# Patient Record
Sex: MALE | Race: WHITE | NOT HISPANIC OR LATINO | URBAN - METROPOLITAN AREA
[De-identification: names, ages, dates, MRNs, and addresses within clinical notes are randomized per-mention and may not be internally consistent; named-entity substitution may affect disease eponyms.]

---

## 2016-09-17 RX ORDER — ASPIRIN/CALCIUM CARB/MAGNESIUM 324 MG
1 TABLET ORAL
Qty: 30 | Refills: 3
Start: 2016-09-17 | End: 2023-09-15

## 2017-01-10 ENCOUNTER — OUTPATIENT (OUTPATIENT)
Dept: OUTPATIENT SERVICES | Facility: HOSPITAL | Age: 82
LOS: 1 days | Discharge: HOME | End: 2017-01-10

## 2017-01-10 DIAGNOSIS — Z95.0 PRESENCE OF CARDIAC PACEMAKER: Chronic | ICD-10-CM

## 2017-01-10 DIAGNOSIS — Z95.1 PRESENCE OF AORTOCORONARY BYPASS GRAFT: Chronic | ICD-10-CM

## 2017-01-10 DIAGNOSIS — Z98.89 OTHER SPECIFIED POSTPROCEDURAL STATES: Chronic | ICD-10-CM

## 2017-06-14 ENCOUNTER — OUTPATIENT (OUTPATIENT)
Dept: OUTPATIENT SERVICES | Facility: HOSPITAL | Age: 82
LOS: 1 days | Discharge: HOME | End: 2017-06-14

## 2017-06-14 DIAGNOSIS — Z95.0 PRESENCE OF CARDIAC PACEMAKER: Chronic | ICD-10-CM

## 2017-06-14 DIAGNOSIS — I65.29 OCCLUSION AND STENOSIS OF UNSPECIFIED CAROTID ARTERY: ICD-10-CM

## 2017-06-14 DIAGNOSIS — Z98.89 OTHER SPECIFIED POSTPROCEDURAL STATES: Chronic | ICD-10-CM

## 2017-06-14 DIAGNOSIS — Z95.1 PRESENCE OF AORTOCORONARY BYPASS GRAFT: Chronic | ICD-10-CM

## 2017-06-22 ENCOUNTER — APPOINTMENT (OUTPATIENT)
Dept: CARDIOLOGY | Facility: CLINIC | Age: 82
End: 2017-06-22

## 2017-06-22 VITALS — SYSTOLIC BLOOD PRESSURE: 140 MMHG | BODY MASS INDEX: 27.26 KG/M2 | DIASTOLIC BLOOD PRESSURE: 60 MMHG | WEIGHT: 190 LBS

## 2017-06-28 DIAGNOSIS — I48.91 UNSPECIFIED ATRIAL FIBRILLATION: ICD-10-CM

## 2017-06-28 DIAGNOSIS — Z79.01 LONG TERM (CURRENT) USE OF ANTICOAGULANTS: ICD-10-CM

## 2017-07-07 ENCOUNTER — OUTPATIENT (OUTPATIENT)
Dept: OUTPATIENT SERVICES | Facility: HOSPITAL | Age: 82
LOS: 1 days | Discharge: HOME | End: 2017-07-07

## 2017-07-07 DIAGNOSIS — N40.0 BENIGN PROSTATIC HYPERPLASIA WITHOUT LOWER URINARY TRACT SYMPTOMS: ICD-10-CM

## 2017-07-07 DIAGNOSIS — Z98.89 OTHER SPECIFIED POSTPROCEDURAL STATES: Chronic | ICD-10-CM

## 2017-07-07 DIAGNOSIS — Z95.0 PRESENCE OF CARDIAC PACEMAKER: Chronic | ICD-10-CM

## 2017-07-07 DIAGNOSIS — E78.00 PURE HYPERCHOLESTEROLEMIA, UNSPECIFIED: ICD-10-CM

## 2017-07-07 DIAGNOSIS — N18.2 CHRONIC KIDNEY DISEASE, STAGE 2 (MILD): ICD-10-CM

## 2017-07-07 DIAGNOSIS — K76.89 OTHER SPECIFIED DISEASES OF LIVER: ICD-10-CM

## 2017-07-07 DIAGNOSIS — I65.29 OCCLUSION AND STENOSIS OF UNSPECIFIED CAROTID ARTERY: ICD-10-CM

## 2017-07-07 DIAGNOSIS — Z95.1 PRESENCE OF AORTOCORONARY BYPASS GRAFT: Chronic | ICD-10-CM

## 2017-07-07 DIAGNOSIS — E55.9 VITAMIN D DEFICIENCY, UNSPECIFIED: ICD-10-CM

## 2017-07-07 DIAGNOSIS — D64.9 ANEMIA, UNSPECIFIED: ICD-10-CM

## 2017-07-07 DIAGNOSIS — E11.9 TYPE 2 DIABETES MELLITUS WITHOUT COMPLICATIONS: ICD-10-CM

## 2017-07-12 ENCOUNTER — OUTPATIENT (OUTPATIENT)
Dept: OUTPATIENT SERVICES | Facility: HOSPITAL | Age: 82
LOS: 1 days | Discharge: HOME | End: 2017-07-12

## 2017-07-12 DIAGNOSIS — Z79.01 LONG TERM (CURRENT) USE OF ANTICOAGULANTS: ICD-10-CM

## 2017-07-12 DIAGNOSIS — I48.91 UNSPECIFIED ATRIAL FIBRILLATION: ICD-10-CM

## 2017-07-12 DIAGNOSIS — Z98.89 OTHER SPECIFIED POSTPROCEDURAL STATES: Chronic | ICD-10-CM

## 2017-07-12 DIAGNOSIS — Z95.0 PRESENCE OF CARDIAC PACEMAKER: Chronic | ICD-10-CM

## 2017-07-12 DIAGNOSIS — Z95.1 PRESENCE OF AORTOCORONARY BYPASS GRAFT: Chronic | ICD-10-CM

## 2017-07-12 DIAGNOSIS — I65.29 OCCLUSION AND STENOSIS OF UNSPECIFIED CAROTID ARTERY: ICD-10-CM

## 2017-08-16 ENCOUNTER — OUTPATIENT (OUTPATIENT)
Dept: OUTPATIENT SERVICES | Facility: HOSPITAL | Age: 82
LOS: 1 days | Discharge: HOME | End: 2017-08-16

## 2017-08-16 DIAGNOSIS — Z79.01 LONG TERM (CURRENT) USE OF ANTICOAGULANTS: ICD-10-CM

## 2017-08-16 DIAGNOSIS — Z95.1 PRESENCE OF AORTOCORONARY BYPASS GRAFT: Chronic | ICD-10-CM

## 2017-08-16 DIAGNOSIS — Z95.0 PRESENCE OF CARDIAC PACEMAKER: Chronic | ICD-10-CM

## 2017-08-16 DIAGNOSIS — Z98.89 OTHER SPECIFIED POSTPROCEDURAL STATES: Chronic | ICD-10-CM

## 2017-08-16 DIAGNOSIS — I48.91 UNSPECIFIED ATRIAL FIBRILLATION: ICD-10-CM

## 2017-08-16 DIAGNOSIS — I65.29 OCCLUSION AND STENOSIS OF UNSPECIFIED CAROTID ARTERY: ICD-10-CM

## 2017-09-07 DIAGNOSIS — Z79.01 LONG TERM (CURRENT) USE OF ANTICOAGULANTS: ICD-10-CM

## 2017-09-07 DIAGNOSIS — I48.91 UNSPECIFIED ATRIAL FIBRILLATION: ICD-10-CM

## 2017-09-20 ENCOUNTER — OUTPATIENT (OUTPATIENT)
Dept: OUTPATIENT SERVICES | Facility: HOSPITAL | Age: 82
LOS: 1 days | Discharge: HOME | End: 2017-09-20

## 2017-09-20 DIAGNOSIS — I48.91 UNSPECIFIED ATRIAL FIBRILLATION: ICD-10-CM

## 2017-09-20 DIAGNOSIS — Z95.0 PRESENCE OF CARDIAC PACEMAKER: Chronic | ICD-10-CM

## 2017-09-20 DIAGNOSIS — Z79.01 LONG TERM (CURRENT) USE OF ANTICOAGULANTS: ICD-10-CM

## 2017-09-20 DIAGNOSIS — I65.29 OCCLUSION AND STENOSIS OF UNSPECIFIED CAROTID ARTERY: ICD-10-CM

## 2017-09-20 DIAGNOSIS — Z95.1 PRESENCE OF AORTOCORONARY BYPASS GRAFT: Chronic | ICD-10-CM

## 2017-09-20 DIAGNOSIS — Z98.89 OTHER SPECIFIED POSTPROCEDURAL STATES: Chronic | ICD-10-CM

## 2017-10-18 ENCOUNTER — OUTPATIENT (OUTPATIENT)
Dept: OUTPATIENT SERVICES | Facility: HOSPITAL | Age: 82
LOS: 1 days | Discharge: HOME | End: 2017-10-18

## 2017-10-18 DIAGNOSIS — I65.29 OCCLUSION AND STENOSIS OF UNSPECIFIED CAROTID ARTERY: ICD-10-CM

## 2017-10-18 DIAGNOSIS — Z79.01 LONG TERM (CURRENT) USE OF ANTICOAGULANTS: ICD-10-CM

## 2017-10-18 DIAGNOSIS — I48.91 UNSPECIFIED ATRIAL FIBRILLATION: ICD-10-CM

## 2017-10-18 DIAGNOSIS — Z95.1 PRESENCE OF AORTOCORONARY BYPASS GRAFT: Chronic | ICD-10-CM

## 2017-10-18 DIAGNOSIS — Z95.0 PRESENCE OF CARDIAC PACEMAKER: Chronic | ICD-10-CM

## 2017-10-18 DIAGNOSIS — Z98.89 OTHER SPECIFIED POSTPROCEDURAL STATES: Chronic | ICD-10-CM

## 2017-10-25 ENCOUNTER — OUTPATIENT (OUTPATIENT)
Dept: OUTPATIENT SERVICES | Facility: HOSPITAL | Age: 82
LOS: 1 days | Discharge: HOME | End: 2017-10-25

## 2017-10-25 DIAGNOSIS — I35.1 NONRHEUMATIC AORTIC (VALVE) INSUFFICIENCY: ICD-10-CM

## 2017-10-25 DIAGNOSIS — Z98.89 OTHER SPECIFIED POSTPROCEDURAL STATES: Chronic | ICD-10-CM

## 2017-10-25 DIAGNOSIS — Z95.0 PRESENCE OF CARDIAC PACEMAKER: Chronic | ICD-10-CM

## 2017-10-25 DIAGNOSIS — I65.29 OCCLUSION AND STENOSIS OF UNSPECIFIED CAROTID ARTERY: ICD-10-CM

## 2017-10-25 DIAGNOSIS — Z95.1 PRESENCE OF AORTOCORONARY BYPASS GRAFT: Chronic | ICD-10-CM

## 2017-11-15 ENCOUNTER — OUTPATIENT (OUTPATIENT)
Dept: OUTPATIENT SERVICES | Facility: HOSPITAL | Age: 82
LOS: 1 days | Discharge: HOME | End: 2017-11-15

## 2017-11-15 DIAGNOSIS — Z95.1 PRESENCE OF AORTOCORONARY BYPASS GRAFT: Chronic | ICD-10-CM

## 2017-11-15 DIAGNOSIS — Z95.0 PRESENCE OF CARDIAC PACEMAKER: Chronic | ICD-10-CM

## 2017-11-15 DIAGNOSIS — Z98.89 OTHER SPECIFIED POSTPROCEDURAL STATES: Chronic | ICD-10-CM

## 2017-11-15 DIAGNOSIS — I48.91 UNSPECIFIED ATRIAL FIBRILLATION: ICD-10-CM

## 2017-11-15 DIAGNOSIS — I65.29 OCCLUSION AND STENOSIS OF UNSPECIFIED CAROTID ARTERY: ICD-10-CM

## 2017-11-15 DIAGNOSIS — Z79.01 LONG TERM (CURRENT) USE OF ANTICOAGULANTS: ICD-10-CM

## 2017-12-06 ENCOUNTER — OUTPATIENT (OUTPATIENT)
Dept: OUTPATIENT SERVICES | Facility: HOSPITAL | Age: 82
LOS: 1 days | Discharge: HOME | End: 2017-12-06

## 2017-12-06 DIAGNOSIS — Z98.89 OTHER SPECIFIED POSTPROCEDURAL STATES: Chronic | ICD-10-CM

## 2017-12-06 DIAGNOSIS — E55.9 VITAMIN D DEFICIENCY, UNSPECIFIED: ICD-10-CM

## 2017-12-06 DIAGNOSIS — I65.29 OCCLUSION AND STENOSIS OF UNSPECIFIED CAROTID ARTERY: ICD-10-CM

## 2017-12-06 DIAGNOSIS — D64.9 ANEMIA, UNSPECIFIED: ICD-10-CM

## 2017-12-06 DIAGNOSIS — N18.2 CHRONIC KIDNEY DISEASE, STAGE 2 (MILD): ICD-10-CM

## 2017-12-06 DIAGNOSIS — Z95.1 PRESENCE OF AORTOCORONARY BYPASS GRAFT: Chronic | ICD-10-CM

## 2017-12-06 DIAGNOSIS — D50.0 IRON DEFICIENCY ANEMIA SECONDARY TO BLOOD LOSS (CHRONIC): ICD-10-CM

## 2017-12-06 DIAGNOSIS — E61.1 IRON DEFICIENCY: ICD-10-CM

## 2017-12-06 DIAGNOSIS — Z95.0 PRESENCE OF CARDIAC PACEMAKER: Chronic | ICD-10-CM

## 2017-12-06 DIAGNOSIS — N40.0 BENIGN PROSTATIC HYPERPLASIA WITHOUT LOWER URINARY TRACT SYMPTOMS: ICD-10-CM

## 2017-12-06 DIAGNOSIS — K76.89 OTHER SPECIFIED DISEASES OF LIVER: ICD-10-CM

## 2017-12-06 DIAGNOSIS — E78.00 PURE HYPERCHOLESTEROLEMIA, UNSPECIFIED: ICD-10-CM

## 2017-12-13 ENCOUNTER — OUTPATIENT (OUTPATIENT)
Dept: OUTPATIENT SERVICES | Facility: HOSPITAL | Age: 82
LOS: 1 days | Discharge: HOME | End: 2017-12-13

## 2017-12-13 DIAGNOSIS — Z98.89 OTHER SPECIFIED POSTPROCEDURAL STATES: Chronic | ICD-10-CM

## 2017-12-13 DIAGNOSIS — I65.29 OCCLUSION AND STENOSIS OF UNSPECIFIED CAROTID ARTERY: ICD-10-CM

## 2017-12-13 DIAGNOSIS — Z79.01 LONG TERM (CURRENT) USE OF ANTICOAGULANTS: ICD-10-CM

## 2017-12-13 DIAGNOSIS — E11.9 TYPE 2 DIABETES MELLITUS WITHOUT COMPLICATIONS: ICD-10-CM

## 2017-12-13 DIAGNOSIS — I48.91 UNSPECIFIED ATRIAL FIBRILLATION: ICD-10-CM

## 2017-12-13 DIAGNOSIS — Z95.0 PRESENCE OF CARDIAC PACEMAKER: Chronic | ICD-10-CM

## 2017-12-13 DIAGNOSIS — Z95.1 PRESENCE OF AORTOCORONARY BYPASS GRAFT: Chronic | ICD-10-CM

## 2017-12-21 ENCOUNTER — APPOINTMENT (OUTPATIENT)
Dept: CARDIOLOGY | Facility: CLINIC | Age: 82
End: 2017-12-21

## 2017-12-21 VITALS — WEIGHT: 191 LBS | SYSTOLIC BLOOD PRESSURE: 136 MMHG | DIASTOLIC BLOOD PRESSURE: 70 MMHG | BODY MASS INDEX: 27.41 KG/M2

## 2018-01-17 ENCOUNTER — OUTPATIENT (OUTPATIENT)
Dept: OUTPATIENT SERVICES | Facility: HOSPITAL | Age: 83
LOS: 1 days | Discharge: HOME | End: 2018-01-17

## 2018-01-17 DIAGNOSIS — I65.29 OCCLUSION AND STENOSIS OF UNSPECIFIED CAROTID ARTERY: ICD-10-CM

## 2018-01-17 DIAGNOSIS — Z95.0 PRESENCE OF CARDIAC PACEMAKER: Chronic | ICD-10-CM

## 2018-01-17 DIAGNOSIS — Z98.89 OTHER SPECIFIED POSTPROCEDURAL STATES: Chronic | ICD-10-CM

## 2018-01-17 DIAGNOSIS — Z79.01 LONG TERM (CURRENT) USE OF ANTICOAGULANTS: ICD-10-CM

## 2018-01-17 DIAGNOSIS — I48.91 UNSPECIFIED ATRIAL FIBRILLATION: ICD-10-CM

## 2018-01-17 DIAGNOSIS — Z95.1 PRESENCE OF AORTOCORONARY BYPASS GRAFT: Chronic | ICD-10-CM

## 2018-02-21 ENCOUNTER — OUTPATIENT (OUTPATIENT)
Dept: OUTPATIENT SERVICES | Facility: HOSPITAL | Age: 83
LOS: 1 days | Discharge: HOME | End: 2018-02-21

## 2018-02-21 DIAGNOSIS — Z98.89 OTHER SPECIFIED POSTPROCEDURAL STATES: Chronic | ICD-10-CM

## 2018-02-21 DIAGNOSIS — Z79.01 LONG TERM (CURRENT) USE OF ANTICOAGULANTS: ICD-10-CM

## 2018-02-21 DIAGNOSIS — Z95.1 PRESENCE OF AORTOCORONARY BYPASS GRAFT: Chronic | ICD-10-CM

## 2018-02-21 DIAGNOSIS — I48.91 UNSPECIFIED ATRIAL FIBRILLATION: ICD-10-CM

## 2018-02-21 DIAGNOSIS — Z95.0 PRESENCE OF CARDIAC PACEMAKER: Chronic | ICD-10-CM

## 2018-03-19 ENCOUNTER — OUTPATIENT (OUTPATIENT)
Dept: OUTPATIENT SERVICES | Facility: HOSPITAL | Age: 83
LOS: 1 days | Discharge: HOME | End: 2018-03-19

## 2018-03-19 DIAGNOSIS — E55.9 VITAMIN D DEFICIENCY, UNSPECIFIED: ICD-10-CM

## 2018-03-19 DIAGNOSIS — Z95.1 PRESENCE OF AORTOCORONARY BYPASS GRAFT: Chronic | ICD-10-CM

## 2018-03-19 DIAGNOSIS — Z95.0 PRESENCE OF CARDIAC PACEMAKER: Chronic | ICD-10-CM

## 2018-03-19 DIAGNOSIS — E78.4 OTHER HYPERLIPIDEMIA: ICD-10-CM

## 2018-03-19 DIAGNOSIS — Z98.89 OTHER SPECIFIED POSTPROCEDURAL STATES: Chronic | ICD-10-CM

## 2018-03-19 DIAGNOSIS — E78.1 PURE HYPERGLYCERIDEMIA: ICD-10-CM

## 2018-03-19 DIAGNOSIS — R73.09 OTHER ABNORMAL GLUCOSE: ICD-10-CM

## 2018-03-19 DIAGNOSIS — R68.89 OTHER GENERAL SYMPTOMS AND SIGNS: ICD-10-CM

## 2018-03-19 DIAGNOSIS — R74.8 ABNORMAL LEVELS OF OTHER SERUM ENZYMES: ICD-10-CM

## 2018-03-19 DIAGNOSIS — Z02.89 ENCOUNTER FOR OTHER ADMINISTRATIVE EXAMINATIONS: ICD-10-CM

## 2018-03-19 DIAGNOSIS — R79.82 ELEVATED C-REACTIVE PROTEIN (CRP): ICD-10-CM

## 2018-03-28 ENCOUNTER — OUTPATIENT (OUTPATIENT)
Dept: OUTPATIENT SERVICES | Facility: HOSPITAL | Age: 83
LOS: 1 days | Discharge: HOME | End: 2018-03-28

## 2018-03-28 DIAGNOSIS — Z95.1 PRESENCE OF AORTOCORONARY BYPASS GRAFT: Chronic | ICD-10-CM

## 2018-03-28 DIAGNOSIS — Z98.89 OTHER SPECIFIED POSTPROCEDURAL STATES: Chronic | ICD-10-CM

## 2018-03-28 DIAGNOSIS — I48.91 UNSPECIFIED ATRIAL FIBRILLATION: ICD-10-CM

## 2018-03-28 DIAGNOSIS — Z95.0 PRESENCE OF CARDIAC PACEMAKER: Chronic | ICD-10-CM

## 2018-03-28 DIAGNOSIS — Z79.01 LONG TERM (CURRENT) USE OF ANTICOAGULANTS: ICD-10-CM

## 2018-05-04 ENCOUNTER — OUTPATIENT (OUTPATIENT)
Dept: OUTPATIENT SERVICES | Facility: HOSPITAL | Age: 83
LOS: 1 days | Discharge: HOME | End: 2018-05-04

## 2018-05-04 DIAGNOSIS — Z79.01 LONG TERM (CURRENT) USE OF ANTICOAGULANTS: ICD-10-CM

## 2018-05-04 DIAGNOSIS — Z98.89 OTHER SPECIFIED POSTPROCEDURAL STATES: Chronic | ICD-10-CM

## 2018-05-04 DIAGNOSIS — Z95.1 PRESENCE OF AORTOCORONARY BYPASS GRAFT: Chronic | ICD-10-CM

## 2018-05-04 DIAGNOSIS — I48.91 UNSPECIFIED ATRIAL FIBRILLATION: ICD-10-CM

## 2018-05-04 DIAGNOSIS — Z95.0 PRESENCE OF CARDIAC PACEMAKER: Chronic | ICD-10-CM

## 2018-06-08 ENCOUNTER — OUTPATIENT (OUTPATIENT)
Dept: OUTPATIENT SERVICES | Facility: HOSPITAL | Age: 83
LOS: 1 days | Discharge: HOME | End: 2018-06-08

## 2018-06-08 DIAGNOSIS — Z79.01 LONG TERM (CURRENT) USE OF ANTICOAGULANTS: ICD-10-CM

## 2018-06-08 DIAGNOSIS — Z98.89 OTHER SPECIFIED POSTPROCEDURAL STATES: Chronic | ICD-10-CM

## 2018-06-08 DIAGNOSIS — Z95.0 PRESENCE OF CARDIAC PACEMAKER: Chronic | ICD-10-CM

## 2018-06-08 DIAGNOSIS — Z95.1 PRESENCE OF AORTOCORONARY BYPASS GRAFT: Chronic | ICD-10-CM

## 2018-06-08 DIAGNOSIS — I48.91 UNSPECIFIED ATRIAL FIBRILLATION: ICD-10-CM

## 2018-06-21 ENCOUNTER — APPOINTMENT (OUTPATIENT)
Dept: CARDIOLOGY | Facility: CLINIC | Age: 83
End: 2018-06-21

## 2018-06-21 VITALS — BODY MASS INDEX: 27.55 KG/M2 | SYSTOLIC BLOOD PRESSURE: 126 MMHG | DIASTOLIC BLOOD PRESSURE: 56 MMHG | WEIGHT: 192 LBS

## 2018-07-13 ENCOUNTER — OUTPATIENT (OUTPATIENT)
Dept: OUTPATIENT SERVICES | Facility: HOSPITAL | Age: 83
LOS: 1 days | Discharge: HOME | End: 2018-07-13

## 2018-07-13 DIAGNOSIS — I48.91 UNSPECIFIED ATRIAL FIBRILLATION: ICD-10-CM

## 2018-07-13 DIAGNOSIS — Z98.89 OTHER SPECIFIED POSTPROCEDURAL STATES: Chronic | ICD-10-CM

## 2018-07-13 DIAGNOSIS — Z79.01 LONG TERM (CURRENT) USE OF ANTICOAGULANTS: ICD-10-CM

## 2018-07-13 DIAGNOSIS — Z95.0 PRESENCE OF CARDIAC PACEMAKER: Chronic | ICD-10-CM

## 2018-07-13 DIAGNOSIS — Z95.1 PRESENCE OF AORTOCORONARY BYPASS GRAFT: Chronic | ICD-10-CM

## 2018-08-17 ENCOUNTER — OUTPATIENT (OUTPATIENT)
Dept: OUTPATIENT SERVICES | Facility: HOSPITAL | Age: 83
LOS: 1 days | Discharge: HOME | End: 2018-08-17

## 2018-08-17 DIAGNOSIS — I48.91 UNSPECIFIED ATRIAL FIBRILLATION: ICD-10-CM

## 2018-08-17 DIAGNOSIS — Z95.0 PRESENCE OF CARDIAC PACEMAKER: Chronic | ICD-10-CM

## 2018-08-17 DIAGNOSIS — Z98.89 OTHER SPECIFIED POSTPROCEDURAL STATES: Chronic | ICD-10-CM

## 2018-08-17 DIAGNOSIS — Z95.1 PRESENCE OF AORTOCORONARY BYPASS GRAFT: Chronic | ICD-10-CM

## 2018-08-17 DIAGNOSIS — Z79.01 LONG TERM (CURRENT) USE OF ANTICOAGULANTS: ICD-10-CM

## 2018-08-24 ENCOUNTER — APPOINTMENT (OUTPATIENT)
Dept: CARDIOTHORACIC SURGERY | Facility: CLINIC | Age: 83
End: 2018-08-24
Payer: MEDICARE

## 2018-08-24 PROCEDURE — 99213 OFFICE O/P EST LOW 20 MIN: CPT

## 2018-09-14 ENCOUNTER — OUTPATIENT (OUTPATIENT)
Dept: OUTPATIENT SERVICES | Facility: HOSPITAL | Age: 83
LOS: 1 days | Discharge: HOME | End: 2018-09-14

## 2018-09-14 DIAGNOSIS — Z95.1 PRESENCE OF AORTOCORONARY BYPASS GRAFT: Chronic | ICD-10-CM

## 2018-09-14 DIAGNOSIS — I48.91 UNSPECIFIED ATRIAL FIBRILLATION: ICD-10-CM

## 2018-09-14 DIAGNOSIS — Z79.01 LONG TERM (CURRENT) USE OF ANTICOAGULANTS: ICD-10-CM

## 2018-09-14 DIAGNOSIS — Z95.0 PRESENCE OF CARDIAC PACEMAKER: Chronic | ICD-10-CM

## 2018-09-14 DIAGNOSIS — Z98.89 OTHER SPECIFIED POSTPROCEDURAL STATES: Chronic | ICD-10-CM

## 2018-09-14 LAB
POCT INR: 1.9 RATIO — HIGH (ref 0.9–1.2)
POCT PT: 23.3 SEC — HIGH (ref 10–13.4)

## 2018-09-18 ENCOUNTER — OUTPATIENT (OUTPATIENT)
Dept: OUTPATIENT SERVICES | Facility: HOSPITAL | Age: 83
LOS: 1 days | Discharge: HOME | End: 2018-09-18

## 2018-09-18 DIAGNOSIS — E11.9 TYPE 2 DIABETES MELLITUS WITHOUT COMPLICATIONS: ICD-10-CM

## 2018-09-18 DIAGNOSIS — Z95.1 PRESENCE OF AORTOCORONARY BYPASS GRAFT: Chronic | ICD-10-CM

## 2018-09-18 DIAGNOSIS — N18.2 CHRONIC KIDNEY DISEASE, STAGE 2 (MILD): ICD-10-CM

## 2018-09-18 DIAGNOSIS — Z95.0 PRESENCE OF CARDIAC PACEMAKER: Chronic | ICD-10-CM

## 2018-09-18 DIAGNOSIS — D64.9 ANEMIA, UNSPECIFIED: ICD-10-CM

## 2018-09-18 DIAGNOSIS — E78.00 PURE HYPERCHOLESTEROLEMIA, UNSPECIFIED: ICD-10-CM

## 2018-09-18 DIAGNOSIS — K76.89 OTHER SPECIFIED DISEASES OF LIVER: ICD-10-CM

## 2018-09-18 DIAGNOSIS — Z98.89 OTHER SPECIFIED POSTPROCEDURAL STATES: Chronic | ICD-10-CM

## 2018-09-18 DIAGNOSIS — N39.0 URINARY TRACT INFECTION, SITE NOT SPECIFIED: ICD-10-CM

## 2018-10-12 ENCOUNTER — OUTPATIENT (OUTPATIENT)
Dept: OUTPATIENT SERVICES | Facility: HOSPITAL | Age: 83
LOS: 1 days | Discharge: HOME | End: 2018-10-12

## 2018-10-12 DIAGNOSIS — Z95.1 PRESENCE OF AORTOCORONARY BYPASS GRAFT: Chronic | ICD-10-CM

## 2018-10-12 DIAGNOSIS — I48.91 UNSPECIFIED ATRIAL FIBRILLATION: ICD-10-CM

## 2018-10-12 DIAGNOSIS — Z79.01 LONG TERM (CURRENT) USE OF ANTICOAGULANTS: ICD-10-CM

## 2018-10-12 DIAGNOSIS — Z98.89 OTHER SPECIFIED POSTPROCEDURAL STATES: Chronic | ICD-10-CM

## 2018-10-12 DIAGNOSIS — Z95.0 PRESENCE OF CARDIAC PACEMAKER: Chronic | ICD-10-CM

## 2018-10-12 LAB
POCT INR: 1.6 RATIO — HIGH (ref 0.9–1.2)
POCT PT: 19.5 SEC — HIGH (ref 10–13.4)

## 2018-10-19 ENCOUNTER — OUTPATIENT (OUTPATIENT)
Dept: OUTPATIENT SERVICES | Facility: HOSPITAL | Age: 83
LOS: 1 days | Discharge: HOME | End: 2018-10-19

## 2018-10-19 DIAGNOSIS — I48.91 UNSPECIFIED ATRIAL FIBRILLATION: ICD-10-CM

## 2018-10-19 DIAGNOSIS — Z98.89 OTHER SPECIFIED POSTPROCEDURAL STATES: Chronic | ICD-10-CM

## 2018-10-19 DIAGNOSIS — Z79.01 LONG TERM (CURRENT) USE OF ANTICOAGULANTS: ICD-10-CM

## 2018-10-19 DIAGNOSIS — Z95.1 PRESENCE OF AORTOCORONARY BYPASS GRAFT: Chronic | ICD-10-CM

## 2018-10-19 DIAGNOSIS — Z95.0 PRESENCE OF CARDIAC PACEMAKER: Chronic | ICD-10-CM

## 2018-10-19 LAB
POCT INR: 1.9 RATIO — HIGH (ref 0.9–1.2)
POCT PT: 22.7 SEC — HIGH (ref 10–13.4)

## 2018-11-01 ENCOUNTER — OUTPATIENT (OUTPATIENT)
Dept: OUTPATIENT SERVICES | Facility: HOSPITAL | Age: 83
LOS: 1 days | Discharge: HOME | End: 2018-11-01

## 2018-11-01 DIAGNOSIS — I48.91 UNSPECIFIED ATRIAL FIBRILLATION: ICD-10-CM

## 2018-11-01 DIAGNOSIS — Z98.89 OTHER SPECIFIED POSTPROCEDURAL STATES: Chronic | ICD-10-CM

## 2018-11-01 DIAGNOSIS — Z95.0 PRESENCE OF CARDIAC PACEMAKER: Chronic | ICD-10-CM

## 2018-11-01 DIAGNOSIS — Z95.1 PRESENCE OF AORTOCORONARY BYPASS GRAFT: Chronic | ICD-10-CM

## 2018-11-01 DIAGNOSIS — Z79.01 LONG TERM (CURRENT) USE OF ANTICOAGULANTS: ICD-10-CM

## 2018-11-01 LAB
POCT INR: 2 RATIO — HIGH (ref 0.9–1.2)
POCT PT: 23.8 SEC — HIGH (ref 10–13.4)

## 2018-11-08 ENCOUNTER — OUTPATIENT (OUTPATIENT)
Dept: OUTPATIENT SERVICES | Facility: HOSPITAL | Age: 83
LOS: 1 days | Discharge: HOME | End: 2018-11-08

## 2018-11-08 DIAGNOSIS — R31.9 HEMATURIA, UNSPECIFIED: ICD-10-CM

## 2018-11-08 DIAGNOSIS — Z95.0 PRESENCE OF CARDIAC PACEMAKER: Chronic | ICD-10-CM

## 2018-11-08 DIAGNOSIS — Z98.89 OTHER SPECIFIED POSTPROCEDURAL STATES: Chronic | ICD-10-CM

## 2018-11-08 DIAGNOSIS — N40.1 BENIGN PROSTATIC HYPERPLASIA WITH LOWER URINARY TRACT SYMPTOMS: ICD-10-CM

## 2018-11-08 DIAGNOSIS — Z95.1 PRESENCE OF AORTOCORONARY BYPASS GRAFT: Chronic | ICD-10-CM

## 2018-11-14 ENCOUNTER — OUTPATIENT (OUTPATIENT)
Dept: OUTPATIENT SERVICES | Facility: HOSPITAL | Age: 83
LOS: 1 days | Discharge: HOME | End: 2018-11-14

## 2018-11-14 DIAGNOSIS — N20.1 CALCULUS OF URETER: ICD-10-CM

## 2018-11-14 DIAGNOSIS — N40.1 BENIGN PROSTATIC HYPERPLASIA WITH LOWER URINARY TRACT SYMPTOMS: ICD-10-CM

## 2018-11-14 DIAGNOSIS — Z95.0 PRESENCE OF CARDIAC PACEMAKER: Chronic | ICD-10-CM

## 2018-11-14 DIAGNOSIS — Z98.89 OTHER SPECIFIED POSTPROCEDURAL STATES: Chronic | ICD-10-CM

## 2018-11-14 DIAGNOSIS — Z95.1 PRESENCE OF AORTOCORONARY BYPASS GRAFT: Chronic | ICD-10-CM

## 2018-11-20 ENCOUNTER — OUTPATIENT (OUTPATIENT)
Dept: OUTPATIENT SERVICES | Facility: HOSPITAL | Age: 83
LOS: 1 days | Discharge: HOME | End: 2018-11-20

## 2018-11-20 VITALS
DIASTOLIC BLOOD PRESSURE: 86 MMHG | SYSTOLIC BLOOD PRESSURE: 150 MMHG | HEIGHT: 70 IN | HEART RATE: 72 BPM | WEIGHT: 191.8 LBS | TEMPERATURE: 96 F | RESPIRATION RATE: 18 BRPM | OXYGEN SATURATION: 97 %

## 2018-11-20 DIAGNOSIS — Z95.0 PRESENCE OF CARDIAC PACEMAKER: Chronic | ICD-10-CM

## 2018-11-20 DIAGNOSIS — Z98.89 OTHER SPECIFIED POSTPROCEDURAL STATES: Chronic | ICD-10-CM

## 2018-11-20 DIAGNOSIS — Z01.818 ENCOUNTER FOR OTHER PREPROCEDURAL EXAMINATION: ICD-10-CM

## 2018-11-20 DIAGNOSIS — N20.0 CALCULUS OF KIDNEY: ICD-10-CM

## 2018-11-20 DIAGNOSIS — Z95.1 PRESENCE OF AORTOCORONARY BYPASS GRAFT: Chronic | ICD-10-CM

## 2018-11-20 LAB
ALBUMIN SERPL ELPH-MCNC: 4.6 G/DL — SIGNIFICANT CHANGE UP (ref 3.5–5.2)
ALP SERPL-CCNC: 197 U/L — HIGH (ref 30–115)
ALT FLD-CCNC: 37 U/L — SIGNIFICANT CHANGE UP (ref 0–41)
ANION GAP SERPL CALC-SCNC: 16 MMOL/L — HIGH (ref 7–14)
APPEARANCE UR: ABNORMAL
APTT BLD: 41.9 SEC — HIGH (ref 27–39.2)
AST SERPL-CCNC: 34 U/L — SIGNIFICANT CHANGE UP (ref 0–41)
BASOPHILS # BLD AUTO: 0.04 K/UL — SIGNIFICANT CHANGE UP (ref 0–0.2)
BASOPHILS NFR BLD AUTO: 0.4 % — SIGNIFICANT CHANGE UP (ref 0–1)
BILIRUB SERPL-MCNC: 0.6 MG/DL — SIGNIFICANT CHANGE UP (ref 0.2–1.2)
BILIRUB UR-MCNC: NEGATIVE — SIGNIFICANT CHANGE UP
BUN SERPL-MCNC: 19 MG/DL — SIGNIFICANT CHANGE UP (ref 10–20)
CALCIUM SERPL-MCNC: 10 MG/DL — SIGNIFICANT CHANGE UP (ref 8.5–10.1)
CHLORIDE SERPL-SCNC: 100 MMOL/L — SIGNIFICANT CHANGE UP (ref 98–110)
CO2 SERPL-SCNC: 27 MMOL/L — SIGNIFICANT CHANGE UP (ref 17–32)
COLOR SPEC: YELLOW — SIGNIFICANT CHANGE UP
CREAT SERPL-MCNC: 1 MG/DL — SIGNIFICANT CHANGE UP (ref 0.7–1.5)
DIFF PNL FLD: ABNORMAL
EOSINOPHIL # BLD AUTO: 0.16 K/UL — SIGNIFICANT CHANGE UP (ref 0–0.7)
EOSINOPHIL NFR BLD AUTO: 1.8 % — SIGNIFICANT CHANGE UP (ref 0–8)
EPI CELLS # UR: ABNORMAL /HPF
ESTIMATED AVERAGE GLUCOSE: 123 MG/DL — HIGH (ref 68–114)
GLUCOSE SERPL-MCNC: 127 MG/DL — HIGH (ref 70–99)
GLUCOSE UR QL: NEGATIVE MG/DL — SIGNIFICANT CHANGE UP
GRAN CASTS # UR COMP ASSIST: ABNORMAL /LPF
HBA1C BLD-MCNC: 5.9 % — HIGH (ref 4–5.6)
HCT VFR BLD CALC: 41.2 % — LOW (ref 42–52)
HGB BLD-MCNC: 13.1 G/DL — LOW (ref 14–18)
IMM GRANULOCYTES NFR BLD AUTO: 0.7 % — HIGH (ref 0.1–0.3)
INR BLD: 2.13 RATIO — HIGH (ref 0.65–1.3)
KETONES UR-MCNC: NEGATIVE — SIGNIFICANT CHANGE UP
LEUKOCYTE ESTERASE UR-ACNC: NEGATIVE — SIGNIFICANT CHANGE UP
LYMPHOCYTES # BLD AUTO: 1.59 K/UL — SIGNIFICANT CHANGE UP (ref 1.2–3.4)
LYMPHOCYTES # BLD AUTO: 17.7 % — LOW (ref 20.5–51.1)
MCHC RBC-ENTMCNC: 30.6 PG — SIGNIFICANT CHANGE UP (ref 27–31)
MCHC RBC-ENTMCNC: 31.8 G/DL — LOW (ref 32–37)
MCV RBC AUTO: 96.3 FL — HIGH (ref 80–94)
MONOCYTES # BLD AUTO: 0.86 K/UL — HIGH (ref 0.1–0.6)
MONOCYTES NFR BLD AUTO: 9.6 % — HIGH (ref 1.7–9.3)
NEUTROPHILS # BLD AUTO: 6.27 K/UL — SIGNIFICANT CHANGE UP (ref 1.4–6.5)
NEUTROPHILS NFR BLD AUTO: 69.8 % — SIGNIFICANT CHANGE UP (ref 42.2–75.2)
NITRITE UR-MCNC: NEGATIVE — SIGNIFICANT CHANGE UP
NRBC # BLD: 0 /100 WBCS — SIGNIFICANT CHANGE UP (ref 0–0)
PH UR: 6 — SIGNIFICANT CHANGE UP (ref 5–8)
PLATELET # BLD AUTO: 325 K/UL — SIGNIFICANT CHANGE UP (ref 130–400)
POTASSIUM SERPL-MCNC: 4.6 MMOL/L — SIGNIFICANT CHANGE UP (ref 3.5–5)
POTASSIUM SERPL-SCNC: 4.6 MMOL/L — SIGNIFICANT CHANGE UP (ref 3.5–5)
PROT SERPL-MCNC: 7.1 G/DL — SIGNIFICANT CHANGE UP (ref 6–8)
PROT UR-MCNC: 30 MG/DL
PROTHROM AB SERPL-ACNC: 24.3 SEC — HIGH (ref 9.95–12.87)
RBC # BLD: 4.28 M/UL — LOW (ref 4.7–6.1)
RBC # FLD: 13.2 % — SIGNIFICANT CHANGE UP (ref 11.5–14.5)
RBC CASTS # UR COMP ASSIST: ABNORMAL /HPF
SODIUM SERPL-SCNC: 143 MMOL/L — SIGNIFICANT CHANGE UP (ref 135–146)
SP GR SPEC: 1.02 — SIGNIFICANT CHANGE UP (ref 1.01–1.03)
UROBILINOGEN FLD QL: 0.2 MG/DL — SIGNIFICANT CHANGE UP (ref 0.2–0.2)
WBC # BLD: 8.98 K/UL — SIGNIFICANT CHANGE UP (ref 4.8–10.8)
WBC # FLD AUTO: 8.98 K/UL — SIGNIFICANT CHANGE UP (ref 4.8–10.8)
WBC UR QL: SIGNIFICANT CHANGE UP /HPF

## 2018-11-20 NOTE — H&P PST ADULT - NS ABD PE RECTAL EXAM
- continue tylenol 650 mg q 6 hrs prn   - will order home PT throught [facility]  - bilateral mild osteoarthritis seen on recent hip xray not examined

## 2018-11-20 NOTE — H&P PST ADULT - PRIMARY CARE PROVIDER
usha- appt today 11/20/18      alison- 10/2018      [facility]- appt 11/21/18 usha- appt today 11/20/18      alison- 10/2018      coumadin clinic- appt 11/21/18   eps alessio- 8/2018

## 2018-11-20 NOTE — H&P PST ADULT - ATTENDING COMMENTS
9mm right renal stones and right proximal ureteral stone (on most recent KUB x-ray).  For Right ESWL today... all r/b/o d/w pt. All ? answered. 9mm right renal stones and right proximal ureteral stone (on most recent [facility] x-ray).  For Right ESWL today... all r/b/o d/w pt. All ? answered.  ICS r/w pt

## 2018-11-20 NOTE — H&P PST ADULT - HISTORY OF PRESENT ILLNESS
CURRENTLY  DENIES ANY CP, SOB,PALPITATIONS,COUGH OR DYSURIA  EXERCISE TOLERANCE 1/2 FOS WITHOUT SOB    AS PER PATIENT  this is his/her complete medical history including medications - PRESCRIPTIONS  OVER THE COUNTER MEDS

## 2018-11-20 NOTE — H&P PST ADULT - REASON FOR ADMISSION
85 Y/O M SCHEDULED FOR PAST FOR RIGHT EXTRACORPOREAL SHOCK WAVE LITHOTRIPSY. PT WITH hx OF KIDNEY STONES IN PAST

## 2018-11-20 NOTE — H&P PST ADULT - PSH
History of CEA (carotid endarterectomy)    History of permanent cardiac pacemaker placement    S/P CABG x 3

## 2018-11-20 NOTE — H&P PST ADULT - PMH
A-fib    Aortic stenosis, severe    Asthma with COPD    BPH (benign prostatic hyperplasia)    DM (diabetes mellitus)    Essential hypertension    HLD (hyperlipidemia)    Pacemaker

## 2018-11-21 ENCOUNTER — OUTPATIENT (OUTPATIENT)
Dept: OUTPATIENT SERVICES | Facility: HOSPITAL | Age: 83
LOS: 1 days | Discharge: HOME | End: 2018-11-21

## 2018-11-21 DIAGNOSIS — Z79.01 LONG TERM (CURRENT) USE OF ANTICOAGULANTS: ICD-10-CM

## 2018-11-21 DIAGNOSIS — Z98.89 OTHER SPECIFIED POSTPROCEDURAL STATES: Chronic | ICD-10-CM

## 2018-11-21 DIAGNOSIS — Z95.1 PRESENCE OF AORTOCORONARY BYPASS GRAFT: Chronic | ICD-10-CM

## 2018-11-21 DIAGNOSIS — Z95.0 PRESENCE OF CARDIAC PACEMAKER: Chronic | ICD-10-CM

## 2018-11-21 DIAGNOSIS — I48.91 UNSPECIFIED ATRIAL FIBRILLATION: ICD-10-CM

## 2018-11-21 LAB
CULTURE RESULTS: NO GROWTH — SIGNIFICANT CHANGE UP
POCT INR: 2 RATIO — HIGH (ref 0.9–1.2)
POCT PT: 24 SEC — HIGH (ref 10–13.4)
SPECIMEN SOURCE: SIGNIFICANT CHANGE UP

## 2018-11-26 NOTE — ASU PATIENT PROFILE, ADULT - PSH
H/O aortic valve replacement    History of CEA (carotid endarterectomy)    History of permanent cardiac pacemaker placement    S/P CABG x 3

## 2018-11-27 ENCOUNTER — OUTPATIENT (OUTPATIENT)
Dept: OUTPATIENT SERVICES | Facility: HOSPITAL | Age: 83
LOS: 1 days | Discharge: HOME | End: 2018-11-27

## 2018-11-27 VITALS
HEART RATE: 64 BPM | RESPIRATION RATE: 16 BRPM | HEIGHT: 70 IN | DIASTOLIC BLOOD PRESSURE: 76 MMHG | OXYGEN SATURATION: 98 % | TEMPERATURE: 97 F | SYSTOLIC BLOOD PRESSURE: 176 MMHG | WEIGHT: 188.94 LBS

## 2018-11-27 VITALS — HEART RATE: 59 BPM | DIASTOLIC BLOOD PRESSURE: 69 MMHG | SYSTOLIC BLOOD PRESSURE: 175 MMHG | RESPIRATION RATE: 17 BRPM

## 2018-11-27 DIAGNOSIS — Z95.2 PRESENCE OF PROSTHETIC HEART VALVE: Chronic | ICD-10-CM

## 2018-11-27 DIAGNOSIS — Z98.89 OTHER SPECIFIED POSTPROCEDURAL STATES: Chronic | ICD-10-CM

## 2018-11-27 DIAGNOSIS — Z95.0 PRESENCE OF CARDIAC PACEMAKER: Chronic | ICD-10-CM

## 2018-11-27 DIAGNOSIS — Z95.1 PRESENCE OF AORTOCORONARY BYPASS GRAFT: Chronic | ICD-10-CM

## 2018-11-27 LAB — GLUCOSE BLDC GLUCOMTR-MCNC: 119 MG/DL — HIGH (ref 70–99)

## 2018-11-27 RX ORDER — OXYCODONE AND ACETAMINOPHEN 5; 325 MG/1; MG/1
1 TABLET ORAL ONCE
Qty: 0 | Refills: 0 | Status: DISCONTINUED | OUTPATIENT
Start: 2018-11-27 | End: 2018-11-27

## 2018-11-27 RX ORDER — ONDANSETRON 8 MG/1
4 TABLET, FILM COATED ORAL ONCE
Qty: 0 | Refills: 0 | Status: DISCONTINUED | OUTPATIENT
Start: 2018-11-27 | End: 2018-12-12

## 2018-11-27 RX ORDER — SODIUM CHLORIDE 9 MG/ML
1000 INJECTION, SOLUTION INTRAVENOUS
Qty: 0 | Refills: 0 | Status: DISCONTINUED | OUTPATIENT
Start: 2018-11-27 | End: 2018-12-12

## 2018-11-27 RX ORDER — MORPHINE SULFATE 50 MG/1
2 CAPSULE, EXTENDED RELEASE ORAL ONCE
Qty: 0 | Refills: 0 | Status: DISCONTINUED | OUTPATIENT
Start: 2018-11-27 | End: 2018-11-27

## 2018-11-27 RX ADMIN — SODIUM CHLORIDE 100 MILLILITER(S): 9 INJECTION, SOLUTION INTRAVENOUS at 16:04

## 2018-11-27 NOTE — PRE-ANESTHESIA EVALUATION ADULT - NSANTHOSAYNRD_GEN_A_CORE
SEE TOOL/No. MICA screening performed.  STOP BANG Legend: 0-2 = LOW Risk; 3-4 = INTERMEDIATE Risk; 5-8 = HIGH Risk

## 2018-11-27 NOTE — BRIEF OPERATIVE NOTE - PROCEDURE
<<-----Click on this checkbox to enter Procedure ESWL  11/27/2018  [facility] stone  Active  [email or web address]

## 2018-11-30 DIAGNOSIS — N20.2 CALCULUS OF KIDNEY WITH CALCULUS OF URETER: ICD-10-CM

## 2018-11-30 DIAGNOSIS — Z79.82 LONG TERM (CURRENT) USE OF ASPIRIN: ICD-10-CM

## 2018-11-30 DIAGNOSIS — J44.9 CHRONIC OBSTRUCTIVE PULMONARY DISEASE, UNSPECIFIED: ICD-10-CM

## 2018-11-30 DIAGNOSIS — E11.9 TYPE 2 DIABETES MELLITUS WITHOUT COMPLICATIONS: ICD-10-CM

## 2018-11-30 DIAGNOSIS — Z87.891 PERSONAL HISTORY OF NICOTINE DEPENDENCE: ICD-10-CM

## 2018-11-30 DIAGNOSIS — I25.10 ATHEROSCLEROTIC HEART DISEASE OF NATIVE CORONARY ARTERY WITHOUT ANGINA PECTORIS: ICD-10-CM

## 2018-11-30 DIAGNOSIS — I77.9 DISORDER OF ARTERIES AND ARTERIOLES, UNSPECIFIED: ICD-10-CM

## 2018-11-30 DIAGNOSIS — E78.00 PURE HYPERCHOLESTEROLEMIA, UNSPECIFIED: ICD-10-CM

## 2018-11-30 DIAGNOSIS — Z79.01 LONG TERM (CURRENT) USE OF ANTICOAGULANTS: ICD-10-CM

## 2018-11-30 DIAGNOSIS — I34.0 NONRHEUMATIC MITRAL (VALVE) INSUFFICIENCY: ICD-10-CM

## 2018-11-30 DIAGNOSIS — Z95.0 PRESENCE OF CARDIAC PACEMAKER: ICD-10-CM

## 2018-11-30 DIAGNOSIS — E66.3 OVERWEIGHT: ICD-10-CM

## 2018-11-30 DIAGNOSIS — Z95.2 PRESENCE OF PROSTHETIC HEART VALVE: ICD-10-CM

## 2018-11-30 DIAGNOSIS — Z79.84 LONG TERM (CURRENT) USE OF ORAL HYPOGLYCEMIC DRUGS: ICD-10-CM

## 2018-11-30 DIAGNOSIS — Z91.041 RADIOGRAPHIC DYE ALLERGY STATUS: ICD-10-CM

## 2018-11-30 DIAGNOSIS — Z95.1 PRESENCE OF AORTOCORONARY BYPASS GRAFT: ICD-10-CM

## 2018-11-30 DIAGNOSIS — D64.9 ANEMIA, UNSPECIFIED: ICD-10-CM

## 2018-11-30 DIAGNOSIS — I10 ESSENTIAL (PRIMARY) HYPERTENSION: ICD-10-CM

## 2018-12-03 ENCOUNTER — OUTPATIENT (OUTPATIENT)
Dept: OUTPATIENT SERVICES | Facility: HOSPITAL | Age: 83
LOS: 1 days | Discharge: HOME | End: 2018-12-03

## 2018-12-03 DIAGNOSIS — I48.91 UNSPECIFIED ATRIAL FIBRILLATION: ICD-10-CM

## 2018-12-03 DIAGNOSIS — Z79.01 LONG TERM (CURRENT) USE OF ANTICOAGULANTS: ICD-10-CM

## 2018-12-03 DIAGNOSIS — Z95.0 PRESENCE OF CARDIAC PACEMAKER: Chronic | ICD-10-CM

## 2018-12-03 DIAGNOSIS — N20.0 CALCULUS OF KIDNEY: ICD-10-CM

## 2018-12-03 DIAGNOSIS — Z95.1 PRESENCE OF AORTOCORONARY BYPASS GRAFT: Chronic | ICD-10-CM

## 2018-12-03 DIAGNOSIS — Z95.2 PRESENCE OF PROSTHETIC HEART VALVE: Chronic | ICD-10-CM

## 2018-12-03 DIAGNOSIS — Z98.89 OTHER SPECIFIED POSTPROCEDURAL STATES: Chronic | ICD-10-CM

## 2018-12-03 PROBLEM — E11.9 TYPE 2 DIABETES MELLITUS WITHOUT COMPLICATIONS: Chronic | Status: ACTIVE | Noted: 2018-11-20

## 2018-12-03 PROBLEM — N40.0 BENIGN PROSTATIC HYPERPLASIA WITHOUT LOWER URINARY TRACT SYMPTOMS: Chronic | Status: ACTIVE | Noted: 2018-11-20

## 2018-12-03 LAB
POCT INR: 1.3 RATIO — HIGH (ref 0.9–1.2)
POCT PT: 15.7 SEC — HIGH (ref 10–13.4)

## 2018-12-05 DIAGNOSIS — N20.0 CALCULUS OF KIDNEY: ICD-10-CM

## 2018-12-05 DIAGNOSIS — Z02.9 ENCOUNTER FOR ADMINISTRATIVE EXAMINATIONS, UNSPECIFIED: ICD-10-CM

## 2018-12-12 ENCOUNTER — OUTPATIENT (OUTPATIENT)
Dept: OUTPATIENT SERVICES | Facility: HOSPITAL | Age: 83
LOS: 1 days | Discharge: HOME | End: 2018-12-12

## 2018-12-12 DIAGNOSIS — I48.91 UNSPECIFIED ATRIAL FIBRILLATION: ICD-10-CM

## 2018-12-12 DIAGNOSIS — Z95.1 PRESENCE OF AORTOCORONARY BYPASS GRAFT: Chronic | ICD-10-CM

## 2018-12-12 DIAGNOSIS — Z95.0 PRESENCE OF CARDIAC PACEMAKER: Chronic | ICD-10-CM

## 2018-12-12 DIAGNOSIS — Z79.01 LONG TERM (CURRENT) USE OF ANTICOAGULANTS: ICD-10-CM

## 2018-12-12 DIAGNOSIS — Z95.2 PRESENCE OF PROSTHETIC HEART VALVE: Chronic | ICD-10-CM

## 2018-12-12 DIAGNOSIS — Z98.89 OTHER SPECIFIED POSTPROCEDURAL STATES: Chronic | ICD-10-CM

## 2018-12-12 LAB
POCT INR: 2.2 RATIO — HIGH (ref 0.9–1.2)
POCT PT: 26.4 SEC — HIGH (ref 10–13.4)

## 2018-12-17 ENCOUNTER — OUTPATIENT (OUTPATIENT)
Dept: OUTPATIENT SERVICES | Facility: HOSPITAL | Age: 83
LOS: 1 days | Discharge: HOME | End: 2018-12-17

## 2018-12-17 DIAGNOSIS — R31.9 HEMATURIA, UNSPECIFIED: ICD-10-CM

## 2018-12-17 DIAGNOSIS — N40.1 BENIGN PROSTATIC HYPERPLASIA WITH LOWER URINARY TRACT SYMPTOMS: ICD-10-CM

## 2018-12-17 DIAGNOSIS — Z95.1 PRESENCE OF AORTOCORONARY BYPASS GRAFT: Chronic | ICD-10-CM

## 2018-12-17 DIAGNOSIS — N20.0 CALCULUS OF KIDNEY: ICD-10-CM

## 2018-12-17 DIAGNOSIS — Z95.2 PRESENCE OF PROSTHETIC HEART VALVE: Chronic | ICD-10-CM

## 2018-12-17 DIAGNOSIS — Z98.89 OTHER SPECIFIED POSTPROCEDURAL STATES: Chronic | ICD-10-CM

## 2018-12-17 DIAGNOSIS — Z95.0 PRESENCE OF CARDIAC PACEMAKER: Chronic | ICD-10-CM

## 2018-12-18 ENCOUNTER — OUTPATIENT (OUTPATIENT)
Dept: OUTPATIENT SERVICES | Facility: HOSPITAL | Age: 83
LOS: 1 days | Discharge: HOME | End: 2018-12-18

## 2018-12-18 VITALS
DIASTOLIC BLOOD PRESSURE: 73 MMHG | OXYGEN SATURATION: 98 % | WEIGHT: 188.94 LBS | TEMPERATURE: 97 F | SYSTOLIC BLOOD PRESSURE: 145 MMHG | HEART RATE: 74 BPM | HEIGHT: 70 IN | RESPIRATION RATE: 18 BRPM

## 2018-12-18 VITALS — RESPIRATION RATE: 17 BRPM | HEART RATE: 63 BPM | DIASTOLIC BLOOD PRESSURE: 73 MMHG | SYSTOLIC BLOOD PRESSURE: 181 MMHG

## 2018-12-18 DIAGNOSIS — Z95.0 PRESENCE OF CARDIAC PACEMAKER: Chronic | ICD-10-CM

## 2018-12-18 DIAGNOSIS — Z95.2 PRESENCE OF PROSTHETIC HEART VALVE: Chronic | ICD-10-CM

## 2018-12-18 DIAGNOSIS — Z98.89 OTHER SPECIFIED POSTPROCEDURAL STATES: Chronic | ICD-10-CM

## 2018-12-18 DIAGNOSIS — Z95.1 PRESENCE OF AORTOCORONARY BYPASS GRAFT: Chronic | ICD-10-CM

## 2018-12-18 LAB — GLUCOSE BLDC GLUCOMTR-MCNC: 135 MG/DL — HIGH (ref 70–99)

## 2018-12-18 RX ORDER — SODIUM CHLORIDE 9 MG/ML
1000 INJECTION, SOLUTION INTRAVENOUS
Qty: 0 | Refills: 0 | Status: DISCONTINUED | OUTPATIENT
Start: 2018-12-18 | End: 2018-12-18

## 2018-12-18 RX ORDER — ONDANSETRON 8 MG/1
4 TABLET, FILM COATED ORAL ONCE
Qty: 0 | Refills: 0 | Status: DISCONTINUED | OUTPATIENT
Start: 2018-12-18 | End: 2018-12-18

## 2018-12-18 RX ORDER — DEXAMETHASONE 0.5 MG/5ML
8 ELIXIR ORAL ONCE
Qty: 0 | Refills: 0 | Status: DISCONTINUED | OUTPATIENT
Start: 2018-12-18 | End: 2018-12-18

## 2018-12-18 RX ORDER — OXYCODONE AND ACETAMINOPHEN 5; 325 MG/1; MG/1
2 TABLET ORAL ONCE
Qty: 0 | Refills: 0 | Status: DISCONTINUED | OUTPATIENT
Start: 2018-12-18 | End: 2018-12-18

## 2018-12-18 RX ADMIN — SODIUM CHLORIDE 75 MILLILITER(S): 9 INJECTION, SOLUTION INTRAVENOUS at 14:58

## 2018-12-18 NOTE — BRIEF OPERATIVE NOTE - PROCEDURE
<<-----Click on this checkbox to enter Procedure ESWL  12/18/2018  Right Ureteral ESWL  Active  [email or web address]

## 2018-12-20 ENCOUNTER — OUTPATIENT (OUTPATIENT)
Dept: OUTPATIENT SERVICES | Facility: HOSPITAL | Age: 83
LOS: 1 days | Discharge: HOME | End: 2018-12-20

## 2018-12-20 DIAGNOSIS — Z95.0 PRESENCE OF CARDIAC PACEMAKER: Chronic | ICD-10-CM

## 2018-12-20 DIAGNOSIS — N20.0 CALCULUS OF KIDNEY: ICD-10-CM

## 2018-12-20 DIAGNOSIS — Z95.2 PRESENCE OF PROSTHETIC HEART VALVE: Chronic | ICD-10-CM

## 2018-12-20 DIAGNOSIS — Z95.1 PRESENCE OF AORTOCORONARY BYPASS GRAFT: Chronic | ICD-10-CM

## 2018-12-20 DIAGNOSIS — Z98.89 OTHER SPECIFIED POSTPROCEDURAL STATES: Chronic | ICD-10-CM

## 2018-12-24 DIAGNOSIS — I48.91 UNSPECIFIED ATRIAL FIBRILLATION: ICD-10-CM

## 2018-12-24 DIAGNOSIS — E11.40 TYPE 2 DIABETES MELLITUS WITH DIABETIC NEUROPATHY, UNSPECIFIED: ICD-10-CM

## 2018-12-24 DIAGNOSIS — I10 ESSENTIAL (PRIMARY) HYPERTENSION: ICD-10-CM

## 2018-12-24 DIAGNOSIS — Z91.041 RADIOGRAPHIC DYE ALLERGY STATUS: ICD-10-CM

## 2018-12-24 DIAGNOSIS — N20.1 CALCULUS OF URETER: ICD-10-CM

## 2018-12-24 DIAGNOSIS — E78.49 OTHER HYPERLIPIDEMIA: ICD-10-CM

## 2018-12-24 DIAGNOSIS — Z95.0 PRESENCE OF CARDIAC PACEMAKER: ICD-10-CM

## 2018-12-24 DIAGNOSIS — Z79.01 LONG TERM (CURRENT) USE OF ANTICOAGULANTS: ICD-10-CM

## 2018-12-28 ENCOUNTER — OUTPATIENT (OUTPATIENT)
Dept: OUTPATIENT SERVICES | Facility: HOSPITAL | Age: 83
LOS: 1 days | Discharge: HOME | End: 2018-12-28

## 2018-12-28 DIAGNOSIS — I48.91 UNSPECIFIED ATRIAL FIBRILLATION: ICD-10-CM

## 2018-12-28 DIAGNOSIS — Z95.1 PRESENCE OF AORTOCORONARY BYPASS GRAFT: Chronic | ICD-10-CM

## 2018-12-28 DIAGNOSIS — Z95.2 PRESENCE OF PROSTHETIC HEART VALVE: Chronic | ICD-10-CM

## 2018-12-28 DIAGNOSIS — Z95.0 PRESENCE OF CARDIAC PACEMAKER: Chronic | ICD-10-CM

## 2018-12-28 DIAGNOSIS — Z79.01 LONG TERM (CURRENT) USE OF ANTICOAGULANTS: ICD-10-CM

## 2018-12-28 DIAGNOSIS — Z98.89 OTHER SPECIFIED POSTPROCEDURAL STATES: Chronic | ICD-10-CM

## 2018-12-28 LAB
POCT INR: 1.3 RATIO — HIGH (ref 0.9–1.2)
POCT PT: 16 SEC — HIGH (ref 10–13.4)

## 2019-01-10 ENCOUNTER — OUTPATIENT (OUTPATIENT)
Dept: OUTPATIENT SERVICES | Facility: HOSPITAL | Age: 84
LOS: 1 days | Discharge: HOME | End: 2019-01-10

## 2019-01-10 ENCOUNTER — APPOINTMENT (OUTPATIENT)
Dept: CARDIOLOGY | Facility: CLINIC | Age: 84
End: 2019-01-10

## 2019-01-10 DIAGNOSIS — I48.91 UNSPECIFIED ATRIAL FIBRILLATION: ICD-10-CM

## 2019-01-10 DIAGNOSIS — Z95.0 PRESENCE OF CARDIAC PACEMAKER: Chronic | ICD-10-CM

## 2019-01-10 DIAGNOSIS — Z95.2 PRESENCE OF PROSTHETIC HEART VALVE: Chronic | ICD-10-CM

## 2019-01-10 DIAGNOSIS — Z95.1 PRESENCE OF AORTOCORONARY BYPASS GRAFT: Chronic | ICD-10-CM

## 2019-01-10 DIAGNOSIS — Z98.89 OTHER SPECIFIED POSTPROCEDURAL STATES: Chronic | ICD-10-CM

## 2019-01-10 DIAGNOSIS — Z79.01 LONG TERM (CURRENT) USE OF ANTICOAGULANTS: ICD-10-CM

## 2019-01-10 LAB
POCT INR: 2.2 RATIO — HIGH (ref 0.9–1.2)
POCT PT: 26.6 SEC — HIGH (ref 10–13.4)

## 2019-01-10 NOTE — PROCEDURE
[Complete Heart Block] : complete heart block [Pacemaker] : pacemaker [DDD] : DDD [Longevity: ___ months] : The estimated remaining battery life is [unfilled] months [Lead Imp:  ___ohms] : lead impedance was [unfilled] ohms [Sensing Amplitude ___mv] : sensing amplitude was [unfilled] mv [___V @] : [unfilled] V [___ ms] : [unfilled] ms [None] : none [Pace ___ %] : Pace [unfilled]% [de-identified] : Medtronic [de-identified] : Abdias [de-identified] : [id number] [de-identified] : 50

## 2019-02-05 ENCOUNTER — OUTPATIENT (OUTPATIENT)
Dept: OUTPATIENT SERVICES | Facility: HOSPITAL | Age: 84
LOS: 1 days | Discharge: HOME | End: 2019-02-05

## 2019-02-05 DIAGNOSIS — Z95.0 PRESENCE OF CARDIAC PACEMAKER: Chronic | ICD-10-CM

## 2019-02-05 DIAGNOSIS — Z95.2 PRESENCE OF PROSTHETIC HEART VALVE: Chronic | ICD-10-CM

## 2019-02-05 DIAGNOSIS — Z98.89 OTHER SPECIFIED POSTPROCEDURAL STATES: Chronic | ICD-10-CM

## 2019-02-05 DIAGNOSIS — I48.91 UNSPECIFIED ATRIAL FIBRILLATION: ICD-10-CM

## 2019-02-05 DIAGNOSIS — Z95.1 PRESENCE OF AORTOCORONARY BYPASS GRAFT: Chronic | ICD-10-CM

## 2019-02-05 DIAGNOSIS — Z79.01 LONG TERM (CURRENT) USE OF ANTICOAGULANTS: ICD-10-CM

## 2019-02-05 LAB
POCT INR: 2.5 RATIO — HIGH (ref 0.9–1.2)
POCT PT: 29.8 SEC — HIGH (ref 10–13.4)

## 2019-02-22 ENCOUNTER — OUTPATIENT (OUTPATIENT)
Dept: OUTPATIENT SERVICES | Facility: HOSPITAL | Age: 84
LOS: 1 days | Discharge: HOME | End: 2019-02-22

## 2019-02-22 DIAGNOSIS — N18.2 CHRONIC KIDNEY DISEASE, STAGE 2 (MILD): ICD-10-CM

## 2019-02-22 DIAGNOSIS — Z95.0 PRESENCE OF CARDIAC PACEMAKER: Chronic | ICD-10-CM

## 2019-02-22 DIAGNOSIS — D64.9 ANEMIA, UNSPECIFIED: ICD-10-CM

## 2019-02-22 DIAGNOSIS — E55.9 VITAMIN D DEFICIENCY, UNSPECIFIED: ICD-10-CM

## 2019-02-22 DIAGNOSIS — E78.00 PURE HYPERCHOLESTEROLEMIA, UNSPECIFIED: ICD-10-CM

## 2019-02-22 DIAGNOSIS — Z98.89 OTHER SPECIFIED POSTPROCEDURAL STATES: Chronic | ICD-10-CM

## 2019-02-22 DIAGNOSIS — Z95.1 PRESENCE OF AORTOCORONARY BYPASS GRAFT: Chronic | ICD-10-CM

## 2019-02-22 DIAGNOSIS — K76.89 OTHER SPECIFIED DISEASES OF LIVER: ICD-10-CM

## 2019-02-22 DIAGNOSIS — Z95.2 PRESENCE OF PROSTHETIC HEART VALVE: Chronic | ICD-10-CM

## 2019-02-22 DIAGNOSIS — N40.0 BENIGN PROSTATIC HYPERPLASIA WITHOUT LOWER URINARY TRACT SYMPTOMS: ICD-10-CM

## 2019-03-08 ENCOUNTER — OUTPATIENT (OUTPATIENT)
Dept: OUTPATIENT SERVICES | Facility: HOSPITAL | Age: 84
LOS: 1 days | Discharge: HOME | End: 2019-03-08

## 2019-03-08 DIAGNOSIS — Z98.89 OTHER SPECIFIED POSTPROCEDURAL STATES: Chronic | ICD-10-CM

## 2019-03-08 DIAGNOSIS — Z95.2 PRESENCE OF PROSTHETIC HEART VALVE: Chronic | ICD-10-CM

## 2019-03-08 DIAGNOSIS — Z95.1 PRESENCE OF AORTOCORONARY BYPASS GRAFT: Chronic | ICD-10-CM

## 2019-03-08 DIAGNOSIS — I48.91 UNSPECIFIED ATRIAL FIBRILLATION: ICD-10-CM

## 2019-03-08 DIAGNOSIS — Z95.0 PRESENCE OF CARDIAC PACEMAKER: Chronic | ICD-10-CM

## 2019-03-08 DIAGNOSIS — Z79.01 LONG TERM (CURRENT) USE OF ANTICOAGULANTS: ICD-10-CM

## 2019-03-08 LAB
POCT INR: 1.4 RATIO — HIGH (ref 0.9–1.2)
POCT PT: 17.2 SEC — HIGH (ref 10–13.4)

## 2019-03-22 ENCOUNTER — OUTPATIENT (OUTPATIENT)
Dept: OUTPATIENT SERVICES | Facility: HOSPITAL | Age: 84
LOS: 1 days | Discharge: HOME | End: 2019-03-22

## 2019-03-22 DIAGNOSIS — Z79.01 LONG TERM (CURRENT) USE OF ANTICOAGULANTS: ICD-10-CM

## 2019-03-22 DIAGNOSIS — Z98.89 OTHER SPECIFIED POSTPROCEDURAL STATES: Chronic | ICD-10-CM

## 2019-03-22 DIAGNOSIS — Z95.0 PRESENCE OF CARDIAC PACEMAKER: Chronic | ICD-10-CM

## 2019-03-22 DIAGNOSIS — Z95.1 PRESENCE OF AORTOCORONARY BYPASS GRAFT: Chronic | ICD-10-CM

## 2019-03-22 DIAGNOSIS — I48.91 UNSPECIFIED ATRIAL FIBRILLATION: ICD-10-CM

## 2019-03-22 DIAGNOSIS — Z95.2 PRESENCE OF PROSTHETIC HEART VALVE: Chronic | ICD-10-CM

## 2019-03-29 ENCOUNTER — OUTPATIENT (OUTPATIENT)
Dept: OUTPATIENT SERVICES | Facility: HOSPITAL | Age: 84
LOS: 1 days | Discharge: HOME | End: 2019-03-29

## 2019-03-29 DIAGNOSIS — Z79.01 LONG TERM (CURRENT) USE OF ANTICOAGULANTS: ICD-10-CM

## 2019-03-29 DIAGNOSIS — Z95.0 PRESENCE OF CARDIAC PACEMAKER: Chronic | ICD-10-CM

## 2019-03-29 DIAGNOSIS — I48.91 UNSPECIFIED ATRIAL FIBRILLATION: ICD-10-CM

## 2019-03-29 DIAGNOSIS — Z95.2 PRESENCE OF PROSTHETIC HEART VALVE: Chronic | ICD-10-CM

## 2019-03-29 DIAGNOSIS — Z98.89 OTHER SPECIFIED POSTPROCEDURAL STATES: Chronic | ICD-10-CM

## 2019-03-29 DIAGNOSIS — Z95.1 PRESENCE OF AORTOCORONARY BYPASS GRAFT: Chronic | ICD-10-CM

## 2019-04-05 ENCOUNTER — OUTPATIENT (OUTPATIENT)
Dept: OUTPATIENT SERVICES | Facility: HOSPITAL | Age: 84
LOS: 1 days | Discharge: HOME | End: 2019-04-05

## 2019-04-05 DIAGNOSIS — I48.91 UNSPECIFIED ATRIAL FIBRILLATION: ICD-10-CM

## 2019-04-05 DIAGNOSIS — Z95.2 PRESENCE OF PROSTHETIC HEART VALVE: Chronic | ICD-10-CM

## 2019-04-05 DIAGNOSIS — Z79.01 LONG TERM (CURRENT) USE OF ANTICOAGULANTS: ICD-10-CM

## 2019-04-05 DIAGNOSIS — Z95.1 PRESENCE OF AORTOCORONARY BYPASS GRAFT: Chronic | ICD-10-CM

## 2019-04-05 DIAGNOSIS — Z95.0 PRESENCE OF CARDIAC PACEMAKER: Chronic | ICD-10-CM

## 2019-04-05 DIAGNOSIS — Z98.89 OTHER SPECIFIED POSTPROCEDURAL STATES: Chronic | ICD-10-CM

## 2019-04-22 ENCOUNTER — OUTPATIENT (OUTPATIENT)
Dept: OUTPATIENT SERVICES | Facility: HOSPITAL | Age: 84
LOS: 1 days | Discharge: HOME | End: 2019-04-22

## 2019-04-22 DIAGNOSIS — Z79.01 LONG TERM (CURRENT) USE OF ANTICOAGULANTS: ICD-10-CM

## 2019-04-22 DIAGNOSIS — I48.91 UNSPECIFIED ATRIAL FIBRILLATION: ICD-10-CM

## 2019-04-22 DIAGNOSIS — Z95.2 PRESENCE OF PROSTHETIC HEART VALVE: Chronic | ICD-10-CM

## 2019-04-22 DIAGNOSIS — Z98.89 OTHER SPECIFIED POSTPROCEDURAL STATES: Chronic | ICD-10-CM

## 2019-04-22 DIAGNOSIS — Z95.1 PRESENCE OF AORTOCORONARY BYPASS GRAFT: Chronic | ICD-10-CM

## 2019-04-22 DIAGNOSIS — Z95.0 PRESENCE OF CARDIAC PACEMAKER: Chronic | ICD-10-CM

## 2019-04-29 ENCOUNTER — OUTPATIENT (OUTPATIENT)
Dept: OUTPATIENT SERVICES | Facility: HOSPITAL | Age: 84
LOS: 1 days | Discharge: HOME | End: 2019-04-29

## 2019-04-29 DIAGNOSIS — Z98.89 OTHER SPECIFIED POSTPROCEDURAL STATES: Chronic | ICD-10-CM

## 2019-04-29 DIAGNOSIS — Z95.0 PRESENCE OF CARDIAC PACEMAKER: Chronic | ICD-10-CM

## 2019-04-29 DIAGNOSIS — Z79.01 LONG TERM (CURRENT) USE OF ANTICOAGULANTS: ICD-10-CM

## 2019-04-29 DIAGNOSIS — I48.91 UNSPECIFIED ATRIAL FIBRILLATION: ICD-10-CM

## 2019-04-29 DIAGNOSIS — Z95.1 PRESENCE OF AORTOCORONARY BYPASS GRAFT: Chronic | ICD-10-CM

## 2019-04-29 DIAGNOSIS — Z95.2 PRESENCE OF PROSTHETIC HEART VALVE: Chronic | ICD-10-CM

## 2019-05-06 ENCOUNTER — OUTPATIENT (OUTPATIENT)
Dept: OUTPATIENT SERVICES | Facility: HOSPITAL | Age: 84
LOS: 1 days | Discharge: HOME | End: 2019-05-06

## 2019-05-06 DIAGNOSIS — Z79.01 LONG TERM (CURRENT) USE OF ANTICOAGULANTS: ICD-10-CM

## 2019-05-06 DIAGNOSIS — Z98.89 OTHER SPECIFIED POSTPROCEDURAL STATES: Chronic | ICD-10-CM

## 2019-05-06 DIAGNOSIS — Z95.1 PRESENCE OF AORTOCORONARY BYPASS GRAFT: Chronic | ICD-10-CM

## 2019-05-06 DIAGNOSIS — I48.91 UNSPECIFIED ATRIAL FIBRILLATION: ICD-10-CM

## 2019-05-06 DIAGNOSIS — Z95.0 PRESENCE OF CARDIAC PACEMAKER: Chronic | ICD-10-CM

## 2019-05-06 DIAGNOSIS — Z95.2 PRESENCE OF PROSTHETIC HEART VALVE: Chronic | ICD-10-CM

## 2019-05-21 ENCOUNTER — OUTPATIENT (OUTPATIENT)
Dept: OUTPATIENT SERVICES | Facility: HOSPITAL | Age: 84
LOS: 1 days | Discharge: HOME | End: 2019-05-21

## 2019-05-21 DIAGNOSIS — Z79.01 LONG TERM (CURRENT) USE OF ANTICOAGULANTS: ICD-10-CM

## 2019-05-21 DIAGNOSIS — Z95.2 PRESENCE OF PROSTHETIC HEART VALVE: Chronic | ICD-10-CM

## 2019-05-21 DIAGNOSIS — I48.91 UNSPECIFIED ATRIAL FIBRILLATION: ICD-10-CM

## 2019-05-21 DIAGNOSIS — Z95.0 PRESENCE OF CARDIAC PACEMAKER: Chronic | ICD-10-CM

## 2019-05-21 DIAGNOSIS — Z98.89 OTHER SPECIFIED POSTPROCEDURAL STATES: Chronic | ICD-10-CM

## 2019-05-21 DIAGNOSIS — Z95.1 PRESENCE OF AORTOCORONARY BYPASS GRAFT: Chronic | ICD-10-CM

## 2019-05-21 LAB
POCT INR: 1.9 RATIO — HIGH (ref 0.9–1.2)
POCT PT: 22.5 SEC — HIGH (ref 10–13.4)

## 2019-06-04 ENCOUNTER — OUTPATIENT (OUTPATIENT)
Dept: OUTPATIENT SERVICES | Facility: HOSPITAL | Age: 84
LOS: 1 days | Discharge: HOME | End: 2019-06-04

## 2019-06-04 DIAGNOSIS — Z95.2 PRESENCE OF PROSTHETIC HEART VALVE: Chronic | ICD-10-CM

## 2019-06-04 DIAGNOSIS — Z98.89 OTHER SPECIFIED POSTPROCEDURAL STATES: Chronic | ICD-10-CM

## 2019-06-04 DIAGNOSIS — Z95.0 PRESENCE OF CARDIAC PACEMAKER: Chronic | ICD-10-CM

## 2019-06-04 DIAGNOSIS — I48.91 UNSPECIFIED ATRIAL FIBRILLATION: ICD-10-CM

## 2019-06-04 DIAGNOSIS — Z79.01 LONG TERM (CURRENT) USE OF ANTICOAGULANTS: ICD-10-CM

## 2019-06-04 DIAGNOSIS — Z95.1 PRESENCE OF AORTOCORONARY BYPASS GRAFT: Chronic | ICD-10-CM

## 2019-06-25 ENCOUNTER — OUTPATIENT (OUTPATIENT)
Dept: OUTPATIENT SERVICES | Facility: HOSPITAL | Age: 84
LOS: 1 days | Discharge: HOME | End: 2019-06-25

## 2019-06-25 DIAGNOSIS — Z79.01 LONG TERM (CURRENT) USE OF ANTICOAGULANTS: ICD-10-CM

## 2019-06-25 DIAGNOSIS — Z98.89 OTHER SPECIFIED POSTPROCEDURAL STATES: Chronic | ICD-10-CM

## 2019-06-25 DIAGNOSIS — Z95.0 PRESENCE OF CARDIAC PACEMAKER: Chronic | ICD-10-CM

## 2019-06-25 DIAGNOSIS — Z95.2 PRESENCE OF PROSTHETIC HEART VALVE: Chronic | ICD-10-CM

## 2019-06-25 DIAGNOSIS — Z95.1 PRESENCE OF AORTOCORONARY BYPASS GRAFT: Chronic | ICD-10-CM

## 2019-06-25 DIAGNOSIS — I48.91 UNSPECIFIED ATRIAL FIBRILLATION: ICD-10-CM

## 2019-06-25 LAB
POCT INR: 2.3 RATIO — HIGH (ref 0.9–1.2)
POCT PT: 27.8 SEC — HIGH (ref 10–13.4)

## 2019-07-16 ENCOUNTER — OUTPATIENT (OUTPATIENT)
Dept: OUTPATIENT SERVICES | Facility: HOSPITAL | Age: 84
LOS: 1 days | Discharge: HOME | End: 2019-07-16

## 2019-07-16 DIAGNOSIS — I48.91 UNSPECIFIED ATRIAL FIBRILLATION: ICD-10-CM

## 2019-07-16 DIAGNOSIS — Z95.1 PRESENCE OF AORTOCORONARY BYPASS GRAFT: Chronic | ICD-10-CM

## 2019-07-16 DIAGNOSIS — Z95.0 PRESENCE OF CARDIAC PACEMAKER: Chronic | ICD-10-CM

## 2019-07-16 DIAGNOSIS — Z98.89 OTHER SPECIFIED POSTPROCEDURAL STATES: Chronic | ICD-10-CM

## 2019-07-16 DIAGNOSIS — Z95.2 PRESENCE OF PROSTHETIC HEART VALVE: Chronic | ICD-10-CM

## 2019-07-16 DIAGNOSIS — Z79.01 LONG TERM (CURRENT) USE OF ANTICOAGULANTS: ICD-10-CM

## 2019-07-16 LAB
POCT INR: 2.2 RATIO — HIGH (ref 0.9–1.2)
POCT PT: 26.8 SEC — HIGH (ref 10–13.4)

## 2019-08-05 NOTE — H&P PST ADULT - NSSUBSTANCEUSE_GEN_ALL_CORE_SD
never used PROBLEM DIAGNOSES  Problem: Occlusion of external iliac artery  Assessment and Plan: -LLE angio possible angioplasty with stent placement  -consent to be obtained  -labs, ECG, echo, CTA abd/pelvis, GATITO PVRs reviewed  -Procedure d/w pt; risks and benefits explained,questions answered  -hold insulin am of procedure

## 2019-08-13 ENCOUNTER — OUTPATIENT (OUTPATIENT)
Dept: OUTPATIENT SERVICES | Facility: HOSPITAL | Age: 84
LOS: 1 days | Discharge: HOME | End: 2019-08-13

## 2019-08-13 DIAGNOSIS — Z95.0 PRESENCE OF CARDIAC PACEMAKER: Chronic | ICD-10-CM

## 2019-08-13 DIAGNOSIS — I48.91 UNSPECIFIED ATRIAL FIBRILLATION: ICD-10-CM

## 2019-08-13 DIAGNOSIS — Z95.2 PRESENCE OF PROSTHETIC HEART VALVE: Chronic | ICD-10-CM

## 2019-08-13 DIAGNOSIS — Z98.89 OTHER SPECIFIED POSTPROCEDURAL STATES: Chronic | ICD-10-CM

## 2019-08-13 DIAGNOSIS — Z79.01 LONG TERM (CURRENT) USE OF ANTICOAGULANTS: ICD-10-CM

## 2019-08-13 DIAGNOSIS — Z95.1 PRESENCE OF AORTOCORONARY BYPASS GRAFT: Chronic | ICD-10-CM

## 2019-08-13 LAB
POCT INR: 1.1 RATIO — SIGNIFICANT CHANGE UP (ref 0.9–1.2)
POCT PT: 13.7 SEC — HIGH (ref 10–13.4)

## 2019-08-19 ENCOUNTER — OUTPATIENT (OUTPATIENT)
Dept: OUTPATIENT SERVICES | Facility: HOSPITAL | Age: 84
LOS: 1 days | Discharge: HOME | End: 2019-08-19

## 2019-08-19 DIAGNOSIS — Z95.2 PRESENCE OF PROSTHETIC HEART VALVE: Chronic | ICD-10-CM

## 2019-08-19 DIAGNOSIS — Z98.89 OTHER SPECIFIED POSTPROCEDURAL STATES: Chronic | ICD-10-CM

## 2019-08-19 DIAGNOSIS — Z95.1 PRESENCE OF AORTOCORONARY BYPASS GRAFT: Chronic | ICD-10-CM

## 2019-08-19 DIAGNOSIS — Z79.01 LONG TERM (CURRENT) USE OF ANTICOAGULANTS: ICD-10-CM

## 2019-08-19 DIAGNOSIS — Z95.0 PRESENCE OF CARDIAC PACEMAKER: Chronic | ICD-10-CM

## 2019-08-19 DIAGNOSIS — I48.91 UNSPECIFIED ATRIAL FIBRILLATION: ICD-10-CM

## 2019-08-19 LAB
POCT INR: 2.3 RATIO — HIGH (ref 0.9–1.2)
POCT PT: 27.6 SEC — HIGH (ref 10–13.4)

## 2019-08-28 ENCOUNTER — OUTPATIENT (OUTPATIENT)
Dept: OUTPATIENT SERVICES | Facility: HOSPITAL | Age: 84
LOS: 1 days | Discharge: HOME | End: 2019-08-28

## 2019-08-28 DIAGNOSIS — Z95.1 PRESENCE OF AORTOCORONARY BYPASS GRAFT: Chronic | ICD-10-CM

## 2019-08-28 DIAGNOSIS — Z95.2 PRESENCE OF PROSTHETIC HEART VALVE: Chronic | ICD-10-CM

## 2019-08-28 DIAGNOSIS — Z79.01 LONG TERM (CURRENT) USE OF ANTICOAGULANTS: ICD-10-CM

## 2019-08-28 DIAGNOSIS — Z95.0 PRESENCE OF CARDIAC PACEMAKER: Chronic | ICD-10-CM

## 2019-08-28 DIAGNOSIS — Z98.89 OTHER SPECIFIED POSTPROCEDURAL STATES: Chronic | ICD-10-CM

## 2019-08-28 DIAGNOSIS — I48.91 UNSPECIFIED ATRIAL FIBRILLATION: ICD-10-CM

## 2019-08-28 LAB
POCT INR: 2.3 RATIO — HIGH (ref 0.9–1.2)
POCT PT: 27.8 SEC — HIGH (ref 10–13.4)

## 2019-09-05 ENCOUNTER — APPOINTMENT (OUTPATIENT)
Dept: CARDIOLOGY | Facility: CLINIC | Age: 84
End: 2019-09-05
Payer: MEDICARE

## 2019-09-05 VITALS — DIASTOLIC BLOOD PRESSURE: 60 MMHG | SYSTOLIC BLOOD PRESSURE: 140 MMHG

## 2019-09-05 PROCEDURE — 93280 PM DEVICE PROGR EVAL DUAL: CPT

## 2019-09-05 PROCEDURE — 99213 OFFICE O/P EST LOW 20 MIN: CPT

## 2019-09-05 NOTE — PHYSICAL EXAM
[General Appearance - Well Developed] : well developed [Normal Appearance] : normal appearance [Well Groomed] : well groomed [General Appearance - Well Nourished] : well nourished [No Deformities] : no deformities [General Appearance - In No Acute Distress] : no acute distress [Heart Rate And Rhythm] : heart rate and rhythm were normal [Heart Sounds] : normal S1 and S2 [Murmurs] : no murmurs present [Arterial Pulses Normal] : the arterial pulses were normal [Respiration, Rhythm And Depth] : normal respiratory rhythm and effort [Exaggerated Use Of Accessory Muscles For Inspiration] : no accessory muscle use [Auscultation Breath Sounds / Voice Sounds] : lungs were clear to auscultation bilaterally [Left Infraclavicular] : left infraclavicular area [Clean] : clean [Dry] : dry [Well-Healed] : well-healed [Abdomen Soft] : soft [Abdomen Tenderness] : non-tender [Abdomen Mass (___ Cm)] : no abdominal mass palpated [Nail Clubbing] : no clubbing of the fingernails [Cyanosis, Localized] : no localized cyanosis [Petechial Hemorrhages (___cm)] : no petechial hemorrhages [] : no ischemic changes

## 2019-09-07 NOTE — PROCEDURE
[Complete Heart Block] : complete heart block [Pacemaker] : pacemaker [DDD] : DDD [Voltage: ___ volts] : Voltage was [unfilled] volts [Longevity: ___ months] : The estimated remaining battery life is [unfilled] months [Lead Imp:  ___ohms] : lead impedance was [unfilled] ohms [Sensing Amplitude ___mv] : sensing amplitude was [unfilled] mv [___V @] : [unfilled] V [___ ms] : [unfilled] ms [None] : none [Asense-Vsense ___ %] : Asense-Vsense [unfilled]% [Programmed for Longevity] : output reprogrammed for improved battery longevity [Asense-Vpace ___ %] : Asense-Vpace [unfilled]% [Apace-Vsense ___ %] : Apace-Vsense [unfilled]% [Apace-Vpace ___ %] : Apace-Vpace [unfilled]% [Pace ___ %] : Pace [unfilled]% [See Scanned Paceart Report] : See scanned paceart report [See Device Printout] : See device printout [de-identified] : Medtronic [de-identified] : Abdias [de-identified] : 4/7/14 [de-identified] : 50 [de-identified] : [id number] [de-identified] : 1 NSVT lasting 9 seconds.\par 2 AT/AF episodes longest lasting 1 min and 15 seconds.

## 2019-09-07 NOTE — HISTORY OF PRESENT ILLNESS
[Palpitations] : no palpitations [Syncope] : no syncope [SOB] : no dyspnea [Dizziness] : no dizziness [de-identified] : 86 y/o male with pmh of CAD, HTN , S/P PCI with stent to OM 1, followed by  CABG 3VD on 7/18/2011, AS S/P TAVR CHB, s/p PPM, AF on warfarin. \par Presents for routine follow up \par \par Repots progressive worsening in PELAYO . Denies CP/SOB or palpitations. No dizziness or syncope.

## 2019-09-12 ENCOUNTER — OUTPATIENT (OUTPATIENT)
Dept: OUTPATIENT SERVICES | Facility: HOSPITAL | Age: 84
LOS: 1 days | Discharge: HOME | End: 2019-09-12

## 2019-09-12 DIAGNOSIS — Z95.1 PRESENCE OF AORTOCORONARY BYPASS GRAFT: Chronic | ICD-10-CM

## 2019-09-12 DIAGNOSIS — E55.9 VITAMIN D DEFICIENCY, UNSPECIFIED: ICD-10-CM

## 2019-09-12 DIAGNOSIS — R74.8 ABNORMAL LEVELS OF OTHER SERUM ENZYMES: ICD-10-CM

## 2019-09-12 DIAGNOSIS — E78.00 PURE HYPERCHOLESTEROLEMIA, UNSPECIFIED: ICD-10-CM

## 2019-09-12 DIAGNOSIS — Z02.89 ENCOUNTER FOR OTHER ADMINISTRATIVE EXAMINATIONS: ICD-10-CM

## 2019-09-12 DIAGNOSIS — N18.2 CHRONIC KIDNEY DISEASE, STAGE 2 (MILD): ICD-10-CM

## 2019-09-12 DIAGNOSIS — R68.89 OTHER GENERAL SYMPTOMS AND SIGNS: ICD-10-CM

## 2019-09-12 DIAGNOSIS — Z95.0 PRESENCE OF CARDIAC PACEMAKER: Chronic | ICD-10-CM

## 2019-09-12 DIAGNOSIS — N40.0 BENIGN PROSTATIC HYPERPLASIA WITHOUT LOWER URINARY TRACT SYMPTOMS: ICD-10-CM

## 2019-09-12 DIAGNOSIS — D64.9 ANEMIA, UNSPECIFIED: ICD-10-CM

## 2019-09-12 DIAGNOSIS — R79.82 ELEVATED C-REACTIVE PROTEIN (CRP): ICD-10-CM

## 2019-09-12 DIAGNOSIS — Z95.2 PRESENCE OF PROSTHETIC HEART VALVE: Chronic | ICD-10-CM

## 2019-09-12 DIAGNOSIS — K76.89 OTHER SPECIFIED DISEASES OF LIVER: ICD-10-CM

## 2019-09-12 DIAGNOSIS — R73.09 OTHER ABNORMAL GLUCOSE: ICD-10-CM

## 2019-09-12 DIAGNOSIS — R94.6 ABNORMAL RESULTS OF THYROID FUNCTION STUDIES: ICD-10-CM

## 2019-09-12 DIAGNOSIS — Z98.89 OTHER SPECIFIED POSTPROCEDURAL STATES: Chronic | ICD-10-CM

## 2019-09-12 DIAGNOSIS — E11.9 TYPE 2 DIABETES MELLITUS WITHOUT COMPLICATIONS: ICD-10-CM

## 2019-09-18 ENCOUNTER — OUTPATIENT (OUTPATIENT)
Dept: OUTPATIENT SERVICES | Facility: HOSPITAL | Age: 84
LOS: 1 days | Discharge: HOME | End: 2019-09-18

## 2019-09-18 DIAGNOSIS — Z95.0 PRESENCE OF CARDIAC PACEMAKER: Chronic | ICD-10-CM

## 2019-09-18 DIAGNOSIS — I48.91 UNSPECIFIED ATRIAL FIBRILLATION: ICD-10-CM

## 2019-09-18 DIAGNOSIS — Z95.1 PRESENCE OF AORTOCORONARY BYPASS GRAFT: Chronic | ICD-10-CM

## 2019-09-18 DIAGNOSIS — Z95.2 PRESENCE OF PROSTHETIC HEART VALVE: Chronic | ICD-10-CM

## 2019-09-18 DIAGNOSIS — Z98.89 OTHER SPECIFIED POSTPROCEDURAL STATES: Chronic | ICD-10-CM

## 2019-09-18 DIAGNOSIS — Z79.01 LONG TERM (CURRENT) USE OF ANTICOAGULANTS: ICD-10-CM

## 2019-09-18 LAB
POCT INR: 3.4 RATIO — HIGH (ref 0.9–1.2)
POCT PT: 41.3 SEC — HIGH (ref 10–13.4)

## 2019-10-02 ENCOUNTER — APPOINTMENT (OUTPATIENT)
Dept: CARDIOTHORACIC SURGERY | Facility: CLINIC | Age: 84
End: 2019-10-02
Payer: MEDICARE

## 2019-10-02 VITALS
HEIGHT: 70 IN | RESPIRATION RATE: 14 BRPM | SYSTOLIC BLOOD PRESSURE: 150 MMHG | HEART RATE: 72 BPM | DIASTOLIC BLOOD PRESSURE: 74 MMHG | OXYGEN SATURATION: 97 % | TEMPERATURE: 98.2 F

## 2019-10-02 DIAGNOSIS — Z95.2 PRESENCE OF PROSTHETIC HEART VALVE: ICD-10-CM

## 2019-10-02 PROCEDURE — 99213 OFFICE O/P EST LOW 20 MIN: CPT

## 2019-10-02 NOTE — ASSESSMENT
[FreeTextEntry1] : Mr. Llanos is an 84 y/o male that arrives today for his three year TAVR follow up. PMH includes HTN, hyperlipidemia, atrial fibrillation (on Coumadin), asthma/COPD (no home O2), carotid stenosis s/p right CEA in 2013, PPM in 2006 with generator change in 2014, known CAD s/p PCI with stent to OM1 followed by 3V CABG (LIMA-LAD, SVG-OM1 and SVG-R-PDA) with MAZE via sternotomy with Dr. Ocampo at [facility] on 7/18/11 and chronic diastolic heart failure with severe aortic stenosis s/p transfemoral TAVR on 9/15/16 with Mehrdad S3 26mm (commercial) and CHB s/p PPM (last checked two weeks ago) who presents for his three year follow up. He is functionally doing very well. Offering slight SOB, which is almost the same as prior to surgery.  RTO in 1 year with echo, labs and EKG.

## 2019-10-02 NOTE — HISTORY OF PRESENT ILLNESS
[FreeTextEntry1] : Mr. Llanos is an 86 y/o male that arrives today for his three year TAVR follow up. PMH includes HTN, hyperlipidemia, atrial fibrillation (on Coumadin), asthma/COPD (no home O2), carotid stenosis s/p right CEA in 2013, PPM in 2006 with generator change in 2014, known CAD s/p PCI with stent to OM1 followed by 3V CABG (LIMA-LAD, SVG-OM1 and SVG-RPDA) with MAZE via sternotomy with Dr. Ocampo at [facility] on 7/18/11 and chronic diastolic heart failure with severe aortic stenosis s/p transfemoral TAVR on 9/15/16 with Mehrdad S3 26mm (commercial) and CHB s/p PPM (last checked two weeks ago) who presents for his three year follow up.

## 2019-10-02 NOTE — PHYSICAL EXAM
[Sclera] : the sclera and conjunctiva were normal [PERRL With Normal Accommodation] : pupils were equal in size, round, and reactive to light [Extraocular Movements] : extraocular movements were intact [Neck Appearance] : the appearance of the neck was normal [Neck Cervical Mass (___cm)] : no neck mass was observed [Jugular Venous Distention Increased] : there was no jugular-venous distention [Thyroid Diffuse Enlargement] : the thyroid was not enlarged [Thyroid Nodule] : there were no palpable thyroid nodules [Auscultation Breath Sounds / Voice Sounds] : lungs were clear to auscultation bilaterally [Heart Rate And Rhythm] : heart rate was normal and rhythm regular [Heart Sounds] : normal S1 and S2 [Heart Sounds Gallop] : no gallops [Murmurs] : no murmurs [Heart Sounds Pericardial Friction Rub] : no pericardial rub [Bowel Sounds] : normal bowel sounds [Abdomen Soft] : soft [Abdomen Tenderness] : non-tender [Abdomen Mass (___ Cm)] : no abdominal mass palpated [Skin Color & Pigmentation] : normal skin color and pigmentation [Skin Turgor] : normal skin turgor [] : no rash [Deep Tendon Reflexes (DTR)] : deep tendon reflexes were 2+ and symmetric [Sensation] : the sensory exam was normal to light touch and pinprick [No Focal Deficits] : no focal deficits [Oriented To Time, Place, And Person] : oriented to person, place, and time [Impaired Insight] : insight and judgment were intact [Affect] : the affect was normal

## 2019-10-16 ENCOUNTER — OUTPATIENT (OUTPATIENT)
Dept: OUTPATIENT SERVICES | Facility: HOSPITAL | Age: 84
LOS: 1 days | Discharge: HOME | End: 2019-10-16

## 2019-10-16 DIAGNOSIS — Z79.01 LONG TERM (CURRENT) USE OF ANTICOAGULANTS: ICD-10-CM

## 2019-10-16 DIAGNOSIS — Z95.2 PRESENCE OF PROSTHETIC HEART VALVE: Chronic | ICD-10-CM

## 2019-10-16 DIAGNOSIS — Z95.0 PRESENCE OF CARDIAC PACEMAKER: Chronic | ICD-10-CM

## 2019-10-16 DIAGNOSIS — Z98.89 OTHER SPECIFIED POSTPROCEDURAL STATES: Chronic | ICD-10-CM

## 2019-10-16 DIAGNOSIS — Z95.1 PRESENCE OF AORTOCORONARY BYPASS GRAFT: Chronic | ICD-10-CM

## 2019-10-16 DIAGNOSIS — I48.91 UNSPECIFIED ATRIAL FIBRILLATION: ICD-10-CM

## 2019-10-16 LAB
POCT INR: 3 RATIO — HIGH (ref 0.9–1.2)
POCT PT: 36.6 SEC — HIGH (ref 10–13.4)

## 2019-11-13 ENCOUNTER — OUTPATIENT (OUTPATIENT)
Dept: OUTPATIENT SERVICES | Facility: HOSPITAL | Age: 84
LOS: 1 days | Discharge: HOME | End: 2019-11-13

## 2019-11-13 DIAGNOSIS — Z95.1 PRESENCE OF AORTOCORONARY BYPASS GRAFT: Chronic | ICD-10-CM

## 2019-11-13 DIAGNOSIS — Z95.0 PRESENCE OF CARDIAC PACEMAKER: Chronic | ICD-10-CM

## 2019-11-13 DIAGNOSIS — I48.91 UNSPECIFIED ATRIAL FIBRILLATION: ICD-10-CM

## 2019-11-13 DIAGNOSIS — Z79.01 LONG TERM (CURRENT) USE OF ANTICOAGULANTS: ICD-10-CM

## 2019-11-13 DIAGNOSIS — Z95.2 PRESENCE OF PROSTHETIC HEART VALVE: Chronic | ICD-10-CM

## 2019-11-13 DIAGNOSIS — Z98.89 OTHER SPECIFIED POSTPROCEDURAL STATES: Chronic | ICD-10-CM

## 2019-11-13 LAB
POCT INR: 2 RATIO — HIGH (ref 0.9–1.2)
POCT PT: 23.8 SEC — HIGH (ref 10–13.4)

## 2019-12-11 ENCOUNTER — OUTPATIENT (OUTPATIENT)
Dept: OUTPATIENT SERVICES | Facility: HOSPITAL | Age: 84
LOS: 1 days | Discharge: HOME | End: 2019-12-11

## 2019-12-11 DIAGNOSIS — Z98.89 OTHER SPECIFIED POSTPROCEDURAL STATES: Chronic | ICD-10-CM

## 2019-12-11 DIAGNOSIS — Z95.2 PRESENCE OF PROSTHETIC HEART VALVE: Chronic | ICD-10-CM

## 2019-12-11 DIAGNOSIS — I48.91 UNSPECIFIED ATRIAL FIBRILLATION: ICD-10-CM

## 2019-12-11 DIAGNOSIS — Z95.1 PRESENCE OF AORTOCORONARY BYPASS GRAFT: Chronic | ICD-10-CM

## 2019-12-11 DIAGNOSIS — Z79.01 LONG TERM (CURRENT) USE OF ANTICOAGULANTS: ICD-10-CM

## 2019-12-11 DIAGNOSIS — Z95.0 PRESENCE OF CARDIAC PACEMAKER: Chronic | ICD-10-CM

## 2019-12-11 LAB
POCT INR: 1.7 RATIO — HIGH (ref 0.9–1.2)
POCT PT: 20.3 SEC — HIGH (ref 10–13.4)

## 2020-01-09 ENCOUNTER — OUTPATIENT (OUTPATIENT)
Dept: OUTPATIENT SERVICES | Facility: HOSPITAL | Age: 85
LOS: 1 days | Discharge: HOME | End: 2020-01-09

## 2020-01-09 DIAGNOSIS — Z95.0 PRESENCE OF CARDIAC PACEMAKER: Chronic | ICD-10-CM

## 2020-01-09 DIAGNOSIS — Z95.2 PRESENCE OF PROSTHETIC HEART VALVE: Chronic | ICD-10-CM

## 2020-01-09 DIAGNOSIS — Z79.01 LONG TERM (CURRENT) USE OF ANTICOAGULANTS: ICD-10-CM

## 2020-01-09 DIAGNOSIS — Z98.89 OTHER SPECIFIED POSTPROCEDURAL STATES: Chronic | ICD-10-CM

## 2020-01-09 DIAGNOSIS — I48.91 UNSPECIFIED ATRIAL FIBRILLATION: ICD-10-CM

## 2020-01-09 DIAGNOSIS — Z95.1 PRESENCE OF AORTOCORONARY BYPASS GRAFT: Chronic | ICD-10-CM

## 2020-01-09 LAB
POCT INR: 2.5 RATIO — HIGH (ref 0.9–1.2)
POCT PT: 30.5 SEC — HIGH (ref 10–13.4)

## 2020-02-10 ENCOUNTER — OUTPATIENT (OUTPATIENT)
Dept: OUTPATIENT SERVICES | Facility: HOSPITAL | Age: 85
LOS: 1 days | Discharge: HOME | End: 2020-02-10

## 2020-02-10 DIAGNOSIS — Z95.2 PRESENCE OF PROSTHETIC HEART VALVE: Chronic | ICD-10-CM

## 2020-02-10 DIAGNOSIS — Z98.89 OTHER SPECIFIED POSTPROCEDURAL STATES: Chronic | ICD-10-CM

## 2020-02-10 DIAGNOSIS — Z95.0 PRESENCE OF CARDIAC PACEMAKER: Chronic | ICD-10-CM

## 2020-02-10 DIAGNOSIS — Z95.1 PRESENCE OF AORTOCORONARY BYPASS GRAFT: Chronic | ICD-10-CM

## 2020-02-10 DIAGNOSIS — Z79.01 LONG TERM (CURRENT) USE OF ANTICOAGULANTS: ICD-10-CM

## 2020-02-10 DIAGNOSIS — I48.91 UNSPECIFIED ATRIAL FIBRILLATION: ICD-10-CM

## 2020-02-10 LAB
INR PPP: 2.7 RATIO
POCT INR: 2.7 RATIO — HIGH (ref 0.9–1.2)
POCT PT: 32.1 SEC — HIGH (ref 10–13.4)
POCT-PROTHROMBIN TIME: 32.1 SECS

## 2020-03-05 ENCOUNTER — APPOINTMENT (OUTPATIENT)
Dept: CARDIOLOGY | Facility: CLINIC | Age: 85
End: 2020-03-05
Payer: MEDICARE

## 2020-03-05 VITALS
HEIGHT: 70 IN | WEIGHT: 200 LBS | DIASTOLIC BLOOD PRESSURE: 80 MMHG | BODY MASS INDEX: 28.63 KG/M2 | SYSTOLIC BLOOD PRESSURE: 122 MMHG | HEART RATE: 73 BPM

## 2020-03-05 PROCEDURE — 93280 PM DEVICE PROGR EVAL DUAL: CPT

## 2020-03-05 PROCEDURE — 99213 OFFICE O/P EST LOW 20 MIN: CPT

## 2020-03-23 NOTE — PROCEDURE
[Complete Heart Block] : complete heart block [See Scanned Paceart Report] : See scanned paceart report [See Device Printout] : See device printout [Pacemaker] : pacemaker [DDD] : DDD [Voltage: ___ volts] : Voltage was [unfilled] volts [Longevity: ___ months] : The estimated remaining battery life is [unfilled] months [Lead Imp:  ___ohms] : lead impedance was [unfilled] ohms [Sensing Amplitude ___mv] : sensing amplitude was [unfilled] mv [___V @] : [unfilled] V [___ ms] : [unfilled] ms [None] : none [Programmed for Longevity] : output reprogrammed for improved battery longevity [Asense-Vsense ___ %] : Asense-Vsense [unfilled]% [Asense-Vpace ___ %] : Asense-Vpace [unfilled]% [Apace-Vsense ___ %] : Apace-Vsense [unfilled]% [Apace-Vpace ___ %] : Apace-Vpace [unfilled]% [Pace ___ %] : Pace [unfilled]% [de-identified] : Medtronic [de-identified] : Abdias [de-identified] : [id number] [de-identified] : 4/7/14 [de-identified] : 50 [de-identified] : 7 AT/AF episodes longest lasting 2 min and 33 seconds.

## 2020-03-23 NOTE — HISTORY OF PRESENT ILLNESS
[Palpitations] : no palpitations [SOB] : no dyspnea [Syncope] : no syncope [Dizziness] : no dizziness [de-identified] : 85 y/o male with pmh of CAD, HTN , S/P PCI with stent to OM 1, followed by  CABG 3VD on 7/18/2011, AS S/P TAVR CHB, s/p PPM, AF on warfarin. \par Presents for routine follow up \par \par Repots progressive worsening in PELAYO . Denies CP/SOB or palpitations. No dizziness or syncope.

## 2020-03-25 ENCOUNTER — OUTPATIENT (OUTPATIENT)
Dept: OUTPATIENT SERVICES | Facility: HOSPITAL | Age: 85
LOS: 1 days | Discharge: HOME | End: 2020-03-25

## 2020-03-25 DIAGNOSIS — Z98.89 OTHER SPECIFIED POSTPROCEDURAL STATES: Chronic | ICD-10-CM

## 2020-03-25 DIAGNOSIS — Z95.0 PRESENCE OF CARDIAC PACEMAKER: Chronic | ICD-10-CM

## 2020-03-25 DIAGNOSIS — Z95.2 PRESENCE OF PROSTHETIC HEART VALVE: Chronic | ICD-10-CM

## 2020-03-25 DIAGNOSIS — Z79.01 LONG TERM (CURRENT) USE OF ANTICOAGULANTS: ICD-10-CM

## 2020-03-25 DIAGNOSIS — Z95.1 PRESENCE OF AORTOCORONARY BYPASS GRAFT: Chronic | ICD-10-CM

## 2020-03-25 DIAGNOSIS — I48.91 UNSPECIFIED ATRIAL FIBRILLATION: ICD-10-CM

## 2020-03-25 LAB
INR PPP: 4.2 RATIO
POCT INR: 4.2 RATIO — HIGH (ref 0.9–1.2)
POCT PT: 51 SEC — HIGH (ref 10–13.4)
POCT-PROTHROMBIN TIME: 51 SECS

## 2020-04-23 ENCOUNTER — OUTPATIENT (OUTPATIENT)
Dept: OUTPATIENT SERVICES | Facility: HOSPITAL | Age: 85
LOS: 1 days | Discharge: HOME | End: 2020-04-23

## 2020-04-23 DIAGNOSIS — I48.91 UNSPECIFIED ATRIAL FIBRILLATION: ICD-10-CM

## 2020-04-23 DIAGNOSIS — Z95.2 PRESENCE OF PROSTHETIC HEART VALVE: Chronic | ICD-10-CM

## 2020-04-23 DIAGNOSIS — Z79.01 LONG TERM (CURRENT) USE OF ANTICOAGULANTS: ICD-10-CM

## 2020-04-23 DIAGNOSIS — Z98.89 OTHER SPECIFIED POSTPROCEDURAL STATES: Chronic | ICD-10-CM

## 2020-04-23 DIAGNOSIS — Z95.0 PRESENCE OF CARDIAC PACEMAKER: Chronic | ICD-10-CM

## 2020-04-23 DIAGNOSIS — Z95.1 PRESENCE OF AORTOCORONARY BYPASS GRAFT: Chronic | ICD-10-CM

## 2020-04-23 LAB
INR PPP: 4.8 RATIO
POCT INR: 4.8 RATIO — HIGH (ref 0.9–1.2)
POCT PT: 58.1 SEC — HIGH (ref 10–13.4)
POCT-PROTHROMBIN TIME: 58.1 SECS
QUALITY CONTROL: YES

## 2020-04-30 ENCOUNTER — OUTPATIENT (OUTPATIENT)
Dept: OUTPATIENT SERVICES | Facility: HOSPITAL | Age: 85
LOS: 1 days | Discharge: HOME | End: 2020-04-30

## 2020-04-30 DIAGNOSIS — Z95.1 PRESENCE OF AORTOCORONARY BYPASS GRAFT: Chronic | ICD-10-CM

## 2020-04-30 DIAGNOSIS — Z98.89 OTHER SPECIFIED POSTPROCEDURAL STATES: Chronic | ICD-10-CM

## 2020-04-30 DIAGNOSIS — I48.91 UNSPECIFIED ATRIAL FIBRILLATION: ICD-10-CM

## 2020-04-30 DIAGNOSIS — Z79.01 LONG TERM (CURRENT) USE OF ANTICOAGULANTS: ICD-10-CM

## 2020-04-30 DIAGNOSIS — Z95.2 PRESENCE OF PROSTHETIC HEART VALVE: Chronic | ICD-10-CM

## 2020-04-30 DIAGNOSIS — Z95.0 PRESENCE OF CARDIAC PACEMAKER: Chronic | ICD-10-CM

## 2020-04-30 LAB
INR PPP: 2 RATIO
POCT INR: 2 RATIO — HIGH (ref 0.9–1.2)
POCT PT: 24.4 SEC — HIGH (ref 10–13.4)
POCT-PROTHROMBIN TIME: 24.4 SECS
QUALITY CONTROL: YES

## 2020-05-14 ENCOUNTER — OUTPATIENT (OUTPATIENT)
Dept: OUTPATIENT SERVICES | Facility: HOSPITAL | Age: 85
LOS: 1 days | Discharge: HOME | End: 2020-05-14

## 2020-05-14 DIAGNOSIS — Z95.0 PRESENCE OF CARDIAC PACEMAKER: Chronic | ICD-10-CM

## 2020-05-14 DIAGNOSIS — Z98.89 OTHER SPECIFIED POSTPROCEDURAL STATES: Chronic | ICD-10-CM

## 2020-05-14 DIAGNOSIS — Z95.2 PRESENCE OF PROSTHETIC HEART VALVE: Chronic | ICD-10-CM

## 2020-05-14 DIAGNOSIS — Z79.01 LONG TERM (CURRENT) USE OF ANTICOAGULANTS: ICD-10-CM

## 2020-05-14 DIAGNOSIS — Z95.1 PRESENCE OF AORTOCORONARY BYPASS GRAFT: Chronic | ICD-10-CM

## 2020-05-14 DIAGNOSIS — I48.91 UNSPECIFIED ATRIAL FIBRILLATION: ICD-10-CM

## 2020-05-14 LAB
INR PPP: 1.9 RATIO
POCT INR: 1.9 RATIO — HIGH (ref 0.9–1.2)
POCT PT: 23 SEC — HIGH (ref 10–13.4)
POCT-PROTHROMBIN TIME: 23 SECS
QUALITY CONTROL: YES

## 2020-06-04 ENCOUNTER — OUTPATIENT (OUTPATIENT)
Dept: OUTPATIENT SERVICES | Facility: HOSPITAL | Age: 85
LOS: 1 days | Discharge: HOME | End: 2020-06-04

## 2020-06-04 DIAGNOSIS — Z95.2 PRESENCE OF PROSTHETIC HEART VALVE: Chronic | ICD-10-CM

## 2020-06-04 DIAGNOSIS — Z79.01 LONG TERM (CURRENT) USE OF ANTICOAGULANTS: ICD-10-CM

## 2020-06-04 DIAGNOSIS — I48.91 UNSPECIFIED ATRIAL FIBRILLATION: ICD-10-CM

## 2020-06-04 DIAGNOSIS — Z95.0 PRESENCE OF CARDIAC PACEMAKER: Chronic | ICD-10-CM

## 2020-06-04 DIAGNOSIS — Z98.89 OTHER SPECIFIED POSTPROCEDURAL STATES: Chronic | ICD-10-CM

## 2020-06-04 DIAGNOSIS — Z95.1 PRESENCE OF AORTOCORONARY BYPASS GRAFT: Chronic | ICD-10-CM

## 2020-06-04 LAB
INR PPP: 2.6 RATIO
POCT INR: 2.6 RATIO — HIGH (ref 0.9–1.2)
POCT PT: 30.7 SEC — HIGH (ref 10–13.4)
POCT-PROTHROMBIN TIME: 30.7 SECS
QUALITY CONTROL: YES

## 2020-07-02 ENCOUNTER — OUTPATIENT (OUTPATIENT)
Dept: OUTPATIENT SERVICES | Facility: HOSPITAL | Age: 85
LOS: 1 days | Discharge: HOME | End: 2020-07-02

## 2020-07-02 DIAGNOSIS — Z95.2 PRESENCE OF PROSTHETIC HEART VALVE: Chronic | ICD-10-CM

## 2020-07-02 DIAGNOSIS — Z95.0 PRESENCE OF CARDIAC PACEMAKER: Chronic | ICD-10-CM

## 2020-07-02 DIAGNOSIS — Z98.89 OTHER SPECIFIED POSTPROCEDURAL STATES: Chronic | ICD-10-CM

## 2020-07-02 DIAGNOSIS — Z95.1 PRESENCE OF AORTOCORONARY BYPASS GRAFT: Chronic | ICD-10-CM

## 2020-07-02 DIAGNOSIS — Z79.01 LONG TERM (CURRENT) USE OF ANTICOAGULANTS: ICD-10-CM

## 2020-07-02 DIAGNOSIS — I48.91 UNSPECIFIED ATRIAL FIBRILLATION: ICD-10-CM

## 2020-07-02 LAB
INR PPP: 2.3 RATIO
POCT INR: 2.3 RATIO — HIGH (ref 0.9–1.2)
POCT PT: 27.1 SEC — HIGH (ref 10–13.4)
POCT-PROTHROMBIN TIME: 27.1 SECS
QUALITY CONTROL: YES

## 2020-07-30 ENCOUNTER — RECORD ABSTRACTING (OUTPATIENT)
Age: 85
End: 2020-07-30

## 2020-07-30 DIAGNOSIS — E11.9 TYPE 2 DIABETES MELLITUS W/OUT COMPLICATIONS: ICD-10-CM

## 2020-08-13 ENCOUNTER — APPOINTMENT (OUTPATIENT)
Dept: MEDICATION MANAGEMENT | Facility: CLINIC | Age: 85
End: 2020-08-13

## 2020-08-13 ENCOUNTER — OUTPATIENT (OUTPATIENT)
Dept: OUTPATIENT SERVICES | Facility: HOSPITAL | Age: 85
LOS: 1 days | Discharge: HOME | End: 2020-08-13

## 2020-08-13 VITALS — HEART RATE: 69 BPM | RESPIRATION RATE: 16 BRPM | OXYGEN SATURATION: 99 %

## 2020-08-13 DIAGNOSIS — Z95.1 PRESENCE OF AORTOCORONARY BYPASS GRAFT: Chronic | ICD-10-CM

## 2020-08-13 DIAGNOSIS — I48.91 UNSPECIFIED ATRIAL FIBRILLATION: ICD-10-CM

## 2020-08-13 DIAGNOSIS — Z95.2 PRESENCE OF PROSTHETIC HEART VALVE: Chronic | ICD-10-CM

## 2020-08-13 DIAGNOSIS — Z95.0 PRESENCE OF CARDIAC PACEMAKER: Chronic | ICD-10-CM

## 2020-08-13 DIAGNOSIS — Z98.89 OTHER SPECIFIED POSTPROCEDURAL STATES: Chronic | ICD-10-CM

## 2020-08-13 DIAGNOSIS — Z79.01 LONG TERM (CURRENT) USE OF ANTICOAGULANTS: ICD-10-CM

## 2020-08-13 LAB
INR PPP: 2.4 RATIO
POCT-PROTHROMBIN TIME: 29.1 SECS
QUALITY CONTROL: YES

## 2020-09-25 ENCOUNTER — APPOINTMENT (OUTPATIENT)
Dept: MEDICATION MANAGEMENT | Facility: CLINIC | Age: 85
End: 2020-09-25

## 2020-09-25 ENCOUNTER — OUTPATIENT (OUTPATIENT)
Dept: OUTPATIENT SERVICES | Facility: HOSPITAL | Age: 85
LOS: 1 days | Discharge: HOME | End: 2020-09-25

## 2020-09-25 VITALS — HEART RATE: 76 BPM | RESPIRATION RATE: 16 BRPM | OXYGEN SATURATION: 99 %

## 2020-09-25 DIAGNOSIS — Z98.89 OTHER SPECIFIED POSTPROCEDURAL STATES: Chronic | ICD-10-CM

## 2020-09-25 DIAGNOSIS — Z95.2 PRESENCE OF PROSTHETIC HEART VALVE: Chronic | ICD-10-CM

## 2020-09-25 DIAGNOSIS — Z95.1 PRESENCE OF AORTOCORONARY BYPASS GRAFT: Chronic | ICD-10-CM

## 2020-09-25 DIAGNOSIS — Z79.01 LONG TERM (CURRENT) USE OF ANTICOAGULANTS: ICD-10-CM

## 2020-09-25 DIAGNOSIS — Z95.0 PRESENCE OF CARDIAC PACEMAKER: Chronic | ICD-10-CM

## 2020-09-25 DIAGNOSIS — I48.91 UNSPECIFIED ATRIAL FIBRILLATION: ICD-10-CM

## 2020-09-25 LAB
INR PPP: 1.9 RATIO
POCT-PROTHROMBIN TIME: 22.4 SECS
QUALITY CONTROL: YES

## 2020-10-02 ENCOUNTER — APPOINTMENT (OUTPATIENT)
Dept: MEDICATION MANAGEMENT | Facility: CLINIC | Age: 85
End: 2020-10-02

## 2020-10-02 ENCOUNTER — OUTPATIENT (OUTPATIENT)
Dept: OUTPATIENT SERVICES | Facility: HOSPITAL | Age: 85
LOS: 1 days | Discharge: HOME | End: 2020-10-02

## 2020-10-02 VITALS — HEART RATE: 78 BPM | RESPIRATION RATE: 16 BRPM | OXYGEN SATURATION: 98 %

## 2020-10-02 DIAGNOSIS — Z95.2 PRESENCE OF PROSTHETIC HEART VALVE: Chronic | ICD-10-CM

## 2020-10-02 DIAGNOSIS — Z79.01 LONG TERM (CURRENT) USE OF ANTICOAGULANTS: ICD-10-CM

## 2020-10-02 DIAGNOSIS — Z95.0 PRESENCE OF CARDIAC PACEMAKER: Chronic | ICD-10-CM

## 2020-10-02 DIAGNOSIS — I48.91 UNSPECIFIED ATRIAL FIBRILLATION: ICD-10-CM

## 2020-10-02 DIAGNOSIS — Z98.89 OTHER SPECIFIED POSTPROCEDURAL STATES: Chronic | ICD-10-CM

## 2020-10-02 DIAGNOSIS — Z95.1 PRESENCE OF AORTOCORONARY BYPASS GRAFT: Chronic | ICD-10-CM

## 2020-10-02 LAB
INR PPP: 2.6 RATIO
POCT-PROTHROMBIN TIME: 31.5 SECS
QUALITY CONTROL: YES

## 2020-11-12 ENCOUNTER — APPOINTMENT (OUTPATIENT)
Dept: CARDIOLOGY | Facility: CLINIC | Age: 85
End: 2020-11-12
Payer: MEDICARE

## 2020-11-12 VITALS
SYSTOLIC BLOOD PRESSURE: 159 MMHG | DIASTOLIC BLOOD PRESSURE: 80 MMHG | WEIGHT: 191 LBS | HEIGHT: 70 IN | BODY MASS INDEX: 27.35 KG/M2

## 2020-11-12 PROCEDURE — 99213 OFFICE O/P EST LOW 20 MIN: CPT

## 2020-11-12 PROCEDURE — 93280 PM DEVICE PROGR EVAL DUAL: CPT

## 2020-11-12 NOTE — PROCEDURE
[Complete Heart Block] : complete heart block [See Scanned Paceart Report] : See scanned paceart report [See Device Printout] : See device printout [Pacemaker] : pacemaker [DDD] : DDD [Voltage: ___ volts] : Voltage was [unfilled] volts [Longevity: ___ months] : The estimated remaining battery life is [unfilled] months [Programmed for Longevity] : output reprogrammed for improved battery longevity [Asense-Vsense ___ %] : Asense-Vsense [unfilled]% [Asense-Vpace ___ %] : Asense-Vpace [unfilled]% [Apace-Vsense ___ %] : Apace-Vsense [unfilled]% [Apace-Vpace ___ %] : Apace-Vpace [unfilled]% [Pace ___ %] : Pace [unfilled]% [de-identified] : Medtronic [de-identified] : Abdias [de-identified] : [id number] [de-identified] : 4/7/14 [de-identified] : 50 [de-identified] : 7 AT/AF episodes longest lasting 2 min and 33 seconds.

## 2020-11-12 NOTE — HISTORY OF PRESENT ILLNESS
[Palpitations] : no palpitations [SOB] : no dyspnea [Syncope] : no syncope [Dizziness] : no dizziness [de-identified] : 85 y/o male with pmh of CAD, HTN , S/P PCI with stent to OM 1, followed by  CABG 3VD on 7/18/2011, AS S/P TAVR CHB, s/p PPM, AF on warfarin. \par Presents for routine follow up \par \par Repots progressive worsening in PELAYO . Denies CP/SOB or palpitations. No dizziness or syncope.

## 2020-11-13 ENCOUNTER — APPOINTMENT (OUTPATIENT)
Dept: MEDICATION MANAGEMENT | Facility: CLINIC | Age: 85
End: 2020-11-13

## 2020-11-13 VITALS — OXYGEN SATURATION: 98 % | HEART RATE: 78 BPM | RESPIRATION RATE: 16 BRPM

## 2020-11-13 LAB
INR PPP: 2.7 RATIO
POCT-PROTHROMBIN TIME: 32.9 SECS
QUALITY CONTROL: YES

## 2020-12-22 ENCOUNTER — APPOINTMENT (OUTPATIENT)
Dept: MEDICATION MANAGEMENT | Facility: CLINIC | Age: 85
End: 2020-12-22

## 2020-12-31 ENCOUNTER — APPOINTMENT (OUTPATIENT)
Dept: MEDICATION MANAGEMENT | Facility: CLINIC | Age: 85
End: 2020-12-31

## 2020-12-31 ENCOUNTER — OUTPATIENT (OUTPATIENT)
Dept: OUTPATIENT SERVICES | Facility: HOSPITAL | Age: 85
LOS: 1 days | Discharge: HOME | End: 2020-12-31

## 2020-12-31 VITALS — HEART RATE: 73 BPM | OXYGEN SATURATION: 99 % | RESPIRATION RATE: 16 BRPM

## 2020-12-31 DIAGNOSIS — I48.91 UNSPECIFIED ATRIAL FIBRILLATION: ICD-10-CM

## 2020-12-31 DIAGNOSIS — Z95.1 PRESENCE OF AORTOCORONARY BYPASS GRAFT: Chronic | ICD-10-CM

## 2020-12-31 DIAGNOSIS — Z95.0 PRESENCE OF CARDIAC PACEMAKER: Chronic | ICD-10-CM

## 2020-12-31 DIAGNOSIS — Z79.01 LONG TERM (CURRENT) USE OF ANTICOAGULANTS: ICD-10-CM

## 2020-12-31 DIAGNOSIS — Z95.2 PRESENCE OF PROSTHETIC HEART VALVE: Chronic | ICD-10-CM

## 2020-12-31 DIAGNOSIS — Z98.89 OTHER SPECIFIED POSTPROCEDURAL STATES: Chronic | ICD-10-CM

## 2020-12-31 LAB
INR PPP: 3 RATIO
POCT-PROTHROMBIN TIME: 35.4 SECS
QUALITY CONTROL: YES

## 2021-02-10 ENCOUNTER — OUTPATIENT (OUTPATIENT)
Dept: OUTPATIENT SERVICES | Facility: HOSPITAL | Age: 86
LOS: 1 days | Discharge: HOME | End: 2021-02-10

## 2021-02-10 ENCOUNTER — APPOINTMENT (OUTPATIENT)
Dept: MEDICATION MANAGEMENT | Facility: CLINIC | Age: 86
End: 2021-02-10

## 2021-02-10 VITALS — RESPIRATION RATE: 16 BRPM | HEART RATE: 75 BPM | OXYGEN SATURATION: 100 %

## 2021-02-10 DIAGNOSIS — Z79.01 LONG TERM (CURRENT) USE OF ANTICOAGULANTS: ICD-10-CM

## 2021-02-10 DIAGNOSIS — I48.91 UNSPECIFIED ATRIAL FIBRILLATION: ICD-10-CM

## 2021-02-10 DIAGNOSIS — Z95.0 PRESENCE OF CARDIAC PACEMAKER: Chronic | ICD-10-CM

## 2021-02-10 DIAGNOSIS — Z98.89 OTHER SPECIFIED POSTPROCEDURAL STATES: Chronic | ICD-10-CM

## 2021-02-10 DIAGNOSIS — Z95.1 PRESENCE OF AORTOCORONARY BYPASS GRAFT: Chronic | ICD-10-CM

## 2021-02-10 DIAGNOSIS — Z95.2 PRESENCE OF PROSTHETIC HEART VALVE: Chronic | ICD-10-CM

## 2021-02-10 LAB
INR PPP: 2.1 RATIO
POCT-PROTHROMBIN TIME: 25 SECS
QUALITY CONTROL: YES

## 2021-03-24 ENCOUNTER — APPOINTMENT (OUTPATIENT)
Dept: MEDICATION MANAGEMENT | Facility: CLINIC | Age: 86
End: 2021-03-24

## 2021-03-31 ENCOUNTER — OUTPATIENT (OUTPATIENT)
Dept: OUTPATIENT SERVICES | Facility: HOSPITAL | Age: 86
LOS: 1 days | Discharge: HOME | End: 2021-03-31

## 2021-03-31 ENCOUNTER — APPOINTMENT (OUTPATIENT)
Dept: MEDICATION MANAGEMENT | Facility: CLINIC | Age: 86
End: 2021-03-31

## 2021-03-31 VITALS — HEART RATE: 65 BPM | OXYGEN SATURATION: 99 % | RESPIRATION RATE: 16 BRPM

## 2021-03-31 DIAGNOSIS — I48.91 UNSPECIFIED ATRIAL FIBRILLATION: ICD-10-CM

## 2021-03-31 DIAGNOSIS — Z79.01 LONG TERM (CURRENT) USE OF ANTICOAGULANTS: ICD-10-CM

## 2021-03-31 DIAGNOSIS — Z95.2 PRESENCE OF PROSTHETIC HEART VALVE: Chronic | ICD-10-CM

## 2021-03-31 DIAGNOSIS — Z95.0 PRESENCE OF CARDIAC PACEMAKER: Chronic | ICD-10-CM

## 2021-03-31 DIAGNOSIS — Z95.1 PRESENCE OF AORTOCORONARY BYPASS GRAFT: Chronic | ICD-10-CM

## 2021-03-31 DIAGNOSIS — Z98.89 OTHER SPECIFIED POSTPROCEDURAL STATES: Chronic | ICD-10-CM

## 2021-03-31 LAB
INR PPP: 2.7 RATIO
POCT-PROTHROMBIN TIME: 32.7 SECS
QUALITY CONTROL: YES

## 2021-05-12 ENCOUNTER — APPOINTMENT (OUTPATIENT)
Dept: MEDICATION MANAGEMENT | Facility: CLINIC | Age: 86
End: 2021-05-12

## 2021-05-12 ENCOUNTER — OUTPATIENT (OUTPATIENT)
Dept: OUTPATIENT SERVICES | Facility: HOSPITAL | Age: 86
LOS: 1 days | Discharge: HOME | End: 2021-05-12

## 2021-05-12 VITALS — HEART RATE: 63 BPM | OXYGEN SATURATION: 98 % | RESPIRATION RATE: 16 BRPM

## 2021-05-12 DIAGNOSIS — Z98.89 OTHER SPECIFIED POSTPROCEDURAL STATES: Chronic | ICD-10-CM

## 2021-05-12 DIAGNOSIS — Z79.01 LONG TERM (CURRENT) USE OF ANTICOAGULANTS: ICD-10-CM

## 2021-05-12 DIAGNOSIS — Z95.0 PRESENCE OF CARDIAC PACEMAKER: Chronic | ICD-10-CM

## 2021-05-12 DIAGNOSIS — Z95.2 PRESENCE OF PROSTHETIC HEART VALVE: Chronic | ICD-10-CM

## 2021-05-12 DIAGNOSIS — Z95.1 PRESENCE OF AORTOCORONARY BYPASS GRAFT: Chronic | ICD-10-CM

## 2021-05-12 DIAGNOSIS — I48.91 UNSPECIFIED ATRIAL FIBRILLATION: ICD-10-CM

## 2021-05-12 LAB
INR PPP: 2.5 RATIO
POCT-PROTHROMBIN TIME: 30.6 SECS
QUALITY CONTROL: YES

## 2021-05-27 ENCOUNTER — APPOINTMENT (OUTPATIENT)
Dept: CARDIOLOGY | Facility: CLINIC | Age: 86
End: 2021-05-27
Payer: MEDICARE

## 2021-05-27 VITALS
HEART RATE: 65 BPM | HEIGHT: 70 IN | DIASTOLIC BLOOD PRESSURE: 60 MMHG | BODY MASS INDEX: 27.92 KG/M2 | OXYGEN SATURATION: 96 % | SYSTOLIC BLOOD PRESSURE: 140 MMHG | WEIGHT: 195 LBS | TEMPERATURE: 98 F

## 2021-05-27 DIAGNOSIS — Z00.00 ENCOUNTER FOR GENERAL ADULT MEDICAL EXAMINATION W/OUT ABNORMAL FINDINGS: ICD-10-CM

## 2021-05-27 PROCEDURE — 93280 PM DEVICE PROGR EVAL DUAL: CPT

## 2021-05-27 NOTE — PROCEDURE
[Complete Heart Block] : complete heart block [See Scanned Paceart Report] : See scanned paceart report [See Device Printout] : See device printout [Pacemaker] : pacemaker [DDD] : DDD [Programmed for Longevity] : output reprogrammed for improved battery longevity [de-identified] : Medtronic [de-identified] : Abdias [de-identified] : [id number] [de-identified] : 4/7/14 [de-identified] : 50 [de-identified] : low AF brden <1%; battery 2 years\par P waves on low side 0.5\par increase RV out put to 2.5

## 2021-05-27 NOTE — HISTORY OF PRESENT ILLNESS
[Palpitations] : no palpitations [SOB] : no dyspnea [Syncope] : no syncope [Dizziness] : no dizziness [de-identified] : 85 y/o male with pmh of CAD, HTN , S/P PCI with stent to OM 1, followed by  CABG 3VD on 7/18/2011, AS S/P TAVR CHB, s/p PPM, AF on warfarin. \par Presents for routine follow up \par \par Repots progressive worsening in PELAYO . Denies CP/SOB or palpitations. No dizziness or syncope.

## 2021-07-02 ENCOUNTER — OUTPATIENT (OUTPATIENT)
Dept: OUTPATIENT SERVICES | Facility: HOSPITAL | Age: 86
LOS: 1 days | Discharge: HOME | End: 2021-07-02

## 2021-07-02 ENCOUNTER — APPOINTMENT (OUTPATIENT)
Dept: MEDICATION MANAGEMENT | Facility: CLINIC | Age: 86
End: 2021-07-02

## 2021-07-02 VITALS — OXYGEN SATURATION: 98 % | RESPIRATION RATE: 16 BRPM | HEART RATE: 65 BPM

## 2021-07-02 DIAGNOSIS — Z98.89 OTHER SPECIFIED POSTPROCEDURAL STATES: Chronic | ICD-10-CM

## 2021-07-02 DIAGNOSIS — Z95.2 PRESENCE OF PROSTHETIC HEART VALVE: Chronic | ICD-10-CM

## 2021-07-02 DIAGNOSIS — Z79.01 LONG TERM (CURRENT) USE OF ANTICOAGULANTS: ICD-10-CM

## 2021-07-02 DIAGNOSIS — Z95.0 PRESENCE OF CARDIAC PACEMAKER: Chronic | ICD-10-CM

## 2021-07-02 DIAGNOSIS — I48.91 UNSPECIFIED ATRIAL FIBRILLATION: ICD-10-CM

## 2021-07-02 DIAGNOSIS — Z95.1 PRESENCE OF AORTOCORONARY BYPASS GRAFT: Chronic | ICD-10-CM

## 2021-07-02 LAB
INR PPP: 3 RATIO
POCT-PROTHROMBIN TIME: 36.5 SECS
QUALITY CONTROL: YES

## 2021-07-03 ENCOUNTER — INPATIENT (INPATIENT)
Facility: HOSPITAL | Age: 86
LOS: 4 days | Discharge: NURSING, REHAB OR LONG-TERM CARE | End: 2021-07-08
Attending: SURGERY | Admitting: SURGERY
Payer: MEDICARE

## 2021-07-03 VITALS
TEMPERATURE: 98 F | HEIGHT: 70 IN | SYSTOLIC BLOOD PRESSURE: 202 MMHG | RESPIRATION RATE: 18 BRPM | HEART RATE: 79 BPM | OXYGEN SATURATION: 99 % | WEIGHT: 195.11 LBS | DIASTOLIC BLOOD PRESSURE: 90 MMHG

## 2021-07-03 DIAGNOSIS — Z95.1 PRESENCE OF AORTOCORONARY BYPASS GRAFT: ICD-10-CM

## 2021-07-03 DIAGNOSIS — N40.0 BENIGN PROSTATIC HYPERPLASIA WITHOUT LOWER URINARY TRACT SYMPTOMS: ICD-10-CM

## 2021-07-03 DIAGNOSIS — J44.9 CHRONIC OBSTRUCTIVE PULMONARY DISEASE, UNSPECIFIED: ICD-10-CM

## 2021-07-03 DIAGNOSIS — I50.23 ACUTE ON CHRONIC SYSTOLIC (CONGESTIVE) HEART FAILURE: ICD-10-CM

## 2021-07-03 DIAGNOSIS — I25.10 ATHEROSCLEROTIC HEART DISEASE OF NATIVE CORONARY ARTERY WITHOUT ANGINA PECTORIS: ICD-10-CM

## 2021-07-03 DIAGNOSIS — Z91.041 RADIOGRAPHIC DYE ALLERGY STATUS: ICD-10-CM

## 2021-07-03 DIAGNOSIS — Z95.3 PRESENCE OF XENOGENIC HEART VALVE: ICD-10-CM

## 2021-07-03 DIAGNOSIS — I11.0 HYPERTENSIVE HEART DISEASE WITH HEART FAILURE: ICD-10-CM

## 2021-07-03 DIAGNOSIS — Z95.0 PRESENCE OF CARDIAC PACEMAKER: Chronic | ICD-10-CM

## 2021-07-03 DIAGNOSIS — Z98.89 OTHER SPECIFIED POSTPROCEDURAL STATES: Chronic | ICD-10-CM

## 2021-07-03 DIAGNOSIS — Z95.2 PRESENCE OF PROSTHETIC HEART VALVE: Chronic | ICD-10-CM

## 2021-07-03 DIAGNOSIS — I48.91 UNSPECIFIED ATRIAL FIBRILLATION: ICD-10-CM
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
## 2021-07-03 DIAGNOSIS — E87.3 ALKALOSIS: ICD-10-CM

## 2021-07-03 DIAGNOSIS — G89.11 ACUTE PAIN DUE TO TRAUMA: ICD-10-CM

## 2021-07-03 DIAGNOSIS — S72.142A DISPLACED INTERTROCHANTERIC FRACTURE OF LEFT FEMUR, INITIAL ENCOUNTER FOR CLOSED FRACTURE: ICD-10-CM

## 2021-07-03 DIAGNOSIS — Z95.1 PRESENCE OF AORTOCORONARY BYPASS GRAFT: Chronic | ICD-10-CM

## 2021-07-03 DIAGNOSIS — Z95.0 PRESENCE OF CARDIAC PACEMAKER: ICD-10-CM

## 2021-07-03 DIAGNOSIS — I50.1 LEFT VENTRICULAR FAILURE, UNSPECIFIED: ICD-10-CM
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
## 2021-07-03 DIAGNOSIS — E11.9 TYPE 2 DIABETES MELLITUS WITHOUT COMPLICATIONS: ICD-10-CM
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
## 2021-07-03 DIAGNOSIS — Z79.01 LONG TERM (CURRENT) USE OF ANTICOAGULANTS: ICD-10-CM

## 2021-07-03 LAB
ALBUMIN SERPL ELPH-MCNC: 4.5 G/DL — SIGNIFICANT CHANGE UP (ref 3.5–5.2)
ALP SERPL-CCNC: 194 U/L — HIGH (ref 30–115)
ALT FLD-CCNC: 24 U/L — SIGNIFICANT CHANGE UP (ref 0–41)
ANION GAP SERPL CALC-SCNC: 15 MMOL/L — HIGH (ref 7–14)
ANION GAP SERPL CALC-SCNC: 15 MMOL/L — HIGH (ref 7–14)
APPEARANCE UR: CLEAR — SIGNIFICANT CHANGE UP
APTT BLD: 38.7 SEC — SIGNIFICANT CHANGE UP (ref 27–39.2)
APTT BLD: 45 SEC — HIGH (ref 27–39.2)
AST SERPL-CCNC: 28 U/L — SIGNIFICANT CHANGE UP (ref 0–41)
BACTERIA # UR AUTO: NEGATIVE — SIGNIFICANT CHANGE UP
BASOPHILS # BLD AUTO: 0.03 K/UL — SIGNIFICANT CHANGE UP (ref 0–0.2)
BASOPHILS # BLD AUTO: 0.04 K/UL — SIGNIFICANT CHANGE UP (ref 0–0.2)
BASOPHILS NFR BLD AUTO: 0.2 % — SIGNIFICANT CHANGE UP (ref 0–1)
BASOPHILS NFR BLD AUTO: 0.5 % — SIGNIFICANT CHANGE UP (ref 0–1)
BILIRUB SERPL-MCNC: 0.5 MG/DL — SIGNIFICANT CHANGE UP (ref 0.2–1.2)
BILIRUB UR-MCNC: NEGATIVE — SIGNIFICANT CHANGE UP
BUN SERPL-MCNC: 17 MG/DL — SIGNIFICANT CHANGE UP (ref 10–20)
BUN SERPL-MCNC: 18 MG/DL — SIGNIFICANT CHANGE UP (ref 10–20)
CALCIUM SERPL-MCNC: 10 MG/DL — SIGNIFICANT CHANGE UP (ref 8.5–10.1)
CALCIUM SERPL-MCNC: 9.9 MG/DL — SIGNIFICANT CHANGE UP (ref 8.5–10.1)
CHLORIDE SERPL-SCNC: 102 MMOL/L — SIGNIFICANT CHANGE UP (ref 98–110)
CHLORIDE SERPL-SCNC: 104 MMOL/L — SIGNIFICANT CHANGE UP (ref 98–110)
CO2 SERPL-SCNC: 20 MMOL/L — SIGNIFICANT CHANGE UP (ref 17–32)
CO2 SERPL-SCNC: 20 MMOL/L — SIGNIFICANT CHANGE UP (ref 17–32)
COLOR SPEC: COLORLESS — SIGNIFICANT CHANGE UP
CREAT SERPL-MCNC: 1 MG/DL — SIGNIFICANT CHANGE UP (ref 0.7–1.5)
CREAT SERPL-MCNC: 1.1 MG/DL — SIGNIFICANT CHANGE UP (ref 0.7–1.5)
DIFF PNL FLD: ABNORMAL
EOSINOPHIL # BLD AUTO: 0 K/UL — SIGNIFICANT CHANGE UP (ref 0–0.7)
EOSINOPHIL # BLD AUTO: 0.23 K/UL — SIGNIFICANT CHANGE UP (ref 0–0.7)
EOSINOPHIL NFR BLD AUTO: 0 % — SIGNIFICANT CHANGE UP (ref 0–8)
EOSINOPHIL NFR BLD AUTO: 2.6 % — SIGNIFICANT CHANGE UP (ref 0–8)
EPI CELLS # UR: 0 /HPF — SIGNIFICANT CHANGE UP (ref 0–5)
ETHANOL SERPL-MCNC: <10 MG/DL — SIGNIFICANT CHANGE UP
GLUCOSE SERPL-MCNC: 171 MG/DL — HIGH (ref 70–99)
GLUCOSE SERPL-MCNC: 199 MG/DL — HIGH (ref 70–99)
GLUCOSE UR QL: NEGATIVE — SIGNIFICANT CHANGE UP
HCT VFR BLD CALC: 38.6 % — LOW (ref 42–52)
HCT VFR BLD CALC: 39.4 % — LOW (ref 42–52)
HGB BLD-MCNC: 12.4 G/DL — LOW (ref 14–18)
HGB BLD-MCNC: 12.8 G/DL — LOW (ref 14–18)
HYALINE CASTS # UR AUTO: 0 /LPF — SIGNIFICANT CHANGE UP (ref 0–7)
IMM GRANULOCYTES NFR BLD AUTO: 0.6 % — HIGH (ref 0.1–0.3)
IMM GRANULOCYTES NFR BLD AUTO: 1.1 % — HIGH (ref 0.1–0.3)
INR BLD: 2.97 RATIO — HIGH (ref 0.65–1.3)
INR BLD: 3.09 RATIO — HIGH (ref 0.65–1.3)
KETONES UR-MCNC: NEGATIVE — SIGNIFICANT CHANGE UP
LACTATE SERPL-SCNC: 3.4 MMOL/L — HIGH (ref 0.7–2)
LACTATE SERPL-SCNC: 4.1 MMOL/L — CRITICAL HIGH (ref 0.7–2)
LEUKOCYTE ESTERASE UR-ACNC: NEGATIVE — SIGNIFICANT CHANGE UP
LIDOCAIN IGE QN: 32 U/L — SIGNIFICANT CHANGE UP (ref 7–60)
LYMPHOCYTES # BLD AUTO: 0.85 K/UL — LOW (ref 1.2–3.4)
LYMPHOCYTES # BLD AUTO: 2.55 K/UL — SIGNIFICANT CHANGE UP (ref 1.2–3.4)
LYMPHOCYTES # BLD AUTO: 28.9 % — SIGNIFICANT CHANGE UP (ref 20.5–51.1)
LYMPHOCYTES # BLD AUTO: 6 % — LOW (ref 20.5–51.1)
MAGNESIUM SERPL-MCNC: 1.8 MG/DL — SIGNIFICANT CHANGE UP (ref 1.8–2.4)
MCHC RBC-ENTMCNC: 29.8 PG — SIGNIFICANT CHANGE UP (ref 27–31)
MCHC RBC-ENTMCNC: 30.2 PG — SIGNIFICANT CHANGE UP (ref 27–31)
MCHC RBC-ENTMCNC: 32.1 G/DL — SIGNIFICANT CHANGE UP (ref 32–37)
MCHC RBC-ENTMCNC: 32.5 G/DL — SIGNIFICANT CHANGE UP (ref 32–37)
MCV RBC AUTO: 92.8 FL — SIGNIFICANT CHANGE UP (ref 80–94)
MCV RBC AUTO: 92.9 FL — SIGNIFICANT CHANGE UP (ref 80–94)
MONOCYTES # BLD AUTO: 0.16 K/UL — SIGNIFICANT CHANGE UP (ref 0.1–0.6)
MONOCYTES # BLD AUTO: 0.8 K/UL — HIGH (ref 0.1–0.6)
MONOCYTES NFR BLD AUTO: 1.1 % — LOW (ref 1.7–9.3)
MONOCYTES NFR BLD AUTO: 9.1 % — SIGNIFICANT CHANGE UP (ref 1.7–9.3)
NEUTROPHILS # BLD AUTO: 12.85 K/UL — HIGH (ref 1.4–6.5)
NEUTROPHILS # BLD AUTO: 5.14 K/UL — SIGNIFICANT CHANGE UP (ref 1.4–6.5)
NEUTROPHILS NFR BLD AUTO: 58.3 % — SIGNIFICANT CHANGE UP (ref 42.2–75.2)
NEUTROPHILS NFR BLD AUTO: 91.6 % — HIGH (ref 42.2–75.2)
NITRITE UR-MCNC: NEGATIVE — SIGNIFICANT CHANGE UP
NRBC # BLD: 0 /100 WBCS — SIGNIFICANT CHANGE UP (ref 0–0)
NRBC # BLD: 0 /100 WBCS — SIGNIFICANT CHANGE UP (ref 0–0)
PH UR: 6.5 — SIGNIFICANT CHANGE UP (ref 5–8)
PHOSPHATE SERPL-MCNC: 2.9 MG/DL — SIGNIFICANT CHANGE UP (ref 2.1–4.9)
PLATELET # BLD AUTO: 312 K/UL — SIGNIFICANT CHANGE UP (ref 130–400)
PLATELET # BLD AUTO: 312 K/UL — SIGNIFICANT CHANGE UP (ref 130–400)
POTASSIUM SERPL-MCNC: 4.1 MMOL/L — SIGNIFICANT CHANGE UP (ref 3.5–5)
POTASSIUM SERPL-MCNC: 4.3 MMOL/L — SIGNIFICANT CHANGE UP (ref 3.5–5)
POTASSIUM SERPL-SCNC: 4.1 MMOL/L — SIGNIFICANT CHANGE UP (ref 3.5–5)
POTASSIUM SERPL-SCNC: 4.3 MMOL/L — SIGNIFICANT CHANGE UP (ref 3.5–5)
PROT SERPL-MCNC: 7.3 G/DL — SIGNIFICANT CHANGE UP (ref 6–8)
PROT UR-MCNC: SIGNIFICANT CHANGE UP
PROTHROM AB SERPL-ACNC: 34.2 SEC — HIGH (ref 9.95–12.87)
PROTHROM AB SERPL-ACNC: 35.5 SEC — HIGH (ref 9.95–12.87)
RBC # BLD: 4.16 M/UL — LOW (ref 4.7–6.1)
RBC # BLD: 4.24 M/UL — LOW (ref 4.7–6.1)
RBC # FLD: 12.8 % — SIGNIFICANT CHANGE UP (ref 11.5–14.5)
RBC # FLD: 12.8 % — SIGNIFICANT CHANGE UP (ref 11.5–14.5)
RBC CASTS # UR COMP ASSIST: 13 /HPF — HIGH (ref 0–4)
SARS-COV-2 RNA SPEC QL NAA+PROBE: SIGNIFICANT CHANGE UP
SODIUM SERPL-SCNC: 137 MMOL/L — SIGNIFICANT CHANGE UP (ref 135–146)
SODIUM SERPL-SCNC: 139 MMOL/L — SIGNIFICANT CHANGE UP (ref 135–146)
SP GR SPEC: 1.02 — SIGNIFICANT CHANGE UP (ref 1.01–1.03)
UROBILINOGEN FLD QL: SIGNIFICANT CHANGE UP
WBC # BLD: 14.05 K/UL — HIGH (ref 4.8–10.8)
WBC # BLD: 8.81 K/UL — SIGNIFICANT CHANGE UP (ref 4.8–10.8)
WBC # FLD AUTO: 14.05 K/UL — HIGH (ref 4.8–10.8)
WBC # FLD AUTO: 8.81 K/UL — SIGNIFICANT CHANGE UP (ref 4.8–10.8)
WBC UR QL: 0 /HPF — SIGNIFICANT CHANGE UP (ref 0–5)

## 2021-07-03 PROCEDURE — 76705 ECHO EXAM OF ABDOMEN: CPT | Mod: 26,GC

## 2021-07-03 PROCEDURE — 93010 ELECTROCARDIOGRAM REPORT: CPT

## 2021-07-03 PROCEDURE — 71045 X-RAY EXAM CHEST 1 VIEW: CPT | Mod: 26

## 2021-07-03 PROCEDURE — 73552 X-RAY EXAM OF FEMUR 2/>: CPT | Mod: 26,LT

## 2021-07-03 PROCEDURE — 70450 CT HEAD/BRAIN W/O DYE: CPT | Mod: 26,MA

## 2021-07-03 PROCEDURE — 73502 X-RAY EXAM HIP UNI 2-3 VIEWS: CPT | Mod: 26,LT

## 2021-07-03 PROCEDURE — 72192 CT PELVIS W/O DYE: CPT | Mod: 26,59,MA

## 2021-07-03 PROCEDURE — 99285 EMERGENCY DEPT VISIT HI MDM: CPT | Mod: 25,GC

## 2021-07-03 PROCEDURE — 71260 CT THORAX DX C+: CPT | Mod: 26,MA

## 2021-07-03 PROCEDURE — 72125 CT NECK SPINE W/O DYE: CPT | Mod: 26,MA

## 2021-07-03 PROCEDURE — 93308 TTE F-UP OR LMTD: CPT | Mod: 26,GC

## 2021-07-03 PROCEDURE — 74177 CT ABD & PELVIS W/CONTRAST: CPT | Mod: 26,MA

## 2021-07-03 RX ORDER — SODIUM CHLORIDE 9 MG/ML
1000 INJECTION, SOLUTION INTRAVENOUS
Refills: 0 | Status: DISCONTINUED | OUTPATIENT
Start: 2021-07-03 | End: 2021-07-03

## 2021-07-03 RX ORDER — DEXTROSE 50 % IN WATER 50 %
25 SYRINGE (ML) INTRAVENOUS ONCE
Refills: 0 | Status: DISCONTINUED | OUTPATIENT
Start: 2021-07-03 | End: 2021-07-06

## 2021-07-03 RX ORDER — FINASTERIDE 5 MG/1
5 TABLET, FILM COATED ORAL DAILY
Refills: 0 | Status: DISCONTINUED | OUTPATIENT
Start: 2021-07-03 | End: 2021-07-06

## 2021-07-03 RX ORDER — DEXTROSE 50 % IN WATER 50 %
15 SYRINGE (ML) INTRAVENOUS ONCE
Refills: 0 | Status: DISCONTINUED | OUTPATIENT
Start: 2021-07-03 | End: 2021-07-06

## 2021-07-03 RX ORDER — INSULIN LISPRO 100/ML
VIAL (ML) SUBCUTANEOUS
Refills: 0 | Status: DISCONTINUED | OUTPATIENT
Start: 2021-07-03 | End: 2021-07-04

## 2021-07-03 RX ORDER — ZOLPIDEM TARTRATE 10 MG/1
5 TABLET ORAL AT BEDTIME
Refills: 0 | Status: DISCONTINUED | OUTPATIENT
Start: 2021-07-03 | End: 2021-07-06

## 2021-07-03 RX ORDER — SODIUM CHLORIDE 9 MG/ML
1000 INJECTION, SOLUTION INTRAVENOUS ONCE
Refills: 0 | Status: COMPLETED | OUTPATIENT
Start: 2021-07-03 | End: 2021-07-03

## 2021-07-03 RX ORDER — GLUCAGON INJECTION, SOLUTION 0.5 MG/.1ML
1 INJECTION, SOLUTION SUBCUTANEOUS ONCE
Refills: 0 | Status: DISCONTINUED | OUTPATIENT
Start: 2021-07-03 | End: 2021-07-06

## 2021-07-03 RX ORDER — LOSARTAN POTASSIUM 100 MG/1
100 TABLET, FILM COATED ORAL DAILY
Refills: 0 | Status: DISCONTINUED | OUTPATIENT
Start: 2021-07-03 | End: 2021-07-06

## 2021-07-03 RX ORDER — ACETAMINOPHEN 500 MG
650 TABLET ORAL EVERY 6 HOURS
Refills: 0 | Status: DISCONTINUED | OUTPATIENT
Start: 2021-07-03 | End: 2021-07-06

## 2021-07-03 RX ORDER — OXYCODONE HYDROCHLORIDE 5 MG/1
5 TABLET ORAL EVERY 6 HOURS
Refills: 0 | Status: DISCONTINUED | OUTPATIENT
Start: 2021-07-03 | End: 2021-07-06

## 2021-07-03 RX ORDER — PANTOPRAZOLE SODIUM 20 MG/1
40 TABLET, DELAYED RELEASE ORAL
Refills: 0 | Status: DISCONTINUED | OUTPATIENT
Start: 2021-07-03 | End: 2021-07-06

## 2021-07-03 RX ORDER — AMLODIPINE BESYLATE 2.5 MG/1
5 TABLET ORAL ONCE
Refills: 0 | Status: COMPLETED | OUTPATIENT
Start: 2021-07-03 | End: 2021-07-03

## 2021-07-03 RX ORDER — TETANUS TOXOID, REDUCED DIPHTHERIA TOXOID AND ACELLULAR PERTUSSIS VACCINE, ADSORBED 5; 2.5; 8; 8; 2.5 [IU]/.5ML; [IU]/.5ML; UG/.5ML; UG/.5ML; UG/.5ML
0.5 SUSPENSION INTRAMUSCULAR ONCE
Refills: 0 | Status: COMPLETED | OUTPATIENT
Start: 2021-07-03 | End: 2021-07-03

## 2021-07-03 RX ORDER — SODIUM CHLORIDE 9 MG/ML
1000 INJECTION, SOLUTION INTRAVENOUS
Refills: 0 | Status: DISCONTINUED | OUTPATIENT
Start: 2021-07-03 | End: 2021-07-06

## 2021-07-03 RX ORDER — SIMVASTATIN 20 MG/1
40 TABLET, FILM COATED ORAL AT BEDTIME
Refills: 0 | Status: DISCONTINUED | OUTPATIENT
Start: 2021-07-03 | End: 2021-07-06

## 2021-07-03 RX ORDER — DIPHENHYDRAMINE HCL 50 MG
50 CAPSULE ORAL ONCE
Refills: 0 | Status: COMPLETED | OUTPATIENT
Start: 2021-07-03 | End: 2021-07-03

## 2021-07-03 RX ORDER — METOPROLOL TARTRATE 50 MG
50 TABLET ORAL
Refills: 0 | Status: DISCONTINUED | OUTPATIENT
Start: 2021-07-03 | End: 2021-07-06

## 2021-07-03 RX ORDER — HEPARIN SODIUM 5000 [USP'U]/ML
5000 INJECTION INTRAVENOUS; SUBCUTANEOUS EVERY 8 HOURS
Refills: 0 | Status: DISCONTINUED | OUTPATIENT
Start: 2021-07-03 | End: 2021-07-03

## 2021-07-03 RX ORDER — DEXTROSE 50 % IN WATER 50 %
12.5 SYRINGE (ML) INTRAVENOUS ONCE
Refills: 0 | Status: DISCONTINUED | OUTPATIENT
Start: 2021-07-03 | End: 2021-07-06

## 2021-07-03 RX ORDER — TAMSULOSIN HYDROCHLORIDE 0.4 MG/1
0.4 CAPSULE ORAL AT BEDTIME
Refills: 0 | Status: DISCONTINUED | OUTPATIENT
Start: 2021-07-03 | End: 2021-07-06

## 2021-07-03 RX ORDER — ASPIRIN/CALCIUM CARB/MAGNESIUM 324 MG
81 TABLET ORAL DAILY
Refills: 0 | Status: DISCONTINUED | OUTPATIENT
Start: 2021-07-03 | End: 2021-07-06

## 2021-07-03 RX ORDER — SODIUM CHLORIDE 9 MG/ML
1000 INJECTION, SOLUTION INTRAVENOUS
Refills: 0 | Status: DISCONTINUED | OUTPATIENT
Start: 2021-07-03 | End: 2021-07-05

## 2021-07-03 RX ORDER — CHLORHEXIDINE GLUCONATE 213 G/1000ML
1 SOLUTION TOPICAL DAILY
Refills: 0 | Status: DISCONTINUED | OUTPATIENT
Start: 2021-07-03 | End: 2021-07-06

## 2021-07-03 RX ADMIN — Medication 50 MILLIGRAM(S): at 15:00

## 2021-07-03 RX ADMIN — Medication 650 MILLIGRAM(S): at 20:05

## 2021-07-03 RX ADMIN — ZOLPIDEM TARTRATE 5 MILLIGRAM(S): 10 TABLET ORAL at 23:22

## 2021-07-03 RX ADMIN — TETANUS TOXOID, REDUCED DIPHTHERIA TOXOID AND ACELLULAR PERTUSSIS VACCINE, ADSORBED 0.5 MILLILITER(S): 5; 2.5; 8; 8; 2.5 SUSPENSION INTRAMUSCULAR at 15:00

## 2021-07-03 RX ADMIN — SODIUM CHLORIDE 125 MILLILITER(S): 9 INJECTION, SOLUTION INTRAVENOUS at 21:11

## 2021-07-03 RX ADMIN — Medication 125 MILLIGRAM(S): at 15:00

## 2021-07-03 RX ADMIN — OXYCODONE HYDROCHLORIDE 5 MILLIGRAM(S): 5 TABLET ORAL at 21:52

## 2021-07-03 RX ADMIN — Medication 81 MILLIGRAM(S): at 21:10

## 2021-07-03 RX ADMIN — TAMSULOSIN HYDROCHLORIDE 0.4 MILLIGRAM(S): 0.4 CAPSULE ORAL at 21:10

## 2021-07-03 RX ADMIN — Medication 650 MILLIGRAM(S): at 18:15

## 2021-07-03 RX ADMIN — SODIUM CHLORIDE 1000 MILLILITER(S): 9 INJECTION, SOLUTION INTRAVENOUS at 19:45

## 2021-07-03 RX ADMIN — SODIUM CHLORIDE 100 MILLILITER(S): 9 INJECTION, SOLUTION INTRAVENOUS at 18:14

## 2021-07-03 RX ADMIN — Medication 650 MILLIGRAM(S): at 23:22

## 2021-07-03 RX ADMIN — FINASTERIDE 5 MILLIGRAM(S): 5 TABLET, FILM COATED ORAL at 21:10

## 2021-07-03 RX ADMIN — SIMVASTATIN 40 MILLIGRAM(S): 20 TABLET, FILM COATED ORAL at 21:11

## 2021-07-03 RX ADMIN — AMLODIPINE BESYLATE 5 MILLIGRAM(S): 2.5 TABLET ORAL at 21:10

## 2021-07-03 NOTE — H&P ADULT - NSHPLABSRESULTS_GEN_ALL_CORE
LABS:  CAPILLARY BLOOD GLUCOSE  POCT Blood Glucose.: 167 mg/dL (2021 14:43)                  12.8   8.81  )-----------( 312      ( 2021 14:54 )             39.4       139  |  104  |  18  ----------------------------<  171<H>  4.1   |  20  |  1.1    Ca    9.9      2021 14:54    TPro  7.3  /  Alb  4.5  /  TBili  0.5  /  DBili  x   /  AST  28  /  ALT  24  /  AlkPhos  194<H>      Lipase, Serum: 32 U/L (21 @ 14:54)    Lactate, Blood: 4.1 mmol/L (21 @ 14:54)    PT/INR - ( 2021 14:54 )   PT: 35.50 sec;   INR: 3.09 ratio    PTT - ( 2021 14:54 )  PTT:38.7 sec    Alcohol, Blood: <10 mg/dL (21 @ 14:54)    Urinalysis Basic - ( 2021 16:29 )  Color: Colorless / Appearance: Clear / S.022 / pH: x  Gluc: x / Ketone: Negative  / Bili: Negative / Urobili: <2 mg/dL   Blood: x / Protein: Trace / Nitrite: Negative   Leuk Esterase: Negative / RBC: 13 /HPF / WBC 0 /HPF   Sq Epi: x / Non Sq Epi: 0 /HPF / Bacteria: Negative      IMAGING:  < from: Xray Hip 2-3 Views, Left (21 @ 15:00) >  IMPRESSION:    1. Left intertrochanteric 1.6 cm displaced fracture.  < end of copied text >      < from: CT Head No Cont (21 @ 15:53) >  IMPRESSION:    CT HEAD:    1. No evidence of acute intracranial hemorrhage or large territorial infarct.  2. Stable chronic microvascular changes.    CT CERVICAL SPINE:    1. No evidence of a cervical spine fracture or subluxation.  2. Biapical lung interlobular septal thickening, consistent with interstitial edema.  < end of copied text >    < from: CT Abdomen and Pelvis w/ IV Cont (21 @ 15:59) >  IMPRESSION:    1. Acute comminuted left intertrochanteric femoral fracture.  2. No evidence of acute traumatic intrathoracic pathology or solid abdominal organ injury.  < end of copied text >

## 2021-07-03 NOTE — ED ADULT NURSE NOTE - OBJECTIVE STATEMENT
BIBEMS s/p trip and fall on dog's leash on concrete. C/o pain on left hip, abrasion noted on right knee. Denies head trauma and LOC, on coumadin. Trauma alert initiated.

## 2021-07-03 NOTE — ED PROVIDER NOTE - CARE PLAN
Principal Discharge DX:	Intertrochanteric fracture, closed, left, initial encounter  Secondary Diagnosis:	On continuous oral anticoagulation

## 2021-07-03 NOTE — H&P ADULT - ASSESSMENT
87yM w/ PMH/PSH of aortic stenosis s/p TAVR (2016), AFib (on Coumadin & s/p MAZE procedure), asthma/COPD, CHF, s/p pacemaker placement, carotid stenosis s/p right CEA (2013), and known CAD s/p PCI placement and s/p CABG who was seen as a Trauma Alert s/p mechanical trip and fall, -HT, -LOC, +Coumadin with complaint of left hip pain, no external signs of trauma. Trauma assessment in ED: ABCs intact , GCS 15 , AAOx3,  MARCH.     Injuries Identified:  - Left intertrochanteric femoral fx    PLAN:  -Admit to trauma  -F/u ortho recommendations- may need coumadin reversal  -Cardiology consult  -CT cystogram - microscopic hematuria on UA  -Pain control  -DVT/GI ppx 87yM w/ PMH/PSH of aortic stenosis s/p TAVR (2016), AFib (on Coumadin & s/p MAZE procedure), asthma/COPD, CHF, s/p pacemaker placement, carotid stenosis s/p right CEA (2013), and known CAD s/p PCI placement and s/p CABG who was seen as a Trauma Alert s/p mechanical trip and fall, -HT, -LOC, +Coumadin with complaint of left hip pain, no external signs of trauma. Trauma assessment in ED: ABCs intact , GCS 15 , AAOx3,  MARCH.     Injuries Identified:  - Left intertrochanteric femoral fx    PLAN:  -Admit to trauma  -F/u ortho recommendations- may need coumadin reversal  -Cardiology consult  -CT cystogram - microscopic hematuria on UA  -Pain control  -DVT/GI ppx      Senior Resident Addendum  Narrative as above, with PMH with significant cardiovascular disease, admitted for left intertrochanteric femoral fx, ortho with reported plan for OR tomorrow, medical and cardiology risk stratification requested, 2u pRBCs on hold, cleared for OR from trauma perspective.     Case d/w Dr. Saini, patient and family, ED, ortho.

## 2021-07-03 NOTE — ED PROVIDER NOTE - PROGRESS NOTE DETAILS
Trauma Alert called on EMS notification - trauma team at bedside prior to pt arrival. MANSI - L hip fx, Ortho consulted, will come see pt D/w Ortho - L femoral nerve block performed with good pain control. Ortho resident at bedside. Trauma DrLola Gave requesting a CT Cystogram due to presence of microscopic hematuria.  Care endorsed to Dr. Santaigo. AH - pending CT cystogram, H&P likely admitting to trauma under Gave

## 2021-07-03 NOTE — ED PROCEDURE NOTE - GENERAL PROCEDURE DETAILS
under US guidance, L femoral nerve identified and 20cc of Bupivacaine and 10cc of Lidocaine with Epi administered around nerve sheath

## 2021-07-03 NOTE — PROCEDURAL SAFETY CHECKLIST WITH OR WITHOUT SEDATION - NSPOSTCOMMENTFT_GEN_ALL_CORE
Pt tolerated procedure. Pt stated (+) pain relief s/p nerve block. (+) strong peripheral pulses. Will monitor pt.

## 2021-07-03 NOTE — H&P ADULT - HISTORY OF PRESENT ILLNESS
87yM w/ PMH/PSH of aortic stenosis s/p TAVR (2016), AFib (on Coumadin & s/p MAZE procedure), asthma/COPD, CHF, s/p pacemaker placement, carotid stenosis s/p right CEA (2013), and known CAD s/p PCI placement and s/p CABG who was seen as a Trauma Alert s/p mechanical trip and fall, -HT, -LOC, +Coumadin. Patient stated that he was walking his dog when he tripped over the leash, falling on his left side. Reports pain to his left hip, denies pain elsewhere.  Trauma assessment in ED: ABCs intact , GCS 15 , AAOx3.

## 2021-07-03 NOTE — H&P ADULT - NSICDXPASTSURGICALHX_GEN_ALL_CORE_FT
PAST SURGICAL HISTORY:  H/O aortic valve replacement     History of CEA (carotid endarterectomy)     History of permanent cardiac pacemaker placement     S/P CABG x 3

## 2021-07-03 NOTE — ED PROVIDER NOTE - OBJECTIVE STATEMENT
83 y/o M with PMH of a-fib on coumadin, aortic stenosis, asthma/COPD, CHF, pacemaker placement, carotid stenosis (s/p right CEA, 2013), and known CAD with s/p PCI placement and s/p CABG, TAVR who presents with fall this afternoon after tripping over dog's leash and falling on L side.  Pt only endorsing L hip pain.  No head injury, no LOC.  Pt denies chest pain, abdominal pain, SOB, headaches, neck pain.

## 2021-07-03 NOTE — ED PROVIDER NOTE - NS ED ROS FT
Review of Systems:  CONSTITUTIONAL: No fever, No diaphoresis  SKIN: No rash  HEMATOLOGIC: No abnormal bleeding or bruising  EYES: No eye pain, No blurred vision  ENT: No change in hearing, No neck pain  RESPIRATORY: No shortness of breath, No cough  CARDIAC: No chest pain, No palpitations  GI: No abdominal pain, No nausea, No vomiting  MUSCULOSKELETAL: + joint paint, No swelling, No back pain  NEUROLOGIC: No numbness, No focal weakness, No headache, No dizziness  All other systems negative, unless specified in HPI

## 2021-07-03 NOTE — H&P ADULT - ATTENDING COMMENTS
s/p fall   intertrochanteric fracture   Ortho evaluation   may need coumadin reversal   admit to trauma

## 2021-07-03 NOTE — ED PROVIDER NOTE - PHYSICAL EXAMINATION
CONSTITUTIONAL: in mild acute distress  TRAUMA: ABC intact, GCS 15  HEAD: normocephalic, atraumatic, no lacerations  EYES: EOMI, no raccoon eyes  ENT: no nasal discharge, no garcia sign, moist mucous membranes moist  NECK: no midline tenderness, no stepoffs, no deformity  CV: irregular  RESP: lungs clear to auscultation bilaterally, normal work of breathing, symmetric rise and fall of chest   CHEST: no chest wall tenderness, no crepitus, no clavicular deformity/tenting  ABD: soft, nondistended, nontender, no rebound, no guarding, no rigidity  BACK: no midline T/L/S-spine tenderness, no stepoffs, no deformity  EXT: TTP of L hip, externally rotated and shortened; cap refill < 2 seconds; Pulses 2+ DP  SKIN: small 1 cm abrasion to R knee  NEURO: A&Ox3,  sensation grossly intact throughout, no focal neurological deficits, GCS 15  PSYCH: normal mood, appropriate affect

## 2021-07-03 NOTE — H&P ADULT - NSICDXPASTMEDICALHX_GEN_ALL_CORE_FT
PAST MEDICAL HISTORY:  A-fib     Aortic stenosis, severe     Asthma with COPD     BPH (benign prostatic hyperplasia)     DM (diabetes mellitus)     Essential hypertension     HLD (hyperlipidemia)     Pacemaker

## 2021-07-03 NOTE — CONSULT NOTE ADULT - ATTENDING COMMENTS
s/p fall on coumadin   left inter trochanter Fracture   ortho evaluation may need coumadin reversal for surgery   admit to trauma   pain control   cardiology evaluation
will need ORIF   need INR to be 1.5     medical optimization

## 2021-07-03 NOTE — ED PROCEDURE NOTE - CPROC ED INFORMED CONSENT1
Benefits, risks, and possible complications of procedure explained to patient/caregiver who verbalized understanding and gave written consent.
This was an emergent procedure and consent was implied.

## 2021-07-03 NOTE — CONSULT NOTE ADULT - ASSESSMENT
ASSESSMENT:  87yM w/ PMH/PSH of aortic stenosis s/p TAVR (2016), AFib (on Coumadin & s/p MAZE procedure), asthma/COPD, CHF, s/p pacemaker placement, carotid stenosis s/p right CEA (2013), and known CAD s/p PCI placement and s/p CABG who was seen as a Trauma Alert s/p mechanical trip and fall, -HT, -LOC, +Coumadin with complaint of left hip pain, no external signs of trauma. Trauma assessment in ED: ABCs intact , GCS 15 , AAOx3,  MARCH.     Injuries identified:   - No external signs of trauma, pending imaging    PLAN:   - Trauma Labs: (CBC, BMP, Coags, T&S, UA, EtOH level)  - Additional studies :EKG    Trauma Imaging to include the following:  - CXR  - CT Head,  CT C-spine, CT Chest, CT Abd/Pelvis  - Extremity films: Left hip, femur    Additional consultations:  - Orthopedics    Disposition pending results of above labs and imaging  Above plan discussed with Trauma attending, Dr. Saini, patient, patient family, and ED team  --------------------------------------------------------------------------------------  07-03-21 @ 15:00   ASSESSMENT:  87yM w/ PMH/PSH of aortic stenosis s/p TAVR (2016), AFib (on Coumadin & s/p MAZE procedure), asthma/COPD, CHF, s/p pacemaker placement, carotid stenosis s/p right CEA (2013), and known CAD s/p PCI placement and s/p CABG who was seen as a Trauma Alert s/p mechanical trip and fall, -HT, -LOC, +Coumadin with complaint of left hip pain, no external signs of trauma. Trauma assessment in ED: ABCs intact , GCS 15 , AAOx3,  MARCH.     Injuries identified:   - No external signs of trauma, pending imaging    PLAN:   - Trauma Labs: (CBC, BMP, Coags, T&S, UA, EtOH level)  - Additional studies :EKG    Trauma Imaging to include the following:  - CXR  - CT Head,  CT C-spine, CT Chest, CT Abd/Pelvis  - Extremity films: Left hip, femur    Additional consultations:  - Orthopedics    Disposition pending results of above labs and imaging    Senior Resident Addendum  Please see H&P for final assessment and plan  Above plan discussed with Trauma attending, Dr. Saini, patient, patient family, and ED team  --------------------------------------------------------------------------------------  07-03-21 @ 15:00

## 2021-07-03 NOTE — H&P ADULT - NSHPPHYSICALEXAM_GEN_ALL_CORE
VITALS:  T(F): 98 (07-03-21 @ 14:41), Max: 98 (07-03-21 @ 14:41)  HR: 73 (07-03-21 @ 15:00) (73 - 83)  BP: 181/81 (07-03-21 @ 15:00) (181/81 - 202/90)  RR: 18 (07-03-21 @ 15:00) (18 - 18)  SpO2: 99% (07-03-21 @ 15:00) (99% - 99%)    PHYSICAL EXAM:  General: NAD  HEENT: Normocephalic, atraumatic, EOMI, PEERLA. No scalp lacerations.   Neck: Soft, midline trachea. No c-spine tenderness.  Chest: No chest wall tenderness, no subcutaneous emphysema.  Cardiac: S1, S2, RRR.  Respiratory: Bilateral breath sounds, clear and equal bilaterally.  Abdomen: Soft, non-distended, non-tender, no rebound, no guarding.  Groin: Normal appearing, pelvis stable   Ext:  Tender to palpation over left hip, nontender to palpation over remaining extremity. Unable to move LLE secondary to pain. No visible deformities. Moving B/L upper and RLE. Palpable Radial b/l UE, b/l DP palpable in LE.   Back: No T/L/S spine tenderness, No palpable runoff/stepoff/deformity.  Rectal: No annika blood, JOHNNY with good tone.

## 2021-07-03 NOTE — CONSULT NOTE ADULT - ASSESSMENT
CC / HPI  87yM w/ PMH/PSH of aortic stenosis s/p TAVR (2016), AFib (on Coumadin & s/p MAZE procedure), asthma/COPD, CHF, s/p pacemaker placement, carotid stenosis s/p right CEA (2013), and known CAD s/p PCI placement and s/p CABG who presented as a Trauma Alert s/p mechanical trip and fall, -HT, -LOC, +Coumadin. Patient stated that he was walking his dog when he tripped over the leash, falling on his left side.     No chest pain or shortness of breath. No syncope or presyncopal symptoms. Denies head trauma or loss of consciousness.  Denies other injuries.  Reports pain to his left hip, denies pain elsewhere.    ABCs intact , GCS 15 , AAOx3.    PMHx  A-fib   Aortic stenosis, severe   Asthma with COPD   BPH (benign prostatic hyperplasia)   DM (diabetes mellitus)   Essential hypertension   HLD (hyperlipidemia)   Pacemaker.     PSHx:  H/O aortic valve replacement   History of CEA (carotid endarterectomy)   History of permanent cardiac pacemaker placement   S/P CABG x 3.    SHx:   Family support nearby  Son (Eladio estevez) bedside, cell: [phone]    Medications  	Coumadin 4 mg oral tablet: 1 tab(s) orally once a day EVERY TUESDAY/THURSDAY/SATURDAY/SUNDAY  · 	Coumadin 6 mg oral tablet: 1 tab(s) orally once a day EVERY MONDAY/WEDNESDAY/FRIDAY  · 	amLODIPine 5 mg oral tablet: 1 tab(s) orally once a day  · 	Aspirin Enteric Coated 81 mg oral delayed release tablet: 1 tab(s) orally once a day  · 	simvastatin 40 mg oral tablet: 1 tab(s) orally once a day (at bedtime)  · 	Flomax 0.4 mg oral capsule: 1 cap(s) orally once a day  · 	Proscar 5 mg oral tablet: 1 tab(s) orally once a day  · 	losartan 100 mg oral tablet: 1 tab(s) orally once a day  · 	Ambien 10 mg oral tablet: 1 tab(s) orally once a day (at bedtime)  · 	Metoprolol Tartrate 50 mg oral tablet: 1 tab(s) orally 2 times a day  · 	metFORMIN 500 mg oral tablet: 1 tab(s) orally 2 times a day    PHYSICAL EXAM  General: In no acute distress, awake/alert and oriented    Left Hip  Unable to range hip secondary to pain  No swelling or ecchymosis  Skin intact about the hip  AROM limited due to pain  Motor intact: 5/5 gastroc-soleus, tibialis anterior, FHL, EHL  Sensation intact to light touch sural/saphenous/DP/SP/tibial   DP/PT palpable pulses, toes warm and well perfused    Complete orthopaedic secondary exam unremarkable for other injuries    IMAGING  Studies During Admission:  Multiple XR views today of pelvis, left hip and femur demonstrate   -displaced, intertrochanteric fracture of the L hip      ASSESSMENT AND PLAN  87M w/ a left displaced, intertrochanteric hip fracture.      Discussed with patient and his family the nature and severity of the injury, and the need and rationale for operative intervention for best chance return to function. We discussed the risks, benefits and expected rehabilitative course of all alternate, viable medical modes of treatment, including further diagnosis, both operative and non-operative treatments as well as no further treatment.  Discussed that surgical delay greater than 4 days is associated with increased mortality, UTI, decubitus ulcers, DVT and pneumonia. ((Marcy MOTT, JBJS 2005;87A:483), (Dedra P, LIAM, 2011).     All questions were answered.  Available links to further peer-reviewed written information on the diagnosis were provided.   The decision was made by the patient and his family to proceed with surgery to fix the left hip fracture.   The orthopedic team will add-on the patient for surgery tomorrow, 7/4/21, pending medical optimization.      PREOPERATIVE CHECKLIST  Diagnosis: Left Intertrochanteric Femur Fracture  Proposed Procedure: left femur intramedullary nailing  DOS: 7/4/21  Attending: ZACHARY Ang      x CBC  x PT/INR  x PTT  [] repeat PT/PTT/INR daily  x BMP  x EKG  x Chest XR    [pending] T&S (1st)  [pending] T&S (2nd)  [pending] 2 units of pRBC on hold  [pending] NPO at midnight, 7/4/21  [pending] Fluids  [pending] Anti-coag held  [pending] COVID Screen    CLEARANCES NEEDED  [pending] Medical/Trauma Clearance  [pending] cardiology Clearance  [pending] Anesthesia clearance     - in light of his coumadin use, and elevated INR, Please hold coumadin until the INR decreases to below 2.       Discussed with primary team and attending.   CC / HPI  87yM w/ PMH/PSH of aortic stenosis s/p TAVR (2016), AFib (on Coumadin & s/p MAZE procedure), asthma/COPD, CHF, s/p pacemaker placement, carotid stenosis s/p right CEA (2013), and known CAD s/p PCI placement and s/p CABG who presented as a Trauma Alert s/p mechanical trip and fall, -HT, -LOC, +Coumadin. Patient stated that he was walking his dog when he tripped over the leash, falling on his left side.     No chest pain or shortness of breath. No syncope or presyncopal symptoms. Denies head trauma or loss of consciousness.  Denies other injuries.  Reports pain to his left hip, denies pain elsewhere.    ABCs intact , GCS 15 , AAOx3.    PMHx  A-fib   Aortic stenosis, severe   Asthma with COPD   BPH (benign prostatic hyperplasia)   DM (diabetes mellitus)   Essential hypertension   HLD (hyperlipidemia)   Pacemaker.     PSHx:  H/O aortic valve replacement   History of CEA (carotid endarterectomy)   History of permanent cardiac pacemaker placement   S/P CABG x 3.    SHx:   Family support nearby  Son (Eladio estevez) bedside, cell: [phone]    Medications  	Coumadin 4 mg oral tablet: 1 tab(s) orally once a day EVERY TUESDAY/THURSDAY/SATURDAY/SUNDAY  · 	Coumadin 6 mg oral tablet: 1 tab(s) orally once a day EVERY MONDAY/WEDNESDAY/FRIDAY  · 	amLODIPine 5 mg oral tablet: 1 tab(s) orally once a day  · 	Aspirin Enteric Coated 81 mg oral delayed release tablet: 1 tab(s) orally once a day  · 	simvastatin 40 mg oral tablet: 1 tab(s) orally once a day (at bedtime)  · 	Flomax 0.4 mg oral capsule: 1 cap(s) orally once a day  · 	Proscar 5 mg oral tablet: 1 tab(s) orally once a day  · 	losartan 100 mg oral tablet: 1 tab(s) orally once a day  · 	Ambien 10 mg oral tablet: 1 tab(s) orally once a day (at bedtime)  · 	Metoprolol Tartrate 50 mg oral tablet: 1 tab(s) orally 2 times a day  · 	metFORMIN 500 mg oral tablet: 1 tab(s) orally 2 times a day    PHYSICAL EXAM  General: In no acute distress, awake/alert and oriented    Left Hip  Unable to range hip secondary to pain  No swelling or ecchymosis  Skin intact about the hip  AROM limited due to pain  Motor intact: 5/5 gastroc-soleus, tibialis anterior, FHL, EHL  Sensation intact to light touch sural/saphenous/DP/SP/tibial   DP/PT palpable pulses, toes warm and well perfused    Complete orthopaedic secondary exam unremarkable for other injuries    IMAGING  Studies During Admission:  Multiple XR views today of pelvis, left hip and femur demonstrate   -displaced, intertrochanteric fracture of the L hip      ASSESSMENT AND PLAN  87M w/ a left displaced, intertrochanteric hip fracture.      Discussed with patient and his family the nature and severity of the injury, and the need and rationale for operative intervention for best chance return to function. We discussed the risks, benefits and expected rehabilitative course of all alternate, viable medical modes of treatment, including further diagnosis, both operative and non-operative treatments as well as no further treatment.  Discussed that surgical delay greater than 4 days is associated with increased mortality, UTI, decubitus ulcers, DVT and pneumonia. ((Marcy MOTT, JBJS 2005;87A:483), (Dedra P, LIAM, 2011).     All questions were answered.  Available links to further peer-reviewed written information on the diagnosis were provided.   The decision was made by the patient and his family to proceed with surgery to fix the left hip fracture.   The orthopedic team will add-on the patient for surgery tomorrow, 7/4/21, pending medical optimization.      PREOPERATIVE CHECKLIST  Diagnosis: Left Intertrochanteric Femur Fracture  Proposed Procedure: left femur intramedullary nailing  DOS: 7/4/21  Attending: ZACHARY Ang      x CBC  x PT/INR  x PTT  [] repeat PT/PTT/INR daily  x BMP  x EKG  x Chest XR    [pending] T&S (1st)  [pending] T&S (2nd)  [pending] 2 units of pRBC on hold  [pending] NPO at midnight, 7/4/21  [pending] Fluids  [pending] Anti-coag held  [pending] COVID Screen    CLEARANCES NEEDED  [pending] Medical/Trauma Clearance  [pending] cardiology Clearance  [pending] Anesthesia clearance     - in light of his coumadin use, and elevated INR, Please hold coumadin until the INR decreases to at least 1.5.       Discussed with primary team and attending.

## 2021-07-03 NOTE — ED PROVIDER NOTE - ATTENDING CONTRIBUTION TO CARE
Pt is a 88yo male who tripped over his dogs' leash and fell onto his L hip.  No CHI, no LOC, no other complaints.    Exam: GCS 15, NCAT, RRR, CTAB, soft NT abdomen, stable pelvis, L hip tenderness, no knee tenderness, 2+ DP pulses, NAD  PLan: labs, xr, FAST

## 2021-07-03 NOTE — CONSULT NOTE ADULT - SUBJECTIVE AND OBJECTIVE BOX
TRAUMA ACTIVATION LEVEL:  Alert  ACTIVATED BY: EMS  INTUBATED: NO    MECHANISM OF INJURY:   [] Blunt     [] MVC	  [X] Fall	  [] Pedestrian Struck	  [] Motorcycle     [] Assault     [] Bicycle collision    [] Sports injury    [] Penetrating    [] Gun Shot Wound      [] Stab Wound    GCS: 15 	E: 4	V: 5	M: 6    HPI:  87yM w/ PMH/PSH of aortic stenosis s/p TAVR (2016), AFib (on Coumadin & s/p MAZE procedure), asthma/COPD, CHF, s/p pacemaker placement, carotid stenosis s/p right CEA (2013), and known CAD s/p PCI placement and s/p CABG who was seen as a Trauma Alert s/p mechanical trip and fall, -HT, -LOC, +Coumadin. Patient stated that he was walking his dog when he tripped over the leash, falling on his left side. Reports pain to his left hip, denies pain elsewhere.  Trauma assessment in ED: ABCs intact , GCS 15 , AAOx3.    PAST MEDICAL & SURGICAL HISTORY:  Asthma with COPD  HLD (hyperlipidemia)  Essential hypertension  Aortic stenosis, severe  A-fib  DM (diabetes mellitus)  Pacemaker  BPH (benign prostatic hyperplasia)  S/P CABG x 3  History of CEA (carotid endarterectomy)  History of permanent cardiac pacemaker placement  H/O aortic valve replacement    Allergies  IV Contrast (Hives)  Intolerances  contrast media (gadolinium-based) (Hives)    Home Medications:  Ambien 10 mg oral tablet: 1 tab(s) orally once a day (at bedtime) (18 Dec 2018 12:16)  metFORMIN 500 mg oral tablet: 1 tab(s) orally 2 times a day (18 Dec 2018 12:16)  Metoprolol Tartrate 50 mg oral tablet: 1 tab(s) orally 2 times a day (18 Dec 2018 12:16)    ROS: 10-system review is otherwise negative except HPI above.      Primary Survey:    A - airway intact  B - bilateral breath sounds and good chest rise  C - palpable pulses in all extremities  D - GCS 15 on arrival, MARCH  Exposure obtained    Vital Signs Last 24 Hrs  T(C): 36.5 (03 Jul 2021 14:33), Max: 36.5 (03 Jul 2021 14:33)  T(F): 97.7 (03 Jul 2021 14:33), Max: 97.7 (03 Jul 2021 14:33)  HR: 79 (03 Jul 2021 14:33) (79 - 79)  BP: 202/90 (03 Jul 2021 14:33) (202/90 - 202/90)  RR: 18 (03 Jul 2021 14:33) (18 - 18)  SpO2: 99% (03 Jul 2021 14:33) (99% - 99%)    Secondary Survey:   General: NAD  HEENT: Normocephalic, atraumatic, EOMI, PEERLA. No scalp lacerations.   Neck: Soft, midline trachea. No c-spine tenderness.  Chest: No chest wall tenderness, no subcutaneous emphysema.  Cardiac: S1, S2, RRR.  Respiratory: Bilateral breath sounds, clear and equal bilaterally.  Abdomen: Soft, non-distended, non-tender, no rebound, no guarding.  Groin: Normal appearing, pelvis stable   Ext:  Tender to palpation over left hip, nontender to palpation over remaining extremity. Unable to move LLE secondary to pain. No visible deformities. Moving B/L upper and RLE. Palpable Radial b/l UE, b/l DP palpable in LE.   Back: No T/L/S spine tenderness, No palpable runoff/stepoff/deformity.  Rectal: No annika blood, JOHNNY with good tone.    ACCESS / DEVICES:  [ X ] Peripheral IV  [ ] Central Venous Line	[ ] R	[ ] L	[ ] IJ	[ ] Fem	[ ] SC	Placed:   [ ] Arterial Line		[ ] R	[ ] L	[ ] Fem	[ ] Rad	[ ] Ax	Placed:   [ ] PICC:					[ ] Mediport  [ ] Urinary Catheter,  Date Placed:   [ ] Chest tube: [ ] Right, [ ] Left  [ ] EZEQUIEL/Ramón Drains    Labs:  CAPILLARY BLOOD GLUCOSE  POCT Blood Glucose.: 167 mg/dL (03 Jul 2021 14:43)    Remaining labs pending    RADIOLOGY & ADDITIONAL STUDIES:  Pending  ---------------------------------------------------------------------------------------

## 2021-07-04 LAB
ANION GAP SERPL CALC-SCNC: 12 MMOL/L — SIGNIFICANT CHANGE UP (ref 7–14)
APTT BLD: 38.6 SEC — SIGNIFICANT CHANGE UP (ref 27–39.2)
BASOPHILS # BLD AUTO: 0.04 K/UL — SIGNIFICANT CHANGE UP (ref 0–0.2)
BASOPHILS NFR BLD AUTO: 0.2 % — SIGNIFICANT CHANGE UP (ref 0–1)
BLD GP AB SCN SERPL QL: SIGNIFICANT CHANGE UP
BUN SERPL-MCNC: 22 MG/DL — HIGH (ref 10–20)
CALCIUM SERPL-MCNC: 9.1 MG/DL — SIGNIFICANT CHANGE UP (ref 8.5–10.1)
CHLORIDE SERPL-SCNC: 102 MMOL/L — SIGNIFICANT CHANGE UP (ref 98–110)
CO2 SERPL-SCNC: 22 MMOL/L — SIGNIFICANT CHANGE UP (ref 17–32)
CREAT SERPL-MCNC: 1.2 MG/DL — SIGNIFICANT CHANGE UP (ref 0.7–1.5)
EOSINOPHIL # BLD AUTO: 0.07 K/UL — SIGNIFICANT CHANGE UP (ref 0–0.7)
EOSINOPHIL NFR BLD AUTO: 0.3 % — SIGNIFICANT CHANGE UP (ref 0–8)
GLUCOSE BLDC GLUCOMTR-MCNC: 138 MG/DL — HIGH (ref 70–99)
GLUCOSE BLDC GLUCOMTR-MCNC: 160 MG/DL — HIGH (ref 70–99)
GLUCOSE BLDC GLUCOMTR-MCNC: 199 MG/DL — HIGH (ref 70–99)
GLUCOSE BLDC GLUCOMTR-MCNC: 200 MG/DL — HIGH (ref 70–99)
GLUCOSE BLDC GLUCOMTR-MCNC: 233 MG/DL — HIGH (ref 70–99)
GLUCOSE SERPL-MCNC: 132 MG/DL — HIGH (ref 70–99)
HCT VFR BLD CALC: 33.3 % — LOW (ref 42–52)
HGB BLD-MCNC: 10.7 G/DL — LOW (ref 14–18)
IMM GRANULOCYTES NFR BLD AUTO: 0.8 % — HIGH (ref 0.1–0.3)
INR BLD: 2.64 RATIO — HIGH (ref 0.65–1.3)
LACTATE SERPL-SCNC: 2.6 MMOL/L — HIGH (ref 0.7–2)
LYMPHOCYTES # BLD AUTO: 1.83 K/UL — SIGNIFICANT CHANGE UP (ref 1.2–3.4)
LYMPHOCYTES # BLD AUTO: 9 % — LOW (ref 20.5–51.1)
MAGNESIUM SERPL-MCNC: 2.1 MG/DL — SIGNIFICANT CHANGE UP (ref 1.8–2.4)
MCHC RBC-ENTMCNC: 29.8 PG — SIGNIFICANT CHANGE UP (ref 27–31)
MCHC RBC-ENTMCNC: 32.1 G/DL — SIGNIFICANT CHANGE UP (ref 32–37)
MCV RBC AUTO: 92.8 FL — SIGNIFICANT CHANGE UP (ref 80–94)
MONOCYTES # BLD AUTO: 1.87 K/UL — HIGH (ref 0.1–0.6)
MONOCYTES NFR BLD AUTO: 9.2 % — SIGNIFICANT CHANGE UP (ref 1.7–9.3)
NEUTROPHILS # BLD AUTO: 16.36 K/UL — HIGH (ref 1.4–6.5)
NEUTROPHILS NFR BLD AUTO: 80.5 % — HIGH (ref 42.2–75.2)
NRBC # BLD: 0 /100 WBCS — SIGNIFICANT CHANGE UP (ref 0–0)
PHOSPHATE SERPL-MCNC: 3.7 MG/DL — SIGNIFICANT CHANGE UP (ref 2.1–4.9)
PLATELET # BLD AUTO: 304 K/UL — SIGNIFICANT CHANGE UP (ref 130–400)
POTASSIUM SERPL-MCNC: 4.4 MMOL/L — SIGNIFICANT CHANGE UP (ref 3.5–5)
POTASSIUM SERPL-SCNC: 4.4 MMOL/L — SIGNIFICANT CHANGE UP (ref 3.5–5)
PROTHROM AB SERPL-ACNC: 30.4 SEC — HIGH (ref 9.95–12.87)
RBC # BLD: 3.59 M/UL — LOW (ref 4.7–6.1)
RBC # FLD: 13 % — SIGNIFICANT CHANGE UP (ref 11.5–14.5)
SODIUM SERPL-SCNC: 136 MMOL/L — SIGNIFICANT CHANGE UP (ref 135–146)
WBC # BLD: 20.33 K/UL — HIGH (ref 4.8–10.8)
WBC # FLD AUTO: 20.33 K/UL — HIGH (ref 4.8–10.8)

## 2021-07-04 PROCEDURE — 93306 TTE W/DOPPLER COMPLETE: CPT | Mod: 26

## 2021-07-04 PROCEDURE — 99233 SBSQ HOSP IP/OBS HIGH 50: CPT

## 2021-07-04 PROCEDURE — 99223 1ST HOSP IP/OBS HIGH 75: CPT

## 2021-07-04 RX ORDER — INSULIN LISPRO 100/ML
VIAL (ML) SUBCUTANEOUS EVERY 6 HOURS
Refills: 0 | Status: DISCONTINUED | OUTPATIENT
Start: 2021-07-04 | End: 2021-07-06

## 2021-07-04 RX ORDER — SODIUM CHLORIDE 9 MG/ML
500 INJECTION, SOLUTION INTRAVENOUS
Refills: 0 | Status: DISCONTINUED | OUTPATIENT
Start: 2021-07-04 | End: 2021-07-05

## 2021-07-04 RX ORDER — PHYTONADIONE (VIT K1) 5 MG
5 TABLET ORAL ONCE
Refills: 0 | Status: COMPLETED | OUTPATIENT
Start: 2021-07-04 | End: 2021-07-04

## 2021-07-04 RX ORDER — INSULIN LISPRO 100/ML
5 VIAL (ML) SUBCUTANEOUS ONCE
Refills: 0 | Status: COMPLETED | OUTPATIENT
Start: 2021-07-04 | End: 2021-07-04

## 2021-07-04 RX ORDER — MAGNESIUM SULFATE 500 MG/ML
2 VIAL (ML) INJECTION ONCE
Refills: 0 | Status: COMPLETED | OUTPATIENT
Start: 2021-07-04 | End: 2021-07-04

## 2021-07-04 RX ADMIN — TAMSULOSIN HYDROCHLORIDE 0.4 MILLIGRAM(S): 0.4 CAPSULE ORAL at 21:46

## 2021-07-04 RX ADMIN — Medication 650 MILLIGRAM(S): at 17:21

## 2021-07-04 RX ADMIN — Medication 650 MILLIGRAM(S): at 23:16

## 2021-07-04 RX ADMIN — SIMVASTATIN 40 MILLIGRAM(S): 20 TABLET, FILM COATED ORAL at 21:46

## 2021-07-04 RX ADMIN — LOSARTAN POTASSIUM 100 MILLIGRAM(S): 100 TABLET, FILM COATED ORAL at 11:45

## 2021-07-04 RX ADMIN — Medication 650 MILLIGRAM(S): at 11:46

## 2021-07-04 RX ADMIN — OXYCODONE HYDROCHLORIDE 5 MILLIGRAM(S): 5 TABLET ORAL at 17:20

## 2021-07-04 RX ADMIN — Medication 5 MILLIGRAM(S): at 16:47

## 2021-07-04 RX ADMIN — Medication 650 MILLIGRAM(S): at 05:08

## 2021-07-04 RX ADMIN — Medication 650 MILLIGRAM(S): at 11:49

## 2021-07-04 RX ADMIN — Medication 50 MILLIGRAM(S): at 17:47

## 2021-07-04 RX ADMIN — OXYCODONE HYDROCHLORIDE 5 MILLIGRAM(S): 5 TABLET ORAL at 04:10

## 2021-07-04 RX ADMIN — Medication 16.67 GRAM(S): at 04:06

## 2021-07-04 RX ADMIN — Medication 650 MILLIGRAM(S): at 23:07

## 2021-07-04 RX ADMIN — PANTOPRAZOLE SODIUM 40 MILLIGRAM(S): 20 TABLET, DELAYED RELEASE ORAL at 05:08

## 2021-07-04 RX ADMIN — Medication 50 MILLIGRAM(S): at 05:08

## 2021-07-04 RX ADMIN — CHLORHEXIDINE GLUCONATE 1 APPLICATION(S): 213 SOLUTION TOPICAL at 11:46

## 2021-07-04 RX ADMIN — ZOLPIDEM TARTRATE 5 MILLIGRAM(S): 10 TABLET ORAL at 01:53

## 2021-07-04 RX ADMIN — FINASTERIDE 5 MILLIGRAM(S): 5 TABLET, FILM COATED ORAL at 11:45

## 2021-07-04 RX ADMIN — Medication 2: at 11:45

## 2021-07-04 RX ADMIN — Medication 2: at 17:21

## 2021-07-04 RX ADMIN — Medication 81 MILLIGRAM(S): at 11:45

## 2021-07-04 RX ADMIN — OXYCODONE HYDROCHLORIDE 5 MILLIGRAM(S): 5 TABLET ORAL at 17:50

## 2021-07-04 RX ADMIN — ZOLPIDEM TARTRATE 5 MILLIGRAM(S): 10 TABLET ORAL at 23:17

## 2021-07-04 RX ADMIN — ZOLPIDEM TARTRATE 5 MILLIGRAM(S): 10 TABLET ORAL at 21:46

## 2021-07-04 RX ADMIN — Medication 5 UNIT(S): at 02:21

## 2021-07-04 RX ADMIN — Medication 2: at 06:33

## 2021-07-04 NOTE — CONSULT NOTE ADULT - ASSESSMENT
87yM w/ PMH/PSH of aortic stenosis s/p TAVR (2016), AFib (on Coumadin & s/p MAZE procedure), asthma/COPD, CHF, s/p pacemaker placement, carotid stenosis s/p right CEA (2013), and known CAD s/p PCI placement and s/p CABG who was seen as a Trauma Alert s/p mechanical trip and fall, -HT, -LOC, +Coumadin with complaint of left hip pain, no external signs of trauma. Trauma assessment in ED: ABCs intact , GCS 15 , AAOx3,  MARCH.     Injuries Identified:  - Left intertrochanteric femoral fx    PLAN:  #Left intertrochanteric femoral fx  Ortho planning repair.   Pending CArdiology eval for clearance  Patient uses coumadin.   coumadin held.  Current INR 2.64.  Ortho wants it <1.5  Administer PO Vitamin K 5mg x 1 now.   Repeat INR in AM    Recall PRN

## 2021-07-04 NOTE — PROGRESS NOTE ADULT - ASSESSMENT
· Assessment	  87yM w/ PMH/PSH of aortic stenosis s/p TAVR (2016), AFib (on Coumadin & s/p MAZE procedure), asthma/COPD, CHF, s/p pacemaker placement, carotid stenosis s/p right CEA (2013), and known CAD s/p PCI placement and s/p CABG who was seen as a Trauma Alert s/p mechanical trip and fall, -HT, -LOC, +Coumadin with complaint of left hip pain, no external signs of trauma. Trauma assessment in ED: ABCs intact , GCS 15 , AAOx3,  MARCH.     Injuries Identified:  - Left intertrochanteric femoral fx    PLAN:  -Admit to trauma  -F/u ortho recommendations- may need coumadin reversal  -Cardiology consult  -CT cystogram - microscopic hematuria on UA  -Pain control  -DVT/GI ppx  -AM lactate and AM INR  Lines/Tubes: [facility]    trauma [id number]

## 2021-07-04 NOTE — PROGRESS NOTE ADULT - ASSESSMENT
ORTHOPAEDIC SURGERY PREOP CHECKLIST    DENITA PINO  MRN-[id number]      [] Consent   [] in chart   [] in call room  DIAGNOSIS: LEFT INTERTROCHANTERIC FEMUR FRACTURE  PROCEDURE: LEFT FEMUR INTRAMEDULLARY NAILING    [X] Added on    [X] Trauma Clearance  [] Anesthesia Clearance  [] Testing required:  - REPEAT INR    Consults required:  [] Medical Clearance   [] Cards Clearance    [X] NPO @ MN - ORDER PLACED  CUURENT DIET: Diet, NPO:   Except Medications (07-03-21 @ 20:49) [Active]    [X] IVF   [X] HOLD ANTICOAGULATION   - pt is coumadin, please hold in anticipation of OR, and to bring INR down to goal of 1.5      [x] Type and Screen x1  [] Type and Screen x2  [] 2U pRBC on hold for OR - please order    [x] EKG in chart  [X] CXR in PACS     [X] CBC                        12.4   14.05 )-----------( 312      ( 03 Jul 2021 21:10 )             38.6       [X] BMP  07-03    137  |  102  |  17  ----------------------------<  199<H>  4.3   |  20  |  1.0    Ca    10.0      03 Jul 2021 21:10  Phos  2.9     07-03  Mg     1.8     07-03    TPro  7.3  /  Alb  4.5  /  TBili  0.5  /  DBili  x   /  AST  28  /  ALT  24  /  AlkPhos  194<H>  07-03    [] Coags  PT/INR - ( 03 Jul 2021 21:10 )   PT: 34.20 sec;     INR: 2.97 ratio    PTT - ( 03 Jul 2021 21:10 )  PTT:45.0 sec  - PLEASE ORDER REPEAT PT/INR - TREND TO GOAL INR OF 1.5    COVID STATUS:  [] pending  [X] negative  [] postive   COVID-19 PCR: NotDetec (03 Jul 2021 14:54)

## 2021-07-04 NOTE — CONSULT NOTE ADULT - SUBJECTIVE AND OBJECTIVE BOX
S: Left Hip pain  No CP/SOB/Palpitations/Light headedness/ N/v/f/c/      All other pertinent ROS negative.      07-03-21 @ 07:01  -  07-04-21 @ 07:00  --------------------------------------------------------  IN: 0 mL / OUT: 1300 mL / NET: -1300 mL    07-04-21 @ 07:01  -  07-04-21 @ 19:11  --------------------------------------------------------  IN: 1500 mL / OUT: 900 mL / NET: 600 mL      Vital Signs Last 24 Hrs  T(C): 36.2 (04 Jul 2021 17:30), Max: 36.4 (04 Jul 2021 05:21)  T(F): 97.1 (04 Jul 2021 14:36), Max: 97.6 (04 Jul 2021 05:21)  HR: 89 (04 Jul 2021 17:46) (72 - 92)  BP: 159/70 (04 Jul 2021 17:46) (107/62 - 177/87)  BP(mean): --  RR: 18 (04 Jul 2021 17:46) (18 - 18)  SpO2: 99% (03 Jul 2021 19:29) (99% - 99%)  PHYSICAL EXAM:    Constitutional: NAD, awake and alert, well-developed  HEENT: PERR, EOMI, Normal Hearing, MMM  Neck: Soft and supple, No LAD, No JVD  Respiratory: Breath sounds are clear bilaterally, No wheezing, rales or rhonchi  Cardiovascular: S1 and S2, regular rate and rhythm, no Murmurs, gallops or rubs  Gastrointestinal: Bowel Sounds present, soft, nontender, nondistended, no guarding, no rebound  Extremities: Left hip tender. limb externally rotated    MEDICATIONS:  MEDICATIONS  (STANDING):  acetaminophen   Tablet .. 650 milliGRAM(s) Oral every 6 hours  aspirin enteric coated 81 milliGRAM(s) Oral daily  chlorhexidine 4% Liquid 1 Application(s) Topical daily  dextrose 40% Gel 15 Gram(s) Oral once  dextrose 5%. 1000 milliLiter(s) (50 mL/Hr) IV Continuous <Continuous>  dextrose 5%. 1000 milliLiter(s) (100 mL/Hr) IV Continuous <Continuous>  dextrose 50% Injectable 25 Gram(s) IV Push once  dextrose 50% Injectable 12.5 Gram(s) IV Push once  dextrose 50% Injectable 25 Gram(s) IV Push once  finasteride 5 milliGRAM(s) Oral daily  glucagon  Injectable 1 milliGRAM(s) IntraMuscular once  insulin lispro (ADMELOG) corrective regimen sliding scale   SubCutaneous every 6 hours  lactated ringers. 1000 milliLiter(s) (125 mL/Hr) IV Continuous <Continuous>  lactated ringers. 500 milliLiter(s) (250 mL/Hr) IV Continuous <Continuous>  losartan 100 milliGRAM(s) Oral daily  metoprolol tartrate 50 milliGRAM(s) Oral two times a day  pantoprazole    Tablet 40 milliGRAM(s) Oral before breakfast  simvastatin 40 milliGRAM(s) Oral at bedtime  tamsulosin 0.4 milliGRAM(s) Oral at bedtime      LABS: All Labs Reviewed:                        12.4   14.05 )-----------( 312      ( 03 Jul 2021 21:10 )             38.6     07-03    137  |  102  |  17  ----------------------------<  199<H>  4.3   |  20  |  1.0    Ca    10.0      03 Jul 2021 21:10  Phos  2.9     07-03  Mg     1.8     07-03    TPro  7.3  /  Alb  4.5  /  TBili  0.5  /  DBili  x   /  AST  28  /  ALT  24  /  AlkPhos  194<H>  07-03    PT/INR - ( 04 Jul 2021 05:36 )   PT: 30.40 sec;   INR: 2.64 ratio         PTT - ( 04 Jul 2021 05:36 )  PTT:38.6 sec      Blood Culture:     Radiology: reviewed

## 2021-07-05 DIAGNOSIS — I48.91 UNSPECIFIED ATRIAL FIBRILLATION: ICD-10-CM

## 2021-07-05 DIAGNOSIS — R06.00 DYSPNEA, UNSPECIFIED: ICD-10-CM

## 2021-07-05 DIAGNOSIS — Z95.0 PRESENCE OF CARDIAC PACEMAKER: ICD-10-CM

## 2021-07-05 DIAGNOSIS — S72.142A DISPLACED INTERTROCHANTERIC FRACTURE OF LEFT FEMUR, INITIAL ENCOUNTER FOR CLOSED FRACTURE: ICD-10-CM

## 2021-07-05 LAB
A1C WITH ESTIMATED AVERAGE GLUCOSE RESULT: 6 % — HIGH (ref 4–5.6)
ANION GAP SERPL CALC-SCNC: 11 MMOL/L — SIGNIFICANT CHANGE UP (ref 7–14)
ANION GAP SERPL CALC-SCNC: 12 MMOL/L — SIGNIFICANT CHANGE UP (ref 7–14)
ANION GAP SERPL CALC-SCNC: 25 MMOL/L — HIGH (ref 7–14)
APTT BLD: 33.6 SEC — SIGNIFICANT CHANGE UP (ref 27–39.2)
BASE EXCESS BLDA CALC-SCNC: -0.9 MMOL/L — SIGNIFICANT CHANGE UP (ref -2–2)
BASOPHILS # BLD AUTO: 0.04 K/UL — SIGNIFICANT CHANGE UP (ref 0–0.2)
BASOPHILS # BLD AUTO: 0.04 K/UL — SIGNIFICANT CHANGE UP (ref 0–0.2)
BASOPHILS # BLD AUTO: 0.09 K/UL — SIGNIFICANT CHANGE UP (ref 0–0.2)
BASOPHILS NFR BLD AUTO: 0.2 % — SIGNIFICANT CHANGE UP (ref 0–1)
BASOPHILS NFR BLD AUTO: 0.2 % — SIGNIFICANT CHANGE UP (ref 0–1)
BASOPHILS NFR BLD AUTO: 0.4 % — SIGNIFICANT CHANGE UP (ref 0–1)
BUN SERPL-MCNC: 20 MG/DL — SIGNIFICANT CHANGE UP (ref 10–20)
CALCIUM SERPL-MCNC: 9 MG/DL — SIGNIFICANT CHANGE UP (ref 8.5–10.1)
CALCIUM SERPL-MCNC: 9.1 MG/DL — SIGNIFICANT CHANGE UP (ref 8.5–10.1)
CALCIUM SERPL-MCNC: 9.1 MG/DL — SIGNIFICANT CHANGE UP (ref 8.5–10.1)
CHLORIDE SERPL-SCNC: 102 MMOL/L — SIGNIFICANT CHANGE UP (ref 98–110)
CHLORIDE SERPL-SCNC: 103 MMOL/L — SIGNIFICANT CHANGE UP (ref 98–110)
CHLORIDE SERPL-SCNC: 99 MMOL/L — SIGNIFICANT CHANGE UP (ref 98–110)
CK MB CFR SERPL CALC: 4.1 NG/ML — SIGNIFICANT CHANGE UP (ref 0.6–6.3)
CK SERPL-CCNC: 416 U/L — HIGH (ref 0–225)
CO2 SERPL-SCNC: 12 MMOL/L — LOW (ref 17–32)
CO2 SERPL-SCNC: 23 MMOL/L — SIGNIFICANT CHANGE UP (ref 17–32)
CO2 SERPL-SCNC: 25 MMOL/L — SIGNIFICANT CHANGE UP (ref 17–32)
COVID-19 SPIKE DOMAIN AB INTERP: POSITIVE
COVID-19 SPIKE DOMAIN ANTIBODY RESULT: >250 U/ML — HIGH
CREAT SERPL-MCNC: 1 MG/DL — SIGNIFICANT CHANGE UP (ref 0.7–1.5)
CREAT SERPL-MCNC: 1 MG/DL — SIGNIFICANT CHANGE UP (ref 0.7–1.5)
CREAT SERPL-MCNC: 1.1 MG/DL — SIGNIFICANT CHANGE UP (ref 0.7–1.5)
EOSINOPHIL # BLD AUTO: 0.01 K/UL — SIGNIFICANT CHANGE UP (ref 0–0.7)
EOSINOPHIL # BLD AUTO: 0.09 K/UL — SIGNIFICANT CHANGE UP (ref 0–0.7)
EOSINOPHIL # BLD AUTO: 0.1 K/UL — SIGNIFICANT CHANGE UP (ref 0–0.7)
EOSINOPHIL NFR BLD AUTO: 0.1 % — SIGNIFICANT CHANGE UP (ref 0–8)
EOSINOPHIL NFR BLD AUTO: 0.4 % — SIGNIFICANT CHANGE UP (ref 0–8)
EOSINOPHIL NFR BLD AUTO: 0.6 % — SIGNIFICANT CHANGE UP (ref 0–8)
ESTIMATED AVERAGE GLUCOSE: 126 MG/DL — HIGH (ref 68–114)
GAS PNL BLDA: SIGNIFICANT CHANGE UP
GLUCOSE BLDC GLUCOMTR-MCNC: 143 MG/DL — HIGH (ref 70–99)
GLUCOSE BLDC GLUCOMTR-MCNC: 143 MG/DL — HIGH (ref 70–99)
GLUCOSE BLDC GLUCOMTR-MCNC: 196 MG/DL — HIGH (ref 70–99)
GLUCOSE BLDC GLUCOMTR-MCNC: 207 MG/DL — HIGH (ref 70–99)
GLUCOSE SERPL-MCNC: 121 MG/DL — HIGH (ref 70–99)
GLUCOSE SERPL-MCNC: 159 MG/DL — HIGH (ref 70–99)
GLUCOSE SERPL-MCNC: 219 MG/DL — HIGH (ref 70–99)
HCO3 BLDA-SCNC: 21 MMOL/L — LOW (ref 23–27)
HCT VFR BLD CALC: 31.6 % — LOW (ref 42–52)
HCT VFR BLD CALC: 32 % — LOW (ref 42–52)
HCT VFR BLD CALC: 37.1 % — LOW (ref 42–52)
HGB BLD-MCNC: 10.2 G/DL — LOW (ref 14–18)
HGB BLD-MCNC: 10.6 G/DL — LOW (ref 14–18)
HGB BLD-MCNC: 11.6 G/DL — LOW (ref 14–18)
IMM GRANULOCYTES NFR BLD AUTO: 0.9 % — HIGH (ref 0.1–0.3)
IMM GRANULOCYTES NFR BLD AUTO: 1.1 % — HIGH (ref 0.1–0.3)
IMM GRANULOCYTES NFR BLD AUTO: 2.5 % — HIGH (ref 0.1–0.3)
INR BLD: 2.66 RATIO — HIGH (ref 0.65–1.3)
LACTATE SERPL-SCNC: 7.7 MMOL/L — CRITICAL HIGH (ref 0.7–2)
LYMPHOCYTES # BLD AUTO: 1.53 K/UL — SIGNIFICANT CHANGE UP (ref 1.2–3.4)
LYMPHOCYTES # BLD AUTO: 10.2 % — LOW (ref 20.5–51.1)
LYMPHOCYTES # BLD AUTO: 11.9 % — LOW (ref 20.5–51.1)
LYMPHOCYTES # BLD AUTO: 2.04 K/UL — SIGNIFICANT CHANGE UP (ref 1.2–3.4)
LYMPHOCYTES # BLD AUTO: 2.43 K/UL — SIGNIFICANT CHANGE UP (ref 1.2–3.4)
LYMPHOCYTES # BLD AUTO: 8.6 % — LOW (ref 20.5–51.1)
MAGNESIUM SERPL-MCNC: 2 MG/DL — SIGNIFICANT CHANGE UP (ref 1.8–2.4)
MAGNESIUM SERPL-MCNC: 2 MG/DL — SIGNIFICANT CHANGE UP (ref 1.8–2.4)
MCHC RBC-ENTMCNC: 29.5 PG — SIGNIFICANT CHANGE UP (ref 27–31)
MCHC RBC-ENTMCNC: 30.1 PG — SIGNIFICANT CHANGE UP (ref 27–31)
MCHC RBC-ENTMCNC: 30.5 PG — SIGNIFICANT CHANGE UP (ref 27–31)
MCHC RBC-ENTMCNC: 31.3 G/DL — LOW (ref 32–37)
MCHC RBC-ENTMCNC: 32.3 G/DL — SIGNIFICANT CHANGE UP (ref 32–37)
MCHC RBC-ENTMCNC: 33.1 G/DL — SIGNIFICANT CHANGE UP (ref 32–37)
MCV RBC AUTO: 91.3 FL — SIGNIFICANT CHANGE UP (ref 80–94)
MCV RBC AUTO: 92 FL — SIGNIFICANT CHANGE UP (ref 80–94)
MCV RBC AUTO: 96.1 FL — HIGH (ref 80–94)
MONOCYTES # BLD AUTO: 0.37 K/UL — SIGNIFICANT CHANGE UP (ref 0.1–0.6)
MONOCYTES # BLD AUTO: 1.71 K/UL — HIGH (ref 0.1–0.6)
MONOCYTES # BLD AUTO: 1.91 K/UL — HIGH (ref 0.1–0.6)
MONOCYTES NFR BLD AUTO: 1.5 % — LOW (ref 1.7–9.3)
MONOCYTES NFR BLD AUTO: 11.2 % — HIGH (ref 1.7–9.3)
MONOCYTES NFR BLD AUTO: 9.6 % — HIGH (ref 1.7–9.3)
NEUTROPHILS # BLD AUTO: 12.84 K/UL — HIGH (ref 1.4–6.5)
NEUTROPHILS # BLD AUTO: 14.31 K/UL — HIGH (ref 1.4–6.5)
NEUTROPHILS # BLD AUTO: 20.31 K/UL — HIGH (ref 1.4–6.5)
NEUTROPHILS NFR BLD AUTO: 75.2 % — SIGNIFICANT CHANGE UP (ref 42.2–75.2)
NEUTROPHILS NFR BLD AUTO: 80.4 % — HIGH (ref 42.2–75.2)
NEUTROPHILS NFR BLD AUTO: 85 % — HIGH (ref 42.2–75.2)
NRBC # BLD: 0 /100 WBCS — SIGNIFICANT CHANGE UP (ref 0–0)
NT-PROBNP SERPL-SCNC: 2287 PG/ML — HIGH (ref 0–300)
PCO2 BLDA: 25 MMHG — LOW (ref 38–42)
PH BLDA: 7.53 — HIGH (ref 7.38–7.42)
PHOSPHATE SERPL-MCNC: 3.2 MG/DL — SIGNIFICANT CHANGE UP (ref 2.1–4.9)
PHOSPHATE SERPL-MCNC: 3.7 MG/DL — SIGNIFICANT CHANGE UP (ref 2.1–4.9)
PLATELET # BLD AUTO: 254 K/UL — SIGNIFICANT CHANGE UP (ref 130–400)
PLATELET # BLD AUTO: 257 K/UL — SIGNIFICANT CHANGE UP (ref 130–400)
PLATELET # BLD AUTO: 337 K/UL — SIGNIFICANT CHANGE UP (ref 130–400)
PO2 BLDA: 131 MMHG — HIGH (ref 78–95)
POTASSIUM SERPL-MCNC: 4 MMOL/L — SIGNIFICANT CHANGE UP (ref 3.5–5)
POTASSIUM SERPL-MCNC: 4.4 MMOL/L — SIGNIFICANT CHANGE UP (ref 3.5–5)
POTASSIUM SERPL-MCNC: 4.7 MMOL/L — SIGNIFICANT CHANGE UP (ref 3.5–5)
POTASSIUM SERPL-SCNC: 4 MMOL/L — SIGNIFICANT CHANGE UP (ref 3.5–5)
POTASSIUM SERPL-SCNC: 4.4 MMOL/L — SIGNIFICANT CHANGE UP (ref 3.5–5)
POTASSIUM SERPL-SCNC: 4.7 MMOL/L — SIGNIFICANT CHANGE UP (ref 3.5–5)
PROTHROM AB SERPL-ACNC: 30.6 SEC — HIGH (ref 9.95–12.87)
RBC # BLD: 3.46 M/UL — LOW (ref 4.7–6.1)
RBC # BLD: 3.48 M/UL — LOW (ref 4.7–6.1)
RBC # BLD: 3.86 M/UL — LOW (ref 4.7–6.1)
RBC # FLD: 13.1 % — SIGNIFICANT CHANGE UP (ref 11.5–14.5)
RBC # FLD: 13.2 % — SIGNIFICANT CHANGE UP (ref 11.5–14.5)
RBC # FLD: 13.2 % — SIGNIFICANT CHANGE UP (ref 11.5–14.5)
SAO2 % BLDA: 100 % — HIGH (ref 94–98)
SARS-COV-2 IGG+IGM SERPL QL IA: >250 U/ML — HIGH
SARS-COV-2 IGG+IGM SERPL QL IA: POSITIVE
SODIUM SERPL-SCNC: 136 MMOL/L — SIGNIFICANT CHANGE UP (ref 135–146)
SODIUM SERPL-SCNC: 138 MMOL/L — SIGNIFICANT CHANGE UP (ref 135–146)
SODIUM SERPL-SCNC: 138 MMOL/L — SIGNIFICANT CHANGE UP (ref 135–146)
TROPONIN T SERPL-MCNC: 0.02 NG/ML — HIGH
TROPONIN T SERPL-MCNC: 0.06 NG/ML — CRITICAL HIGH
WBC # BLD: 17.08 K/UL — HIGH (ref 4.8–10.8)
WBC # BLD: 17.79 K/UL — HIGH (ref 4.8–10.8)
WBC # BLD: 23.89 K/UL — HIGH (ref 4.8–10.8)
WBC # FLD AUTO: 17.08 K/UL — HIGH (ref 4.8–10.8)
WBC # FLD AUTO: 17.79 K/UL — HIGH (ref 4.8–10.8)
WBC # FLD AUTO: 23.89 K/UL — HIGH (ref 4.8–10.8)

## 2021-07-05 PROCEDURE — 71045 X-RAY EXAM CHEST 1 VIEW: CPT | Mod: 26,76

## 2021-07-05 PROCEDURE — 93010 ELECTROCARDIOGRAM REPORT: CPT

## 2021-07-05 PROCEDURE — 99233 SBSQ HOSP IP/OBS HIGH 50: CPT | Mod: 25

## 2021-07-05 PROCEDURE — 93010 ELECTROCARDIOGRAM REPORT: CPT | Mod: 77

## 2021-07-05 RX ORDER — ACETAMINOPHEN 500 MG
1000 TABLET ORAL ONCE
Refills: 0 | Status: COMPLETED | OUTPATIENT
Start: 2021-07-05 | End: 2021-07-05

## 2021-07-05 RX ORDER — FUROSEMIDE 40 MG
40 TABLET ORAL ONCE
Refills: 0 | Status: COMPLETED | OUTPATIENT
Start: 2021-07-05 | End: 2021-07-05

## 2021-07-05 RX ORDER — PHYTONADIONE (VIT K1) 5 MG
10 TABLET ORAL ONCE
Refills: 0 | Status: COMPLETED | OUTPATIENT
Start: 2021-07-05 | End: 2021-07-05

## 2021-07-05 RX ORDER — SODIUM CHLORIDE 9 MG/ML
1000 INJECTION, SOLUTION INTRAVENOUS
Refills: 0 | Status: DISCONTINUED | OUTPATIENT
Start: 2021-07-05 | End: 2021-07-06

## 2021-07-05 RX ADMIN — OXYCODONE HYDROCHLORIDE 5 MILLIGRAM(S): 5 TABLET ORAL at 21:30

## 2021-07-05 RX ADMIN — Medication 102 MILLIGRAM(S): at 06:03

## 2021-07-05 RX ADMIN — Medication 650 MILLIGRAM(S): at 16:54

## 2021-07-05 RX ADMIN — Medication 650 MILLIGRAM(S): at 05:46

## 2021-07-05 RX ADMIN — Medication 650 MILLIGRAM(S): at 20:01

## 2021-07-05 RX ADMIN — Medication 40 MILLIGRAM(S): at 08:07

## 2021-07-05 RX ADMIN — OXYCODONE HYDROCHLORIDE 5 MILLIGRAM(S): 5 TABLET ORAL at 01:25

## 2021-07-05 RX ADMIN — Medication 650 MILLIGRAM(S): at 06:09

## 2021-07-05 RX ADMIN — Medication 650 MILLIGRAM(S): at 20:31

## 2021-07-05 RX ADMIN — OXYCODONE HYDROCHLORIDE 5 MILLIGRAM(S): 5 TABLET ORAL at 17:02

## 2021-07-05 RX ADMIN — Medication 50 MILLIGRAM(S): at 05:46

## 2021-07-05 RX ADMIN — Medication 650 MILLIGRAM(S): at 15:24

## 2021-07-05 RX ADMIN — LOSARTAN POTASSIUM 100 MILLIGRAM(S): 100 TABLET, FILM COATED ORAL at 05:46

## 2021-07-05 RX ADMIN — SIMVASTATIN 40 MILLIGRAM(S): 20 TABLET, FILM COATED ORAL at 21:30

## 2021-07-05 RX ADMIN — Medication 81 MILLIGRAM(S): at 15:23

## 2021-07-05 RX ADMIN — OXYCODONE HYDROCHLORIDE 5 MILLIGRAM(S): 5 TABLET ORAL at 22:00

## 2021-07-05 RX ADMIN — Medication 400 MILLIGRAM(S): at 23:25

## 2021-07-05 RX ADMIN — OXYCODONE HYDROCHLORIDE 5 MILLIGRAM(S): 5 TABLET ORAL at 15:26

## 2021-07-05 RX ADMIN — OXYCODONE HYDROCHLORIDE 5 MILLIGRAM(S): 5 TABLET ORAL at 02:00

## 2021-07-05 RX ADMIN — TAMSULOSIN HYDROCHLORIDE 0.4 MILLIGRAM(S): 0.4 CAPSULE ORAL at 21:30

## 2021-07-05 RX ADMIN — FINASTERIDE 5 MILLIGRAM(S): 5 TABLET, FILM COATED ORAL at 15:24

## 2021-07-05 RX ADMIN — Medication 50 MILLIGRAM(S): at 17:04

## 2021-07-05 NOTE — CONSULT NOTE ADULT - SUBJECTIVE AND OBJECTIVE BOX
Patient is a 87y old  Male who presents with a chief complaint of left intertrochanteric fracture (2021 08:35)      HPI:  87yM w/ PMH/PSH of aortic stenosis s/p TAVR (), AFib (on Coumadin & s/p MAZE procedure), asthma/COPD, CHF, s/p pacemaker placement, carotid stenosis s/p right CEA (), and known CAD s/p PCI placement and s/p CABG 3v who was seen as a Trauma Alert s/p mechanical trip and fall, -HT, -LOC, +Coumadin. Patient stated that he was walking his dog when he tripped over the leash, falling on his left side. Reports pain to his left hip, denies pain elsewhere.  pt denies cp, sob nor fever prior to admission.  pt is preop for intertrochenteric fx repair and awaiting for inr to go to <1.5 for surgery.  pt this am at around 7 am had episode of vomiting with sob and pulse of decreased to 60.  a rapid response was called and pt give oxygen and iv lasix and improved.  pt has spb of 190.  pt was thoguth to be in chf with possible flash pulmonary edema.  pt had lactate of 7, troponin 0.02, wbc jumpd to 23 and bicarb decreased to 12.  pt's first abg was 7.32/32/152 and repeat was 7.53/25/131.  pt's lactate was 7 and decreased to 4.     Trauma assessment in ED: ABCs intact , GCS 15 , AAOx3. (2021 17:30)      PAST MEDICAL & SURGICAL HISTORY:  Asthma with COPD    HLD (hyperlipidemia)    Essential hypertension    Aortic stenosis, severe    A-fib    DM (diabetes mellitus)    Pacemaker    BPH (benign prostatic hyperplasia)    S/P CABG x 3    History of CEA (carotid endarterectomy)    History of permanent cardiac pacemaker placement    H/O aortic valve replacement        PREVIOUS DIAGNOSTIC TESTING:      ECHO  FINDINGS:    STRESS TEST  FINDINGS:    CATHETERIZATION  FINDINGS:    MEDICATIONS  (STANDING):  acetaminophen   Tablet .. 650 milliGRAM(s) Oral every 6 hours  aspirin enteric coated 81 milliGRAM(s) Oral daily  chlorhexidine 4% Liquid 1 Application(s) Topical daily  dextrose 40% Gel 15 Gram(s) Oral once  dextrose 5%. 1000 milliLiter(s) (50 mL/Hr) IV Continuous <Continuous>  dextrose 5%. 1000 milliLiter(s) (100 mL/Hr) IV Continuous <Continuous>  dextrose 50% Injectable 25 Gram(s) IV Push once  dextrose 50% Injectable 12.5 Gram(s) IV Push once  dextrose 50% Injectable 25 Gram(s) IV Push once  finasteride 5 milliGRAM(s) Oral daily  glucagon  Injectable 1 milliGRAM(s) IntraMuscular once  insulin lispro (ADMELOG) corrective regimen sliding scale   SubCutaneous every 6 hours  lactated ringers. 1000 milliLiter(s) (25 mL/Hr) IV Continuous <Continuous>  losartan 100 milliGRAM(s) Oral daily  metoprolol tartrate 50 milliGRAM(s) Oral two times a day  pantoprazole    Tablet 40 milliGRAM(s) Oral before breakfast  simvastatin 40 milliGRAM(s) Oral at bedtime  tamsulosin 0.4 milliGRAM(s) Oral at bedtime    MEDICATIONS  (PRN):  oxyCODONE    IR 5 milliGRAM(s) Oral every 6 hours PRN Severe Pain (7 - 10)  zolpidem 5 milliGRAM(s) Oral at bedtime PRN Insomnia  zolpidem 5 milliGRAM(s) Oral at bedtime PRN Insomnia      FAMILY HISTORY:      SOCIAL HISTORY:  CIGARETTES:  ALCOHOL:  DRUGS:                      REVIEW OF SYSTEMS:  CONSTITUTIONAL: No distress, Looks stable  NECK: No pain or stiffnes  RESPIRATORY: No cough, wheezing, shortness of breath  CARDIOVASCULAR: No chest pain, SOB, palpitations, leg swelling  GASTROINTESTINAL: No abdominal or epigastric pain. No nausea, vomiting, or hematemesis;  No melena.  NEUROLOGICAL: No dizziness, headaches, memory loss, loss of strength  SKIN: No itching, burning, rashes, or lesions   ENDOCRINE: No heat or cold intolerance  MUSCULOSKELETAL: No joint pain, No  swelling; No muscle pain  PSYCHIATRIC: No depression, anxiety, mood swings, or difficulty sleeping  ALLERGY: No hives, itching, rash          Vital Signs Last 24 Hrs  T(C): 36.7 (2021 14:10), Max: 36.7 (2021 14:10)  T(F): 98.1 (2021 14:10), Max: 98.1 (2021 14:10)  HR: 83 (2021 14:10) (56 - 89)  BP: 162/73 (2021 14:10) (123/61 - 213/130)  BP(mean): --  RR: 20 (2021 14:10) (18 - 24)  SpO2: 98% (2021 07:40) (84% - 98%)                      PHYSICAL EXAM:  GENERAL: No distress, well developed  HEAD:  Atraumatic, Normocephalic  NECK: Supple, No JVD, No Bruit of either carotid arteries  NERVOUS SYSTEM:  Alert, Awake, Oriented to time, place, person; Normal memory and speech; Normal motor Strength 5/5 B/L upper and lower extremities  CHEST/LUNG: Normal air entry to lung base bilaterally; No wheeze, crackle, rales, rhonchi  HEART: Regular heart beat, S1, A2, P2, No S3, No S4, No gallop, No murmur  ABDOMEN: Soft, Non tender, Non distended; Bowel sounds present  EXTREMITIES:  2+ Peripheral Pulses, No clubbing, No edema  SKIN: No rashes or lesions    TELEMETRY:    ECG:    I&O's Detail    2021 07:01  -  2021 07:00  --------------------------------------------------------  IN:    Lactated Ringers: 1500 mL  Total IN: 1500 mL    OUT:    Indwelling Catheter - Urethral (mL): 2100 mL  Total OUT: 2100 mL    Total NET: -600 mL      2021 07:01  -  2021 14:42  --------------------------------------------------------  IN:  Total IN: 0 mL    OUT:    Indwelling Catheter - Urethral (mL): 1400 mL  Total OUT: 1400 mL    Total NET: -1400 mL          LABS:                        11.6   23.89 )-----------( 337      ( 2021 07:16 )             37.1     07-05    136  |  99  |  20  ----------------------------<  219<H>  4.7   |  12<L>  |  1.1    Ca    9.1      2021 07:16  Phos  3.7     07-05  Mg     2.0     07-05    TPro  7.3  /  Alb  4.5  /  TBili  0.5  /  DBili  x   /  AST  28  /  ALT  24  /  AlkPhos  194<H>  07-03    CARDIAC MARKERS ( 2021 07:16 )  x     / 0.02 ng/mL / 416 U/L / x     / 4.1 ng/mL      PT/INR - ( 2021 23:13 )   PT: 30.60 sec;   INR: 2.66 ratio         PTT - ( 2021 23:13 )  PTT:33.6 sec  Urinalysis Basic - ( 2021 16:29 )    Color: Colorless / Appearance: Clear / S.022 / pH: x  Gluc: x / Ketone: Negative  / Bili: Negative / Urobili: <2 mg/dL   Blood: x / Protein: Trace / Nitrite: Negative   Leuk Esterase: Negative / RBC: 13 /HPF / WBC 0 /HPF   Sq Epi: x / Non Sq Epi: 0 /HPF / Bacteria: Negative      I&O's Summary    2021 07:01  -  2021 07:00  --------------------------------------------------------  IN: 1500 mL / OUT: 2100 mL / NET: -600 mL    2021 07:01  -  2021 14:42  --------------------------------------------------------  IN: 0 mL / OUT: 1400 mL / NET: -1400 mL        RADIOLOGY & ADDITIONAL STUDIES: Patient is a 87y old  Male who presents with a chief complaint of left intertrochanteric fracture (2021 08:35)      HPI:  87yM w/ PMH/PSH of aortic stenosis s/p TAVR (), AFib (on Coumadin & s/p MAZE procedure), asthma/COPD, CHF, s/p pacemaker placement, carotid stenosis s/p right CEA (), and known CAD s/p PCI placement and s/p CABG 3v who was seen as a Trauma Alert s/p mechanical trip and fall, -HT, -LOC, +Coumadin. Patient stated that he was walking his dog when he tripped over the leash, falling on his left side. Reports pain to his left hip, denies pain elsewhere.  pt denies cp, sob nor fever prior to admission.  pt is preop for intertrochenteric fx repair and awaiting for inr to go to <1.5 for surgery.  pt this am at around 7 am had episode of vomiting with sob and pulse of decreased to 60.  a rapid response was called and pt give oxygen and iv lasix and improved.  pt has spb of 190.  pt was thoguth to be in chf with possible flash pulmonary edema.  pt had lactate of 7, troponin 0.02, wbc jumpd to 23 and bicarb decreased to 12.  pt's first abg was 7.32/32/152 and repeat was 7.53/25/131.  pt's lactate was 7 and decreased to 4.     Trauma assessment in ED: ABCs intact , GCS 15 , AAOx3. (2021 17:30)      PAST MEDICAL & SURGICAL HISTORY:  Asthma with COPD    HLD (hyperlipidemia)    Essential hypertension    Aortic stenosis, severe    A-fib    DM (diabetes mellitus)    Pacemaker    BPH (benign prostatic hyperplasia)    S/P CABG x 3    History of CEA (carotid endarterectomy)    History of permanent cardiac pacemaker placement    H/O aortic valve replacement        PREVIOUS DIAGNOSTIC TESTING:      ECHO  FINDINGS:    STRESS TEST  FINDINGS:    CATHETERIZATION  FINDINGS:    MEDICATIONS  (STANDING):  acetaminophen   Tablet .. 650 milliGRAM(s) Oral every 6 hours  aspirin enteric coated 81 milliGRAM(s) Oral daily  chlorhexidine 4% Liquid 1 Application(s) Topical daily  dextrose 40% Gel 15 Gram(s) Oral once  dextrose 5%. 1000 milliLiter(s) (50 mL/Hr) IV Continuous <Continuous>  dextrose 5%. 1000 milliLiter(s) (100 mL/Hr) IV Continuous <Continuous>  dextrose 50% Injectable 25 Gram(s) IV Push once  dextrose 50% Injectable 12.5 Gram(s) IV Push once  dextrose 50% Injectable 25 Gram(s) IV Push once  finasteride 5 milliGRAM(s) Oral daily  glucagon  Injectable 1 milliGRAM(s) IntraMuscular once  insulin lispro (ADMELOG) corrective regimen sliding scale   SubCutaneous every 6 hours  lactated ringers. 1000 milliLiter(s) (25 mL/Hr) IV Continuous <Continuous>  losartan 100 milliGRAM(s) Oral daily  metoprolol tartrate 50 milliGRAM(s) Oral two times a day  pantoprazole    Tablet 40 milliGRAM(s) Oral before breakfast  simvastatin 40 milliGRAM(s) Oral at bedtime  tamsulosin 0.4 milliGRAM(s) Oral at bedtime    MEDICATIONS  (PRN):  oxyCODONE    IR 5 milliGRAM(s) Oral every 6 hours PRN Severe Pain (7 - 10)  zolpidem 5 milliGRAM(s) Oral at bedtime PRN Insomnia  zolpidem 5 milliGRAM(s) Oral at bedtime PRN Insomnia      FAMILY HISTORY:      SOCIAL HISTORY:  CIGARETTES:  ALCOHOL:  DRUGS:                      REVIEW OF SYSTEMS:  CONSTITUTIONAL: No distress, Looks stable  NECK: No pain or stiffnes  RESPIRATORY: No cough, wheezing, shortness of breath  CARDIOVASCULAR: No chest pain, SOB, palpitations, leg swelling  GASTROINTESTINAL: No abdominal or epigastric pain. No nausea, vomiting, or hematemesis;  No melena.  NEUROLOGICAL: No dizziness, headaches, memory loss, loss of strength  SKIN: No itching, burning, rashes, or lesions   ENDOCRINE: No heat or cold intolerance  MUSCULOSKELETAL: No joint pain, No  swelling; No muscle pain  PSYCHIATRIC: No depression, anxiety, mood swings, or difficulty sleeping  ALLERGY: No hives, itching, rash          Vital Signs Last 24 Hrs  T(C): 36.7 (2021 14:10), Max: 36.7 (2021 14:10)  T(F): 98.1 (2021 14:10), Max: 98.1 (2021 14:10)  HR: 83 (2021 14:10) (56 - 89)  BP: 162/73 (2021 14:10) (123/61 - 213/130)  BP(mean): --  RR: 20 (2021 14:10) (18 - 24)  SpO2: 98% (2021 07:40) (84% - 98%)                      PHYSICAL EXAM:  GENERAL: No distress, well developed  HEAD:  Atraumatic, Normocephalic  NECK: Supple, No JVD, No Bruit of either carotid arteries  NERVOUS SYSTEM:  Alert, Awake, Oriented to time, place, person; Normal memory and speech; Normal motor Strength 5/5 B/L upper and lower extremities  CHEST/LUNG: decreased bs bilateral bases, No wheeze, crackle, rales, rhonchi  HEART: Regular heart beat, S1, A2, P2, No S3, No S4, No gallop, 2/6 sm lsb  ABDOMEN: Soft, Non tender, Non distended; Bowel sounds present  EXTREMITIES:  2+ Peripheral Pulses, No clubbing, No edema  SKIN: No rashes or lesions    TELEMETRY:    ECG:Diagnosis Line Atrial fibrillation with frequent ventricular-paced complexes  Left axis deviation  Left ventricular hypertrophy with QRS widening  T wave abnormality, consider lateral ischemia  Abnormal ECG  cxray  New bibasilar pulmonary opacities.    I&O's Detail    2021 07:01  -  2021 07:00  --------------------------------------------------------  IN:    Lactated Ringers: 1500 mL  Total IN: 1500 mL    OUT:    Indwelling Catheter - Urethral (mL): 2100 mL  Total OUT: 2100 mL    Total NET: -600 mL      2021 07:01  -  2021 14:42  --------------------------------------------------------  IN:  Total IN: 0 mL    OUT:    Indwelling Catheter - Urethral (mL): 1400 mL  Total OUT: 1400 mL    Total NET: -1400 mL          LABS:                        11.6   23.89 )-----------( 337      ( 2021 07:16 )             37.1     07-05    136  |  99  |  20  ----------------------------<  219<H>  4.7   |  12<L>  |  1.1    Ca    9.1      2021 07:16  Phos  3.7     07-05  Mg     2.0     07-05    TPro  7.3  /  Alb  4.5  /  TBili  0.5  /  DBili  x   /  AST  28  /  ALT  24  /  AlkPhos  194<H>  07-03    CARDIAC MARKERS ( 2021 07:16 )  x     / 0.02 ng/mL / 416 U/L / x     / 4.1 ng/mL      PT/INR - ( 2021 23:13 )   PT: 30.60 sec;   INR: 2.66 ratio         PTT - ( 2021 23:13 )  PTT:33.6 sec  Urinalysis Basic - ( 2021 16:29 )    Color: Colorless / Appearance: Clear / S.022 / pH: x  Gluc: x / Ketone: Negative  / Bili: Negative / Urobili: <2 mg/dL   Blood: x / Protein: Trace / Nitrite: Negative   Leuk Esterase: Negative / RBC: 13 /HPF / WBC 0 /HPF   Sq Epi: x / Non Sq Epi: 0 /HPF / Bacteria: Negative      I&O's Summary    2021 07:  -  2021 07:00  --------------------------------------------------------  IN: 1500 mL / OUT: 2100 mL / NET: -600 mL    2021 07:01  -  2021 14:42  --------------------------------------------------------  IN: 0 mL / OUT: 1400 mL / NET: -1400 mL        RADIOLOGY & ADDITIONAL STUDIES:

## 2021-07-05 NOTE — CHART NOTE - NSCHARTNOTEFT_GEN_A_CORE
SICU seen and evaluated patient    Patient was a rapid response due to patient having increased work of breathing and desaturating. NC was started and patient only improved to saturations of 80%. RT came to bedside and pateint was placed on NRM with further improvement to 90% and evetaully placed on BiPAP with sats to 98%. CXR was done and decision was made to give patient lasix.    SICU was called to evaluate patient.    Currently patient is back on NC @ 4L saturating at 100%. B/L air entry of lungs. No increased work of breathing    ICU Vital Signs Last 24 Hrs  T(C): 36.4 (05 Jul 2021 11:26), Max: 36.4 (04 Jul 2021 21:22)  T(F): 97.6 (05 Jul 2021 11:26), Max: 97.6 (05 Jul 2021 11:26)  HR: 84 (05 Jul 2021 11:26) (56 - 89)  BP: 123/61 (05 Jul 2021 11:26) (123/61 - 213/130)  RR: 20 (05 Jul 2021 11:26) (18 - 24)  SpO2: 98% (05 Jul 2021 07:40) (84% - 98%)    PHYSICAL EXAM:  Constitutional: No acute distress. resting comfortably in bed    Eyes: PERRLA    Respiratory: on 4L NC sat 99%. b/l air entry    Cardiovascular: s1, s2    Gastrointestinal: abd soft, non-tender    Genitourinary: monitor UO s/p lasix    Extremities: ROM + in all extremities.     Plan:  Observe patient on floor  c/w NC  monitor oxygen saturations   vitals  f/u WBC  f/u co2  monitor urine output  AM CXR  f/u cards SICU seen and evaluated patient    Patient was a rapid response due to patient having increased work of breathing and desaturating. NC was started and patient only improved to saturations of 80%. RT came to bedside and pateint was placed on NRM with further improvement to 90% and evetaully placed on BiPAP with sats to 98%. CXR was done and decision was made to give patient lasix.    SICU was called to evaluate patient.    Currently patient is back on NC @ 4L saturating at 100%. B/L air entry of lungs. No increased work of breathing    ICU Vital Signs Last 24 Hrs  T(C): 36.4 (2021 11:26), Max: 36.4 (2021 21:22)  T(F): 97.6 (2021 11:26), Max: 97.6 (2021 11:26)  HR: 84 (2021 11:) (56 - 89)  BP: 123/61 (2021 11:26) (123/61 - 213/130)  RR: 20 (2021 11:26) (18 - 24)  SpO2: 98% (2021 07:40) (84% - 98%)    PHYSICAL EXAM:  Constitutional: No acute distress. resting comfortably in bed    Eyes: PERRLA    Respiratory: on 4L NC sat 99%. b/l air entry    Cardiovascular: s1, s2    Gastrointestinal: abd soft, non-tender    Genitourinary: monitor UO s/p lasix    Extremities: ROM + in all extremities.       LABS:  CAPILLARY BLOOD GLUCOSE      POCT Blood Glucose.: 207 mg/dL (2021 07:09)  POCT Blood Glucose.: 143 mg/dL (2021 00:23)  POCT Blood Glucose.: 138 mg/dL (2021 21:08)  POCT Blood Glucose.: 160 mg/dL (2021 16:38)                          11.6   23.89 )-----------( 337      ( 2021 07:16 )             37.1       Auto Neutrophil %: 85.0 % (21 @ 07:16)  Auto Immature Granulocyte %: 2.5 % (21 @ 07:16)  Auto Immature Granulocyte %: 0.8 % (21 @ 20:00)  Auto Neutrophil %: 80.5 % (21 @ 20:00)        136  |  99  |  20  ----------------------------<  219<H>  4.7   |  12<L>  |  1.1      Calcium, Total Serum: 9.1 mg/dL (21 @ 07:16)      LFTs:             7.3  | 0.5  | 28       ------------------[194     ( 2021 14:54 )  4.5  | x    | 24          Lipase:32     Amylase:x         Blood Gas Arterial, Lactate: 4.6 mmoL/L (21 @ 09:54)  Lactate, Blood: 7.7 mmol/L (21 @ 07:16)  Blood Gas Arterial, Lactate: 8.5 mmoL/L (21 @ 07:13)  Lactate, Blood: 2.6 mmol/L (21 @ 05:36)  Lactate, Blood: 3.4 mmol/L (21 @ 21:10)  Lactate, Blood: 4.1 mmol/L (21 @ 14:54)    ABG - ( 2021 09:54 )  pH: 7.53  /  pCO2: 25    /  pO2: 131   / HCO3: 21    / Base Excess: -0.9  /  SaO2: 100             ABG - ( 2021 07:13 )  pH: 7.32  /  pCO2: 32    /  pO2: 152   / HCO3: 16    / Base Excess: -9.0  /  SaO2: 99                Coags:     30.60  ----< 2.66    ( 2021 23:13 )     33.6        CARDIAC MARKERS ( 2021 07:16 )  x     / 0.02 ng/mL / 416 U/L / x     / 4.1 ng/mL      Serum Pro-Brain Natriuretic Peptide: 2287 pg/mL (21 @ 07:16)      Urinalysis Basic - ( 2021 16:29 )    Color: Colorless / Appearance: Clear / S.022 / pH: x  Gluc: x / Ketone: Negative  / Bili: Negative / Urobili: <2 mg/dL   Blood: x / Protein: Trace / Nitrite: Negative   Leuk Esterase: Negative / RBC: 13 /HPF / WBC 0 /HPF   Sq Epi: x / Non Sq Epi: 0 /HPF / Bacteria: Negative            Plan:  Observe patient on floor  c/w NC  monitor oxygen saturations   vitals  f/u WBC  f/u co2  monitor urine output  AM CXR  f/u cards

## 2021-07-05 NOTE — PROVIDER CONTACT NOTE (OTHER) - ASSESSMENT
Pt BP elevated to 213/85 around 0500. Upon assessment pt was a&o to name, date and , but not to location and situation.

## 2021-07-05 NOTE — CHART NOTE - NSCHARTNOTEFT_GEN_A_CORE
Team called to bedside ~7 AM due to hypoxia to 60% and increased work of breathing. 10L NC initiated, only improved saturation to ~80%. Rapid Response was called. Respiratory was called to bedside immediately, patient placed on a nonrebreather mask with improvement to 90%. During this time patient was hypertensive to 190's SBP and HR in the 60's. Full set of labs, CXR, and ABG obtained. On CXR patient appeared fluid overloaded, 40 of IV lasix given. Patient started on BiPAP with saturations >98%. Work of breathing improved. SICU consult placed for close hemodynamic and respiratory monitoring. Dr. Laguerre from Trauma Surgery at bedside during this event.     Labs:    POCT Blood Glucose.: 207 mg/dL (2021 07:09)  POCT Blood Glucose.: 143 mg/dL (2021 00:23)  POCT Blood Glucose.: 138 mg/dL (2021 21:08)  POCT Blood Glucose.: 160 mg/dL (2021 16:38)  POCT Blood Glucose.: 200 mg/dL (2021 11:39)                          11.6   23.89 )-----------( 337      ( 2021 07:16 )             37.1       Auto Neutrophil %: 85.0 % (21 @ 07:16)  Auto Immature Granulocyte %: 2.5 % (21 @ 07:16)  Auto Immature Granulocyte %: 0.8 % (21 @ 20:00)  Auto Neutrophil %: 80.5 % (21 @ 20:00)        136  |  102  |  22<H>  ----------------------------<  132<H>  4.4   |  22  |  1.2    Calcium, Total Serum: 9.1 mg/dL (21 @ 20:00)    LFTs:             7.3  | 0.5  | 28       ------------------[194     ( 2021 14:54 )  4.5  | x    | 24          Lipase:32       Blood Gas Arterial, Lactate: 8.5 mmoL/L (21 @ 07:13)  Lactate, Blood: 2.6 mmol/L (21 @ 05:36)  Lactate, Blood: 3.4 mmol/L (21 @ 21:10)  Lactate, Blood: 4.1 mmol/L (21 @ 14:54)    ABG - ( 2021 07:13 )  pH: 7.32  /  pCO2: 32    /  pO2: 152   / HCO3: 16    / Base Excess: -9.0  /  SaO2: 99        Coags:     30.60  ----< 2.66    ( 2021 23:13 )     33.6     CARDIAC MARKERS ( 2021 07:16 )  x     / 0.02 ng/mL / x     / x     / 4.1 ng/mL    Serum Pro-Brain Natriuretic Peptide: 2287 pg/mL (21 @ 07:16)    Urinalysis Basic - ( 2021 16:29 )    Color: Colorless / Appearance: Clear / S.022 / pH: x  Gluc: x / Ketone: Negative  / Bili: Negative / Urobili: <2 mg/dL   Blood: x / Protein: Trace / Nitrite: Negative   Leuk Esterase: Negative / RBC: 13 /HPF / WBC 0 /HPF   Sq Epi: x / Non Sq Epi: 0 /HPF / Bacteria: Negative

## 2021-07-06 DIAGNOSIS — Z02.9 ENCOUNTER FOR ADMINISTRATIVE EXAMINATIONS, UNSPECIFIED: ICD-10-CM

## 2021-07-06 LAB
ANION GAP SERPL CALC-SCNC: 10 MMOL/L — SIGNIFICANT CHANGE UP (ref 7–14)
ANION GAP SERPL CALC-SCNC: 10 MMOL/L — SIGNIFICANT CHANGE UP (ref 7–14)
APPEARANCE UR: CLEAR — SIGNIFICANT CHANGE UP
APTT BLD: 27.8 SEC — SIGNIFICANT CHANGE UP (ref 27–39.2)
APTT BLD: 27.9 SEC — SIGNIFICANT CHANGE UP (ref 27–39.2)
BACTERIA # UR AUTO: NEGATIVE — SIGNIFICANT CHANGE UP
BASOPHILS # BLD AUTO: 0.04 K/UL — SIGNIFICANT CHANGE UP (ref 0–0.2)
BASOPHILS NFR BLD AUTO: 0.3 % — SIGNIFICANT CHANGE UP (ref 0–1)
BILIRUB UR-MCNC: NEGATIVE — SIGNIFICANT CHANGE UP
BUN SERPL-MCNC: 22 MG/DL — HIGH (ref 10–20)
BUN SERPL-MCNC: 24 MG/DL — HIGH (ref 10–20)
CALCIUM SERPL-MCNC: 8.9 MG/DL — SIGNIFICANT CHANGE UP (ref 8.5–10.1)
CALCIUM SERPL-MCNC: 9 MG/DL — SIGNIFICANT CHANGE UP (ref 8.5–10.1)
CHLORIDE SERPL-SCNC: 102 MMOL/L — SIGNIFICANT CHANGE UP (ref 98–110)
CHLORIDE SERPL-SCNC: 103 MMOL/L — SIGNIFICANT CHANGE UP (ref 98–110)
CO2 SERPL-SCNC: 24 MMOL/L — SIGNIFICANT CHANGE UP (ref 17–32)
CO2 SERPL-SCNC: 26 MMOL/L — SIGNIFICANT CHANGE UP (ref 17–32)
COLOR SPEC: SIGNIFICANT CHANGE UP
CREAT SERPL-MCNC: 1.1 MG/DL — SIGNIFICANT CHANGE UP (ref 0.7–1.5)
CREAT SERPL-MCNC: 1.2 MG/DL — SIGNIFICANT CHANGE UP (ref 0.7–1.5)
DIFF PNL FLD: ABNORMAL
EOSINOPHIL # BLD AUTO: 0.06 K/UL — SIGNIFICANT CHANGE UP (ref 0–0.7)
EOSINOPHIL NFR BLD AUTO: 0.4 % — SIGNIFICANT CHANGE UP (ref 0–8)
EPI CELLS # UR: 1 /HPF — SIGNIFICANT CHANGE UP (ref 0–5)
GLUCOSE BLDC GLUCOMTR-MCNC: 118 MG/DL — HIGH (ref 70–99)
GLUCOSE BLDC GLUCOMTR-MCNC: 128 MG/DL — HIGH (ref 70–99)
GLUCOSE BLDC GLUCOMTR-MCNC: 138 MG/DL — HIGH (ref 70–99)
GLUCOSE SERPL-MCNC: 111 MG/DL — HIGH (ref 70–99)
GLUCOSE SERPL-MCNC: 182 MG/DL — HIGH (ref 70–99)
GLUCOSE UR QL: NEGATIVE — SIGNIFICANT CHANGE UP
HCT VFR BLD CALC: 33.8 % — LOW (ref 42–52)
HCT VFR BLD CALC: 34.5 % — LOW (ref 42–52)
HGB BLD-MCNC: 10.8 G/DL — LOW (ref 14–18)
HGB BLD-MCNC: 11 G/DL — LOW (ref 14–18)
HYALINE CASTS # UR AUTO: 1 /LPF — SIGNIFICANT CHANGE UP (ref 0–7)
IMM GRANULOCYTES NFR BLD AUTO: 0.8 % — HIGH (ref 0.1–0.3)
INR BLD: 1.12 RATIO — SIGNIFICANT CHANGE UP (ref 0.65–1.3)
INR BLD: 1.15 RATIO — SIGNIFICANT CHANGE UP (ref 0.65–1.3)
KETONES UR-MCNC: NEGATIVE — SIGNIFICANT CHANGE UP
LEUKOCYTE ESTERASE UR-ACNC: NEGATIVE — SIGNIFICANT CHANGE UP
LYMPHOCYTES # BLD AUTO: 0.67 K/UL — LOW (ref 1.2–3.4)
LYMPHOCYTES # BLD AUTO: 4.7 % — LOW (ref 20.5–51.1)
MAGNESIUM SERPL-MCNC: 2 MG/DL — SIGNIFICANT CHANGE UP (ref 1.8–2.4)
MCHC RBC-ENTMCNC: 29.7 PG — SIGNIFICANT CHANGE UP (ref 27–31)
MCHC RBC-ENTMCNC: 30.3 PG — SIGNIFICANT CHANGE UP (ref 27–31)
MCHC RBC-ENTMCNC: 31.9 G/DL — LOW (ref 32–37)
MCHC RBC-ENTMCNC: 32 G/DL — SIGNIFICANT CHANGE UP (ref 32–37)
MCV RBC AUTO: 92.9 FL — SIGNIFICANT CHANGE UP (ref 80–94)
MCV RBC AUTO: 95 FL — HIGH (ref 80–94)
MONOCYTES # BLD AUTO: 0.45 K/UL — SIGNIFICANT CHANGE UP (ref 0.1–0.6)
MONOCYTES NFR BLD AUTO: 3.1 % — SIGNIFICANT CHANGE UP (ref 1.7–9.3)
NEUTROPHILS # BLD AUTO: 13.04 K/UL — HIGH (ref 1.4–6.5)
NEUTROPHILS NFR BLD AUTO: 90.7 % — HIGH (ref 42.2–75.2)
NITRITE UR-MCNC: NEGATIVE — SIGNIFICANT CHANGE UP
NRBC # BLD: 0 /100 WBCS — SIGNIFICANT CHANGE UP (ref 0–0)
NRBC # BLD: 0 /100 WBCS — SIGNIFICANT CHANGE UP (ref 0–0)
PH UR: 6.5 — SIGNIFICANT CHANGE UP (ref 5–8)
PHOSPHATE SERPL-MCNC: 3.9 MG/DL — SIGNIFICANT CHANGE UP (ref 2.1–4.9)
PLATELET # BLD AUTO: 235 K/UL — SIGNIFICANT CHANGE UP (ref 130–400)
PLATELET # BLD AUTO: 255 K/UL — SIGNIFICANT CHANGE UP (ref 130–400)
POTASSIUM SERPL-MCNC: 3.9 MMOL/L — SIGNIFICANT CHANGE UP (ref 3.5–5)
POTASSIUM SERPL-MCNC: 4.9 MMOL/L — SIGNIFICANT CHANGE UP (ref 3.5–5)
POTASSIUM SERPL-SCNC: 3.9 MMOL/L — SIGNIFICANT CHANGE UP (ref 3.5–5)
POTASSIUM SERPL-SCNC: 4.9 MMOL/L — SIGNIFICANT CHANGE UP (ref 3.5–5)
PROCALCITONIN SERPL-MCNC: 6.58 NG/ML — HIGH (ref 0.02–0.1)
PROT UR-MCNC: ABNORMAL
PROTHROM AB SERPL-ACNC: 12.9 SEC — HIGH (ref 9.95–12.87)
PROTHROM AB SERPL-ACNC: 13.2 SEC — HIGH (ref 9.95–12.87)
RBC # BLD: 3.63 M/UL — LOW (ref 4.7–6.1)
RBC # BLD: 3.64 M/UL — LOW (ref 4.7–6.1)
RBC # FLD: 12.8 % — SIGNIFICANT CHANGE UP (ref 11.5–14.5)
RBC # FLD: 13.1 % — SIGNIFICANT CHANGE UP (ref 11.5–14.5)
RBC CASTS # UR COMP ASSIST: 22 /HPF — HIGH (ref 0–4)
SODIUM SERPL-SCNC: 137 MMOL/L — SIGNIFICANT CHANGE UP (ref 135–146)
SODIUM SERPL-SCNC: 138 MMOL/L — SIGNIFICANT CHANGE UP (ref 135–146)
SP GR SPEC: 1.01 — SIGNIFICANT CHANGE UP (ref 1.01–1.03)
TROPONIN T SERPL-MCNC: 0.04 NG/ML — CRITICAL HIGH
TROPONIN T SERPL-MCNC: 0.05 NG/ML — CRITICAL HIGH
UROBILINOGEN FLD QL: SIGNIFICANT CHANGE UP
WBC # BLD: 12.5 K/UL — HIGH (ref 4.8–10.8)
WBC # BLD: 14.38 K/UL — HIGH (ref 4.8–10.8)
WBC # FLD AUTO: 12.5 K/UL — HIGH (ref 4.8–10.8)
WBC # FLD AUTO: 14.38 K/UL — HIGH (ref 4.8–10.8)
WBC UR QL: 3 /HPF — SIGNIFICANT CHANGE UP (ref 0–5)

## 2021-07-06 PROCEDURE — 99231 SBSQ HOSP IP/OBS SF/LOW 25: CPT | Mod: GC

## 2021-07-06 PROCEDURE — 99232 SBSQ HOSP IP/OBS MODERATE 35: CPT

## 2021-07-06 PROCEDURE — 71045 X-RAY EXAM CHEST 1 VIEW: CPT | Mod: 26

## 2021-07-06 RX ORDER — FUROSEMIDE 40 MG
40 TABLET ORAL ONCE
Refills: 0 | Status: COMPLETED | OUTPATIENT
Start: 2021-07-06 | End: 2021-07-06

## 2021-07-06 RX ORDER — TAMSULOSIN HYDROCHLORIDE 0.4 MG/1
0.4 CAPSULE ORAL AT BEDTIME
Refills: 0 | Status: DISCONTINUED | OUTPATIENT
Start: 2021-07-06 | End: 2021-07-08

## 2021-07-06 RX ORDER — ASPIRIN/CALCIUM CARB/MAGNESIUM 324 MG
81 TABLET ORAL DAILY
Refills: 0 | Status: DISCONTINUED | OUTPATIENT
Start: 2021-07-06 | End: 2021-07-08

## 2021-07-06 RX ORDER — OXYCODONE HYDROCHLORIDE 5 MG/1
5 TABLET ORAL EVERY 6 HOURS
Refills: 0 | Status: DISCONTINUED | OUTPATIENT
Start: 2021-07-06 | End: 2021-07-08

## 2021-07-06 RX ORDER — ACETAMINOPHEN 500 MG
650 TABLET ORAL EVERY 6 HOURS
Refills: 0 | Status: DISCONTINUED | OUTPATIENT
Start: 2021-07-06 | End: 2021-07-08

## 2021-07-06 RX ORDER — SODIUM CHLORIDE 9 MG/ML
1000 INJECTION, SOLUTION INTRAVENOUS
Refills: 0 | Status: DISCONTINUED | OUTPATIENT
Start: 2021-07-06 | End: 2021-07-06

## 2021-07-06 RX ORDER — CEFAZOLIN SODIUM 1 G
2000 VIAL (EA) INJECTION EVERY 8 HOURS
Refills: 0 | Status: COMPLETED | OUTPATIENT
Start: 2021-07-06 | End: 2021-07-07

## 2021-07-06 RX ORDER — METOPROLOL TARTRATE 50 MG
50 TABLET ORAL
Refills: 0 | Status: DISCONTINUED | OUTPATIENT
Start: 2021-07-06 | End: 2021-07-08

## 2021-07-06 RX ORDER — DEXTROSE 50 % IN WATER 50 %
15 SYRINGE (ML) INTRAVENOUS ONCE
Refills: 0 | Status: DISCONTINUED | OUTPATIENT
Start: 2021-07-06 | End: 2021-07-08

## 2021-07-06 RX ORDER — FINASTERIDE 5 MG/1
5 TABLET, FILM COATED ORAL DAILY
Refills: 0 | Status: DISCONTINUED | OUTPATIENT
Start: 2021-07-06 | End: 2021-07-08

## 2021-07-06 RX ORDER — MORPHINE SULFATE 50 MG/1
2 CAPSULE, EXTENDED RELEASE ORAL
Refills: 0 | Status: DISCONTINUED | OUTPATIENT
Start: 2021-07-06 | End: 2021-07-06

## 2021-07-06 RX ORDER — DEXTROSE 50 % IN WATER 50 %
25 SYRINGE (ML) INTRAVENOUS ONCE
Refills: 0 | Status: DISCONTINUED | OUTPATIENT
Start: 2021-07-06 | End: 2021-07-08

## 2021-07-06 RX ORDER — OXYCODONE AND ACETAMINOPHEN 5; 325 MG/1; MG/1
1 TABLET ORAL ONCE
Refills: 0 | Status: DISCONTINUED | OUTPATIENT
Start: 2021-07-06 | End: 2021-07-06

## 2021-07-06 RX ORDER — GLUCAGON INJECTION, SOLUTION 0.5 MG/.1ML
1 INJECTION, SOLUTION SUBCUTANEOUS ONCE
Refills: 0 | Status: DISCONTINUED | OUTPATIENT
Start: 2021-07-06 | End: 2021-07-08

## 2021-07-06 RX ORDER — PANTOPRAZOLE SODIUM 20 MG/1
40 TABLET, DELAYED RELEASE ORAL
Refills: 0 | Status: DISCONTINUED | OUTPATIENT
Start: 2021-07-06 | End: 2021-07-08

## 2021-07-06 RX ORDER — ZOLPIDEM TARTRATE 10 MG/1
5 TABLET ORAL AT BEDTIME
Refills: 0 | Status: DISCONTINUED | OUTPATIENT
Start: 2021-07-06 | End: 2021-07-08

## 2021-07-06 RX ORDER — INSULIN LISPRO 100/ML
VIAL (ML) SUBCUTANEOUS EVERY 6 HOURS
Refills: 0 | Status: DISCONTINUED | OUTPATIENT
Start: 2021-07-06 | End: 2021-07-08

## 2021-07-06 RX ORDER — SIMVASTATIN 20 MG/1
40 TABLET, FILM COATED ORAL AT BEDTIME
Refills: 0 | Status: DISCONTINUED | OUTPATIENT
Start: 2021-07-06 | End: 2021-07-08

## 2021-07-06 RX ORDER — HEPARIN SODIUM 5000 [USP'U]/ML
5000 INJECTION INTRAVENOUS; SUBCUTANEOUS EVERY 8 HOURS
Refills: 0 | Status: DISCONTINUED | OUTPATIENT
Start: 2021-07-06 | End: 2021-07-08

## 2021-07-06 RX ORDER — DEXTROSE 50 % IN WATER 50 %
12.5 SYRINGE (ML) INTRAVENOUS ONCE
Refills: 0 | Status: DISCONTINUED | OUTPATIENT
Start: 2021-07-06 | End: 2021-07-08

## 2021-07-06 RX ORDER — LOSARTAN POTASSIUM 100 MG/1
100 TABLET, FILM COATED ORAL DAILY
Refills: 0 | Status: DISCONTINUED | OUTPATIENT
Start: 2021-07-06 | End: 2021-07-08

## 2021-07-06 RX ADMIN — Medication 650 MILLIGRAM(S): at 05:32

## 2021-07-06 RX ADMIN — MORPHINE SULFATE 2 MILLIGRAM(S): 50 CAPSULE, EXTENDED RELEASE ORAL at 18:06

## 2021-07-06 RX ADMIN — Medication 650 MILLIGRAM(S): at 13:20

## 2021-07-06 RX ADMIN — HEPARIN SODIUM 5000 UNIT(S): 5000 INJECTION INTRAVENOUS; SUBCUTANEOUS at 23:33

## 2021-07-06 RX ADMIN — LOSARTAN POTASSIUM 100 MILLIGRAM(S): 100 TABLET, FILM COATED ORAL at 05:02

## 2021-07-06 RX ADMIN — Medication 650 MILLIGRAM(S): at 23:32

## 2021-07-06 RX ADMIN — TAMSULOSIN HYDROCHLORIDE 0.4 MILLIGRAM(S): 0.4 CAPSULE ORAL at 23:32

## 2021-07-06 RX ADMIN — FINASTERIDE 5 MILLIGRAM(S): 5 TABLET, FILM COATED ORAL at 23:31

## 2021-07-06 RX ADMIN — MORPHINE SULFATE 2 MILLIGRAM(S): 50 CAPSULE, EXTENDED RELEASE ORAL at 17:50

## 2021-07-06 RX ADMIN — FINASTERIDE 5 MILLIGRAM(S): 5 TABLET, FILM COATED ORAL at 13:22

## 2021-07-06 RX ADMIN — Medication 81 MILLIGRAM(S): at 13:21

## 2021-07-06 RX ADMIN — OXYCODONE HYDROCHLORIDE 5 MILLIGRAM(S): 5 TABLET ORAL at 05:29

## 2021-07-06 RX ADMIN — PANTOPRAZOLE SODIUM 40 MILLIGRAM(S): 20 TABLET, DELAYED RELEASE ORAL at 05:03

## 2021-07-06 RX ADMIN — Medication 1000 MILLIGRAM(S): at 00:02

## 2021-07-06 RX ADMIN — SIMVASTATIN 40 MILLIGRAM(S): 20 TABLET, FILM COATED ORAL at 23:31

## 2021-07-06 RX ADMIN — Medication 650 MILLIGRAM(S): at 05:02

## 2021-07-06 RX ADMIN — Medication 100 MILLIGRAM(S): at 23:30

## 2021-07-06 RX ADMIN — OXYCODONE HYDROCHLORIDE 5 MILLIGRAM(S): 5 TABLET ORAL at 04:59

## 2021-07-06 RX ADMIN — SODIUM CHLORIDE 75 MILLILITER(S): 9 INJECTION, SOLUTION INTRAVENOUS at 17:35

## 2021-07-06 RX ADMIN — Medication 50 MILLIGRAM(S): at 05:02

## 2021-07-06 RX ADMIN — Medication 40 MILLIGRAM(S): at 11:37

## 2021-07-06 NOTE — CHART NOTE - NSCHARTNOTEFT_GEN_A_CORE
[employer] DC PPM interrogated 7/6/21  Two episodes high atrial rates, hx of AF on Coumadin  No VT events  Device functioning properly    Recall EP as needed [phone]

## 2021-07-06 NOTE — PROGRESS NOTE ADULT - ASSESSMENT
Impression     left intertrochanteric fracture   AS s/p TAVR  AFib on coumadin , s/p PPM  HFmrEF  CAD s/p CABG and PCI   HTN   HLD    * SUMMARY:    * Patient-based characteristics (Functional capacity)  Patient is able to achieve more than 4 MET (walk 4 blocks, climb 2 flights of stairs, etc...)          Y [X] / N []    High-risk patient features:  - Recent (<30 days) or active MI          Y [] / N [X]  - Unstable or severe angina          Y [] / N [X]  - Decompensated heart failure, or worsening or new-onset heart failure          Y [] / N [X]  - Severe valvular disease          Y [] / N [X]  - Significant arrhythmia (Tachy- or Bradyarrhythmia)          Y [] / N [X]    * Surgery/Procedure-based characteristics (Type of surgery)  - Low-risk procedure (outpatient procedure, elective, endoscopy, etc...)          Y [] / N []  - Elevated or Moderate-risk procedure (Inpatient)          Y [x] / N []  - High-risk procedure (urgent/emergent procedure, Intrathoracic, vascular, etc...)          Y [] / N []    * Revised Cardiac Risk Index (RCRI)  1- History of ischemic heart disease          Y [x] / N [X]  2- History of congestive heart failure          Y [x] / N [X]  3- History of stroke/TIA          Y [] / N [X]  4- History of insulin-dependent diabetes          Y [] / N [X]  5- Chronic kidney disease (Cr >2mg/dL)          Y [] / N [x]  6- Undergoing suprainguinal vascular, intraperitoneal, or intrathoracic surgery          Y [] / N [X]      Class III risk (Two factors) --> 10.1% risk (30-day risk of death, MI, or cardiac arrest)      * IMPRESSION & RECOMMENDATIONS:  Moderate risk patient for a  Moderate -risk surgery/procedure      control BP, restart home medications  avoid excess IV hydration and can given as need lasix for fluids overload   recheck INR, if it's below 1.7 can proceed to OR  restart AC after OR as soon as cleared by ortho       Impression     left intertrochanteric fracture   AS s/p TAVR  AFib on coumadin , s/p PPM  HFmrEF  CAD s/p CABG and PCI   HTN   HLD    * SUMMARY:    * Patient-based characteristics (Functional capacity)  Patient is able to achieve more than 4 MET (walk 4 blocks, climb 2 flights of stairs, etc...)          Y [X] / N []    High-risk patient features:  - Recent (<30 days) or active MI          Y [] / N [X]  - Unstable or severe angina          Y [] / N [X]  - Decompensated heart failure, or worsening or new-onset heart failure          Y [] / N [X]  - Severe valvular disease          Y [] / N [X]  - Significant arrhythmia (Tachy- or Bradyarrhythmia)          Y [] / N [X]    * Surgery/Procedure-based characteristics (Type of surgery)  - Low-risk procedure (outpatient procedure, elective, endoscopy, etc...)          Y [] / N []  - Elevated or Moderate-risk procedure (Inpatient)          Y [x] / N []  - High-risk procedure (urgent/emergent procedure, Intrathoracic, vascular, etc...)          Y [] / N []    * Revised Cardiac Risk Index (RCRI)  1- History of ischemic heart disease          Y [x] / N [X]  2- History of congestive heart failure          Y [x] / N [X]  3- History of stroke/TIA          Y [] / N [X]  4- History of insulin-dependent diabetes          Y [] / N [X]  5- Chronic kidney disease (Cr >2mg/dL)          Y [] / N [x]  6- Undergoing suprainguinal vascular, intraperitoneal, or intrathoracic surgery          Y [] / N [X]      Class III risk (Two factors) --> 10.1% risk (30-day risk of death, MI, or cardiac arrest)      * IMPRESSION & RECOMMENDATIONS:  Moderate risk patient for a  Moderate -risk surgery/procedure      control BP, restart home medications  avoid excess IV hydration and can given as need lasix for fluids overload   recheck INR, if it's below 1.7 can proceed to OR  restart AC after OR as soon as cleared by ortho    agree with above.

## 2021-07-06 NOTE — PROGRESS NOTE ADULT - ASSESSMENT
ASSESSMENT:  87M w/ left intertrochanteric femoral fracture.  Plan is OR w/ Ortho pending medical optimization.      PLAN:   - As per Cardiology, Moderate risk patient for a  Moderate -risk surgery/procedure.  Recommending EP consult to investigate pacemaker.  As per EP team, device functioning properly.  - FU Ortho plan and OR date  - FU UOP s/p lasix  - FU 11am INR  - PM labs    TRAUMA SPECTRA: [id number]

## 2021-07-06 NOTE — PRE-OP CHECKLIST - SELECT TESTS ORDERED
BMP/CBC/CMP/INR/EKG/CXR
BMP/CBC/CMP/PT/PTT/INR/Type and Cross/Type and Screen/EKG/CXR/COVID-19
BMP/CMP/INR/Type and Cross/Type and Screen/COVID-19

## 2021-07-06 NOTE — CHART NOTE - NSCHARTNOTEFT_GEN_A_CORE
PACU ANESTHESIA ADMISSION NOTE      Procedure: Intramedullary nailing of left femur      Post op diagnosis:      ____  Intubated  TV:______       Rate: ______      FiO2: ______    __x__  Patent Airway    ____  Full return of protective reflexes    ____  Full recovery from anesthesia / back to baseline     Vitals:   T:        98   R:     12            BP:        190/74           Sat:  98%                 P:  83      Mental Status:  __x__ Awake   _____ Alert   _____ Drowsy   _____ Sedated    Nausea/Vomiting:  __x__ NO  ______Yes,   See Post - Op Orders          Pain Scale (0-10):  _____    Treatment: ____ None    ___x_ See Post - Op/PCA Orders    Post - Operative Fluids:   ____ Oral   ___x_ See Post - Op Orders    Plan: Discharge:   ____Home       ___x__Floor     _____Critical Care    _____  Other:_________________    Comments: Uneventful intraoperative course. No anesthesia issues or complications noted.  Patient stable upon arrival to PACU. Report given to RN. Discharge when criteria met.

## 2021-07-07 LAB
ANION GAP SERPL CALC-SCNC: 11 MMOL/L — SIGNIFICANT CHANGE UP (ref 7–14)
BASOPHILS # BLD AUTO: 0.03 K/UL — SIGNIFICANT CHANGE UP (ref 0–0.2)
BASOPHILS NFR BLD AUTO: 0.2 % — SIGNIFICANT CHANGE UP (ref 0–1)
BUN SERPL-MCNC: 33 MG/DL — HIGH (ref 10–20)
CALCIUM SERPL-MCNC: 9 MG/DL — SIGNIFICANT CHANGE UP (ref 8.5–10.1)
CHLORIDE SERPL-SCNC: 103 MMOL/L — SIGNIFICANT CHANGE UP (ref 98–110)
CO2 SERPL-SCNC: 21 MMOL/L — SIGNIFICANT CHANGE UP (ref 17–32)
CREAT SERPL-MCNC: 1 MG/DL — SIGNIFICANT CHANGE UP (ref 0.7–1.5)
DEPRECATED COMMON MILLET IGE AB RAST CLASS [PRESENCE] IN SERUM: SIGNIFICANT CHANGE UP
DEPRECATED FOXTAIL MILLET IGE AB RAST CLASS [PRESENCE] IN SERUM: 0.12 KUA/L — SIGNIFICANT CHANGE UP
EOSINOPHIL # BLD AUTO: 0.1 K/UL — SIGNIFICANT CHANGE UP (ref 0–0.7)
EOSINOPHIL NFR BLD AUTO: 0.7 % — SIGNIFICANT CHANGE UP (ref 0–8)
GLUCOSE BLDC GLUCOMTR-MCNC: 175 MG/DL — HIGH (ref 70–99)
GLUCOSE BLDC GLUCOMTR-MCNC: 186 MG/DL — HIGH (ref 70–99)
GLUCOSE BLDC GLUCOMTR-MCNC: 188 MG/DL — HIGH (ref 70–99)
GLUCOSE BLDC GLUCOMTR-MCNC: 202 MG/DL — HIGH (ref 70–99)
GLUCOSE BLDC GLUCOMTR-MCNC: 206 MG/DL — HIGH (ref 70–99)
GLUCOSE BLDC GLUCOMTR-MCNC: 225 MG/DL — HIGH (ref 70–99)
GLUCOSE BLDC GLUCOMTR-MCNC: 262 MG/DL — HIGH (ref 70–99)
GLUCOSE SERPL-MCNC: 136 MG/DL — HIGH (ref 70–99)
HCT VFR BLD CALC: 31.8 % — LOW (ref 42–52)
HGB BLD-MCNC: 10.3 G/DL — LOW (ref 14–18)
IMM GRANULOCYTES NFR BLD AUTO: 0.7 % — HIGH (ref 0.1–0.3)
LYMPHOCYTES # BLD AUTO: 1.5 K/UL — SIGNIFICANT CHANGE UP (ref 1.2–3.4)
LYMPHOCYTES # BLD AUTO: 10.4 % — LOW (ref 20.5–51.1)
MAGNESIUM SERPL-MCNC: 2.1 MG/DL — SIGNIFICANT CHANGE UP (ref 1.8–2.4)
MCHC RBC-ENTMCNC: 29.8 PG — SIGNIFICANT CHANGE UP (ref 27–31)
MCHC RBC-ENTMCNC: 32.4 G/DL — SIGNIFICANT CHANGE UP (ref 32–37)
MCV RBC AUTO: 91.9 FL — SIGNIFICANT CHANGE UP (ref 80–94)
MONOCYTES # BLD AUTO: 1.62 K/UL — HIGH (ref 0.1–0.6)
MONOCYTES NFR BLD AUTO: 11.2 % — HIGH (ref 1.7–9.3)
NEUTROPHILS # BLD AUTO: 11.12 K/UL — HIGH (ref 1.4–6.5)
NEUTROPHILS NFR BLD AUTO: 76.8 % — HIGH (ref 42.2–75.2)
NRBC # BLD: 0 /100 WBCS — SIGNIFICANT CHANGE UP (ref 0–0)
PHOSPHATE SERPL-MCNC: 3.1 MG/DL — SIGNIFICANT CHANGE UP (ref 2.1–4.9)
PLATELET # BLD AUTO: 286 K/UL — SIGNIFICANT CHANGE UP (ref 130–400)
POTASSIUM SERPL-MCNC: 3.9 MMOL/L — SIGNIFICANT CHANGE UP (ref 3.5–5)
POTASSIUM SERPL-SCNC: 3.9 MMOL/L — SIGNIFICANT CHANGE UP (ref 3.5–5)
RBC # BLD: 3.46 M/UL — LOW (ref 4.7–6.1)
RBC # FLD: 12.7 % — SIGNIFICANT CHANGE UP (ref 11.5–14.5)
SODIUM SERPL-SCNC: 135 MMOL/L — SIGNIFICANT CHANGE UP (ref 135–146)
TOTAL IGE SMQN RAST: SIGNIFICANT CHANGE UP
WBC # BLD: 14.47 K/UL — HIGH (ref 4.8–10.8)
WBC # FLD AUTO: 14.47 K/UL — HIGH (ref 4.8–10.8)

## 2021-07-07 PROCEDURE — 99231 SBSQ HOSP IP/OBS SF/LOW 25: CPT | Mod: GC

## 2021-07-07 PROCEDURE — 71045 X-RAY EXAM CHEST 1 VIEW: CPT | Mod: 26

## 2021-07-07 RX ORDER — POLYETHYLENE GLYCOL 3350 17 G/17G
17 POWDER, FOR SOLUTION ORAL DAILY
Refills: 0 | Status: DISCONTINUED | OUTPATIENT
Start: 2021-07-07 | End: 2021-07-08

## 2021-07-07 RX ADMIN — OXYCODONE HYDROCHLORIDE 5 MILLIGRAM(S): 5 TABLET ORAL at 00:40

## 2021-07-07 RX ADMIN — Medication 50 MILLIGRAM(S): at 17:36

## 2021-07-07 RX ADMIN — OXYCODONE HYDROCHLORIDE 5 MILLIGRAM(S): 5 TABLET ORAL at 00:10

## 2021-07-07 RX ADMIN — LOSARTAN POTASSIUM 100 MILLIGRAM(S): 100 TABLET, FILM COATED ORAL at 06:06

## 2021-07-07 RX ADMIN — HEPARIN SODIUM 5000 UNIT(S): 5000 INJECTION INTRAVENOUS; SUBCUTANEOUS at 06:07

## 2021-07-07 RX ADMIN — Medication 4: at 06:06

## 2021-07-07 RX ADMIN — Medication 50 MILLIGRAM(S): at 06:06

## 2021-07-07 RX ADMIN — Medication 2: at 12:43

## 2021-07-07 RX ADMIN — Medication 650 MILLIGRAM(S): at 06:06

## 2021-07-07 RX ADMIN — Medication 650 MILLIGRAM(S): at 18:06

## 2021-07-07 RX ADMIN — Medication 100 MILLIGRAM(S): at 06:06

## 2021-07-07 RX ADMIN — PANTOPRAZOLE SODIUM 40 MILLIGRAM(S): 20 TABLET, DELAYED RELEASE ORAL at 06:06

## 2021-07-07 RX ADMIN — Medication 6: at 00:14

## 2021-07-07 RX ADMIN — HEPARIN SODIUM 5000 UNIT(S): 5000 INJECTION INTRAVENOUS; SUBCUTANEOUS at 13:38

## 2021-07-07 RX ADMIN — ZOLPIDEM TARTRATE 5 MILLIGRAM(S): 10 TABLET ORAL at 23:06

## 2021-07-07 RX ADMIN — TAMSULOSIN HYDROCHLORIDE 0.4 MILLIGRAM(S): 0.4 CAPSULE ORAL at 23:07

## 2021-07-07 RX ADMIN — Medication 2: at 17:41

## 2021-07-07 RX ADMIN — Medication 650 MILLIGRAM(S): at 06:43

## 2021-07-07 RX ADMIN — Medication 100 MILLIGRAM(S): at 13:38

## 2021-07-07 RX ADMIN — Medication 81 MILLIGRAM(S): at 12:40

## 2021-07-07 RX ADMIN — Medication 650 MILLIGRAM(S): at 23:10

## 2021-07-07 RX ADMIN — Medication 650 MILLIGRAM(S): at 17:36

## 2021-07-07 RX ADMIN — Medication 650 MILLIGRAM(S): at 00:02

## 2021-07-07 RX ADMIN — Medication 650 MILLIGRAM(S): at 13:10

## 2021-07-07 RX ADMIN — HEPARIN SODIUM 5000 UNIT(S): 5000 INJECTION INTRAVENOUS; SUBCUTANEOUS at 23:08

## 2021-07-07 RX ADMIN — SIMVASTATIN 40 MILLIGRAM(S): 20 TABLET, FILM COATED ORAL at 23:07

## 2021-07-07 RX ADMIN — Medication 650 MILLIGRAM(S): at 12:40

## 2021-07-07 RX ADMIN — FINASTERIDE 5 MILLIGRAM(S): 5 TABLET, FILM COATED ORAL at 12:40

## 2021-07-07 NOTE — CONSULT NOTE ADULT - ASSESSMENT
IMPRESSION: Rehab of left hip it fx, s/p orif    PRECAUTIONS: [   ] Cardiac  [   ] Respiratory  [   ] Seizures [   ] Contact Isolation  [   ] Droplet Isolation  [   ] Other    Weight Bearing Status: WBAT LLE    RECOMMENDATION:    Out of Bed to Chair     DVT/Decubiti Prophylaxis    REHAB PLAN:     [  x  ] Bedside P/T 3-5 times a week   [  x  ]   Bedside O/T  2-3 times a week             [    ] Speech Therapy               [    ]  No Rehab Therapy Indicated   Conditioning/ROM                                    ADL  Bed Mobility                                               Conditioning/ROM  Transfers                                                     Bed Mobility  Sitting /Standing Balance                         Transfers                                        Gait Training                                               Sitting/Standing Balance  Stair Training [   ]Applicable                    Home equipment Eval                                                                        Splinting  [   ] Only      GOALS:   ADL   [ x   ]   Independent                    Transfers  [ x   ] Independent                          Ambulation  [  x  ] Independent     [  x   ] With device                            [    ]  CG                                                         [    ]  CG                                                                  [    ] CG                            [    ] Min A                                                   [    ] Min A                                                              [    ] Min  A          DISCHARGE PLAN:   [    ]  Good candidate for Intensive Rehabilitation/Hospital based                                             Will tolerate 3hrs Intensive Rehab Daily                                       [     ]  Short Term Rehab in Skilled Nursing Facility                                       [     ]  Home with Outpatient or  services                                         [  x   ]  Possible Candidate for Intensive Hospital based Rehab

## 2021-07-07 NOTE — PHYSICAL THERAPY INITIAL EVALUATION ADULT - LEVEL OF INDEPENDENCE: BED TO CHAIR, REHAB EVAL
unable due to decr standing tolerance, poor wt shift skills, worsening pain with wt bearing/unable to perform

## 2021-07-07 NOTE — CONSULT NOTE ADULT - REASON FOR ADMISSION
left intertrochanteric fracture

## 2021-07-07 NOTE — CONSULT NOTE ADULT - SUBJECTIVE AND OBJECTIVE BOX
HPI:  87yM w/ PMH/PSH of aortic stenosis s/p TAVR (2016), AFib (on Coumadin & s/p MAZE procedure), asthma/COPD, CHF, s/p pacemaker placement, carotid stenosis s/p right CEA (2013), and known CAD s/p PCI placement and s/p CABG who was seen as a Trauma Alert s/p mechanical trip and fall, -HT, -LOC, +Coumadin. Patient stated that he was walking his dog when he tripped over the leash, falling on his left side. Reports pain to his left hip, denies pain elsewhere.  Trauma assessment in ED: ABCs intact , GCS 15. Trauma w/u showed left hip intertrochanteric fx, s/p orif on 7/6, wbat as per ortho      PAST MEDICAL & SURGICAL HISTORY:  Asthma with COPD    HLD (hyperlipidemia)    Essential hypertension    Aortic stenosis, severe    A-fib    DM (diabetes mellitus)    Pacemaker    BPH (benign prostatic hyperplasia)    S/P CABG x 3    History of CEA (carotid endarterectomy)    History of permanent cardiac pacemaker placement    H/O aortic valve replacement        Hospital Course:    TODAY'S SUBJECTIVE & REVIEW OF SYMPTOMS:     Constitutional WNL   Cardio WNL   Resp WNL   GI WNL  Heme WNL  Endo WNL  Skin WNL  MSK pain  Neuro WNL  Cognitive WNL  Psych WNL      MEDICATIONS  (STANDING):  acetaminophen   Tablet .. 650 milliGRAM(s) Oral every 6 hours  aspirin enteric coated 81 milliGRAM(s) Oral daily  ceFAZolin   IVPB 2000 milliGRAM(s) IV Intermittent every 8 hours  dextrose 40% Gel 15 Gram(s) Oral once  dextrose 50% Injectable 25 Gram(s) IV Push once  dextrose 50% Injectable 12.5 Gram(s) IV Push once  dextrose 50% Injectable 25 Gram(s) IV Push once  finasteride 5 milliGRAM(s) Oral daily  glucagon  Injectable 1 milliGRAM(s) IntraMuscular once  heparin   Injectable 5000 Unit(s) SubCutaneous every 8 hours  insulin lispro (ADMELOG) corrective regimen sliding scale   SubCutaneous every 6 hours  losartan 100 milliGRAM(s) Oral daily  metoprolol tartrate 50 milliGRAM(s) Oral two times a day  pantoprazole    Tablet 40 milliGRAM(s) Oral before breakfast  simvastatin 40 milliGRAM(s) Oral at bedtime  tamsulosin 0.4 milliGRAM(s) Oral at bedtime    MEDICATIONS  (PRN):  oxyCODONE    IR 5 milliGRAM(s) Oral every 6 hours PRN Severe Pain (7 - 10)  zolpidem 5 milliGRAM(s) Oral at bedtime PRN Insomnia      FAMILY HISTORY:      Allergies    IV Contrast (Hives)    Intolerances    contrast media (gadolinium-based) (Hives)      SOCIAL HISTORY:    [  ] Etoh  [  ] Smoking  [  ] Substance abuse     Home Environment:  [   ] Home Alone  [ x  ] Lives with Family  [   ] Home Health Aid    Dwelling:  [   ] Apartment  [x   ] Private House  [   ] Adult Home  [   ] Skilled Nursing Facility      [   ] Short Term  [   ] Long Term  [ x  ] Stairs       Elevator [   ]    FUNCTIONAL STATUS PTA: (Check all that apply)  Ambulation: [   x ]Independent    [   ] Dependent     [   ] Non-Ambulatory  Assistive Device: [   ] SA Cane  [   ]  Q Cane  [   ] Walker  [   ]  Wheelchair  ADL : [x   ] Independent  [    ]  Dependent       Vital Signs Last 24 Hrs  T(C): 36.1 (07 Jul 2021 12:47), Max: 37.2 (06 Jul 2021 15:38)  T(F): 97 (07 Jul 2021 12:47), Max: 99 (06 Jul 2021 15:38)  HR: 107 (07 Jul 2021 12:47) (76 - 107)  BP: 134/89 (07 Jul 2021 12:47) (134/89 - 213/80)  BP(mean): 98 (06 Jul 2021 18:30) (98 - 106)  RR: 18 (07 Jul 2021 12:47) (10 - 31)  SpO2: 97% (07 Jul 2021 05:08) (94% - 99%)      PHYSICAL EXAM: Awake & Alert  GENERAL: NAD  HEAD:  Normocephalic  CHEST/LUNG: Clear   HEART: S1S2+  ABDOMEN: Soft, Nontender  EXTREMITIES:  no calf tenderness    NERVOUS SYSTEM:  Cranial Nerves 2-12 intact [   ] Abnormal  [   ]  ROM: WFL all extremities [   ]  Abnormal [ x  ]limited LLE  Motor Strength: WFL all extremities  [   ]  Abnormal [ x  ]limited LLE  Sensation: intact to light touch [ x  ] Abnormal [   ]    FUNCTIONAL STATUS:  Bed Mobility: Independent [   ]  Supervision [   ]  Needs Assistance [x   ]  N/A [   ]  Transfers: Independent [   ]  Supervision [   ]  Needs Assistance [x   ]  N/A [   ]   Ambulation: Independent [   ]  Supervision [   ]  Needs Assistance [ x  ]  N/A [   ]  ADL: Independent [   ] Requires Assistance [   ] N/A [   ]      LABS:                        10.8   14.38 )-----------( 235      ( 06 Jul 2021 19:57 )             33.8     07-06    137  |  103  |  24<H>  ----------------------------<  182<H>  4.9   |  24  |  1.2    Ca    9.0      06 Jul 2021 19:57  Phos  3.9     07-06  Mg     2.0     07-06      PT/INR - ( 06 Jul 2021 19:57 )   PT: 12.90 sec;   INR: 1.12 ratio         PTT - ( 06 Jul 2021 19:57 )  PTT:27.8 sec      RADIOLOGY & ADDITIONAL STUDIES:

## 2021-07-07 NOTE — CONSULT NOTE ADULT - CONSULT REASON
preop evaluation
s/p mechanical trip and fall, -HT, -LOC, +Coumadin
Left hip fracture
Medical co-management
left hip fx

## 2021-07-07 NOTE — PROGRESS NOTE ADULT - ASSESSMENT
ASSESSMENT:  87M w/ left intertrochanteric femoral fracture.  Patient underwent intramedullary pinning by ortho on 7/6.     PLAN:   - D/C pizano and do trial void  - F/U Cardio   - PT/Rehab  - SW

## 2021-07-07 NOTE — PHYSICAL THERAPY INITIAL EVALUATION ADULT - RANGE OF MOTION EXAMINATION, REHAB EVAL
L Hip ~1/4 AROM limited due to pain/bilateral upper extremity ROM was WFL (within functional limits)/Right LE ROM was WFL (within functional limits)/deficits as listed below

## 2021-07-08 ENCOUNTER — TRANSCRIPTION ENCOUNTER (OUTPATIENT)
Age: 86
End: 2021-07-08

## 2021-07-08 VITALS
DIASTOLIC BLOOD PRESSURE: 74 MMHG | SYSTOLIC BLOOD PRESSURE: 159 MMHG | TEMPERATURE: 98 F | HEART RATE: 78 BPM | RESPIRATION RATE: 18 BRPM

## 2021-07-08 LAB
APTT BLD: 26.1 SEC — LOW (ref 27–39.2)
GLUCOSE BLDC GLUCOMTR-MCNC: 122 MG/DL — HIGH (ref 70–99)
GLUCOSE BLDC GLUCOMTR-MCNC: 128 MG/DL — HIGH (ref 70–99)
GLUCOSE BLDC GLUCOMTR-MCNC: 148 MG/DL — HIGH (ref 70–99)
GLUCOSE BLDC GLUCOMTR-MCNC: 156 MG/DL — HIGH (ref 70–99)
GLUCOSE BLDC GLUCOMTR-MCNC: 184 MG/DL — HIGH (ref 70–99)
INR BLD: 1.05 RATIO — SIGNIFICANT CHANGE UP (ref 0.65–1.3)
PROTHROM AB SERPL-ACNC: 12.1 SEC — SIGNIFICANT CHANGE UP (ref 9.95–12.87)

## 2021-07-08 PROCEDURE — 99232 SBSQ HOSP IP/OBS MODERATE 35: CPT

## 2021-07-08 PROCEDURE — 71045 X-RAY EXAM CHEST 1 VIEW: CPT | Mod: 26

## 2021-07-08 RX ORDER — WARFARIN SODIUM 2.5 MG/1
4 TABLET ORAL
Refills: 0 | Status: DISCONTINUED | OUTPATIENT
Start: 2021-07-08 | End: 2021-07-08

## 2021-07-08 RX ORDER — ACETAMINOPHEN 500 MG
2 TABLET ORAL
Qty: 0 | Refills: 0 | DISCHARGE
Start: 2021-07-08

## 2021-07-08 RX ORDER — WARFARIN SODIUM 2.5 MG/1
4 TABLET ORAL ONCE
Refills: 0 | Status: DISCONTINUED | OUTPATIENT
Start: 2021-07-08 | End: 2021-07-08

## 2021-07-08 RX ORDER — WARFARIN SODIUM 2.5 MG/1
6 TABLET ORAL
Refills: 0 | Status: DISCONTINUED | OUTPATIENT
Start: 2021-07-08 | End: 2021-07-08

## 2021-07-08 RX ADMIN — POLYETHYLENE GLYCOL 3350 17 GRAM(S): 17 POWDER, FOR SOLUTION ORAL at 12:22

## 2021-07-08 RX ADMIN — Medication 81 MILLIGRAM(S): at 12:17

## 2021-07-08 RX ADMIN — Medication 650 MILLIGRAM(S): at 12:17

## 2021-07-08 RX ADMIN — ZOLPIDEM TARTRATE 5 MILLIGRAM(S): 10 TABLET ORAL at 00:49

## 2021-07-08 RX ADMIN — Medication 50 MILLIGRAM(S): at 17:38

## 2021-07-08 RX ADMIN — HEPARIN SODIUM 5000 UNIT(S): 5000 INJECTION INTRAVENOUS; SUBCUTANEOUS at 05:18

## 2021-07-08 RX ADMIN — Medication 650 MILLIGRAM(S): at 17:38

## 2021-07-08 RX ADMIN — PANTOPRAZOLE SODIUM 40 MILLIGRAM(S): 20 TABLET, DELAYED RELEASE ORAL at 05:18

## 2021-07-08 RX ADMIN — HEPARIN SODIUM 5000 UNIT(S): 5000 INJECTION INTRAVENOUS; SUBCUTANEOUS at 13:06

## 2021-07-08 RX ADMIN — Medication 650 MILLIGRAM(S): at 17:43

## 2021-07-08 RX ADMIN — FINASTERIDE 5 MILLIGRAM(S): 5 TABLET, FILM COATED ORAL at 12:17

## 2021-07-08 RX ADMIN — Medication 650 MILLIGRAM(S): at 00:19

## 2021-07-08 RX ADMIN — Medication 50 MILLIGRAM(S): at 05:18

## 2021-07-08 RX ADMIN — LOSARTAN POTASSIUM 100 MILLIGRAM(S): 100 TABLET, FILM COATED ORAL at 05:18

## 2021-07-08 RX ADMIN — Medication 2: at 12:17

## 2021-07-08 RX ADMIN — Medication 650 MILLIGRAM(S): at 05:18

## 2021-07-08 RX ADMIN — Medication 2: at 00:49

## 2021-07-08 NOTE — PROGRESS NOTE ADULT - ASSESSMENT
ASSESSMENT:  87y Male w Left intertrochanteric fracture, S/P Fracture reduction.     PLAN:   - FU with cardio  - Continue PT   -DVT Profilaxis  -DC    Lines/Tubes: PIV, Midline, Central Line, A-Line, Chest tubes, Ramón/EZEQUIEL drains, Drew Catheter.    TRAUMA SPECTRA: [id number]

## 2021-07-08 NOTE — DISCHARGE NOTE NURSING/CASE MANAGEMENT/SOCIAL WORK - NSDCVIVACCINE_GEN_ALL_CORE_FT
Tdap; 03-Jul-2021 15:00; Montserrat Kimble (CELIA); [employer]; [id number] (Exp. Date: 18-Nov-2022); IntraMuscular; Deltoid Left.; 0.5 milliLiter(s); VIS (VIS Published: 09-May-2013, VIS Presented: 03-Jul-2021);

## 2021-07-08 NOTE — DISCHARGE NOTE PROVIDER - CARE PROVIDER_API CALL
Shaw Miranda (DO)  [facility]  [address] [place] [address]  Phone: [phone]  Fax: [phone]  Follow Up Time:

## 2021-07-08 NOTE — PROGRESS NOTE ADULT - PROVIDER SPECIALTY LIST ADULT
Orthopedics
Trauma Surgery
Cardiology
Trauma Surgery

## 2021-07-08 NOTE — DISCHARGE NOTE PROVIDER - NSDCCPCAREPLAN_GEN_ALL_CORE_FT
PRINCIPAL DISCHARGE DIAGNOSIS  Diagnosis: Intertrochanteric fracture, closed, left, initial encounter  Assessment and Plan of Treatment: Take Tylenol around the clock every 6 h for 5 days, afterwords as needed. Continue PT. Continue coumadin at home. Consult Dr. Moyer for LBB. FU in two weeks with trauma clinic.      SECONDARY DISCHARGE DIAGNOSES  Diagnosis: On continuous oral anticoagulation  Assessment and Plan of Treatment:

## 2021-07-08 NOTE — DISCHARGE NOTE PROVIDER - NSFOLLOWUPCLINICS_GEN_ALL_ED_FT
[facility]  Trauma Surgery  [address] [place] [address]  Phone: [phone]  Fax:   Follow Up Time: 2 weeks

## 2021-07-08 NOTE — DISCHARGE NOTE NURSING/CASE MANAGEMENT/SOCIAL WORK - PATIENT PORTAL LINK FT
You can access the FollowMyHealth Patient Portal offered by [facility] by registering at the following website: [email or web address]. By joining [facility]’s FollowMyHealth portal, you will also be able to view your health information using other applications (apps) compatible with our system.

## 2021-07-08 NOTE — OCCUPATIONAL THERAPY INITIAL EVALUATION ADULT - ADDITIONAL COMMENTS
Pt. Ind. with ADL's PTA. No RW or SC indoors or outdoors. Pt reports going outdoors at least one time a day and has a shower chair and grab bars in the shower.

## 2021-07-08 NOTE — PROGRESS NOTE ADULT - ATTENDING COMMENTS
ACS Attending Note Attestation    Patient is examined and evaluated at the bedside with the residents/PAs. Treatment plan discussed with the team, nurses, and consulting physicians and consulting teams. Medications, radiological studies and all other relevant studies reviewed. I reviewed the resident/PA note and agreed with above assessment and plan with following additions and corrections.    DENITA PINO Patient is a 87y old  Male who presents with a chief complaint of left intertrochanteric fracture (05 Jul 2021 08:35). This Am patient was found at the bedside with severe dysmaturation to low 80. ABG performed on 100% FiO2 demonstrate PO2 152 with significant AA gradient. Patient CXR demonstrated fluid overload -> Lasix 40 mg was given and patient was placed on BiPAP with improvement in saturation, HR and BP     Allergies  IV Contrast (Hives)  Intolerances  contrast media (gadolinium-based) (Hives)    PAST MEDICAL & SURGICAL HISTORY:  Asthma with COPD  HLD (hyperlipidemia)  Essential hypertension  Aortic stenosis, severe  A-fib  DM (diabetes mellitus)  Pacemaker  BPH (benign prostatic hyperplasia)  S/P CABG x 3  History of CEA (carotid endarterectomy)  History of permanent cardiac pacemaker placement  H/O aortic valve replacement    Vital Signs Last 24 Hrs  T(C): 36.4 (04 Jul 2021 21:22), Max: 36.4 (04 Jul 2021 21:22)  T(F): 97.5 (04 Jul 2021 21:22), Max: 97.5 (04 Jul 2021 21:22)  HR: 68 (05 Jul 2021 07:15) (68 - 89)  BP: 164/74 (05 Jul 2021 00:50) (153/69 - 164/74)  BP(mean): --  RR: 18 (04 Jul 2021 21:22) (18 - 18)  SpO2: 98% (05 Jul 2021 07:15) (98% - 98%)                        11.6   23.89 )-----------( 337      ( 05 Jul 2021 07:16 )             37.1     07-05    136  |  99  |  20  ----------------------------<  219<H>  4.7   |  12<L>  |  1.1    Ca    9.1      05 Jul 2021 07:16  Phos  3.7     07-05  Mg     2.0     07-05    TPro  7.3  /  Alb  4.5  /  TBili  0.5  /  DBili  x   /  AST  28  /  ALT  24  /  AlkPhos  194<H>  07-03    ABG - ( 05 Jul 2021 09:54 )  pH, Arterial: 7.53  pH, Blood: x     /  pCO2: 25    /  pO2: 131   / HCO3: 21    / Base Excess: -0.9  /  SaO2: 100     on 4L NC    Diagnosis: Left hip fracture                  Volume overload                  R/O PE vs fat embolus    Plan:	  - SICU/SDU consult for management  - repeat labs and follow up electrolytes   - monitor diuresis   - supportive care  - GI/DVT prophylaxis  - pain management  - follow up consults  - CTA of the chest  - repeat studies as needed    Suzanne Laguerre MD, FACS  Trauma/ACS/Surcical Critical care Attending
events noted  need INR at about 1.5 for surgery
rehab  OOB  WBAT
87M w/ left intertrochanteric femoral fracture.  Plan is OR w/ Ortho pending medical optimization.      PLAN:   - As per Cardiology, Moderate risk patient for a  Moderate -risk surgery/procedure.  Recommending EP consult to investigate pacemaker.  As per EP team, device functioning properly.  - FU Ortho plan and OR date  - FU UOP s/p lasix  - FU 11am INR  - PM labs
s/p imn l it  wbat  rehab  pain control  dc planning  may resume coumadin - keep inr closer to 2 than 3
will need ORIF  INR to 1.5   currently 2.64
87M w/ left intertrochanteric femoral fracture.  Patient underwent intramedullary pinning by ortho on 7/6.     PLAN:   - D/C pizano and do trial void  - F/U Cardio   - PT/Rehab  - SW   - no lasix today   - dc to rehab
hip fracture   for OR once assessed by cardiology   pain control   hold coumadin   DVT prophylaxis
Patient is stable clinically.  Afebrile.  Pain is controlled.    Assessment:  88 y/o male, S/P Fall.  Closed Left Proximal Femur Fracture.  S/P ORIF.  Acute pain due to trauma.    PLAN:  - PT  - Incentive spirometer  - DVT prophylaxis  -  for discharge planning

## 2021-07-08 NOTE — DISCHARGE NOTE PROVIDER - NSDCMRMEDTOKEN_GEN_ALL_CORE_FT
acetaminophen 325 mg oral tablet: 2 tab(s) orally every 6 hours  Ambien 10 mg oral tablet: 1 tab(s) orally once a day (at bedtime)  amLODIPine 5 mg oral tablet: 1 tab(s) orally once a day  Aspirin Enteric Coated 81 mg oral delayed release tablet: 1 tab(s) orally once a day  Coumadin 4 mg oral tablet: 1 tab(s) orally once a day EVERY TUESDAY/THURSDAY/SATURDAY/SUNDAY  Coumadin 6 mg oral tablet: 1 tab(s) orally once a day EVERY MONDAY/WEDNESDAY/FRIDAY  Flomax 0.4 mg oral capsule: 1 cap(s) orally once a day  losartan 100 mg oral tablet: 1 tab(s) orally once a day  metFORMIN 500 mg oral tablet: 1 tab(s) orally 2 times a day  Metoprolol Tartrate 50 mg oral tablet: 1 tab(s) orally 2 times a day  Proscar 5 mg oral tablet: 1 tab(s) orally once a day  simvastatin 40 mg oral tablet: 1 tab(s) orally once a day (at bedtime)

## 2021-07-08 NOTE — PROGRESS NOTE ADULT - REASON FOR ADMISSION
left intertrochanteric fracture

## 2021-07-08 NOTE — DISCHARGE NOTE PROVIDER - HOSPITAL COURSE
87y M  was admitted on July 3, 2021 S/P trip and fall (pt was on coumadin). Extensive workout was done.     07/04/21 24 h:  Pt was stable, no pain or fever. Cardiology consult ordered. PT was NPO until 3 pm, then Orthopedic Surgeons decided to move repair for next day, so we putted him on regular diet until midnight, afterwards NPO. Ordered PT, INR, PTT. Closely following this PT with Ortho team. Around 10 pm PT started to complain on 10/10 pain in his left thigh, thorough inspection of both limbs where done, no abnormalities observed. PT was administered 5 mg oxycodone tablet per os, which cased gradual relieve of  his pain.  WBC count on 07/03/21 was 14, on 07/04/21 it was 20, AM CBC ordered.    7/6: OR by ortho for intramedullary nailing of left femur fracture. Ortho recs: OT/PT. Restart DVT ppx. WBAT LLE. ABX 24 hrs. Pain control. Tele monitoring post. op.     7/7: D/C'ed jerica at 11:30am. Trial void pending. PT seen. Ambulating and passing gas. No BM. Tolerating DASH diet. Awating Cardio recs.     PT is going to rehab, restarted on Coumadin.

## 2021-07-08 NOTE — PROGRESS NOTE ADULT - SUBJECTIVE AND OBJECTIVE BOX
GENERAL SURGERY PROGRESS NOTE    Patient: DENITA PINO , 87y (34)Male   MRN: [id number]  Location: [address]  Visit: 21 Inpatient  Date: 21 @ 06:12        Admitted :21 (1d)  LOS: 1d    Procedure/Dx/Injuries:    Events of past 24 hours: Patient seen and examined at bedside. No acute events overnight. Afebrile, VSS.    PAST MEDICAL & SURGICAL HISTORY:  Asthma with COPD    HLD (hyperlipidemia)    Essential hypertension    Aortic stenosis, severe    A-fib    DM (diabetes mellitus)    Pacemaker    BPH (benign prostatic hyperplasia)    S/P CABG x 3    History of CEA (carotid endarterectomy)    History of permanent cardiac pacemaker placement    H/O aortic valve replacement        Vitals:   T(F): 97.6 (21 @ 05:21), Max: 98 (21 @ 14:41)  HR: 92 (21 @ 05:21)  BP: 107/62 (21 @ 05:21)  RR: 18 (21 @ 05:21)  SpO2: 99% (21 @ 19:29)      Diet, NPO:   Except Medications      Fluids: lactated ringers.: Solution, 500 milliLiter(s) infuse at 250 mL/Hr  lactated ringers.: Solution, 1000 milliLiter(s) infuse at 125 mL/Hr      I & O's:      Bowel Movement: :   Flatus: :     PHYSICAL EXAM:  General Appearance: NAD  HEENT: EOMI, sclera non-icteric.  Heart: RRR   Lungs: No increased work of breathing or accessory muscle use. Symmetric chest wall rise and fall.   Abdomen:  Soft, nontender, nondistended.   MSK/Extremities: Warm & well-perfused.   Skin: Warm, dry. No jaundice.   Incision/wound: healing well, dressings in place, clean, dry and intact    MEDICATIONS  (STANDING):  acetaminophen   Tablet .. 650 milliGRAM(s) Oral every 6 hours  aspirin enteric coated 81 milliGRAM(s) Oral daily  chlorhexidine 4% Liquid 1 Application(s) Topical daily  dextrose 40% Gel 15 Gram(s) Oral once  dextrose 5%. 1000 milliLiter(s) (50 mL/Hr) IV Continuous <Continuous>  dextrose 5%. 1000 milliLiter(s) (100 mL/Hr) IV Continuous <Continuous>  dextrose 50% Injectable 25 Gram(s) IV Push once  dextrose 50% Injectable 12.5 Gram(s) IV Push once  dextrose 50% Injectable 25 Gram(s) IV Push once  finasteride 5 milliGRAM(s) Oral daily  glucagon  Injectable 1 milliGRAM(s) IntraMuscular once  insulin lispro (ADMELOG) corrective regimen sliding scale   SubCutaneous every 6 hours  lactated ringers. 1000 milliLiter(s) (125 mL/Hr) IV Continuous <Continuous>  lactated ringers. 500 milliLiter(s) (250 mL/Hr) IV Continuous <Continuous>  losartan 100 milliGRAM(s) Oral daily  metoprolol tartrate 50 milliGRAM(s) Oral two times a day  pantoprazole    Tablet 40 milliGRAM(s) Oral before breakfast  simvastatin 40 milliGRAM(s) Oral at bedtime  tamsulosin 0.4 milliGRAM(s) Oral at bedtime    MEDICATIONS  (PRN):  oxyCODONE    IR 5 milliGRAM(s) Oral every 6 hours PRN Severe Pain (7 - 10)  zolpidem 5 milliGRAM(s) Oral at bedtime PRN Insomnia  zolpidem 5 milliGRAM(s) Oral at bedtime PRN Insomnia      DVT PROPHYLAXIS:   GI PROPHYLAXIS: pantoprazole    Tablet 40 milliGRAM(s) Oral before breakfast    ANTICOAGULATION:   ANTIBIOTICS:            LAB/STUDIES:  Labs:  CAPILLARY BLOOD GLUCOSE      POCT Blood Glucose.: 233 mg/dL (2021 02:03)  POCT Blood Glucose.: 203 mg/dL (2021 18:34)  POCT Blood Glucose.: 167 mg/dL (2021 14:43)                          12.4   14.05 )-----------( 312      ( 2021 21:10 )             38.6       Auto Neutrophil %: 91.6 % (21 @ 21:10)  Auto Immature Granulocyte %: 1.1 % (21 @ 21:10)  Auto Neutrophil %: 58.3 % (21 @ 14:54)  Auto Immature Granulocyte %: 0.6 % (21 @ 14:54)        137  |  102  |  17  ----------------------------<  199<H>  4.3   |  20  |  1.0      Calcium, Total Serum: 10.0 mg/dL (21 @ 21:10)      LFTs:             7.3  | 0.5  | 28       ------------------[194     ( 2021 14:54 )  4.5  | x    | 24          Lipase:32     Amylase:x         Lactate, Blood: 3.4 mmol/L (21 @ 21:10)  Lactate, Blood: 4.1 mmol/L (21 @ 14:54)      Coags:     34.20  ----< 2.97    ( 2021 21:10 )     45.0              Alcohol, Blood: <10 mg/dL (21 @ 14:54)    Urinalysis Basic - ( 2021 16:29 )    Color: Colorless / Appearance: Clear / S.022 / pH: x  Gluc: x / Ketone: Negative  / Bili: Negative / Urobili: <2 mg/dL   Blood: x / Protein: Trace / Nitrite: Negative   Leuk Esterase: Negative / RBC: 13 /HPF / WBC 0 /HPF   Sq Epi: x / Non Sq Epi: 0 /HPF / Bacteria: Negative            Alcohol, Blood: <10 mg/dL (21 @ 14:54)      IMAGING:      ACCESS/ DEVICES:  [ ] Peripheral IV  [ ] Central Venous Line	[ ] R	[ ] L	[ ] IJ	[ ] Fem	[ ] SC	Placed:   [ ] Arterial Line		[ ] R	[ ] L	[ ] Fem	[ ] Rad	[ ] Ax	Placed:   [ ] PICC:					[ ] Mediport  [ ] Urinary Catheter,  Date Placed:   [ ] Chest tube: [ ] Right, [ ] Left  [ ] EZEQUIEL/Ramón Drains    
TRAUMA SURGERY PROGRESS NOTE    Patient: DENITA PINO , 87y ([birth year])Male   MRN: [id number]  Location: [address]  Visit: 07-03-21 Inpatient  Date: 07-07-21 @ 09:25    Hospital Day #: 5  Post-Op Day #: 1    Procedure/Dx/Injuries: left closed intertrochanteric fracture    Events of past 24 hours: Patient was taken to OR for intramedullary nailing of left fx.     PAST MEDICAL & SURGICAL HISTORY:  Asthma with COPD    HLD (hyperlipidemia)    Essential hypertension    Aortic stenosis, severe    A-fib    DM (diabetes mellitus)    Pacemaker    BPH (benign prostatic hyperplasia)    S/P CABG x 3    History of CEA (carotid endarterectomy)    History of permanent cardiac pacemaker placement    H/O aortic valve replacement        Vitals:   T(F): 96.9 (07-07-21 @ 05:08), Max: 99 (07-06-21 @ 15:38)  HR: 80 (07-07-21 @ 05:08)  BP: 148/69 (07-07-21 @ 05:08)  RR: 16 (07-07-21 @ 05:08)  SpO2: 97% (07-07-21 @ 05:08)      Diet, DASH/TLC:   Sodium & Cholesterol Restricted      Fluids:     I & O's:    07-06-21 @ 07:01  -  07-07-21 @ 07:00  --------------------------------------------------------  IN:  Total IN: 0 mL    OUT:    Indwelling Catheter - Urethral (mL): 1850 mL  Total OUT: 1850 mL    Total NET: -1850 mL        PHYSICAL EXAM:  General: NAD  HEENT: Normocephalic, atraumatic, EOMI, PEERLA. no scalp lacerations   Neck: Soft, midline trachea. no c-spine tenderness  Chest: No chest wall tenderness, no subcutaneous emphysema   Cardiac: S1, S2, RRR  Respiratory: Bilateral breath sounds, clear and equal bilaterally  Abdomen: Soft, non-distended, non-tender, no rebound, no guarding.  Groin: Normal appearing, pelvis stable   Ext:  Moving b/l upper and lower extremities. Palpable Radial b/l UE, b/l DP palpable in LE. Appropriate post op LLE tenderness.   Back: No T/L/S spine tenderness, No palpable runoff/stepoff/deformity    MEDICATIONS  (STANDING):  acetaminophen   Tablet .. 650 milliGRAM(s) Oral every 6 hours  aspirin enteric coated 81 milliGRAM(s) Oral daily  ceFAZolin   IVPB 2000 milliGRAM(s) IV Intermittent every 8 hours  dextrose 40% Gel 15 Gram(s) Oral once  dextrose 50% Injectable 25 Gram(s) IV Push once  dextrose 50% Injectable 12.5 Gram(s) IV Push once  dextrose 50% Injectable 25 Gram(s) IV Push once  finasteride 5 milliGRAM(s) Oral daily  glucagon  Injectable 1 milliGRAM(s) IntraMuscular once  heparin   Injectable 5000 Unit(s) SubCutaneous every 8 hours  insulin lispro (ADMELOG) corrective regimen sliding scale   SubCutaneous every 6 hours  losartan 100 milliGRAM(s) Oral daily  metoprolol tartrate 50 milliGRAM(s) Oral two times a day  pantoprazole    Tablet 40 milliGRAM(s) Oral before breakfast  simvastatin 40 milliGRAM(s) Oral at bedtime  tamsulosin 0.4 milliGRAM(s) Oral at bedtime    MEDICATIONS  (PRN):  oxyCODONE    IR 5 milliGRAM(s) Oral every 6 hours PRN Severe Pain (7 - 10)  zolpidem 5 milliGRAM(s) Oral at bedtime PRN Insomnia      DVT PROPHYLAXIS: SCDs, heparin   Injectable 5000 Unit(s) SubCutaneous every 8 hours    GI PROPHYLAXIS: pantoprazole    Tablet 40 milliGRAM(s) Oral before breakfast    ANTICOAGULATION:   ANTIBIOTICS: ceFAZolin   IVPB 2000 milliGRAM(s)            LAB/STUDIES:  Labs:  CAPILLARY BLOOD GLUCOSE      POCT Blood Glucose.: 202 mg/dL (07 Jul 2021 07:41)  POCT Blood Glucose.: 206 mg/dL (07 Jul 2021 06:02)  POCT Blood Glucose.: 262 mg/dL (07 Jul 2021 00:13)  POCT Blood Glucose.: 118 mg/dL (06 Jul 2021 11:16)                          10.8   14.38 )-----------( 235      ( 06 Jul 2021 19:57 )             33.8       Auto Neutrophil %: 90.7 % (07-06-21 @ 19:57)  Auto Immature Granulocyte %: 0.8 % (07-06-21 @ 19:57)    07-06    137  |  103  |  24<H>  ----------------------------<  182<H>  4.9   |  24  |  1.2      Calcium, Total Serum: 9.0 mg/dL (07-06-21 @ 19:57)      LFTs:     Blood Gas Arterial, Lactate: 0.9 mmoL/L (07-06-21 @ 11:40)  Blood Gas Arterial, Lactate: 4.6 mmoL/L (07-05-21 @ 09:54)  Lactate, Blood: 7.7 mmol/L (07-05-21 @ 07:16)  Blood Gas Arterial, Lactate: 8.5 mmoL/L (07-05-21 @ 07:13)    ABG - ( 06 Jul 2021 11:40 )  pH: 7.49  /  pCO2: 35    /  pO2: 64    / HCO3: 26    / Base Excess: 3.2   /  SaO2: 95              ABG - ( 05 Jul 2021 09:54 )  pH: 7.53  /  pCO2: 25    /  pO2: 131   / HCO3: 21    / Base Excess: -0.9  /  SaO2: 100             ABG - ( 05 Jul 2021 07:13 )  pH: 7.32  /  pCO2: 32    /  pO2: 152   / HCO3: 16    / Base Excess: -9.0  /  SaO2: 99                Coags:     12.90  ----< 1.12    ( 06 Jul 2021 19:57 )     27.8        CARDIAC MARKERS ( 06 Jul 2021 11:11 )  x     / 0.04 ng/mL / x     / x     / x      CARDIAC MARKERS ( 06 Jul 2021 00:42 )  x     / 0.05 ng/mL / x     / x     / x      CARDIAC MARKERS ( 05 Jul 2021 16:56 )  x     / 0.06 ng/mL / x     / x     / x          Serum Pro-Brain Natriuretic Peptide: 2287 pg/mL (07-05-21 @ 07:16)    ACCESS DEVICES:  [x ] Peripheral IV  [x ] Urinary Catheter      ASSESSMENT:  87M w/ left intertrochanteric femoral fracture.  Patient underwent intramedullary pinning by ortho on 7/6.     PLAN:   - D/C pizano and do trial void  - F/U Cardio   - PT/Rehab  - SW       Lines/Tubes: PIV, Pizano Catheter.    TRAUMA SPECTRA: [id number]
TRAUMA SURGERY PROGRESS NOTE    Patient: DENITA PINO , 87y (34)Male   MRN: [id number]  Location: [address]  Visit: 21 Inpatient  Date: 21 @ 13:01    Hospital Day #: 4  Post-Op Day #: 3    Procedure/Dx/Injuries: Left Intertrochanteric Femoral Fracture    Events of past 24 hours:  `Patient had a RRT yesterday morning 2/2 desat to 60s, suspected fluid overload.  Given lasix and placed on Bipap.  INR increasing, repeat pending this afternoon.  Ortho saw and plan to take to OR when medically optimized.   PAST MEDICAL & SURGICAL HISTORY:  Asthma with COPD    HLD (hyperlipidemia)    Essential hypertension    Aortic stenosis, severe    A-fib    DM (diabetes mellitus)    Pacemaker    BPH (benign prostatic hyperplasia)    S/P CABG x 3    History of CEA (carotid endarterectomy)    History of permanent cardiac pacemaker placement    H/O aortic valve replacement        Vitals:   T(F): 97.3 (21 @ 09:00), Max: 98.1 (21 @ 14:10)  HR: 84 (21 @ 09:00)  BP: 145/64 (21 @ 09:00)  RR: 20 (21 @ 09:00)  SpO2: 95% (21 @ 11:25)      Diet, NPO:   Except Medications      Fluids:     I & O's:    21 @ 07:01  -  21 @ 07:00  --------------------------------------------------------  IN:    IV PiggyBack: 100 mL    Lactated Ringers: 300 mL  Total IN: 400 mL    OUT:    Indwelling Catheter - Urethral (mL): 2400 mL  Total OUT: 2400 mL    Total NET: -2000 mL      PHYSICAL EXAM:`  General: NAD  Chest: No chest wall tenderness, no subcutaneous emphysema   Cardiac: S1, S2, RRR  Respiratory: Unlabored breathing at rest  Abdomen: Soft, non-distended, non-tender, no rebound, no guarding.      MEDICATIONS  (STANDING):  acetaminophen   Tablet .. 650 milliGRAM(s) Oral every 6 hours  aspirin enteric coated 81 milliGRAM(s) Oral daily  chlorhexidine 4% Liquid 1 Application(s) Topical daily  dextrose 40% Gel 15 Gram(s) Oral once  dextrose 5%. 1000 milliLiter(s) (50 mL/Hr) IV Continuous <Continuous>  dextrose 5%. 1000 milliLiter(s) (100 mL/Hr) IV Continuous <Continuous>  dextrose 50% Injectable 25 Gram(s) IV Push once  dextrose 50% Injectable 12.5 Gram(s) IV Push once  dextrose 50% Injectable 25 Gram(s) IV Push once  finasteride 5 milliGRAM(s) Oral daily  glucagon  Injectable 1 milliGRAM(s) IntraMuscular once  insulin lispro (ADMELOG) corrective regimen sliding scale   SubCutaneous every 6 hours  lactated ringers. 1000 milliLiter(s) (25 mL/Hr) IV Continuous <Continuous>  losartan 100 milliGRAM(s) Oral daily  metoprolol tartrate 50 milliGRAM(s) Oral two times a day  pantoprazole    Tablet 40 milliGRAM(s) Oral before breakfast  simvastatin 40 milliGRAM(s) Oral at bedtime  tamsulosin 0.4 milliGRAM(s) Oral at bedtime    MEDICATIONS  (PRN):  oxyCODONE    IR 5 milliGRAM(s) Oral every 6 hours PRN Severe Pain (7 - 10)  zolpidem 5 milliGRAM(s) Oral at bedtime PRN Insomnia  zolpidem 5 milliGRAM(s) Oral at bedtime PRN Insomnia      DVT PROPHYLAXIS: SCDs,   GI PROPHYLAXIS: pantoprazole    Tablet 40 milliGRAM(s) Oral before breakfast    ANTICOAGULATION:   ANTIBIOTICS:           LAB/STUDIES:  Labs:  CAPILLARY BLOOD GLUCOSE      POCT Blood Glucose.: 118 mg/dL (2021 11:16)  POCT Blood Glucose.: 128 mg/dL (2021 05:02)  POCT Blood Glucose.: 138 mg/dL (2021 00:08)  POCT Blood Glucose.: 143 mg/dL (2021 18:23)  POCT Blood Glucose.: 196 mg/dL (2021 14:04)                          10.6   17.08 )-----------( 254      ( 2021 21:22 )             32.0       Auto Immature Granulocyte %: 0.9 % (21 @ 21:22)  Auto Neutrophil %: 75.2 % (21 @ 21:22)  Auto Immature Granulocyte %: 1.1 % (21 @ 16:56)  Auto Neutrophil %: 80.4 % (21 @ 16:56)        138  |  102  |  20  ----------------------------<  121<H>  4.0   |  25  |  1.0      Calcium, Total Serum: 9.1 mg/dL (21 @ 21:22)      LFTs:     Blood Gas Arterial, Lactate: 0.9 mmoL/L (21 @ 11:40)  Blood Gas Arterial, Lactate: 4.6 mmoL/L (21 @ 09:54)  Lactate, Blood: 7.7 mmol/L (21 @ 07:16)  Blood Gas Arterial, Lactate: 8.5 mmoL/L (21 @ 07:13)  Lactate, Blood: 2.6 mmol/L (21 @ 05:36)  Lactate, Blood: 3.4 mmol/L (21 @ 21:10)  Lactate, Blood: 4.1 mmol/L (21 @ 14:54)    ABG - ( 2021 11:40 )  pH: 7.49  /  pCO2: 35    /  pO2: 64    / HCO3: 26    / Base Excess: 3.2   /  SaO2: 95              ABG - ( 2021 09:54 )  pH: 7.53  /  pCO2: 25    /  pO2: 131   / HCO3: 21    / Base Excess: -0.9  /  SaO2: 100             ABG - ( 2021 07:13 )  pH: 7.32  /  pCO2: 32    /  pO2: 152   / HCO3: 16    / Base Excess: -9.0  /  SaO2: 99                Coags:     30.60  ----< 2.66    ( 2021 23:13 )     33.6        CARDIAC MARKERS ( 2021 00:42 )  x     / 0.05 ng/mL / x     / x     / x      CARDIAC MARKERS ( 2021 16:56 )  x     / 0.06 ng/mL / x     / x     / x      CARDIAC MARKERS ( 2021 07:16 )  x     / 0.02 ng/mL / 416 U/L / x     / 4.1 ng/mL      Serum Pro-Brain Natriuretic Peptide: 2287 pg/mL (21 @ 07:16)      Urinalysis Basic - ( 2021 02:58 )    Color: Light Yellow / Appearance: Clear / S.015 / pH: x  Gluc: x / Ketone: Negative  / Bili: Negative / Urobili: <2 mg/dL   Blood: x / Protein: 30 mg/dL / Nitrite: Negative   Leuk Esterase: Negative / RBC: 22 /HPF / WBC 3 /HPF   Sq Epi: x / Non Sq Epi: 1 /HPF / Bacteria: Negative      ASSESSMENT:  87M w/ left intertrochanteric femoral fracture.  Plan is OR w/ Ortho pending medical optimization.      PLAN:   - As per Cardiology, Moderate risk patient for a  Moderate -risk surgery/procedure.  Recommending EP consult to investigate pacemaker.  As per EP team, device functioning properly.  - FU Ortho plan and OR date  - FU UOP s/p lasix  - FU 11am INR  - PM labs    TRAUMA SPECTRA: [id number]
TRAUMA SURGERY PROGRESS NOTE    Patient: DENITA PINO , 87y (34)Male   MRN: [id number]  Location: [address]  Visit: 21 Inpatient  Date: 21 @ 02:08    Hospital Day #:3  Post-Op Day #:2    Procedure/Dx/Injuries: Left Intertrochanteric Femoral Fracture    Events of past 24 hours: Pt was stable, no pain or fever. Cardiology consult ordered. PT was NPO until 3 pm, then Orthopedic Surgeons decided to move repair for next day, so we putted him on regular diet until midnight, afterwards NPO. Ordered PT, INR, PTT. Closely following this PT with Ortho team. Around 10 pm PT started to complain on 10/10 pain in his left thigh, thorough inspection of both limbs where done, no abnormalities observed. PT was administered 5 mg oxycodone tablet per os, which cased gradual relieve of  his pain.  WBC count on 21 was 14, on 21 it was 20, AM CBC ordered.    PAST MEDICAL & SURGICAL HISTORY:  Asthma with COPD    HLD (hyperlipidemia)    Essential hypertension    Aortic stenosis, severe    A-fib    DM (diabetes mellitus)    Pacemaker    BPH (benign prostatic hyperplasia)    S/P CABG x 3    History of CEA (carotid endarterectomy)    History of permanent cardiac pacemaker placement    H/O aortic valve replacement        Vitals:   T(F): 97.5 (21 @ 21:22), Max: 97.6 (21 @ 05:21)  HR: 84 (21 @ 00:50)  BP: 164/74 (21 @ 00:50)  RR: 18 (21 @ 21:22)  SpO2: --      Diet, NPO after Midnight:      NPO Start Date: 2021,   NPO Start Time: 23:59  Diet, DASH/TLC:   Sodium & Cholesterol Restricted      Fluids:     I & O's:    21 @ 07:01  -  21 @ 07:00  --------------------------------------------------------  IN:  Total IN: 0 mL    OUT:    Indwelling Catheter - Urethral (mL): 800 mL    Voided (mL): 500 mL  Total OUT: 1300 mL    Total NET: -1300 mL        Bowel Movement: : [x] YES [] NO  Flatus: : [x] YES [] NO    PHYSICAL EXAM:  General: NAD  HEENT: Normocephalic, atraumatic, EOMI,  no scalp lacerations   Neck: Soft, midline trachea. no c-spine tenderness  Chest: Equal movements of chest wall.   Cardiac: S1, S2, RRR  Respiratory: Bilateral breath sounds, clear and equal bilaterally  Abdomen: Soft, non-distended, non-tender, no rebound, no guarding.  Groin: Normal appearing, pelvis stable   Ext:  Moving b/l upper and right lower extremities. ROM wasn't able to check, because of femoral fracture. Palpable Radial b/l UE, b/l DP palpable in LE.   Back: No T/L/S spine tenderness, No palpable runoff/stepoff/deformity      MEDICATIONS  (STANDING):  acetaminophen   Tablet .. 650 milliGRAM(s) Oral every 6 hours  aspirin enteric coated 81 milliGRAM(s) Oral daily  chlorhexidine 4% Liquid 1 Application(s) Topical daily  dextrose 40% Gel 15 Gram(s) Oral once  dextrose 5%. 1000 milliLiter(s) (50 mL/Hr) IV Continuous <Continuous>  dextrose 5%. 1000 milliLiter(s) (100 mL/Hr) IV Continuous <Continuous>  dextrose 50% Injectable 25 Gram(s) IV Push once  dextrose 50% Injectable 12.5 Gram(s) IV Push once  dextrose 50% Injectable 25 Gram(s) IV Push once  finasteride 5 milliGRAM(s) Oral daily  glucagon  Injectable 1 milliGRAM(s) IntraMuscular once  insulin lispro (ADMELOG) corrective regimen sliding scale   SubCutaneous every 6 hours  lactated ringers. 1000 milliLiter(s) (125 mL/Hr) IV Continuous <Continuous>  lactated ringers. 500 milliLiter(s) (250 mL/Hr) IV Continuous <Continuous>  losartan 100 milliGRAM(s) Oral daily  metoprolol tartrate 50 milliGRAM(s) Oral two times a day  pantoprazole    Tablet 40 milliGRAM(s) Oral before breakfast  simvastatin 40 milliGRAM(s) Oral at bedtime  tamsulosin 0.4 milliGRAM(s) Oral at bedtime    MEDICATIONS  (PRN):  oxyCODONE    IR 5 milliGRAM(s) Oral every 6 hours PRN Severe Pain (7 - 10)  zolpidem 5 milliGRAM(s) Oral at bedtime PRN Insomnia  zolpidem 5 milliGRAM(s) Oral at bedtime PRN Insomnia      DVT PROPHYLAXIS: SCDs,   GI PROPHYLAXIS: pantoprazole    Tablet 40 milliGRAM(s) Oral before breakfast    LAB/STUDIES:  Labs:  CAPILLARY BLOOD GLUCOSE      POCT Blood Glucose.: 143 mg/dL (2021 00:23)  POCT Blood Glucose.: 138 mg/dL (2021 21:08)  POCT Blood Glucose.: 160 mg/dL (2021 16:38)  POCT Blood Glucose.: 200 mg/dL (2021 11:39)  POCT Blood Glucose.: 199 mg/dL (2021 06:26)                          10.7   20.33 )-----------( 304      ( 2021 20:00 )             33.3       Auto Immature Granulocyte %: 0.8 % (21 @ 20:00)  Auto Neutrophil %: 80.5 % (21 @ 20:00)        136  |  102  |  22<H>  ----------------------------<  132<H>  4.4   |  22  |  1.2      Calcium, Total Serum: 9.1 mg/dL (21 @ 20:00)      LFTs:             7.3  | 0.5  | 28       ------------------[194     ( 2021 14:54 )  4.5  | x    | 24          Lipase:32     Amylase:x         Lactate, Blood: 2.6 mmol/L (21 @ 05:36)  Lactate, Blood: 3.4 mmol/L (21 @ 21:10)  Lactate, Blood: 4.1 mmol/L (21 @ 14:54)      Coags:     30.40  ----< 2.64    ( 2021 05:36 )     38.6        Urinalysis Basic - ( 2021 16:29 )    Color: Colorless / Appearance: Clear / S.022 / pH: x  Gluc: x / Ketone: Negative  / Bili: Negative / Urobili: <2 mg/dL   Blood: x / Protein: Trace / Nitrite: Negative   Leuk Esterase: Negative / RBC: 13 /HPF / WBC 0 /HPF   Sq Epi: x / Non Sq Epi: 0 /HPF / Bacteria: Negative          IMAGING:< from: Xray Femur 2 Views, Left (21 @ 15:00) >        ACCESS DEVICES:  [x] Peripheral IV  [ ] Central Venous Line	[ ] R	[ ] L	[ ] IJ	[ ] Fem	[ ] SC	Placed:   [ ] Arterial Line		[ ] R	[ ] L	[ ] Fem	[ ] Rad	[ ] Ax	Placed:   [ ] PICC:					[ ] Mediport  [ ] Urinary Catheter,  Date Placed:   [ ] Chest tube: [ ] Right, [ ] Left  [ ] EZEQUIEL/Ramón Drains    ASSESSMENT:  Pt was stable, no pain or fever. Cardiology consult ordered. PT was NPO until 3 pm, then Orthopedic Surgeons decided to move repair for next day, so we putted him on regular diet until midnight, afterwards NPO. Ordered PT, INR, PTT. Closely following this PT with Ortho team. Around 10 pm PT started to complain on 10/10 pain in his left thigh, thorough inspection of both limbs where done, no abnormalities observed. PT was administered 5 mg oxycodone tablet per os, which cased gradual relieve of  his pain.  WBC count on 21 was 14, on 21 it was 20, AM CBC ordered.    PLAN:   - Orthopedic Fracture Repair  -GET AM CBC  -GET AM PT, INR, PTT.  -NPO after midnight  -Correct INR, if needed      Lines/Tubes: PIV, Midline, Central Line, A-Line, Chest tubes, Ramón/EZEQUIEL drains, Drew Catheter.    TRAUMA SPECTRA: [id number]
`TRAUMA SURGERY PROGRESS NOTE    Patient: DENITA PINO , 87y ([birth year])Male   MRN: [id number]  Location: [address]   Visit: 07-03-21 Inpatient  Date: 07-08-21 @ 13:11    Hospital Day #:6  Post-Op Day #:5    Dx: Left intertrochanteric fracture     Events of past 24 hours: No acute events.     PAST MEDICAL & SURGICAL HISTORY:  Asthma with COPD    HLD (hyperlipidemia)    Essential hypertension    Aortic stenosis, severe    A-fib    DM (diabetes mellitus)    Pacemaker    BPH (benign prostatic hyperplasia)    S/P CABG x 3    History of CEA (carotid endarterectomy)    History of permanent cardiac pacemaker placement    H/O aortic valve replacement        Vitals:   T(F): 98.4 (07-08-21 @ 13:07), Max: 98.4 (07-08-21 @ 13:07)  HR: 78 (07-08-21 @ 13:07)  BP: 159/74 (07-08-21 @ 13:07)  RR: 18 (07-08-21 @ 13:07)  SpO2: 98% (07-08-21 @ 13:00)      Diet, DASH/TLC:   Sodium & Cholesterol Restricted      Fluids:     I & O's:    07-07-21 @ 07:01  -  07-08-21 @ 07:00  --------------------------------------------------------  IN:    Oral Fluid: 630 mL  Total IN: 630 mL    OUT:    Indwelling Catheter - Urethral (mL): 425 mL    Voided (mL): 850 mL  Total OUT: 1275 mL    Total NET: -645 mL        Bowel Movement: : [x] YES [] NO  Flatus: : [x] YES [] NO    PHYSICAL EXAM:  General: NAD  HEENT: Normocephalic, atraumatic, EOMI. no scalp lacerations   Neck: Soft, midline trachea. no c-spine tenderness  Chest: Equal chest movement  Cardiac: S1, S2, RRR  Respiratory: Bilateral breath sounds, clear and equal bilaterally  Abdomen: Soft, non-distended, non-tender, no rebound, no guarding.  Groin: Normal appearing, pelvis stable   Ext:  Moving b/l upper and lower extremities. Palpable Radial b/l UE, b/l DP palpable in LE.   Back: No T/L/S spine tenderness, No palpable runoff/stepoff/deformity    MEDICATIONS  (STANDING):  acetaminophen   Tablet .. 650 milliGRAM(s) Oral every 6 hours  aspirin enteric coated 81 milliGRAM(s) Oral daily  dextrose 40% Gel 15 Gram(s) Oral once  dextrose 50% Injectable 25 Gram(s) IV Push once  dextrose 50% Injectable 12.5 Gram(s) IV Push once  dextrose 50% Injectable 25 Gram(s) IV Push once  finasteride 5 milliGRAM(s) Oral daily  glucagon  Injectable 1 milliGRAM(s) IntraMuscular once  heparin   Injectable 5000 Unit(s) SubCutaneous every 8 hours  insulin lispro (ADMELOG) corrective regimen sliding scale   SubCutaneous every 6 hours  losartan 100 milliGRAM(s) Oral daily  metoprolol tartrate 50 milliGRAM(s) Oral two times a day  pantoprazole    Tablet 40 milliGRAM(s) Oral before breakfast  polyethylene glycol 3350 17 Gram(s) Oral daily  simvastatin 40 milliGRAM(s) Oral at bedtime  tamsulosin 0.4 milliGRAM(s) Oral at bedtime  warfarin 6 milliGRAM(s) Oral <User Schedule>  warfarin 4 milliGRAM(s) Oral <User Schedule>    MEDICATIONS  (PRN):  oxyCODONE    IR 5 milliGRAM(s) Oral every 6 hours PRN Severe Pain (7 - 10)  zolpidem 5 milliGRAM(s) Oral at bedtime PRN Insomnia      DVT PROPHYLAXIS: SCDs, heparin   Injectable 5000 Unit(s) SubCutaneous every 8 hours    GI PROPHYLAXIS: pantoprazole    Tablet 40 milliGRAM(s) Oral before breakfast    ANTICOAGULATION:   ANTIBIOTICS:           LAB/STUDIES:  Labs:  CAPILLARY BLOOD GLUCOSE      POCT Blood Glucose.: 184 mg/dL (08 Jul 2021 11:22)  POCT Blood Glucose.: 128 mg/dL (08 Jul 2021 07:43)  POCT Blood Glucose.: 148 mg/dL (08 Jul 2021 04:21)  POCT Blood Glucose.: 156 mg/dL (08 Jul 2021 00:18)  POCT Blood Glucose.: 175 mg/dL (07 Jul 2021 17:39)  POCT Blood Glucose.: 186 mg/dL (07 Jul 2021 16:35)                          10.3   14.47 )-----------( 286      ( 07 Jul 2021 21:22 )             31.8       Auto Neutrophil %: 76.8 % (07-07-21 @ 21:22)  Auto Immature Granulocyte %: 0.7 % (07-07-21 @ 21:22)    07-07    135  |  103  |  33<H>  ----------------------------<  136<H>  3.9   |  21  |  1.0      Calcium, Total Serum: 9.0 mg/dL (07-07-21 @ 21:22)      LFTs:     Blood Gas Arterial, Lactate: 0.9 mmoL/L (07-06-21 @ 11:40)    ABG - ( 06 Jul 2021 11:40 )  pH: 7.49  /  pCO2: 35    /  pO2: 64    / HCO3: 26    / Base Excess: 3.2   /  SaO2: 95              ABG - ( 05 Jul 2021 09:54 )  pH: 7.53  /  pCO2: 25    /  pO2: 131   / HCO3: 21    / Base Excess: -0.9  /  SaO2: 100             ABG - ( 05 Jul 2021 07:13 )  pH: 7.32  /  pCO2: 32    /  pO2: 152   / HCO3: 16    / Base Excess: -9.0  /  SaO2: 99                Coags:     12.10  ----< 1.05    ( 08 Jul 2021 01:35 )     26.1            Serum Pro-Brain Natriuretic Peptide: 2287 pg/mL (07-05-21 @ 07:16)            ACCESS DEVICES:  [x] Peripheral IV  [ ] Central Venous Line	[ ] R	[ ] L	[ ] IJ	[ ] Fem	[ ] SC	Placed:   [ ] Arterial Line		[ ] R	[ ] L	[ ] Fem	[ ] Rad	[ ] Ax	Placed:   [ ] PICC:					[ ] Mediport  [x] Urinary Catheter,  Date Placed:   [ ] Chest tube: [ ] Right, [ ] Left  [ ] EZEQUIEL/Ramón Drains    ASSESSMENT:  87y Male w Left intertrochanteric fracture, S/P Fracture reduction.     PLAN:   - FU with cardio  - Continue PT   -DVT Profilaxis  -DC    Lines/Tubes: PIV, Midline, Central Line, A-Line, Chest tubes, Raómn/EZEQUIEL drains, Drew Catheter.    TRAUMA SPECTRA: [id number]
Orthopaedic Surgery Progress Note    DENITA PINO  [id number]    POD 2 s/p left IT IMN. S&E at bedside this AM. Pain well controlled. Denies fever/chills/CP/SOB. No other complaints. Has not walked yet.    acetaminophen   Tablet .. 650 milliGRAM(s) Oral every 6 hours  aspirin enteric coated 81 milliGRAM(s) Oral daily  dextrose 40% Gel 15 Gram(s) Oral once  dextrose 50% Injectable 25 Gram(s) IV Push once  dextrose 50% Injectable 12.5 Gram(s) IV Push once  dextrose 50% Injectable 25 Gram(s) IV Push once  finasteride 5 milliGRAM(s) Oral daily  glucagon  Injectable 1 milliGRAM(s) IntraMuscular once  heparin   Injectable 5000 Unit(s) SubCutaneous every 8 hours  insulin lispro (ADMELOG) corrective regimen sliding scale   SubCutaneous every 6 hours  losartan 100 milliGRAM(s) Oral daily  metoprolol tartrate 50 milliGRAM(s) Oral two times a day  oxyCODONE    IR 5 milliGRAM(s) Oral every 6 hours PRN  pantoprazole    Tablet 40 milliGRAM(s) Oral before breakfast  polyethylene glycol 3350 17 Gram(s) Oral daily  simvastatin 40 milliGRAM(s) Oral at bedtime  tamsulosin 0.4 milliGRAM(s) Oral at bedtime  zolpidem 5 milliGRAM(s) Oral at bedtime PRN      T(C): 36.4 (07-08-21 @ 05:01), Max: 36.4 (07-08-21 @ 05:01)  HR: 66 (07-08-21 @ 05:01) (66 - 107)  BP: 162/76 (07-08-21 @ 05:01) (134/89 - 162/76)  RR: 18 (07-08-21 @ 05:01) (18 - 18)  SpO2: --    P/E:  NAD  Breathing comfortably on RA  Resting comfortably    LLE  Dressing in place, c/d/i  SILT sp/dp/t/sural/saph  Firing ta/ehl/fhl/gs  Foot WWP, 2+ DP pulse    Labs                        10.3   14.47 )-----------( 286      ( 07 Jul 2021 21:22 )             31.8     07-07    135  |  103  |  33<H>  ----------------------------<  136<H>  3.9   |  21  |  1.0    Ca    9.0      07 Jul 2021 21:22  Phos  3.1     07-07  Mg     2.1     07-07        PT/INR - ( 08 Jul 2021 01:35 )   PT: 12.10 sec;   INR: 1.05 ratio         PTT - ( 08 Jul 2021 01:35 )  PTT:26.1 sec    A/P:   86yo Male s/p IMN of left femur for IT fracture on 7/6/2021    - WBAT LLE  - Antibiotics: course complete  - PT/OT  - DVT ppx  - Pain control   - Ortho to follow  - If he is to be discharged please have him FU in the orthopedic outpatient office 1 week after DC          
POST OP CHECK:    PROCEDURE:  Intramedullary nailing of left femur      Pt seen and examined at bedside after procedure indicated above. Not in any acute distress.  Pain well controlled.        - WBAT LLE  - Antibiotics for 24hrs   - PT/OT  - Restart DVT PPX today  - Upgraded to tele unit  - Pain control   - Ortho to follow
ORTHOPEDIC SURGERY PRE OP NOTE      Diagnosis: Left intertrochanteric fracture    Planned Procedure: Left hip intramedullary nailling     Consent: TO BE OBTAINED BY ATTENDING - Dr. Callahan                   Risks/benefits/alternatives were discussed with the patient/family and they wish to proceed with surgery.       ANTICIPATED DATE OF PROCEDURE : 7/6/2021  SCHEDULED CASE OR ADD-ON CASE: 7/6/2021    Clearances:   [X] Cardiology: 7/6/2021     T(C): 36.8 (07-06-21 @ 14:45), Max: 36.8 (07-06-21 @ 13:00)  HR: 88 (07-06-21 @ 14:45) (67 - 88)  BP: 142/62 (07-06-21 @ 14:45) (142/62 - 166/80)  RR: 20 (07-06-21 @ 14:45) (18 - 20)  SpO2: 95% (07-06-21 @ 14:45) (95% - 99%)    Labs:                        11.0   12.50 )-----------( 255      ( 06 Jul 2021 11:11 )             34.5     07-06    138  |  102  |  22<H>  ----------------------------<  111<H>  3.9   |  26  |  1.1    Ca    8.9      06 Jul 2021 11:11  Phos  3.2     07-05  Mg     2.0     07-05      PT/INR - ( 06 Jul 2021 11:11 )   PT: 13.20 sec;   INR: 1.15 ratio         PTT - ( 06 Jul 2021 11:11 )  PTT:27.9 sec  Type and Screen X 2:    COVID-19 PCR: NotDetec (03 Jul 2021 14:54)    [-]Pregnancy test ( if female of childbearing age) : N/A  [X]EKG:   [X]CXR:       DIET: NPO after midnight  IVF: per primary team    ANTICOAGULATION STATUS Coumadin  [X] DISCONTINUE Coumadin                              A/P: Patient is a 87y y/o Male Pending L hip IMN    [x] -NPO and IVF @ midnight  [x]pain control/analgesia prn per primary team   [x]Incentive Spirometry   [x]F/U Clearance  [x]F/U Pending Labs - new INR drawn, within acceptable range  [ ]Notify Ortho with any questions- spectra [id number]    [x]DISCUSSED WITH PRIMARY TEAM
Orthopaedic Surgery Progress Note    DENITA PINO  [id number]    S&E at bedside this AM. Surgery for left hip fracture fixation has been delayed due to increased INR.    Rapid response called this AM due to desaturations into the 60s. Patient examined with trauma team at bedside. Placed on bipap. Primary team suspecting fluid overload. INR also increased, now 2.6. Plan is for fixation of the hip fracture when medically optimized.    acetaminophen   Tablet .. 650 milliGRAM(s) Oral every 6 hours  aspirin enteric coated 81 milliGRAM(s) Oral daily  chlorhexidine 4% Liquid 1 Application(s) Topical daily  dextrose 40% Gel 15 Gram(s) Oral once  dextrose 5%. 1000 milliLiter(s) IV Continuous <Continuous>  dextrose 5%. 1000 milliLiter(s) IV Continuous <Continuous>  dextrose 50% Injectable 25 Gram(s) IV Push once  dextrose 50% Injectable 12.5 Gram(s) IV Push once  dextrose 50% Injectable 25 Gram(s) IV Push once  finasteride 5 milliGRAM(s) Oral daily  glucagon  Injectable 1 milliGRAM(s) IntraMuscular once  insulin lispro (ADMELOG) corrective regimen sliding scale   SubCutaneous every 6 hours  lactated ringers. 1000 milliLiter(s) IV Continuous <Continuous>  lactated ringers. 500 milliLiter(s) IV Continuous <Continuous>  losartan 100 milliGRAM(s) Oral daily  metoprolol tartrate 50 milliGRAM(s) Oral two times a day  oxyCODONE    IR 5 milliGRAM(s) Oral every 6 hours PRN  pantoprazole    Tablet 40 milliGRAM(s) Oral before breakfast  simvastatin 40 milliGRAM(s) Oral at bedtime  tamsulosin 0.4 milliGRAM(s) Oral at bedtime  zolpidem 5 milliGRAM(s) Oral at bedtime PRN  zolpidem 5 milliGRAM(s) Oral at bedtime PRN      T(C): 36.4 (07-04-21 @ 21:22), Max: 36.4 (07-04-21 @ 21:22)  HR: 68 (07-05-21 @ 07:15) (68 - 89)  BP: 164/74 (07-05-21 @ 00:50) (153/69 - 164/74)  RR: 18 (07-04-21 @ 21:22) (18 - 18)  SpO2: 98% (07-05-21 @ 07:15) (98% - 98%)    P/E:  General: In no acute distress, awake/alert and oriented    Left Hip  Unable to range hip secondary to pain  No swelling or ecchymosis  Skin intact about the hip  AROM limited due to pain  Motor intact: 5/5 gastroc-soleus, tibialis anterior, FHL, EHL  Sensation intact to light touch sural/saphenous/DP/SP/tibial   DP/PT palpable pulses, toes warm and well perfused    Labs                        10.7   20.33 )-----------( 304      ( 04 Jul 2021 20:00 )             33.3     07-04    136  |  102  |  22<H>  ----------------------------<  132<H>  4.4   |  22  |  1.2    Ca    9.1      04 Jul 2021 20:00  Phos  3.7     07-04  Mg     2.1     07-04    TPro  7.3  /  Alb  4.5  /  TBili  0.5  /  DBili  x   /  AST  28  /  ALT  24  /  AlkPhos  194<H>  07-03    LIVER FUNCTIONS - ( 03 Jul 2021 14:54 )  Alb: 4.5 g/dL / Pro: 7.3 g/dL / ALK PHOS: 194 U/L / ALT: 24 U/L / AST: 28 U/L / GGT: x           PT/INR - ( 04 Jul 2021 23:13 )   PT: 30.60 sec;   INR: 2.66 ratio         PTT - ( 04 Jul 2021 23:13 )  PTT:33.6 sec    A/P:   86yo Male with left IT fracture pending fixation now unstable on bipap 2/2 presumed fluid overload. Plan to transfer to ICU and trend INR in anticipation for surgery tomorrow. Still pending medical optimization prior to surgery    Weightbearing: NWB LLE  DVT ppx: SCD, hold chem in preparation for OR  Pain control  Home medications  Trend labs  PT/OT/Rehab  Dispo: Pending PT recommendations          
INTERVAL HISTORY:   patient seen and examined this morning. patient feels fine with no more SOB or chest pain. sating fine on Rair.   s/p IV lasix once  	  MEDICATIONS:  acetaminophen   Tablet .. 650 milliGRAM(s) Oral every 6 hours  aspirin enteric coated 81 milliGRAM(s) Oral daily  chlorhexidine 4% Liquid 1 Application(s) Topical daily  dextrose 40% Gel 15 Gram(s) Oral once  dextrose 5%. 1000 milliLiter(s) IV Continuous <Continuous>  dextrose 5%. 1000 milliLiter(s) IV Continuous <Continuous>  dextrose 50% Injectable 25 Gram(s) IV Push once  dextrose 50% Injectable 12.5 Gram(s) IV Push once  dextrose 50% Injectable 25 Gram(s) IV Push once  finasteride 5 milliGRAM(s) Oral daily  glucagon  Injectable 1 milliGRAM(s) IntraMuscular once  insulin lispro (ADMELOG) corrective regimen sliding scale   SubCutaneous every 6 hours  lactated ringers. 1000 milliLiter(s) IV Continuous <Continuous>  losartan 100 milliGRAM(s) Oral daily  metoprolol tartrate 50 milliGRAM(s) Oral two times a day  oxyCODONE    IR 5 milliGRAM(s) Oral every 6 hours PRN  pantoprazole    Tablet 40 milliGRAM(s) Oral before breakfast  simvastatin 40 milliGRAM(s) Oral at bedtime  tamsulosin 0.4 milliGRAM(s) Oral at bedtime  zolpidem 5 milliGRAM(s) Oral at bedtime PRN  zolpidem 5 milliGRAM(s) Oral at bedtime PRN      REVIEW OF SYSTEMS:  CONSTITUTIONAL: No fever, weight loss, or fatigue  NECK: No pain or stiffness  RESPIRATORY: No cough, wheezing, chills or hemoptysis; No shortness of breath  CARDIOVASCULAR: No chest pain, palpitations, dizziness, or leg swelling  GASTROINTESTINAL: No abdominal or epigastric pain. No nausea, vomiting, or hematemesis; No diarrhea or constipation. No melena or hematochezia.  GENITOURINARY: No dysuria, frequency, hematuria, or incontinence  NEUROLOGICAL: No headaches, memory loss, loss of strength, numbness, or tremors  SKIN: No itching, burning, rashes, or lesions   ENDOCRINE: No heat or cold intolerance; No hair loss  MUSCULOSKELETAL: No joint pain or swelling; No muscle, back, or extremity pain  HEME/LYMPH: No easy bruising, or bleeding gums    PHYSICAL EXAM:  T(C): 36.3 (07-06-21 @ 09:00), Max: 36.7 (07-05-21 @ 14:10)  HR: 84 (07-06-21 @ 09:00) (67 - 84)  BP: 145/64 (07-06-21 @ 09:00) (145/64 - 166/80)  RR: 20 (07-06-21 @ 09:00) (18 - 20)  SpO2: 95% (07-06-21 @ 11:25) (95% - 99%)  Wt(kg): --  I&O's Summary    05 Jul 2021 07:01  -  06 Jul 2021 07:00  --------------------------------------------------------  IN: 400 mL / OUT: 2400 mL / NET: -2000 mL          GENERAL: NAD, well-groomed, well-developed  HEAD:  Atraumatic, Normocephalic  NECK: Supple, No JVD, Normal thyroid  NERVOUS SYSTEM:  Alert & Oriented X3, Good concentration  CHEST/LUNG: Clear to percussion bilaterally; No rales, rhonchi, wheezing, or rubs  HEART: Regular rate and rhythm; No murmurs, rubs, or gallops  ABDOMEN: Soft, Nontender, Nondistended; Bowel sounds present  EXTREMITIES:  2+ Peripheral Pulses, No clubbing, cyanosis, or edema  SKIN: No rashes or lesions    LABS:	 	    CARDIAC MARKERS ( 06 Jul 2021 00:42 )  x     / 0.05 ng/mL / x     / x     / x      CARDIAC MARKERS ( 05 Jul 2021 16:56 )  x     / 0.06 ng/mL / x     / x     / x      CARDIAC MARKERS ( 05 Jul 2021 07:16 )  x     / 0.02 ng/mL / 416 U/L / x     / 4.1 ng/mL                            10.6   17.08 )-----------( 254      ( 05 Jul 2021 21:22 )             32.0     07-05    138  |  102  |  20  ----------------------------<  121<H>  4.0   |  25  |  1.0    Ca    9.1      05 Jul 2021 21:22  Phos  3.2     07-05  Mg     2.0     07-05      proBNP:   Lipid Profile:       cxr  Impression:    Improving bibasilar opacities.    2d ECHO  Summary:   1. Mildly decreased global left ventricular systolic function.   2. LV Ejection Fraction by Song's Method with a biplane EF of 47 %.   3. Elevated left atrial and left ventricular end-diastolic pressures.   4. Mild left ventricular hypertrophy.   5. Mildly increased LV wall thickness.   6. Normal left ventricular internal cavity size.  7. Spectral Doppler shows restrictive pattern of left ventricular myocardial filling (Grade III diastolic dysfunction).   8. Mildly enlarged right atrium.   9. Moderately enlarged left atrium.  10. Moderate mitral annular calcification.  11. Moderatemitral valve regurgitation.  12. Moderate thickening and calcification of the anterior and posterior mitral valve leaflets.  13. Moderate tricuspid regurgitation.  14. TAVR present with normal function.  15. Estimated pulmonary artery systolic pressure is 52.2 mmHg assuming a right atrial pressure of 10 mmHg, which is consistent with moderate pulmonary hypertension.

## 2021-07-08 NOTE — OCCUPATIONAL THERAPY INITIAL EVALUATION ADULT - PERTINENT HX OF CURRENT PROBLEM, REHAB EVAL
87yM w/ PMH/PSH of aortic stenosis s/p TAVR (2016), AFib (on Coumadin & s/p MAZE procedure), asthma/COPD, CHF, s/p pacemaker placement, carotid stenosis s/p right CEA (2013), and known CAD s/p PCI placement and s/p CABG who was seen as a Trauma Alert s/p mechanical trip and fall, -HT, -LOC, +Coumadin. Patient stated that he was walking his dog when he tripped over the leash, falling on his left side.

## 2021-07-19 LAB — GLUCOSE BLDC GLUCOMTR-MCNC: 125 MG/DL — HIGH (ref 70–99)

## 2021-08-13 ENCOUNTER — APPOINTMENT (OUTPATIENT)
Dept: MEDICATION MANAGEMENT | Facility: CLINIC | Age: 86
End: 2021-08-13

## 2021-09-17 ENCOUNTER — APPOINTMENT (OUTPATIENT)
Dept: MEDICATION MANAGEMENT | Facility: CLINIC | Age: 86
End: 2021-09-17

## 2021-09-17 ENCOUNTER — OUTPATIENT (OUTPATIENT)
Dept: OUTPATIENT SERVICES | Facility: HOSPITAL | Age: 86
LOS: 1 days | Discharge: HOME | End: 2021-09-17

## 2021-09-17 VITALS — HEART RATE: 71 BPM | RESPIRATION RATE: 16 BRPM | OXYGEN SATURATION: 99 %

## 2021-09-17 DIAGNOSIS — Z98.89 OTHER SPECIFIED POSTPROCEDURAL STATES: Chronic | ICD-10-CM

## 2021-09-17 DIAGNOSIS — I48.91 UNSPECIFIED ATRIAL FIBRILLATION: ICD-10-CM

## 2021-09-17 DIAGNOSIS — Z95.0 PRESENCE OF CARDIAC PACEMAKER: Chronic | ICD-10-CM

## 2021-09-17 DIAGNOSIS — Z79.01 LONG TERM (CURRENT) USE OF ANTICOAGULANTS: ICD-10-CM

## 2021-09-17 DIAGNOSIS — Z95.1 PRESENCE OF AORTOCORONARY BYPASS GRAFT: Chronic | ICD-10-CM

## 2021-09-17 DIAGNOSIS — Z95.2 PRESENCE OF PROSTHETIC HEART VALVE: Chronic | ICD-10-CM

## 2021-09-17 LAB
INR PPP: 1.3 RATIO
POCT-PROTHROMBIN TIME: 15.5 SECS
QUALITY CONTROL: YES

## 2021-09-23 ENCOUNTER — OUTPATIENT (OUTPATIENT)
Dept: OUTPATIENT SERVICES | Facility: HOSPITAL | Age: 86
LOS: 1 days | Discharge: HOME | End: 2021-09-23

## 2021-09-23 ENCOUNTER — APPOINTMENT (OUTPATIENT)
Dept: MEDICATION MANAGEMENT | Facility: CLINIC | Age: 86
End: 2021-09-23

## 2021-09-23 VITALS — HEART RATE: 67 BPM | OXYGEN SATURATION: 100 % | RESPIRATION RATE: 16 BRPM

## 2021-09-23 DIAGNOSIS — Z95.2 PRESENCE OF PROSTHETIC HEART VALVE: Chronic | ICD-10-CM

## 2021-09-23 DIAGNOSIS — Z95.1 PRESENCE OF AORTOCORONARY BYPASS GRAFT: Chronic | ICD-10-CM

## 2021-09-23 DIAGNOSIS — Z79.01 LONG TERM (CURRENT) USE OF ANTICOAGULANTS: ICD-10-CM

## 2021-09-23 DIAGNOSIS — I48.91 UNSPECIFIED ATRIAL FIBRILLATION: ICD-10-CM

## 2021-09-23 DIAGNOSIS — Z95.0 PRESENCE OF CARDIAC PACEMAKER: Chronic | ICD-10-CM

## 2021-09-23 DIAGNOSIS — Z98.89 OTHER SPECIFIED POSTPROCEDURAL STATES: Chronic | ICD-10-CM

## 2021-09-23 LAB
INR PPP: 1.9 RATIO
POCT-PROTHROMBIN TIME: 22.9 SECS
QUALITY CONTROL: YES

## 2021-09-30 ENCOUNTER — APPOINTMENT (OUTPATIENT)
Dept: MEDICATION MANAGEMENT | Facility: CLINIC | Age: 86
End: 2021-09-30

## 2021-09-30 ENCOUNTER — OUTPATIENT (OUTPATIENT)
Dept: OUTPATIENT SERVICES | Facility: HOSPITAL | Age: 86
LOS: 1 days | Discharge: HOME | End: 2021-09-30

## 2021-09-30 VITALS — RESPIRATION RATE: 16 BRPM | OXYGEN SATURATION: 100 % | HEART RATE: 68 BPM

## 2021-09-30 DIAGNOSIS — Z98.89 OTHER SPECIFIED POSTPROCEDURAL STATES: Chronic | ICD-10-CM

## 2021-09-30 DIAGNOSIS — Z95.1 PRESENCE OF AORTOCORONARY BYPASS GRAFT: Chronic | ICD-10-CM

## 2021-09-30 DIAGNOSIS — Z79.01 LONG TERM (CURRENT) USE OF ANTICOAGULANTS: ICD-10-CM

## 2021-09-30 DIAGNOSIS — Z95.0 PRESENCE OF CARDIAC PACEMAKER: Chronic | ICD-10-CM

## 2021-09-30 DIAGNOSIS — Z95.2 PRESENCE OF PROSTHETIC HEART VALVE: Chronic | ICD-10-CM

## 2021-09-30 DIAGNOSIS — I48.91 UNSPECIFIED ATRIAL FIBRILLATION: ICD-10-CM

## 2021-09-30 LAB
INR PPP: 2.4 RATIO
POCT-PROTHROMBIN TIME: 28.2 SECS
QUALITY CONTROL: YES

## 2021-10-12 ENCOUNTER — APPOINTMENT (OUTPATIENT)
Dept: MEDICATION MANAGEMENT | Facility: CLINIC | Age: 86
End: 2021-10-12

## 2021-10-12 ENCOUNTER — OUTPATIENT (OUTPATIENT)
Dept: OUTPATIENT SERVICES | Facility: HOSPITAL | Age: 86
LOS: 1 days | Discharge: HOME | End: 2021-10-12

## 2021-10-12 VITALS — RESPIRATION RATE: 16 BRPM | HEART RATE: 78 BPM | OXYGEN SATURATION: 100 %

## 2021-10-12 DIAGNOSIS — I48.91 UNSPECIFIED ATRIAL FIBRILLATION: ICD-10-CM

## 2021-10-12 DIAGNOSIS — Z95.0 PRESENCE OF CARDIAC PACEMAKER: Chronic | ICD-10-CM

## 2021-10-12 DIAGNOSIS — Z95.2 PRESENCE OF PROSTHETIC HEART VALVE: Chronic | ICD-10-CM

## 2021-10-12 DIAGNOSIS — Z79.01 LONG TERM (CURRENT) USE OF ANTICOAGULANTS: ICD-10-CM

## 2021-10-12 DIAGNOSIS — Z95.1 PRESENCE OF AORTOCORONARY BYPASS GRAFT: Chronic | ICD-10-CM

## 2021-10-12 DIAGNOSIS — Z98.89 OTHER SPECIFIED POSTPROCEDURAL STATES: Chronic | ICD-10-CM

## 2021-10-12 LAB
INR PPP: 2.7 RATIO
POCT-PROTHROMBIN TIME: 32.9 SECS
QUALITY CONTROL: YES

## 2021-11-02 ENCOUNTER — OUTPATIENT (OUTPATIENT)
Dept: OUTPATIENT SERVICES | Facility: HOSPITAL | Age: 86
LOS: 1 days | Discharge: HOME | End: 2021-11-02

## 2021-11-02 ENCOUNTER — APPOINTMENT (OUTPATIENT)
Dept: MEDICATION MANAGEMENT | Facility: CLINIC | Age: 86
End: 2021-11-02

## 2021-11-02 VITALS — RESPIRATION RATE: 16 BRPM | OXYGEN SATURATION: 99 % | HEART RATE: 93 BPM

## 2021-11-02 DIAGNOSIS — Z95.1 PRESENCE OF AORTOCORONARY BYPASS GRAFT: Chronic | ICD-10-CM

## 2021-11-02 DIAGNOSIS — Z79.01 LONG TERM (CURRENT) USE OF ANTICOAGULANTS: ICD-10-CM

## 2021-11-02 DIAGNOSIS — I48.91 UNSPECIFIED ATRIAL FIBRILLATION: ICD-10-CM

## 2021-11-02 DIAGNOSIS — Z95.0 PRESENCE OF CARDIAC PACEMAKER: Chronic | ICD-10-CM

## 2021-11-02 DIAGNOSIS — Z95.2 PRESENCE OF PROSTHETIC HEART VALVE: Chronic | ICD-10-CM

## 2021-11-02 DIAGNOSIS — Z98.89 OTHER SPECIFIED POSTPROCEDURAL STATES: Chronic | ICD-10-CM

## 2021-11-02 LAB
INR PPP: 3.5 RATIO
POCT-PROTHROMBIN TIME: 41.6 SECS
QUALITY CONTROL: YES

## 2021-11-16 ENCOUNTER — APPOINTMENT (OUTPATIENT)
Dept: MEDICATION MANAGEMENT | Facility: CLINIC | Age: 86
End: 2021-11-16

## 2021-11-16 ENCOUNTER — OUTPATIENT (OUTPATIENT)
Dept: OUTPATIENT SERVICES | Facility: HOSPITAL | Age: 86
LOS: 1 days | Discharge: HOME | End: 2021-11-16

## 2021-11-16 VITALS — RESPIRATION RATE: 16 BRPM | OXYGEN SATURATION: 100 % | HEART RATE: 64 BPM

## 2021-11-16 DIAGNOSIS — Z95.2 PRESENCE OF PROSTHETIC HEART VALVE: Chronic | ICD-10-CM

## 2021-11-16 DIAGNOSIS — Z79.01 LONG TERM (CURRENT) USE OF ANTICOAGULANTS: ICD-10-CM

## 2021-11-16 DIAGNOSIS — Z98.89 OTHER SPECIFIED POSTPROCEDURAL STATES: Chronic | ICD-10-CM

## 2021-11-16 DIAGNOSIS — Z95.1 PRESENCE OF AORTOCORONARY BYPASS GRAFT: Chronic | ICD-10-CM

## 2021-11-16 DIAGNOSIS — I48.91 UNSPECIFIED ATRIAL FIBRILLATION: ICD-10-CM

## 2021-11-16 DIAGNOSIS — Z95.0 PRESENCE OF CARDIAC PACEMAKER: Chronic | ICD-10-CM

## 2021-11-16 LAB
INR PPP: 2.5 RATIO
POCT-PROTHROMBIN TIME: 30.5 SECS
QUALITY CONTROL: YES

## 2021-12-02 ENCOUNTER — APPOINTMENT (OUTPATIENT)
Dept: CARDIOLOGY | Facility: CLINIC | Age: 86
End: 2021-12-02
Payer: MEDICARE

## 2021-12-02 VITALS
TEMPERATURE: 96.6 F | BODY MASS INDEX: 27.2 KG/M2 | HEIGHT: 70 IN | HEART RATE: 68 BPM | WEIGHT: 190 LBS | DIASTOLIC BLOOD PRESSURE: 60 MMHG | SYSTOLIC BLOOD PRESSURE: 136 MMHG

## 2021-12-02 PROCEDURE — 99213 OFFICE O/P EST LOW 20 MIN: CPT

## 2021-12-02 PROCEDURE — 93280 PM DEVICE PROGR EVAL DUAL: CPT

## 2021-12-21 NOTE — HISTORY OF PRESENT ILLNESS
[Palpitations] : no palpitations [SOB] : no dyspnea [Syncope] : no syncope [Dizziness] : no dizziness [de-identified] : 88 y/o male with pmh of CAD, HTN , S/P PCI with stent to OM 1, followed by  CABG 3VD on 7/18/2011, AS S/P TAVR CHB, s/p PPM, AF on warfarin. \par Presents for routine follow up \par \par Repots progressive worsening in PELAYO . Denies CP/SOB or palpitations. No dizziness or syncope.

## 2021-12-21 NOTE — PROCEDURE
[Complete Heart Block] : complete heart block [See Scanned Paceart Report] : See scanned paceart report [See Device Printout] : See device printout [Pacemaker] : pacemaker [DDD] : DDD [Programmed for Longevity] : output reprogrammed for improved battery longevity [de-identified] : Medtronic [de-identified] : Abdias [de-identified] : [id number] [de-identified] : 4/7/14 [de-identified] : 50 [de-identified] : low AF brden <1%; battery 2 years\par P waves on low side 0.5\par increase RV out put to 2.5

## 2021-12-22 ENCOUNTER — APPOINTMENT (OUTPATIENT)
Dept: MEDICATION MANAGEMENT | Facility: CLINIC | Age: 86
End: 2021-12-22

## 2021-12-22 ENCOUNTER — OUTPATIENT (OUTPATIENT)
Dept: OUTPATIENT SERVICES | Facility: HOSPITAL | Age: 86
LOS: 1 days | Discharge: HOME | End: 2021-12-22

## 2021-12-22 VITALS — RESPIRATION RATE: 16 BRPM | HEART RATE: 76 BPM | OXYGEN SATURATION: 100 %

## 2021-12-22 DIAGNOSIS — Z95.1 PRESENCE OF AORTOCORONARY BYPASS GRAFT: Chronic | ICD-10-CM

## 2021-12-22 DIAGNOSIS — Z95.2 PRESENCE OF PROSTHETIC HEART VALVE: Chronic | ICD-10-CM

## 2021-12-22 DIAGNOSIS — Z98.89 OTHER SPECIFIED POSTPROCEDURAL STATES: Chronic | ICD-10-CM

## 2021-12-22 DIAGNOSIS — Z95.0 PRESENCE OF CARDIAC PACEMAKER: Chronic | ICD-10-CM

## 2021-12-22 DIAGNOSIS — Z79.01 LONG TERM (CURRENT) USE OF ANTICOAGULANTS: ICD-10-CM

## 2021-12-22 DIAGNOSIS — I48.91 UNSPECIFIED ATRIAL FIBRILLATION: ICD-10-CM

## 2021-12-22 LAB
INR PPP: 2.4 RATIO
POCT-PROTHROMBIN TIME: 29 SECS
QUALITY CONTROL: YES

## 2022-02-02 ENCOUNTER — OUTPATIENT (OUTPATIENT)
Dept: OUTPATIENT SERVICES | Facility: HOSPITAL | Age: 87
LOS: 1 days | Discharge: HOME | End: 2022-02-02

## 2022-02-02 ENCOUNTER — APPOINTMENT (OUTPATIENT)
Dept: MEDICATION MANAGEMENT | Facility: CLINIC | Age: 87
End: 2022-02-02

## 2022-02-02 VITALS — OXYGEN SATURATION: 99 % | RESPIRATION RATE: 16 BRPM | HEART RATE: 76 BPM

## 2022-02-02 DIAGNOSIS — Z95.0 PRESENCE OF CARDIAC PACEMAKER: Chronic | ICD-10-CM

## 2022-02-02 DIAGNOSIS — Z98.89 OTHER SPECIFIED POSTPROCEDURAL STATES: Chronic | ICD-10-CM

## 2022-02-02 DIAGNOSIS — Z95.2 PRESENCE OF PROSTHETIC HEART VALVE: Chronic | ICD-10-CM

## 2022-02-02 DIAGNOSIS — Z95.1 PRESENCE OF AORTOCORONARY BYPASS GRAFT: Chronic | ICD-10-CM

## 2022-02-02 DIAGNOSIS — Z79.01 LONG TERM (CURRENT) USE OF ANTICOAGULANTS: ICD-10-CM

## 2022-02-02 DIAGNOSIS — I48.91 UNSPECIFIED ATRIAL FIBRILLATION: ICD-10-CM

## 2022-02-02 LAB
INR PPP: 3.4 RATIO
POCT-PROTHROMBIN TIME: 40.9 SECS
QUALITY CONTROL: YES

## 2022-02-23 ENCOUNTER — OUTPATIENT (OUTPATIENT)
Dept: OUTPATIENT SERVICES | Facility: HOSPITAL | Age: 87
LOS: 1 days | Discharge: HOME | End: 2022-02-23

## 2022-02-23 ENCOUNTER — APPOINTMENT (OUTPATIENT)
Dept: MEDICATION MANAGEMENT | Facility: CLINIC | Age: 87
End: 2022-02-23

## 2022-02-23 VITALS — HEART RATE: 78 BPM | RESPIRATION RATE: 16 BRPM | OXYGEN SATURATION: 98 %

## 2022-02-23 DIAGNOSIS — Z79.01 LONG TERM (CURRENT) USE OF ANTICOAGULANTS: ICD-10-CM

## 2022-02-23 DIAGNOSIS — Z95.0 PRESENCE OF CARDIAC PACEMAKER: Chronic | ICD-10-CM

## 2022-02-23 DIAGNOSIS — I48.91 UNSPECIFIED ATRIAL FIBRILLATION: ICD-10-CM

## 2022-02-23 DIAGNOSIS — Z98.89 OTHER SPECIFIED POSTPROCEDURAL STATES: Chronic | ICD-10-CM

## 2022-02-23 DIAGNOSIS — Z95.2 PRESENCE OF PROSTHETIC HEART VALVE: Chronic | ICD-10-CM

## 2022-02-23 DIAGNOSIS — Z95.1 PRESENCE OF AORTOCORONARY BYPASS GRAFT: Chronic | ICD-10-CM

## 2022-02-23 LAB
INR PPP: 2.9 RATIO
POCT-PROTHROMBIN TIME: 34.9 SECS
QUALITY CONTROL: YES

## 2022-02-28 NOTE — ED PROCEDURE NOTE - ATTENDING CONTRIBUTION TO CARE
I was present for and supervised the key and critical aspects of the procedures performed during the care of the patient. - CMV -/+ intermediate risk, continue Valcyte 450 mg BID  - Toxo -/- low risk  - PJP prophylaxis - on bactrim SS M/W/F  - Thrush prophylaxis - on Fluconazole daily to boost tacro level. I personally supervised the study.  I reviewed the images and interpretation by the resident/ACP and have edited where appropriate.

## 2022-03-23 ENCOUNTER — OUTPATIENT (OUTPATIENT)
Dept: OUTPATIENT SERVICES | Facility: HOSPITAL | Age: 87
LOS: 1 days | Discharge: HOME | End: 2022-03-23

## 2022-03-23 ENCOUNTER — APPOINTMENT (OUTPATIENT)
Dept: MEDICATION MANAGEMENT | Facility: CLINIC | Age: 87
End: 2022-03-23

## 2022-03-23 VITALS
OXYGEN SATURATION: 99 % | DIASTOLIC BLOOD PRESSURE: 70 MMHG | SYSTOLIC BLOOD PRESSURE: 170 MMHG | HEART RATE: 76 BPM | RESPIRATION RATE: 16 BRPM

## 2022-03-23 DIAGNOSIS — Z95.0 PRESENCE OF CARDIAC PACEMAKER: Chronic | ICD-10-CM

## 2022-03-23 DIAGNOSIS — Z98.89 OTHER SPECIFIED POSTPROCEDURAL STATES: Chronic | ICD-10-CM

## 2022-03-23 DIAGNOSIS — Z95.2 PRESENCE OF PROSTHETIC HEART VALVE: Chronic | ICD-10-CM

## 2022-03-23 DIAGNOSIS — Z79.01 LONG TERM (CURRENT) USE OF ANTICOAGULANTS: ICD-10-CM

## 2022-03-23 DIAGNOSIS — Z95.1 PRESENCE OF AORTOCORONARY BYPASS GRAFT: Chronic | ICD-10-CM

## 2022-03-23 DIAGNOSIS — I48.91 UNSPECIFIED ATRIAL FIBRILLATION: ICD-10-CM

## 2022-03-23 LAB
INR PPP: 4.1 RATIO
POCT-PROTHROMBIN TIME: 49.4 SECS
QUALITY CONTROL: YES

## 2022-03-30 ENCOUNTER — APPOINTMENT (OUTPATIENT)
Dept: MEDICATION MANAGEMENT | Facility: CLINIC | Age: 87
End: 2022-03-30

## 2022-03-30 ENCOUNTER — OUTPATIENT (OUTPATIENT)
Dept: OUTPATIENT SERVICES | Facility: HOSPITAL | Age: 87
LOS: 1 days | Discharge: HOME | End: 2022-03-30

## 2022-03-30 VITALS — SYSTOLIC BLOOD PRESSURE: 160 MMHG | DIASTOLIC BLOOD PRESSURE: 60 MMHG

## 2022-03-30 VITALS — RESPIRATION RATE: 16 BRPM | HEART RATE: 60 BPM | OXYGEN SATURATION: 97 %

## 2022-03-30 DIAGNOSIS — Z95.1 PRESENCE OF AORTOCORONARY BYPASS GRAFT: Chronic | ICD-10-CM

## 2022-03-30 DIAGNOSIS — I48.91 UNSPECIFIED ATRIAL FIBRILLATION: ICD-10-CM

## 2022-03-30 DIAGNOSIS — Z95.0 PRESENCE OF CARDIAC PACEMAKER: Chronic | ICD-10-CM

## 2022-03-30 DIAGNOSIS — Z79.01 LONG TERM (CURRENT) USE OF ANTICOAGULANTS: ICD-10-CM

## 2022-03-30 DIAGNOSIS — Z95.2 PRESENCE OF PROSTHETIC HEART VALVE: Chronic | ICD-10-CM

## 2022-03-30 DIAGNOSIS — Z98.89 OTHER SPECIFIED POSTPROCEDURAL STATES: Chronic | ICD-10-CM

## 2022-03-30 LAB
INR PPP: 2.7 RATIO
POCT-PROTHROMBIN TIME: 32.2 SECS
QUALITY CONTROL: YES

## 2022-04-04 ENCOUNTER — APPOINTMENT (OUTPATIENT)
Dept: MEDICATION MANAGEMENT | Facility: CLINIC | Age: 87
End: 2022-04-04

## 2022-04-04 ENCOUNTER — OUTPATIENT (OUTPATIENT)
Dept: OUTPATIENT SERVICES | Facility: HOSPITAL | Age: 87
LOS: 1 days | Discharge: HOME | End: 2022-04-04

## 2022-04-04 VITALS — OXYGEN SATURATION: 98 % | RESPIRATION RATE: 16 BRPM | HEART RATE: 104 BPM

## 2022-04-04 VITALS — SYSTOLIC BLOOD PRESSURE: 150 MMHG | DIASTOLIC BLOOD PRESSURE: 60 MMHG

## 2022-04-04 DIAGNOSIS — Z95.1 PRESENCE OF AORTOCORONARY BYPASS GRAFT: Chronic | ICD-10-CM

## 2022-04-04 DIAGNOSIS — Z51.81 ENCOUNTER FOR THERAPEUTIC DRUG LVL MONITORING: ICD-10-CM

## 2022-04-04 DIAGNOSIS — Z95.0 PRESENCE OF CARDIAC PACEMAKER: Chronic | ICD-10-CM

## 2022-04-04 DIAGNOSIS — I48.91 UNSPECIFIED ATRIAL FIBRILLATION: ICD-10-CM

## 2022-04-04 DIAGNOSIS — Z79.01 LONG TERM (CURRENT) USE OF ANTICOAGULANTS: ICD-10-CM

## 2022-04-04 DIAGNOSIS — Z98.89 OTHER SPECIFIED POSTPROCEDURAL STATES: Chronic | ICD-10-CM

## 2022-04-04 DIAGNOSIS — Z95.2 PRESENCE OF PROSTHETIC HEART VALVE: Chronic | ICD-10-CM

## 2022-04-04 LAB
INR PPP: 2.7 RATIO
POCT-PROTHROMBIN TIME: 32.7 SECS
QUALITY CONTROL: YES

## 2022-04-19 ENCOUNTER — APPOINTMENT (OUTPATIENT)
Dept: MEDICATION MANAGEMENT | Facility: CLINIC | Age: 87
End: 2022-04-19

## 2022-04-19 ENCOUNTER — OUTPATIENT (OUTPATIENT)
Dept: OUTPATIENT SERVICES | Facility: HOSPITAL | Age: 87
LOS: 1 days | Discharge: HOME | End: 2022-04-19

## 2022-04-19 DIAGNOSIS — Z95.1 PRESENCE OF AORTOCORONARY BYPASS GRAFT: Chronic | ICD-10-CM

## 2022-04-19 DIAGNOSIS — Z79.01 LONG TERM (CURRENT) USE OF ANTICOAGULANTS: ICD-10-CM

## 2022-04-19 DIAGNOSIS — Z95.0 PRESENCE OF CARDIAC PACEMAKER: Chronic | ICD-10-CM

## 2022-04-19 DIAGNOSIS — Z98.89 OTHER SPECIFIED POSTPROCEDURAL STATES: Chronic | ICD-10-CM

## 2022-04-19 DIAGNOSIS — I48.91 UNSPECIFIED ATRIAL FIBRILLATION: ICD-10-CM

## 2022-04-19 DIAGNOSIS — Z95.2 PRESENCE OF PROSTHETIC HEART VALVE: Chronic | ICD-10-CM

## 2022-04-19 LAB
INR PPP: 4.4 RATIO
POCT-PROTHROMBIN TIME: 52.8 SECS
QUALITY CONTROL: YES

## 2022-04-25 ENCOUNTER — INPATIENT (INPATIENT)
Facility: HOSPITAL | Age: 87
LOS: 1 days | Discharge: HOME | End: 2022-04-27
Attending: INTERNAL MEDICINE | Admitting: INTERNAL MEDICINE
Payer: MEDICARE

## 2022-04-25 VITALS
OXYGEN SATURATION: 96 % | HEART RATE: 80 BPM | DIASTOLIC BLOOD PRESSURE: 70 MMHG | TEMPERATURE: 97 F | HEIGHT: 70 IN | SYSTOLIC BLOOD PRESSURE: 153 MMHG | RESPIRATION RATE: 20 BRPM

## 2022-04-25 DIAGNOSIS — Z95.0 PRESENCE OF CARDIAC PACEMAKER: Chronic | ICD-10-CM

## 2022-04-25 DIAGNOSIS — Z95.2 PRESENCE OF PROSTHETIC HEART VALVE: Chronic | ICD-10-CM

## 2022-04-25 DIAGNOSIS — Z95.1 PRESENCE OF AORTOCORONARY BYPASS GRAFT: Chronic | ICD-10-CM

## 2022-04-25 DIAGNOSIS — Z98.89 OTHER SPECIFIED POSTPROCEDURAL STATES: Chronic | ICD-10-CM

## 2022-04-25 LAB
ALBUMIN SERPL ELPH-MCNC: 4.6 G/DL — SIGNIFICANT CHANGE UP (ref 3.5–5.2)
ALP SERPL-CCNC: 209 U/L — HIGH (ref 30–115)
ALT FLD-CCNC: 18 U/L — SIGNIFICANT CHANGE UP (ref 0–41)
ANION GAP SERPL CALC-SCNC: 14 MMOL/L — SIGNIFICANT CHANGE UP (ref 7–14)
APTT BLD: 41.8 SEC — HIGH (ref 27–39.2)
AST SERPL-CCNC: 21 U/L — SIGNIFICANT CHANGE UP (ref 0–41)
BASOPHILS # BLD AUTO: 0.06 K/UL — SIGNIFICANT CHANGE UP (ref 0–0.2)
BASOPHILS NFR BLD AUTO: 0.6 % — SIGNIFICANT CHANGE UP (ref 0–1)
BILIRUB SERPL-MCNC: 0.7 MG/DL — SIGNIFICANT CHANGE UP (ref 0.2–1.2)
BUN SERPL-MCNC: 22 MG/DL — HIGH (ref 10–20)
CALCIUM SERPL-MCNC: 10.1 MG/DL — SIGNIFICANT CHANGE UP (ref 8.5–10.1)
CHLORIDE SERPL-SCNC: 106 MMOL/L — SIGNIFICANT CHANGE UP (ref 98–110)
CO2 SERPL-SCNC: 18 MMOL/L — SIGNIFICANT CHANGE UP (ref 17–32)
CREAT SERPL-MCNC: 1 MG/DL — SIGNIFICANT CHANGE UP (ref 0.7–1.5)
EGFR: 72 ML/MIN/1.73M2 — SIGNIFICANT CHANGE UP
EOSINOPHIL # BLD AUTO: 0.2 K/UL — SIGNIFICANT CHANGE UP (ref 0–0.7)
EOSINOPHIL NFR BLD AUTO: 2 % — SIGNIFICANT CHANGE UP (ref 0–8)
GLUCOSE BLDC GLUCOMTR-MCNC: 162 MG/DL — HIGH (ref 70–99)
GLUCOSE SERPL-MCNC: 159 MG/DL — HIGH (ref 70–99)
HCT VFR BLD CALC: 34.7 % — LOW (ref 42–52)
HGB BLD-MCNC: 11.2 G/DL — LOW (ref 14–18)
IMM GRANULOCYTES NFR BLD AUTO: 0.6 % — HIGH (ref 0.1–0.3)
INR BLD: 2.13 RATIO — HIGH (ref 0.65–1.3)
LYMPHOCYTES # BLD AUTO: 1.17 K/UL — LOW (ref 1.2–3.4)
LYMPHOCYTES # BLD AUTO: 11.9 % — LOW (ref 20.5–51.1)
MCHC RBC-ENTMCNC: 29.2 PG — SIGNIFICANT CHANGE UP (ref 27–31)
MCHC RBC-ENTMCNC: 32.3 G/DL — SIGNIFICANT CHANGE UP (ref 32–37)
MCV RBC AUTO: 90.6 FL — SIGNIFICANT CHANGE UP (ref 80–94)
MONOCYTES # BLD AUTO: 1.03 K/UL — HIGH (ref 0.1–0.6)
MONOCYTES NFR BLD AUTO: 10.5 % — HIGH (ref 1.7–9.3)
NEUTROPHILS # BLD AUTO: 7.32 K/UL — HIGH (ref 1.4–6.5)
NEUTROPHILS NFR BLD AUTO: 74.4 % — SIGNIFICANT CHANGE UP (ref 42.2–75.2)
NRBC # BLD: 0 /100 WBCS — SIGNIFICANT CHANGE UP (ref 0–0)
NT-PROBNP SERPL-SCNC: 2226 PG/ML — HIGH (ref 0–300)
PLATELET # BLD AUTO: 314 K/UL — SIGNIFICANT CHANGE UP (ref 130–400)
POTASSIUM SERPL-MCNC: 4.2 MMOL/L — SIGNIFICANT CHANGE UP (ref 3.5–5)
POTASSIUM SERPL-SCNC: 4.2 MMOL/L — SIGNIFICANT CHANGE UP (ref 3.5–5)
PROT SERPL-MCNC: 7.2 G/DL — SIGNIFICANT CHANGE UP (ref 6–8)
PROTHROM AB SERPL-ACNC: 24.3 SEC — HIGH (ref 9.95–12.87)
RBC # BLD: 3.83 M/UL — LOW (ref 4.7–6.1)
RBC # FLD: 13.8 % — SIGNIFICANT CHANGE UP (ref 11.5–14.5)
SARS-COV-2 RNA SPEC QL NAA+PROBE: SIGNIFICANT CHANGE UP
SODIUM SERPL-SCNC: 138 MMOL/L — SIGNIFICANT CHANGE UP (ref 135–146)
TROPONIN T SERPL-MCNC: 0.03 NG/ML — CRITICAL HIGH
TROPONIN T SERPL-MCNC: 0.04 NG/ML — CRITICAL HIGH
WBC # BLD: 9.84 K/UL — SIGNIFICANT CHANGE UP (ref 4.8–10.8)
WBC # FLD AUTO: 9.84 K/UL — SIGNIFICANT CHANGE UP (ref 4.8–10.8)

## 2022-04-25 PROCEDURE — 99285 EMERGENCY DEPT VISIT HI MDM: CPT | Mod: FS

## 2022-04-25 PROCEDURE — 93010 ELECTROCARDIOGRAM REPORT: CPT | Mod: 77

## 2022-04-25 PROCEDURE — 93010 ELECTROCARDIOGRAM REPORT: CPT

## 2022-04-25 PROCEDURE — 71045 X-RAY EXAM CHEST 1 VIEW: CPT | Mod: 26

## 2022-04-25 RX ORDER — LOSARTAN POTASSIUM 100 MG/1
100 TABLET, FILM COATED ORAL DAILY
Refills: 0 | Status: DISCONTINUED | OUTPATIENT
Start: 2022-04-25 | End: 2022-04-27

## 2022-04-25 RX ORDER — SIMVASTATIN 20 MG/1
40 TABLET, FILM COATED ORAL AT BEDTIME
Refills: 0 | Status: DISCONTINUED | OUTPATIENT
Start: 2022-04-25 | End: 2022-04-27

## 2022-04-25 RX ORDER — AMLODIPINE BESYLATE 2.5 MG/1
5 TABLET ORAL DAILY
Refills: 0 | Status: DISCONTINUED | OUTPATIENT
Start: 2022-04-25 | End: 2022-04-27

## 2022-04-25 RX ORDER — TAMSULOSIN HYDROCHLORIDE 0.4 MG/1
0.4 CAPSULE ORAL AT BEDTIME
Refills: 0 | Status: DISCONTINUED | OUTPATIENT
Start: 2022-04-25 | End: 2022-04-27

## 2022-04-25 RX ORDER — DEXTROSE 50 % IN WATER 50 %
25 SYRINGE (ML) INTRAVENOUS ONCE
Refills: 0 | Status: DISCONTINUED | OUTPATIENT
Start: 2022-04-25 | End: 2022-04-27

## 2022-04-25 RX ORDER — WARFARIN SODIUM 2.5 MG/1
6 TABLET ORAL ONCE
Refills: 0 | Status: COMPLETED | OUTPATIENT
Start: 2022-04-25 | End: 2022-04-25

## 2022-04-25 RX ORDER — GLUCAGON INJECTION, SOLUTION 0.5 MG/.1ML
1 INJECTION, SOLUTION SUBCUTANEOUS ONCE
Refills: 0 | Status: DISCONTINUED | OUTPATIENT
Start: 2022-04-25 | End: 2022-04-27

## 2022-04-25 RX ORDER — DEXTROSE 50 % IN WATER 50 %
12.5 SYRINGE (ML) INTRAVENOUS ONCE
Refills: 0 | Status: DISCONTINUED | OUTPATIENT
Start: 2022-04-25 | End: 2022-04-27

## 2022-04-25 RX ORDER — FUROSEMIDE 40 MG
60 TABLET ORAL DAILY
Refills: 0 | Status: DISCONTINUED | OUTPATIENT
Start: 2022-04-25 | End: 2022-04-27

## 2022-04-25 RX ORDER — ZOLPIDEM TARTRATE 10 MG/1
5 TABLET ORAL AT BEDTIME
Refills: 0 | Status: DISCONTINUED | OUTPATIENT
Start: 2022-04-25 | End: 2022-04-27

## 2022-04-25 RX ORDER — DEXTROSE 50 % IN WATER 50 %
15 SYRINGE (ML) INTRAVENOUS ONCE
Refills: 0 | Status: DISCONTINUED | OUTPATIENT
Start: 2022-04-25 | End: 2022-04-27

## 2022-04-25 RX ORDER — FINASTERIDE 5 MG/1
5 TABLET, FILM COATED ORAL DAILY
Refills: 0 | Status: DISCONTINUED | OUTPATIENT
Start: 2022-04-25 | End: 2022-04-27

## 2022-04-25 RX ORDER — SODIUM CHLORIDE 9 MG/ML
1000 INJECTION, SOLUTION INTRAVENOUS
Refills: 0 | Status: DISCONTINUED | OUTPATIENT
Start: 2022-04-25 | End: 2022-04-27

## 2022-04-25 RX ORDER — METOPROLOL TARTRATE 50 MG
50 TABLET ORAL
Refills: 0 | Status: DISCONTINUED | OUTPATIENT
Start: 2022-04-25 | End: 2022-04-27

## 2022-04-25 RX ORDER — INSULIN LISPRO 100/ML
VIAL (ML) SUBCUTANEOUS
Refills: 0 | Status: DISCONTINUED | OUTPATIENT
Start: 2022-04-25 | End: 2022-04-27

## 2022-04-25 RX ORDER — ASPIRIN/CALCIUM CARB/MAGNESIUM 324 MG
81 TABLET ORAL DAILY
Refills: 0 | Status: DISCONTINUED | OUTPATIENT
Start: 2022-04-25 | End: 2022-04-27

## 2022-04-25 RX ADMIN — TAMSULOSIN HYDROCHLORIDE 0.4 MILLIGRAM(S): 0.4 CAPSULE ORAL at 22:01

## 2022-04-25 RX ADMIN — Medication 60 MILLIGRAM(S): at 11:53

## 2022-04-25 RX ADMIN — ZOLPIDEM TARTRATE 5 MILLIGRAM(S): 10 TABLET ORAL at 22:01

## 2022-04-25 RX ADMIN — WARFARIN SODIUM 6 MILLIGRAM(S): 2.5 TABLET ORAL at 22:01

## 2022-04-25 RX ADMIN — Medication 50 MILLIGRAM(S): at 15:39

## 2022-04-25 RX ADMIN — SIMVASTATIN 40 MILLIGRAM(S): 20 TABLET, FILM COATED ORAL at 22:01

## 2022-04-25 RX ADMIN — AMLODIPINE BESYLATE 5 MILLIGRAM(S): 2.5 TABLET ORAL at 15:39

## 2022-04-25 RX ADMIN — LOSARTAN POTASSIUM 100 MILLIGRAM(S): 100 TABLET, FILM COATED ORAL at 15:39

## 2022-04-25 NOTE — H&P ADULT - NSHPLABSRESULTS_GEN_ALL_CORE
LABS:  cret                        11.2   9.84  )-----------( 314      ( 25 Apr 2022 10:41 )             34.7     04-25    138  |  106  |  22<H>  ----------------------------<  159<H>  4.2   |  18  |  1.0    Ca    10.1      25 Apr 2022 10:41    TPro  7.2  /  Alb  4.6  /  TBili  0.7  /  DBili  x   /  AST  21  /  ALT  18  /  AlkPhos  209<H>  04-25    PT/INR - ( 25 Apr 2022 10:41 )   PT: 24.30 sec;   INR: 2.13 ratio         PTT - ( 25 Apr 2022 10:41 )  PTT:41.8 sec    Serum Pro-Brain Natriuretic Peptide: 2226 pg/mL (04.25.22 @ 10:41)     Troponin T, Serum: 0.04: TYPE:(C=Critical, N=Notification, A=Abnormal) c

## 2022-04-25 NOTE — PATIENT PROFILE ADULT - FALL HARM RISK - HARM RISK INTERVENTIONS
Assistance with ambulation/Assistance OOB with selected safe patient handling equipment/Communicate Risk of Fall with Harm to all staff/Discuss with provider need for PT consult/Monitor gait and stability/Provide patient with walking aids - walker, cane, crutches/Reinforce activity limits and safety measures with patient and family/Review medications for side effects contributing to fall risk/Sit up slowly, dangle for a short time, stand at bedside before walking/Tailored Fall Risk Interventions/Use of alarms - bed, chair and/or voice tab/Visual Cue: Yellow wristband and red socks/Bed in lowest position, wheels locked, appropriate side rails in place/Call bell, personal items and telephone in reach/Instruct patient to call for assistance before getting out of bed or chair/Non-slip footwear when patient is out of bed/[place] to call system/Physically safe environment - no spills, clutter or unnecessary equipment/Purposeful Proactive Rounding/Room/bathroom lighting operational, light cord in reach

## 2022-04-25 NOTE — ED ADULT NURSE NOTE - NSIMPLEMENTINTERV_GEN_ALL_ED
Implemented All Fall with Harm Risk Interventions:  [place] to call system. Call bell, personal items and telephone within reach. Instruct patient to call for assistance. Room bathroom lighting operational. Non-slip footwear when patient is off stretcher. Physically safe environment: no spills, clutter or unnecessary equipment. Stretcher in lowest position, wheels locked, appropriate side rails in place. Provide visual cue, wrist band, yellow gown, etc. Monitor gait and stability. Monitor for mental status changes and reorient to person, place, and time. Review medications for side effects contributing to fall risk. Reinforce activity limits and safety measures with patient and family. Provide visual clues: red socks.

## 2022-04-25 NOTE — ED PROVIDER NOTE - NS ED ROS FT
Constitutional: (-) fever (-) malaise (-) diaphoresis (-) chills   Eyes: (-) visual changes (-) eye pain (-) eye discharge (-) photophobia (-) FB sensation  ENMT: (-) nasal or chest congestion (-) runny nose (-) sore throat (-) hoarseness  (-) hearing changes (-) ear pain (-) ear discharge or infections (-) neck pain (-) neck stiffness  Cardiac: (+) chest pain  (-) palpitations (-) syncope (-) edema  Respiratory: (-) cough (+) SOB (-) PELAYO  GI: (-) nausea (-) vomiting (-) diarrhea (-) abdominal pain  : (-) dysuria (-) increased frequency  (-) hematuria (-) incontinence  MS: (-) back pain (-) myalgia (-) muscle weakness (-)  joint pain  Neuro: (-) headache (-) dizziness (-) numbness/tingling to extremities B/L (-) weakness  Skin: (-) rash (-) laceration    Except as documented in the HPI, all other systems are negative.

## 2022-04-25 NOTE — H&P ADULT - NSHPPHYSICALEXAM_GEN_ALL_CORE
PHYSICAL EXAM:  GENERAL: NAD, lying in bed comfortably  HEAD:  Atraumatic, Normocephalic  EYES: EOMI, PERRLA, conjunctiva and sclera clear  ENT: Moist mucous membranes  NECK: Supple, No JVD  CHEST/LUNG: Decreased BS on the right base; No rales, rhonchi, wheezing, or rubs. Unlabored respirations  HEART: Regular rate and  irregular rhythm; 3/5 systolic murmurs on aortic, rubs, or gallops  ABDOMEN: Bowel sounds present; Soft, Nontender, Nondistended. No hepatomegaly  EXTREMITIES:  3+ peripheral pitting edema up to knees b/l, 2+ Peripheral Pulses, brisk capillary refill. No clubbing, cyanosis  NERVOUS SYSTEM:  Alert & Oriented X3, speech clear. No deficits   MSK: FROM all 4 extremities, full and equal strength  SKIN: No rashes or lesions

## 2022-04-25 NOTE — ED PROVIDER NOTE - NS ED ATTENDING STATEMENT MOD
This was a shared visit with the MILLICENT. I reviewed and verified the documentation and independently performed the documented:

## 2022-04-25 NOTE — ED PROVIDER NOTE - ATTENDING APP SHARED VISIT CONTRIBUTION OF CARE
88-year-old man, history of hypertension, COPD without home O2, diabetes, AS status post TAVR, CAD status post CABG, PPM, A. fib on Coumadin, presents with progressive shortness of breath on exertion over the last 1 week associated with some tightness in his chest.  Initially symptoms were just with exertion, now feels some at rest.  No annika chest pain, radiation to back or arms, nausea, vomiting.  Reports orthopnea and lower extremity edema.  He has been prescribed Lasix, but only "as needed," but patient did not realize current symptoms might be an indication to take it.  On exam he is mildly tachypneic, but alert, otherwise well-appearing, looks younger than stated age.  Lungs with decreased breath sounds at the bases, CV S1-S2, abdomen soft, nontender, 2+ bilateral pitting edema with chronic overlying skin changes.  Concern for fluid overload, less likely pneumonia or PE with this presentation.  Labs, chest x-ray, diuresis, admit.

## 2022-04-25 NOTE — ED PROVIDER NOTE - CARE PLAN
1 Principal Discharge DX:	CHF exacerbation  Secondary Diagnosis:	Elevated troponin  Secondary Diagnosis:	Pleural effusion

## 2022-04-25 NOTE — ED ADULT NURSE NOTE - OBJECTIVE STATEMENT
pt. is an 88yr male presenting to the ED for SOB x1 week pt. is an 88yr male presenting to the ED for SOB x1 week, pt. states that this morning SOB was worse than usual.

## 2022-04-25 NOTE — PATIENT PROFILE ADULT - ARE SIGNIFICANT INDICATORS COMPLETE.
5/24/2021       RE: Omid Major  [address]  [place] [address]     Dear Colleague,    Thank you for referring your patient, Omid Major, to the [facility] [place] at [facility]. Please see a copy of my visit note below.    Omid Major is a 58 year old male who is 9-10 weeks s/p [place] pouch creation. We saw him in the ER a feww weeks ago with an open wound to the left of the stoma. There was no communication with the stoma itself. We left a 12F catheter in the stoma during healing.       Exam:  Incision clean, dry, intact  No tenderness or evidence of cellulitis  No hematoma  Wound to the left of umbilical stoma is mostly closed -- now about pea-sized and good granulation tissue. No catheter palpable or visible in the wound.  12F catheter was removed and I catheterized him with a 14F coude catheter.      A/P   Excellent outcome after [place] pouch creation (no cystectomy)  He should cath and irrigate daily -- working toward volumes of 250cc.  F/U: 2 months for wound check.   He should arrange f/u with [facility] transplant. I asked Liane Segura RN, to help coordinate.       Again, thank you for allowing me to participate in the care of your patient.      Sincerely,    Severiano Justice MD   Yes

## 2022-04-25 NOTE — ED PROVIDER NOTE - CLINICAL SUMMARY MEDICAL DECISION MAKING FREE TEXT BOX
Labs with elevated BNP, chest x-ray with pulmonary congestion and bilateral pleural effusions.  Patient had an echo with an EF of 47% last summer, symptoms likely are due to heart failure.  Spoke with Dr. Armstrong, will admit telemetry for diuresis.  Patient feeling better on O2, and amenable to plan.

## 2022-04-25 NOTE — ED PROVIDER NOTE - PHYSICAL EXAMINATION
GENERAL: Well-nourished, Well-developed. NAD.  HEAD: No visible or palpable bumps or hematomas. No ecchymosis behind ears B/L.  Eyes: PERRLA, EOMI. No asymmetry. No nystagmus. No conjunctival injection. Non-icteric sclera.  ENMT: MMM.   Neck: Supple. FROM  CVS: RRR. Normal S1,S2. No murmurs appreciated on auscultation   RESP: No use of accessory muscles. Chest rise symmetrical with good expansion. Lungs clear to auscultation B/L. No wheezing, rales, or rhonchi auscultated.  GI: Normal auscultation of bowel sounds in all 4 quadrants. Soft, Nontender, Nondistended. No guarding or rebound tenderness. No CVAT B/L.  Skin: Warm, Dry. No rashes or lesions. Good cap refill < 2 sec B/L.  EXT: Radial and pedal pulses present B/L. No calf tenderness or swelling B/L. No palpable cords. No pedal edema B/L. GENERAL: Well-nourished, Well-developed. NAD.  HEAD: No visible or palpable bumps or hematomas. No ecchymosis behind ears B/L.  Eyes: PERRLA, EOMI. No asymmetry. No nystagmus. No conjunctival injection. Non-icteric sclera.  ENMT: MMM.   Neck: Supple. FROM  CVS: RRR. Normal S1,S2. No murmurs appreciated on auscultation   RESP: No use of accessory muscles. Chest rise symmetrical with good expansion. (+)b/l basilar crackles  GI: Normal auscultation of bowel sounds in all 4 quadrants. Soft, Nontender, Nondistended. No guarding or rebound tenderness. No CVAT B/L.  Skin: Warm, Dry. No rashes or lesions. Good cap refill < 2 sec B/L.  EXT: Radial and pedal pulses present B/L. No calf tenderness or swelling B/L. No palpable cords. No pedal edema B/L.

## 2022-04-25 NOTE — ED PROVIDER NOTE - OBJECTIVE STATEMENT
89 yo M pmhx DM, CAD s/p CABG, AS s/p TAVR, pacemaker, afib on coumadin, hld, htn, copd (not on home O2) presenting to the ED for evaluation of persistent sob x 1 week associated with chest tightness. Pt reports symptoms present at rest, denies any modifying factors. Denies fever, chills, cough, abd pain, nausea, vomiting, LE swelling, calf pain. 89 yo M pmhx DM, chf, CAD s/p CABG, AS s/p TAVR, pacemaker, afib on coumadin, hld, htn, copd (not on home O2) presenting to the ED for evaluation of persistent sob x 1 week associated with chest tightness. Pt reports symptoms present at rest, denies any modifying factors. Denies fever, chills, cough, abd pain, nausea, vomiting, LE swelling, calf pain.

## 2022-04-25 NOTE — H&P ADULT - ATTENDING COMMENTS
Patient seen and examined independently of resident    89yo M hx of aortic stenosis s/p TAVR (2016), CAD s/p PCI and CABG 3v, chronic atrial fibrillation (on Coumadin s/p MAZE procedure). 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    #Acute on chronic HFmrEF - Much better after furosemide   #Tropinemia - Stable   #CAD s/p PCI, CABG  #HLD  #HTN - Slightly elevated   Now on oxygen, sitting and comfortable  Speaking in full sentences  - Continue IV diuresis   - Trend troponins   - Check TTE   - Continue home medications: aspirin, simvastatin 40, metroprolol 50 BID (can change to succinate on discharge), losartan 100  - Daily I/Os and weight measurement   - Cardiology consult placed     #Chronic atrial fibrillation. CHADSVASC = 6  # AS s/p TAVR   - Continue warfarin for AC  - Check INR daily     # Normocytic Anemia - Will check iron studies     # Diabetes - FS controlled   - Insulin while in the hospital     #COPD  - not on home O2  - maintain O2 89-92%  - Will likely need triple therapy on discharge     #BPH  - cont flomax and finasteride    #DVT ppx: coumadin  #Activity: as tolerated   #Diet: Diabetic   #Code: full  #Dispo: acute. needs active diuresis  #Handoff: admit to tele. Patient seen and examined independently of resident    89yo M hx of aortic stenosis s/p TAVR (2016), CAD s/p PCI and CABG 3v, chronic atrial fibrillation (on Coumadin s/p MAZE procedure). 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    #Acute on chronic HFmrEF - Much better after furosemide   #Tropinemia - Stable   #CAD s/p PCI, CABG  #HLD  #HTN - Slightly elevated   Now on oxygen, sitting and comfortable  Speaking in full sentences  - Continue IV diuresis   - Trend troponins   - Check TTE   - Continue home medications: aspirin, simvastatin 40, metroprolol 50 BID (can change to succinate on discharge), losartan 100  - Daily I/Os and weight measurement   - Cardiology consult placed   - Continue current "HTN regimen for now. Reasonable given volume overload. Can maximize amlodipine if needed.     #Chronic atrial fibrillation. CHADSVASC = 6  # AS s/p TAVR   - Continue warfarin for AC  - Check INR daily     # Normocytic Anemia - Will check iron studies     # Diabetes - FS controlled   - Insulin while in the hospital     #COPD  - not on home O2  - maintain O2 89-92%  - Will likely need triple therapy on discharge     #BPH  - cont flomax and finasteride    #DVT ppx: coumadin  #Activity: as tolerated   #Diet: Diabetic   #Code: full  #Dispo: acute. needs active diuresis  #Handoff: admit to tele. Patient seen and examined independently of resident    87yo M hx of aortic stenosis s/p TAVR (2016), CAD s/p PCI and CABG 3v, chronic atrial fibrillation (on Coumadin s/p MAZE procedure). 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    Subjective   The patient is not much more comfortable. Sitting. Able to speak in full sentences   No distress, no chest pain, no palpitations. Mild shortness of breath still.     Objective   GENERAL: NAD, comforatble  NECK: Supple, No JVD  CHEST/LUNG: Decreased BS on the right base. No rales appreciated.   HEART: Regular rate and  irregular rhythm; 3/5 systolic murmurs on aortic site  ABDOMEN: Bowel sounds present; Soft, Nontender, Nondistended.   EXTREMITIES:  3+ peripheral pitting edema up to knees b/l  NERVOUS SYSTEM:  Alert & Oriented X3, speech clear. No deficits   MSK: FROM all 4 extremities, full and equal strength    #Acute on chronic HFmrEF - Much better after furosemide   #Tropinemia - Stable   #CAD s/p PCI, CABG  #HLD  #HTN - Slightly elevated   Now on oxygen, sitting and comfortable  Speaking in full sentences  - Continue IV diuresis   - Trend troponins   - Check TTE   - Continue home medications: aspirin, simvastatin 40, metroprolol 50 BID (can change to succinate on discharge), losartan 100  - Daily I/Os and weight measurement   - Cardiology consult placed   - Continue current "HTN regimen for now. Reasonable given volume overload. Can maximize amlodipine if needed.     #Chronic atrial fibrillation. CHADSVASC = 6  # AS s/p TAVR   - Continue warfarin for AC  - Check INR daily     # Normocytic Anemia - Will check iron studies     # Diabetes - FS controlled   - Insulin while in the hospital     #COPD  - not on home O2  - maintain O2 89-92%  - Will likely need triple therapy on discharge     #BPH  - cont flomax and finasteride    #DVT ppx: coumadin  #Activity: as tolerated   #Diet: Diabetic   #Code: full  #Dispo: acute. needs active diuresis  #Handoff: admit to tele.

## 2022-04-25 NOTE — H&P ADULT - ASSESSMENT
89yo M hx of aortic stenosis s/p TAVR (2016), CAD s/p PCI and CABG 3v, chronic atrial fibrillation (on Coumadin s/p MAZE procedure). 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    IMPRESSION  #Respiratory distress and substernal chest pain 2/2 acute CHF exacerbation  #HFmrEF s/p PPM  #Tropinemia  #Chronic atrial fibrillation s/p MAZE on coumadin  #AS s/p TAVR  #CAD s/p PCI and CABG 3v  #HTN  #HLD  #COPD not on home O2  #Normocytic anemia  #Borderline DM2    PLAN    #Acute on chronic HFmrEF  #Tropinemia  #CAD s/p PCI, CABG  #HLD  #HTN  - On admission, BNP 2.2K (stable from last year), currently on RA  - Cont lasix 60mg IV QD  - cont ASA 81mg, simvastatin 40mg, metoprolol tart 50mg BID, losartan 100mg QD  - keep HR < 120, -150  - strict I/Os, daily weights, fluid restriction  - trend trops.  - f/u Chest Xray  - check TSH  - TTE  - admit to tele  - f/u cardio. Sees Dr. Armstrong     #Chronic atrial fibrillation. CHADSVASC = 6  #AS s/p TAVR  - s/p MAZE, has PPM  - cont warfarin  - cont metoprolol tart BID    #Anemia  - check iron studies    #DM2  - takes metformin 500mg BID at home. last A1c was 6  - will check a1c, SSI    #COPD  - not on home O2  - maintain O2 89-92%    #BPH  - cont flomax and finasteride    #DVT ppx: coumadin  #GI ppx: none  #Activity: AAT. Assess functional status with PT  #Diet: DASH/TLC. Assess A1c and blood sugars. If needed, can add carb restrictions  #Code: full  #Dispo: acute. needs active diuresis  #Handoff: admit to tele.  87yo M hx of aortic stenosis s/p TAVR (2016), CAD s/p PCI and CABG 3v, chronic atrial fibrillation (on Coumadin s/p MAZE procedure). 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    IMPRESSION  #Respiratory distress and substernal chest pain 2/2 acute CHF exacerbation  #HFmrEF s/p PPM  #Tropinemia  #Chronic atrial fibrillation s/p MAZE on coumadin  #AS s/p TAVR  #CAD s/p PCI and CABG 3v  #HTN  #HLD  #COPD not on home O2  #Normocytic anemia  #DM2    PLAN    #Acute on chronic HFmrEF  #Tropinemia  #CAD s/p PCI, CABG  #HLD  #HTN  - On admission, BNP 2.2K (stable from last year), currently on RA  - Cont lasix 60mg IV QD  - cont ASA 81mg, simvastatin 40mg, metoprolol tart 50mg BID, losartan 100mg QD  - keep HR < 120, -150  - strict I/Os, daily weights, fluid restriction  - trend trops.  - f/u Chest Xray  - check TSH  - TTE  - admit to tele  - f/u cardio. Sees Dr. Armstrong     #Chronic atrial fibrillation. CHADSVASC = 6  #AS s/p TAVR  - s/p MAZE, has PPM  - cont warfarin  - cont metoprolol tart BID    #Anemia  - check iron studies    #DM2  - takes metformin 500mg BID at home. last A1c was 6  - will check a1c, SSI    #COPD  - not on home O2  - maintain O2 89-92%    #BPH  - cont flomax and finasteride    #DVT ppx: coumadin  #GI ppx: none  #Activity: AAT. Assess functional status with PT  #Diet: DASH/TLC. Assess A1c and blood sugars. If needed, can add carb restrictions  #Code: full  #Dispo: acute. needs active diuresis  #Handoff: admit to tele.

## 2022-04-25 NOTE — H&P ADULT - HISTORY OF PRESENT ILLNESS
87yo M hx of aortic stenosis s/p TAVR (2016), CAD s/p PCI and CABG 3v, paroxysmal atrial fibrillation (on Coumadin s/p MAZE procedure). 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 The chest pain is present at rest. Denies fevers, chills, cough, abdominal pain, n/v, LE swelling, calf pain    At the ED, patient was slightly hypertensive (153/70), SpO2 96% on RA. Labs were significant for trops- 0.04 (appears stable compared with 7/2021), BNP 2.2K (appears stable from 7/2021). CXR was performed, slight blunting of the right costophrenic angle. Patient was started on lasix 60mg IV QD.  87yo M hx of aortic stenosis s/p TAVR (2016), CAD s/p PCI and CABG 3v, chronic atrial fibrillation (on Coumadin s/p MAZE procedure). 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 He also endorses some wheezing occuring at night that week, and swelling of his LE that is worse than his baseline. The chest pain was progressively getting worse (on and off) and present at rest. He denies fevers, sick contacts, weight change, headaches, lightheadedness, syncope, palpitations, n/v, abdominal pain, diarrhea, dysuria. Of note, patient endorses having high BPs at the clinic (as high as SBP of 190) and despite restricting his caloric intake in his diet, he has not noticed any weight loss.     At the ED, patient was slightly hypertensive (153/70), SpO2 96% on RA. Labs were significant for trops- 0.04 (appears stable compared with 7/2021), BNP 2.2K (appears stable from 7/2021). CXR was performed, slight blunting of the right costophrenic angle. Patient was started on lasix 60mg IV QD. Patient was given supplemental O2 for comfort, now currently on RA. Patient states that his chest pain and shortness of breath have improved significantly with lasix treatment

## 2022-04-26 LAB
A1C WITH ESTIMATED AVERAGE GLUCOSE RESULT: 6.3 % — HIGH (ref 4–5.6)
ALBUMIN SERPL ELPH-MCNC: 4.2 G/DL — SIGNIFICANT CHANGE UP (ref 3.5–5.2)
ALP SERPL-CCNC: 198 U/L — HIGH (ref 30–115)
ALT FLD-CCNC: 17 U/L — SIGNIFICANT CHANGE UP (ref 0–41)
ANION GAP SERPL CALC-SCNC: 16 MMOL/L — HIGH (ref 7–14)
APTT BLD: 43.2 SEC — HIGH (ref 27–39.2)
AST SERPL-CCNC: 18 U/L — SIGNIFICANT CHANGE UP (ref 0–41)
BILIRUB SERPL-MCNC: 0.7 MG/DL — SIGNIFICANT CHANGE UP (ref 0.2–1.2)
BUN SERPL-MCNC: 25 MG/DL — HIGH (ref 10–20)
CALCIUM SERPL-MCNC: 9.8 MG/DL — SIGNIFICANT CHANGE UP (ref 8.5–10.1)
CHLORIDE SERPL-SCNC: 109 MMOL/L — SIGNIFICANT CHANGE UP (ref 98–110)
CHOLEST SERPL-MCNC: 97 MG/DL — SIGNIFICANT CHANGE UP
CO2 SERPL-SCNC: 19 MMOL/L — SIGNIFICANT CHANGE UP (ref 17–32)
CREAT SERPL-MCNC: 1.1 MG/DL — SIGNIFICANT CHANGE UP (ref 0.7–1.5)
EGFR: 65 ML/MIN/1.73M2 — SIGNIFICANT CHANGE UP
ESTIMATED AVERAGE GLUCOSE: 134 MG/DL — HIGH (ref 68–114)
FERRITIN SERPL-MCNC: 131 NG/ML — SIGNIFICANT CHANGE UP (ref 30–400)
GLUCOSE BLDC GLUCOMTR-MCNC: 112 MG/DL — HIGH (ref 70–99)
GLUCOSE BLDC GLUCOMTR-MCNC: 119 MG/DL — HIGH (ref 70–99)
GLUCOSE BLDC GLUCOMTR-MCNC: 126 MG/DL — HIGH (ref 70–99)
GLUCOSE BLDC GLUCOMTR-MCNC: 154 MG/DL — HIGH (ref 70–99)
GLUCOSE BLDC GLUCOMTR-MCNC: 197 MG/DL — HIGH (ref 70–99)
GLUCOSE SERPL-MCNC: 121 MG/DL — HIGH (ref 70–99)
HCT VFR BLD CALC: 33.7 % — LOW (ref 42–52)
HDLC SERPL-MCNC: 49 MG/DL — SIGNIFICANT CHANGE UP
HGB BLD-MCNC: 10.8 G/DL — LOW (ref 14–18)
INR BLD: 2.73 RATIO — HIGH (ref 0.65–1.3)
LIPID PNL WITH DIRECT LDL SERPL: 25 MG/DL — SIGNIFICANT CHANGE UP
MAGNESIUM SERPL-MCNC: 1.9 MG/DL — SIGNIFICANT CHANGE UP (ref 1.8–2.4)
MCHC RBC-ENTMCNC: 28.6 PG — SIGNIFICANT CHANGE UP (ref 27–31)
MCHC RBC-ENTMCNC: 32 G/DL — SIGNIFICANT CHANGE UP (ref 32–37)
MCV RBC AUTO: 89.2 FL — SIGNIFICANT CHANGE UP (ref 80–94)
NON HDL CHOLESTEROL: 48 MG/DL — SIGNIFICANT CHANGE UP
NRBC # BLD: 0 /100 WBCS — SIGNIFICANT CHANGE UP (ref 0–0)
PLATELET # BLD AUTO: 292 K/UL — SIGNIFICANT CHANGE UP (ref 130–400)
POTASSIUM SERPL-MCNC: 3.9 MMOL/L — SIGNIFICANT CHANGE UP (ref 3.5–5)
POTASSIUM SERPL-SCNC: 3.9 MMOL/L — SIGNIFICANT CHANGE UP (ref 3.5–5)
PROT SERPL-MCNC: 7 G/DL — SIGNIFICANT CHANGE UP (ref 6–8)
PROTHROM AB SERPL-ACNC: 31.1 SEC — HIGH (ref 9.95–12.87)
RBC # BLD: 3.78 M/UL — LOW (ref 4.7–6.1)
RBC # FLD: 13.8 % — SIGNIFICANT CHANGE UP (ref 11.5–14.5)
SODIUM SERPL-SCNC: 144 MMOL/L — SIGNIFICANT CHANGE UP (ref 135–146)
TRIGL SERPL-MCNC: 117 MG/DL — SIGNIFICANT CHANGE UP
TROPONIN T SERPL-MCNC: 0.05 NG/ML — CRITICAL HIGH
TSH SERPL-MCNC: 2.91 UIU/ML — SIGNIFICANT CHANGE UP (ref 0.27–4.2)
WBC # BLD: 8.89 K/UL — SIGNIFICANT CHANGE UP (ref 4.8–10.8)
WBC # FLD AUTO: 8.89 K/UL — SIGNIFICANT CHANGE UP (ref 4.8–10.8)

## 2022-04-26 PROCEDURE — 71045 X-RAY EXAM CHEST 1 VIEW: CPT | Mod: 26

## 2022-04-26 PROCEDURE — 99233 SBSQ HOSP IP/OBS HIGH 50: CPT

## 2022-04-26 RX ORDER — HYDRALAZINE HCL 50 MG
10 TABLET ORAL ONCE
Refills: 0 | Status: COMPLETED | OUTPATIENT
Start: 2022-04-26 | End: 2022-04-26

## 2022-04-26 RX ADMIN — Medication 81 MILLIGRAM(S): at 11:27

## 2022-04-26 RX ADMIN — LOSARTAN POTASSIUM 100 MILLIGRAM(S): 100 TABLET, FILM COATED ORAL at 05:21

## 2022-04-26 RX ADMIN — SIMVASTATIN 40 MILLIGRAM(S): 20 TABLET, FILM COATED ORAL at 21:58

## 2022-04-26 RX ADMIN — TAMSULOSIN HYDROCHLORIDE 0.4 MILLIGRAM(S): 0.4 CAPSULE ORAL at 21:58

## 2022-04-26 RX ADMIN — FINASTERIDE 5 MILLIGRAM(S): 5 TABLET, FILM COATED ORAL at 11:27

## 2022-04-26 RX ADMIN — Medication 1: at 11:42

## 2022-04-26 RX ADMIN — AMLODIPINE BESYLATE 5 MILLIGRAM(S): 2.5 TABLET ORAL at 05:21

## 2022-04-26 RX ADMIN — ZOLPIDEM TARTRATE 5 MILLIGRAM(S): 10 TABLET ORAL at 21:58

## 2022-04-26 RX ADMIN — Medication 10 MILLIGRAM(S): at 17:43

## 2022-04-26 RX ADMIN — Medication 60 MILLIGRAM(S): at 05:21

## 2022-04-26 NOTE — PHYSICAL THERAPY INITIAL EVALUATION ADULT - GENERAL OBSERVATIONS, REHAB EVAL
[id number] am Chart reviewed. Pt. seen out of bed in recliner , in No apparent distress , + IV lock, telemetry , denies pain, Pt. agreed to activity/therapy.

## 2022-04-26 NOTE — PROGRESS NOTE ADULT - SUBJECTIVE AND OBJECTIVE BOX
SUBJECTIVE:    Patient is a 88y old Male who presents with a chief complaint of CHF exacerbation (25 Apr 2022 13:19)    Currently admitted to medicine with the primary diagnosis of CHF exacerbation.  Today is hospital day 1d. This morning he is resting comfortably in bed and reports no new issues or overnight events.   Pt reports that his shortness of breath and LE edema improved. Pt denies chest pain.     PAST MEDICAL & SURGICAL HISTORY  Asthma with COPD    HLD (hyperlipidemia)    Essential hypertension    Aortic stenosis, severe    A-fib    DM (diabetes mellitus)    Pacemaker    BPH (benign prostatic hyperplasia)    S/P CABG x 3    History of CEA (carotid endarterectomy)    History of permanent cardiac pacemaker placement    H/O aortic valve replacement      ALLERGIES:  IV Contrast (Hives)    MEDICATIONS:  STANDING MEDICATIONS  amLODIPine   Tablet 5 milliGRAM(s) Oral daily  aspirin enteric coated 81 milliGRAM(s) Oral daily  dextrose 5%. 1000 milliLiter(s) IV Continuous <Continuous>  dextrose 5%. 1000 milliLiter(s) IV Continuous <Continuous>  dextrose 50% Injectable 25 Gram(s) IV Push once  dextrose 50% Injectable 12.5 Gram(s) IV Push once  dextrose 50% Injectable 25 Gram(s) IV Push once  finasteride 5 milliGRAM(s) Oral daily  furosemide   Injectable 60 milliGRAM(s) IV Push daily  glucagon  Injectable 1 milliGRAM(s) IntraMuscular once  insulin lispro (ADMELOG) corrective regimen sliding scale   SubCutaneous three times a day before meals  losartan 100 milliGRAM(s) Oral daily  metoprolol tartrate 50 milliGRAM(s) Oral two times a day  simvastatin 40 milliGRAM(s) Oral at bedtime  tamsulosin 0.4 milliGRAM(s) Oral at bedtime    PRN MEDICATIONS  dextrose Oral Gel 15 Gram(s) Oral once PRN  zolpidem 5 milliGRAM(s) Oral at bedtime PRN  zolpidem 5 milliGRAM(s) Oral at bedtime PRN    VITALS:   T(F): 97  HR: 50  BP: 170/70  RR: 18  SpO2: 96%    LABS:                        10.8   8.89  )-----------( 292      ( 26 Apr 2022 07:30 )             33.7     04-26    144  |  109  |  25<H>  ----------------------------<  121<H>  3.9   |  19  |  1.1    Ca    9.8      26 Apr 2022 07:30  Mg     1.9     04-26    TPro  7.0  /  Alb  4.2  /  TBili  0.7  /  DBili  x   /  AST  18  /  ALT  17  /  AlkPhos  198<H>  04-26    PT/INR - ( 26 Apr 2022 07:30 )   PT: 31.10 sec;   INR: 2.73 ratio         PTT - ( 26 Apr 2022 07:30 )  PTT:43.2 sec      Troponin T, Serum: 0.05 ng/mL *HH* (04-26-22 @ 07:30)  Troponin T, Serum: 0.03 ng/mL *HH* (04-25-22 @ 16:00)      CARDIAC MARKERS ( 26 Apr 2022 07:30 )  x     / 0.05 ng/mL / x     / x     / x      CARDIAC MARKERS ( 25 Apr 2022 16:00 )  x     / 0.03 ng/mL / x     / x     / x      CARDIAC MARKERS ( 25 Apr 2022 10:41 )  x     / 0.04 ng/mL / x     / x     / x        RADIOLOGY:  < from: Xray Chest 1 View- PORTABLE-Urgent (04.25.22 @ 13:40) >    IMPRESSION:    Bibasilaropacities, favor atelectasis. No pneumothorax.    < end of copied text >      PHYSICAL EXAM:  GEN: No acute distress  LUNGS: decreased breath sounds bilaterally, no wheezes,    HEART: Regular rate and rhythm, +s1 s2  ABD: Soft, non-tender, non-distended. +BS  EXT: pitting edema 2 +/2+PP/MARCH/Skin Intact.   NEURO: AAOX3     SUBJECTIVE:    Patient is a 88y old Male who presents with a chief complaint of CHF exacerbation (25 Apr 2022 13:19)    Currently admitted to medicine with the primary diagnosis of CHF exacerbation.  Today is hospital day 1d. This morning he is resting comfortably in bed and reports no new issues or overnight events.   Pt reports that his shortness of breath and LE edema improved. Pt denies chest pain.     ROS : no cp, sob improved     PAST MEDICAL & SURGICAL HISTORY  Asthma with COPD    HLD (hyperlipidemia)    Essential hypertension    Aortic stenosis, severe    A-fib    DM (diabetes mellitus)    Pacemaker    BPH (benign prostatic hyperplasia)    S/P CABG x 3    History of CEA (carotid endarterectomy)    History of permanent cardiac pacemaker placement    H/O aortic valve replacement      ALLERGIES:  IV Contrast (Hives)    MEDICATIONS:  STANDING MEDICATIONS  amLODIPine   Tablet 5 milliGRAM(s) Oral daily  aspirin enteric coated 81 milliGRAM(s) Oral daily  dextrose 5%. 1000 milliLiter(s) IV Continuous <Continuous>  dextrose 5%. 1000 milliLiter(s) IV Continuous <Continuous>  dextrose 50% Injectable 25 Gram(s) IV Push once  dextrose 50% Injectable 12.5 Gram(s) IV Push once  dextrose 50% Injectable 25 Gram(s) IV Push once  finasteride 5 milliGRAM(s) Oral daily  furosemide   Injectable 60 milliGRAM(s) IV Push daily  glucagon  Injectable 1 milliGRAM(s) IntraMuscular once  insulin lispro (ADMELOG) corrective regimen sliding scale   SubCutaneous three times a day before meals  losartan 100 milliGRAM(s) Oral daily  metoprolol tartrate 50 milliGRAM(s) Oral two times a day  simvastatin 40 milliGRAM(s) Oral at bedtime  tamsulosin 0.4 milliGRAM(s) Oral at bedtime    PRN MEDICATIONS  dextrose Oral Gel 15 Gram(s) Oral once PRN  zolpidem 5 milliGRAM(s) Oral at bedtime PRN  zolpidem 5 milliGRAM(s) Oral at bedtime PRN    VITALS:   T(F): 97  HR: 50  BP: 170/70  RR: 18  SpO2: 96%    LABS:                        10.8   8.89  )-----------( 292      ( 26 Apr 2022 07:30 )             33.7     04-26    144  |  109  |  25<H>  ----------------------------<  121<H>  3.9   |  19  |  1.1    Ca    9.8      26 Apr 2022 07:30  Mg     1.9     04-26    TPro  7.0  /  Alb  4.2  /  TBili  0.7  /  DBili  x   /  AST  18  /  ALT  17  /  AlkPhos  198<H>  04-26    PT/INR - ( 26 Apr 2022 07:30 )   PT: 31.10 sec;   INR: 2.73 ratio         PTT - ( 26 Apr 2022 07:30 )  PTT:43.2 sec      Troponin T, Serum: 0.05 ng/mL *HH* (04-26-22 @ 07:30)  Troponin T, Serum: 0.03 ng/mL *HH* (04-25-22 @ 16:00)      CARDIAC MARKERS ( 26 Apr 2022 07:30 )  x     / 0.05 ng/mL / x     / x     / x      CARDIAC MARKERS ( 25 Apr 2022 16:00 )  x     / 0.03 ng/mL / x     / x     / x      CARDIAC MARKERS ( 25 Apr 2022 10:41 )  x     / 0.04 ng/mL / x     / x     / x        RADIOLOGY:  < from: Xray Chest 1 View- PORTABLE-Urgent (04.25.22 @ 13:40) >    IMPRESSION:    Bibasilaropacities, favor atelectasis. No pneumothorax.    < end of copied text >      PHYSICAL EXAM:  GEN: No acute distress  LUNGS: decreased breath sounds bilaterally, no wheezes,    HEART: Regular rate and rhythm, +s1 s2  ABD: Soft, non-tender, non-distended. +BS  EXT: pitting edema 2 +/2+PP/MARCH/Skin Intact.   NEURO: AAOX3

## 2022-04-26 NOTE — PHYSICAL THERAPY INITIAL EVALUATION ADULT - PERTINENT HX OF CURRENT PROBLEM, REHAB EVAL
89yo M hx of aortic stenosis s/p TAVR (2016), CAD s/p PCI and CABG 3v, chronic atrial fibrillation (on Coumadin s/p MAZE procedure). 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

## 2022-04-26 NOTE — PHYSICAL THERAPY INITIAL EVALUATION ADULT - SPECIFY REASON(S)
Upon assessment pt is independent with transfers and ambulation using a rolling walker ; will not require skilled PT at this time , may recall our service as needed.

## 2022-04-27 ENCOUNTER — APPOINTMENT (OUTPATIENT)
Dept: MEDICATION MANAGEMENT | Facility: CLINIC | Age: 87
End: 2022-04-27

## 2022-04-27 ENCOUNTER — TRANSCRIPTION ENCOUNTER (OUTPATIENT)
Age: 87
End: 2022-04-27

## 2022-04-27 VITALS
SYSTOLIC BLOOD PRESSURE: 143 MMHG | HEART RATE: 59 BPM | DIASTOLIC BLOOD PRESSURE: 64 MMHG | TEMPERATURE: 97 F | RESPIRATION RATE: 18 BRPM

## 2022-04-27 LAB
ALBUMIN SERPL ELPH-MCNC: 4.5 G/DL — SIGNIFICANT CHANGE UP (ref 3.5–5.2)
ALP SERPL-CCNC: 213 U/L — HIGH (ref 30–115)
ALT FLD-CCNC: 18 U/L — SIGNIFICANT CHANGE UP (ref 0–41)
ANION GAP SERPL CALC-SCNC: 16 MMOL/L — HIGH (ref 7–14)
APTT BLD: 44.5 SEC — HIGH (ref 27–39.2)
AST SERPL-CCNC: 20 U/L — SIGNIFICANT CHANGE UP (ref 0–41)
BASOPHILS # BLD AUTO: 0.05 K/UL — SIGNIFICANT CHANGE UP (ref 0–0.2)
BASOPHILS NFR BLD AUTO: 0.5 % — SIGNIFICANT CHANGE UP (ref 0–1)
BILIRUB SERPL-MCNC: 0.7 MG/DL — SIGNIFICANT CHANGE UP (ref 0.2–1.2)
BUN SERPL-MCNC: 28 MG/DL — HIGH (ref 10–20)
CALCIUM SERPL-MCNC: 10 MG/DL — SIGNIFICANT CHANGE UP (ref 8.5–10.1)
CHLORIDE SERPL-SCNC: 106 MMOL/L — SIGNIFICANT CHANGE UP (ref 98–110)
CO2 SERPL-SCNC: 22 MMOL/L — SIGNIFICANT CHANGE UP (ref 17–32)
CREAT SERPL-MCNC: 1.1 MG/DL — SIGNIFICANT CHANGE UP (ref 0.7–1.5)
EGFR: 65 ML/MIN/1.73M2 — SIGNIFICANT CHANGE UP
EOSINOPHIL # BLD AUTO: 0.36 K/UL — SIGNIFICANT CHANGE UP (ref 0–0.7)
EOSINOPHIL NFR BLD AUTO: 3.9 % — SIGNIFICANT CHANGE UP (ref 0–8)
GLUCOSE BLDC GLUCOMTR-MCNC: 148 MG/DL — HIGH (ref 70–99)
GLUCOSE BLDC GLUCOMTR-MCNC: 211 MG/DL — HIGH (ref 70–99)
GLUCOSE SERPL-MCNC: 139 MG/DL — HIGH (ref 70–99)
HCT VFR BLD CALC: 35.3 % — LOW (ref 42–52)
HGB BLD-MCNC: 11.3 G/DL — LOW (ref 14–18)
IMM GRANULOCYTES NFR BLD AUTO: 0.4 % — HIGH (ref 0.1–0.3)
INR BLD: 3.18 RATIO — HIGH (ref 0.65–1.3)
LYMPHOCYTES # BLD AUTO: 2.14 K/UL — SIGNIFICANT CHANGE UP (ref 1.2–3.4)
LYMPHOCYTES # BLD AUTO: 23 % — SIGNIFICANT CHANGE UP (ref 20.5–51.1)
MAGNESIUM SERPL-MCNC: 2 MG/DL — SIGNIFICANT CHANGE UP (ref 1.8–2.4)
MCHC RBC-ENTMCNC: 28.8 PG — SIGNIFICANT CHANGE UP (ref 27–31)
MCHC RBC-ENTMCNC: 32 G/DL — SIGNIFICANT CHANGE UP (ref 32–37)
MCV RBC AUTO: 90.1 FL — SIGNIFICANT CHANGE UP (ref 80–94)
MONOCYTES # BLD AUTO: 1.26 K/UL — HIGH (ref 0.1–0.6)
MONOCYTES NFR BLD AUTO: 13.5 % — HIGH (ref 1.7–9.3)
NEUTROPHILS # BLD AUTO: 5.47 K/UL — SIGNIFICANT CHANGE UP (ref 1.4–6.5)
NEUTROPHILS NFR BLD AUTO: 58.7 % — SIGNIFICANT CHANGE UP (ref 42.2–75.2)
NRBC # BLD: 0 /100 WBCS — SIGNIFICANT CHANGE UP (ref 0–0)
PLATELET # BLD AUTO: 322 K/UL — SIGNIFICANT CHANGE UP (ref 130–400)
POTASSIUM SERPL-MCNC: 4.1 MMOL/L — SIGNIFICANT CHANGE UP (ref 3.5–5)
POTASSIUM SERPL-SCNC: 4.1 MMOL/L — SIGNIFICANT CHANGE UP (ref 3.5–5)
PROT SERPL-MCNC: 7.2 G/DL — SIGNIFICANT CHANGE UP (ref 6–8)
PROTHROM AB SERPL-ACNC: 36.1 SEC — HIGH (ref 9.95–12.87)
RBC # BLD: 3.92 M/UL — LOW (ref 4.7–6.1)
RBC # FLD: 13.8 % — SIGNIFICANT CHANGE UP (ref 11.5–14.5)
SODIUM SERPL-SCNC: 144 MMOL/L — SIGNIFICANT CHANGE UP (ref 135–146)
WBC # BLD: 9.32 K/UL — SIGNIFICANT CHANGE UP (ref 4.8–10.8)
WBC # FLD AUTO: 9.32 K/UL — SIGNIFICANT CHANGE UP (ref 4.8–10.8)

## 2022-04-27 PROCEDURE — 71045 X-RAY EXAM CHEST 1 VIEW: CPT | Mod: 26

## 2022-04-27 PROCEDURE — 99239 HOSP IP/OBS DSCHRG MGMT >30: CPT

## 2022-04-27 PROCEDURE — 93306 TTE W/DOPPLER COMPLETE: CPT | Mod: 26

## 2022-04-27 RX ADMIN — Medication 60 MILLIGRAM(S): at 06:03

## 2022-04-27 RX ADMIN — AMLODIPINE BESYLATE 5 MILLIGRAM(S): 2.5 TABLET ORAL at 06:04

## 2022-04-27 RX ADMIN — LOSARTAN POTASSIUM 100 MILLIGRAM(S): 100 TABLET, FILM COATED ORAL at 06:04

## 2022-04-27 RX ADMIN — Medication 2: at 11:53

## 2022-04-27 RX ADMIN — FINASTERIDE 5 MILLIGRAM(S): 5 TABLET, FILM COATED ORAL at 11:24

## 2022-04-27 RX ADMIN — Medication 81 MILLIGRAM(S): at 11:24

## 2022-04-27 NOTE — PROGRESS NOTE ADULT - ASSESSMENT
89yo M hx of aortic stenosis s/p TAVR (2016), CAD s/p PCI and CABG 3v, chronic atrial fibrillation (on Coumadin s/p MAZE procedure). 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    #Acute on chronic HFmrEF  #Tropinemia  #CAD s/p PCI, CABG  #HLD  #HTN  - On admission, BNP 2.2K (stable from last year), currently on RA  - Cont lasix 60mg IV QD  - cont ASA 81mg, simvastatin 40mg, metoprolol tart 50mg BID, losartan 100mg QD  - keep HR < 120, -150  - strict I/Os, daily weights, fluid restriction  - trend 0.03>0.04>0.05  - Chest Xray: Bibasilar opacities, favor atelectasis. No pneumothorax.  - TSH 2.9 WNL  - TTE  - f/u cardio Dr. Armstrong     #Chronic atrial fibrillation. CHADSVASC = 6  #AS s/p TAVR  - s/p MAZE, has PPM  - cont warfarin 6mg Mon Wed Friday  - f/u INR daily  - cont metoprolol tart BID    #Anemia  - check iron studies    #DM2  - takes metformin 500mg BID at home. l  -  a1c 6.3 4/26/22  - c/w SSI    #COPD  - not on home O2  - maintain O2 89-92%    #BPH  - cont flomax and finasteride    #DVT ppx: coumadin 6mg Mon Wed Friday  #GI ppx: none  #Activity: AAT. f/u PT  #Diet: DASH/TLC, CC  #Code: full  #Dispo: acute.  #Handoff: c/w tele, f/u ECHO; f/u cardio cs 
87yo M hx of aortic stenosis s/p TAVR (2016), CAD s/p PCI and CABG 3v, chronic atrial fibrillation (on Coumadin s/p MAZE procedure). 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    #Acute on chronic HFmrEF  #Tropinemia  #CAD s/p PCI, CABG  #HLD  #HTN  - On admission, BNP 2.2K (stable from last year), currently on RA  - Cont lasix 60mg IV QD  - cont ASA 81mg, simvastatin 40mg, metoprolol tart 50mg BID, losartan 100mg QD  - keep HR < 120, -150  - strict I/Os, daily weights, fluid restriction  - trend 0.03>0.04>0.05  - Chest Xray: Bibasilar opacities, favor atelectasis. No pneumothorax.  - TSH 2.9 WNL  - TTE  - f/u cardio Dr. Armstrong     #Chronic atrial fibrillation. CHADSVASC = 6  #AS s/p TAVR  - s/p MAZE, has PPM  - cont warfarin 6mg Mon Wed Friday  - f/u INR daily  - cont metoprolol tart BID    #Anemia  - check iron studies    #DM2  - takes metformin 500mg BID at home. l  -  a1c 6.3 4/26/22  - c/w SSI    #COPD  - not on home O2  - maintain O2 89-92%    #BPH  - cont flomax and finasteride    #DVT ppx: coumadin 6mg Mon Wed Friday  #GI ppx: none  #Activity: AAT. f/u PT  #Diet: DASH/TLC, CC  #Code: full  #Dispo: acute.  #Handoff: c/w tele, f/u cardio cs

## 2022-04-27 NOTE — DISCHARGE NOTE NURSING/CASE MANAGEMENT/SOCIAL WORK - NSDCVIVACCINE_GEN_ALL_CORE_FT
Tdap; 03-Jul-2021 15:00; Montserrat Kimble (CELIA); [employer]; [id number] (Exp. Date: 18-Nov-2022); IntraMuscular; Deltoid Left.; 0.5 milliLiter(s); VIS (VIS Published: 09-May-2013, VIS Presented: 03-Jul-2021);

## 2022-04-27 NOTE — DISCHARGE NOTE PROVIDER - NSDCCPCAREPLAN_GEN_ALL_CORE_FT
PRINCIPAL DISCHARGE DIAGNOSIS  Diagnosis: CHF exacerbation  Assessment and Plan of Treatment: Heart failure (HF) is a condition that does not allow your heart to fill or pump properly. Not enough oxygen in your blood gets to your organs and tissues. HF can occur in the right side, the left side, or both lower chambers of your heart. HF is often caused by damage or injury to your heart. The damage may be caused by heart attack, other heart conditions, or high blood pressure. HF is a long-term condition that tends to get worse over time. It is important to manage your health to improve your quality of life. HF can be worsened by heavy alcohol use, smoking, diabetes that is not controlled, or obesity.  Seek care immediately if:  You gain 3 or more pounds (1.4 kg) in a day  Your heartbeat is fast, slow, or uneven all the time.  Do not smoke. Nicotine and other chemicals in cigarettes and cigars can cause lung damage and make HF difficult to manage.   Do not drink alcohol or take illegal drugs. . Weigh yourself every morning. Use the same scale, in the same spot. Do this after you use the bathroom, but before you eat or drink anything. Record your weight each day so you will notice any sudden weight gain. Swelling and weight gain are signs of fluid retention.Manage any chronic health conditions you have. These include high blood pressure, diabetes, obesity, high cholesterol, metabolic syndrome, and COPD. Stay active. If you are not active, your symptoms are likely to worsen quickly. Get a flu shot every year. You may also need the pneumonia vaccine. The flu and pneumonia can be severe for a person who has HF.   Follow up Dr. Armstrong in 2-3 weeks. Please repeat bmp, cbc outpatient .

## 2022-04-27 NOTE — DISCHARGE NOTE PROVIDER - PROVIDER TOKENS
PROVIDER:[TOKEN:[[id number]],FOLLOWUP:[2 weeks]],PROVIDER:[TOKEN:[[id number]],FOLLOWUP:[1 week]]

## 2022-04-27 NOTE — PROGRESS NOTE ADULT - TIME BILLING
#Shortness of breath #Shortness of breath, presumed due to volume overload  cxr with bl opacities/ effusions small  no hypoxia, satting well on ra  tte 7/2021 preserved ef, grade iii diastolic dysfunction  lasix iv 60 daily; change to torsemide 20 po  d/w cards Dr. Armstrong, stable for d/c, needs outpt cards f/u 2-3 weeks with bmp, cbc  #Elevated cardiac enzymes  near baseline  no cp  ekg paced  outpt cards f/u as above

## 2022-04-27 NOTE — DISCHARGE NOTE NURSING/CASE MANAGEMENT/SOCIAL WORK - PATIENT PORTAL LINK FT
You can access the FollowMyHealth Patient Portal offered by [facility] by registering at the following website: [email or web address]. By joining [email or web address]’s FollowMyHealth portal, you will also be able to view your health information using other applications (apps) compatible with our system.

## 2022-04-27 NOTE — PROGRESS NOTE ADULT - SUBJECTIVE AND OBJECTIVE BOX
SUBJECTIVE:    Patient is a 88y old Male who presents with a chief complaint of CHF exacerbation (26 Apr 2022 12:27)    Currently admitted to medicine with the primary diagnosis of CHF exacerbation.     Today is hospital day 2d. This morning he is resting comfortably in bed and reports no new issues or overnight events.     PAST MEDICAL & SURGICAL HISTORY  Asthma with COPD    HLD (hyperlipidemia)    Essential hypertension    Aortic stenosis, severe    A-fib    DM (diabetes mellitus)    Pacemaker    BPH (benign prostatic hyperplasia)    S/P CABG x 3    History of CEA (carotid endarterectomy)    History of permanent cardiac pacemaker placement    H/O aortic valve replacement    ALLERGIES:  IV Contrast (Hives)    MEDICATIONS:  STANDING MEDICATIONS  amLODIPine   Tablet 5 milliGRAM(s) Oral daily  aspirin enteric coated 81 milliGRAM(s) Oral daily  dextrose 5%. 1000 milliLiter(s) IV Continuous <Continuous>  dextrose 5%. 1000 milliLiter(s) IV Continuous <Continuous>  dextrose 50% Injectable 25 Gram(s) IV Push once  dextrose 50% Injectable 12.5 Gram(s) IV Push once  dextrose 50% Injectable 25 Gram(s) IV Push once  finasteride 5 milliGRAM(s) Oral daily  furosemide   Injectable 60 milliGRAM(s) IV Push daily  glucagon  Injectable 1 milliGRAM(s) IntraMuscular once  insulin lispro (ADMELOG) corrective regimen sliding scale   SubCutaneous three times a day before meals  losartan 100 milliGRAM(s) Oral daily  metoprolol tartrate 50 milliGRAM(s) Oral two times a day  simvastatin 40 milliGRAM(s) Oral at bedtime  tamsulosin 0.4 milliGRAM(s) Oral at bedtime    PRN MEDICATIONS  dextrose Oral Gel 15 Gram(s) Oral once PRN  zolpidem 5 milliGRAM(s) Oral at bedtime PRN  zolpidem 5 milliGRAM(s) Oral at bedtime PRN    VITALS:   T(F): 96.9  HR: 72  BP: 167/72  RR: 18  SpO2: 97%    LABS:                        11.3   9.32  )-----------( 322      ( 27 Apr 2022 08:30 )             35.3     04-27    144  |  106  |  28<H>  ----------------------------<  139<H>  4.1   |  22  |  1.1    Ca    10.0      27 Apr 2022 08:30  Mg     2.0     04-27    TPro  7.2  /  Alb  4.5  /  TBili  0.7  /  DBili  x   /  AST  20  /  ALT  18  /  AlkPhos  213<H>  04-27    PT/INR - ( 27 Apr 2022 08:30 )   PT: 36.10 sec;   INR: 3.18 ratio         PTT - ( 27 Apr 2022 08:30 )  PTT:44.5 sec          CARDIAC MARKERS ( 26 Apr 2022 07:30 )  x     / 0.05 ng/mL / x     / x     / x      CARDIAC MARKERS ( 25 Apr 2022 16:00 )  x     / 0.03 ng/mL / x     / x     / x          RADIOLOGY:    PHYSICAL EXAM:  GEN: No acute distress  LUNGS: decreased breath sounds bilaterally, no wheezes,    HEART: Regular rate and rhythm, +s1 s2  ABD: Soft, non-tender, non-distended. +BS  EXT: pitting edema 2 +/2+PP/MARCH/Skin Intact.   NEURO: AAOX3

## 2022-04-27 NOTE — PROGRESS NOTE ADULT - ATTENDING COMMENTS
as below
a/p  # HF acute on chronic HFpEF, cont lasix monitor output , fluid restriction  # Asthma with COPD  # HLD (hyperlipidemia)  # Essential hypertension  # Aortic stenosis, severe, hx of AVR  # A-fib and status PPM cont meds  # DM monitor fingerstick , insulin per protocol   # BPH cont meds  # CAD , hx of CABG x 3    #Progress Note Handoff  Pending (specify): clinical improvement   Family discussion:  dw pt   Disposition: acute, possible dc in 24-48 hrs

## 2022-04-27 NOTE — DISCHARGE NOTE PROVIDER - HOSPITAL COURSE
87yo M hx of aortic stenosis s/p TAVR (2016), CAD s/p PCI and CABG 3v, chronic atrial fibrillation (on Coumadin s/p MAZE procedure). 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    #Acute on chronic HFmrEF  #Tropinemia  #CAD s/p PCI, CABG  #HLD  #HTN  - On admission, BNP 2.2K (stable from last year), currently on RA  - Cont lasix 60mg IV QD  - cont ASA 81mg, simvastatin 40mg, metoprolol tart 50mg BID, losartan 100mg QD  - keep HR < 120, -150  - strict I/Os, daily weights, fluid restriction  - trend 0.03>0.04>0.05  - Chest Xray: Bibasilar opacities, favor atelectasis. No pneumothorax.  - TSH 2.9 WNL  - as per cardiology Dr. Armstrong, stable for d/c, needs outpt cards f/u 2-3 weeks with bmp, cbc    #Chronic atrial fibrillation. CHADSVASC = 6  #AS s/p TAVR  - s/p MAZE, has PPM  - cont warfarin 6mg Mon Wed Friday  - f/u INR daily  - cont metoprolol tart BID       89yo M hx of aortic stenosis s/p TAVR (2016), CAD s/p PCI and CABG 3v, chronic atrial fibrillation (on Coumadin s/p MAZE procedure). 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    #Acute on chronic HFmrEF  #Tropinemia  #CAD s/p PCI, CABG  #HLD  #HTN  - On admission, BNP 2.2K (stable from last year), currently on RA  - Cont lasix 60mg IV QD  - cont ASA 81mg, simvastatin 40mg, metoprolol tart 50mg BID, losartan 100mg QD  - keep HR < 120, -150  - strict I/Os, daily weights, fluid restriction  - trend 0.03>0.04>0.05  - Chest Xray: Bibasilar opacities, favor atelectasis. No pneumothorax.  - TSH 2.9 WNL  - as per cardiology Dr. Armstrong, stable for d/c, needs outpt cards f/u 2-3 weeks with bmp, cbc    #Chronic atrial fibrillation. CHADSVASC = 6  #AS s/p TAVR  - s/p MAZE, has PPM  - cont warfarin 6mg Mon Wed Friday  - f/u INR daily  - cont metoprolol tart BID      35 minutes spent on discharge planning.

## 2022-04-27 NOTE — DISCHARGE NOTE NURSING/CASE MANAGEMENT/SOCIAL WORK - NSDCPEFALRISK_GEN_ALL_CORE
For information on Fall & Injury Prevention, visit: [email or web address] OR  [email or web address] OR  https://www.cdc.gov/steadi/patient.html

## 2022-04-27 NOTE — DISCHARGE NOTE PROVIDER - CARE PROVIDER_API CALL
Shaw Armstrong)  Cardiovascular Disease; Interventional Cardiology  [address]  [place] [address]  Phone: [phone]  Fax: [phone]  Follow Up Time: 2 weeks    Roderick Gonsalez)  [address] [place] [address]  Phone: [phone]  Fax: [phone]  Follow Up Time: 1 week

## 2022-04-27 NOTE — DISCHARGE NOTE PROVIDER - NSDCFUSCHEDAPPT_GEN_ALL_CORE_FT
Selwyn Tai  [facility] Physician [facility]  Cardio [address] [place] [address] Av  Scheduled Appointment: 06/23/2022     [facility] Physician [facility]  Med Mgmt OP [address]  Scheduled Appointment: 04/28/2022    Selwyn Tai  [facility]  Cardio [address]  Scheduled Appointment: 06/23/2022

## 2022-04-27 NOTE — DISCHARGE NOTE PROVIDER - NSDCMRMEDTOKEN_GEN_ALL_CORE_FT
Ambien 10 mg oral tablet: 1 tab(s) orally once a day (at bedtime), As Needed  amLODIPine 5 mg oral tablet: 1 tab(s) orally once a day  Aspirin Enteric Coated 81 mg oral delayed release tablet: 1 tab(s) orally once a day  Coumadin 6 mg oral tablet: 1 tab(s) orally once a day EVERY MONDAY/WEDNESDAY/FRIDAY  Flomax 0.4 mg oral capsule: 1 cap(s) orally once a day  losartan 100 mg oral tablet: 1 tab(s) orally once a day  metFORMIN 500 mg oral tablet: 1 tab(s) orally 2 times a day  Metoprolol Tartrate 50 mg oral tablet: 1 tab(s) orally 2 times a day  Proscar 5 mg oral tablet: 1 tab(s) orally once a day  simvastatin 40 mg oral tablet: 1 tab(s) orally once a day (at bedtime)  torsemide 20 mg oral tablet: 1 tab(s) orally once a day   Vitamin D2 50 mcg (2000 intl units) oral capsule: 1 cap(s) orally once a day

## 2022-04-27 NOTE — DISCHARGE NOTE PROVIDER - CARE PROVIDERS DIRECT ADDRESSES
,DirectAddress_Unknown,[email or web address]

## 2022-04-28 ENCOUNTER — APPOINTMENT (OUTPATIENT)
Dept: MEDICATION MANAGEMENT | Facility: CLINIC | Age: 87
End: 2022-04-28

## 2022-04-28 ENCOUNTER — OUTPATIENT (OUTPATIENT)
Dept: OUTPATIENT SERVICES | Facility: HOSPITAL | Age: 87
LOS: 1 days | Discharge: HOME | End: 2022-04-28

## 2022-04-28 DIAGNOSIS — Z95.2 PRESENCE OF PROSTHETIC HEART VALVE: Chronic | ICD-10-CM

## 2022-04-28 DIAGNOSIS — I48.91 UNSPECIFIED ATRIAL FIBRILLATION: ICD-10-CM

## 2022-04-28 DIAGNOSIS — Z98.89 OTHER SPECIFIED POSTPROCEDURAL STATES: Chronic | ICD-10-CM

## 2022-04-28 DIAGNOSIS — Z79.01 LONG TERM (CURRENT) USE OF ANTICOAGULANTS: ICD-10-CM

## 2022-04-28 DIAGNOSIS — Z95.0 PRESENCE OF CARDIAC PACEMAKER: Chronic | ICD-10-CM

## 2022-04-28 DIAGNOSIS — Z95.1 PRESENCE OF AORTOCORONARY BYPASS GRAFT: Chronic | ICD-10-CM

## 2022-04-28 LAB
INR PPP: 2.1 RATIO
POCT-PROTHROMBIN TIME: 25.3 SECS
QUALITY CONTROL: YES

## 2022-04-29 ENCOUNTER — APPOINTMENT (OUTPATIENT)
Dept: CARE COORDINATION | Facility: HOME HEALTH | Age: 87
End: 2022-04-29

## 2022-05-03 DIAGNOSIS — E78.5 HYPERLIPIDEMIA, UNSPECIFIED: ICD-10-CM

## 2022-05-03 DIAGNOSIS — I11.0 HYPERTENSIVE HEART DISEASE WITH HEART FAILURE: ICD-10-CM

## 2022-05-03 DIAGNOSIS — D64.9 ANEMIA, UNSPECIFIED: ICD-10-CM

## 2022-05-03 DIAGNOSIS — N40.0 BENIGN PROSTATIC HYPERPLASIA WITHOUT LOWER URINARY TRACT SYMPTOMS: ICD-10-CM

## 2022-05-03 DIAGNOSIS — I48.20 CHRONIC ATRIAL FIBRILLATION, UNSPECIFIED: ICD-10-CM

## 2022-05-03 DIAGNOSIS — J44.9 CHRONIC OBSTRUCTIVE PULMONARY DISEASE, UNSPECIFIED: ICD-10-CM

## 2022-05-03 DIAGNOSIS — Z79.82 LONG TERM (CURRENT) USE OF ASPIRIN: ICD-10-CM

## 2022-05-03 DIAGNOSIS — I50.23 ACUTE ON CHRONIC SYSTOLIC (CONGESTIVE) HEART FAILURE: ICD-10-CM

## 2022-05-03 DIAGNOSIS — Z95.0 PRESENCE OF CARDIAC PACEMAKER: ICD-10-CM

## 2022-05-03 DIAGNOSIS — Z95.5 PRESENCE OF CORONARY ANGIOPLASTY IMPLANT AND GRAFT: ICD-10-CM

## 2022-05-03 DIAGNOSIS — Z79.84 LONG TERM (CURRENT) USE OF ORAL HYPOGLYCEMIC DRUGS: ICD-10-CM

## 2022-05-03 DIAGNOSIS — Z95.2 PRESENCE OF PROSTHETIC HEART VALVE: ICD-10-CM

## 2022-05-03 DIAGNOSIS — Z91.041 RADIOGRAPHIC DYE ALLERGY STATUS: ICD-10-CM

## 2022-05-03 DIAGNOSIS — I25.10 ATHEROSCLEROTIC HEART DISEASE OF NATIVE CORONARY ARTERY WITHOUT ANGINA PECTORIS: ICD-10-CM

## 2022-05-03 DIAGNOSIS — R06.03 ACUTE RESPIRATORY DISTRESS: ICD-10-CM

## 2022-05-03 DIAGNOSIS — R77.8 OTHER SPECIFIED ABNORMALITIES OF PLASMA PROTEINS: ICD-10-CM

## 2022-05-03 DIAGNOSIS — E11.9 TYPE 2 DIABETES MELLITUS WITHOUT COMPLICATIONS: ICD-10-CM

## 2022-05-05 ENCOUNTER — OUTPATIENT (OUTPATIENT)
Dept: OUTPATIENT SERVICES | Facility: HOSPITAL | Age: 87
LOS: 1 days | Discharge: HOME | End: 2022-05-05

## 2022-05-05 ENCOUNTER — APPOINTMENT (OUTPATIENT)
Dept: MEDICATION MANAGEMENT | Facility: CLINIC | Age: 87
End: 2022-05-05

## 2022-05-05 DIAGNOSIS — Z95.1 PRESENCE OF AORTOCORONARY BYPASS GRAFT: Chronic | ICD-10-CM

## 2022-05-05 DIAGNOSIS — Z95.0 PRESENCE OF CARDIAC PACEMAKER: Chronic | ICD-10-CM

## 2022-05-05 DIAGNOSIS — I48.91 UNSPECIFIED ATRIAL FIBRILLATION: ICD-10-CM

## 2022-05-05 DIAGNOSIS — Z79.01 LONG TERM (CURRENT) USE OF ANTICOAGULANTS: ICD-10-CM

## 2022-05-05 DIAGNOSIS — Z95.2 PRESENCE OF PROSTHETIC HEART VALVE: Chronic | ICD-10-CM

## 2022-05-05 DIAGNOSIS — Z98.89 OTHER SPECIFIED POSTPROCEDURAL STATES: Chronic | ICD-10-CM

## 2022-05-16 ENCOUNTER — OUTPATIENT (OUTPATIENT)
Dept: OUTPATIENT SERVICES | Facility: HOSPITAL | Age: 87
LOS: 1 days | Discharge: HOME | End: 2022-05-16

## 2022-05-16 ENCOUNTER — APPOINTMENT (OUTPATIENT)
Dept: MEDICATION MANAGEMENT | Facility: CLINIC | Age: 87
End: 2022-05-16

## 2022-05-16 DIAGNOSIS — Z95.0 PRESENCE OF CARDIAC PACEMAKER: Chronic | ICD-10-CM

## 2022-05-16 DIAGNOSIS — Z95.2 PRESENCE OF PROSTHETIC HEART VALVE: Chronic | ICD-10-CM

## 2022-05-16 DIAGNOSIS — Z95.1 PRESENCE OF AORTOCORONARY BYPASS GRAFT: Chronic | ICD-10-CM

## 2022-05-16 DIAGNOSIS — Z79.01 LONG TERM (CURRENT) USE OF ANTICOAGULANTS: ICD-10-CM

## 2022-05-16 DIAGNOSIS — I48.91 UNSPECIFIED ATRIAL FIBRILLATION: ICD-10-CM

## 2022-05-16 DIAGNOSIS — Z98.89 OTHER SPECIFIED POSTPROCEDURAL STATES: Chronic | ICD-10-CM

## 2022-05-16 LAB
INR PPP: 2.4 RATIO
POCT-PROTHROMBIN TIME: 28.6 SECS
QUALITY CONTROL: YES

## 2022-06-01 ENCOUNTER — OUTPATIENT (OUTPATIENT)
Dept: OUTPATIENT SERVICES | Facility: HOSPITAL | Age: 87
LOS: 1 days | Discharge: HOME | End: 2022-06-01

## 2022-06-01 ENCOUNTER — APPOINTMENT (OUTPATIENT)
Dept: MEDICATION MANAGEMENT | Facility: CLINIC | Age: 87
End: 2022-06-01

## 2022-06-01 DIAGNOSIS — Z95.1 PRESENCE OF AORTOCORONARY BYPASS GRAFT: Chronic | ICD-10-CM

## 2022-06-01 DIAGNOSIS — Z79.01 LONG TERM (CURRENT) USE OF ANTICOAGULANTS: ICD-10-CM

## 2022-06-01 DIAGNOSIS — Z95.0 PRESENCE OF CARDIAC PACEMAKER: Chronic | ICD-10-CM

## 2022-06-01 DIAGNOSIS — Z95.2 PRESENCE OF PROSTHETIC HEART VALVE: Chronic | ICD-10-CM

## 2022-06-01 DIAGNOSIS — Z98.89 OTHER SPECIFIED POSTPROCEDURAL STATES: Chronic | ICD-10-CM

## 2022-06-01 DIAGNOSIS — I48.91 UNSPECIFIED ATRIAL FIBRILLATION: ICD-10-CM

## 2022-06-01 LAB
INR PPP: 1.7 RATIO
POCT-PROTHROMBIN TIME: 20.3 SECS
QUALITY CONTROL: YES

## 2022-06-13 ENCOUNTER — OUTPATIENT (OUTPATIENT)
Dept: OUTPATIENT SERVICES | Facility: HOSPITAL | Age: 87
LOS: 1 days | Discharge: HOME | End: 2022-06-13

## 2022-06-13 ENCOUNTER — APPOINTMENT (OUTPATIENT)
Dept: MEDICATION MANAGEMENT | Facility: CLINIC | Age: 87
End: 2022-06-13

## 2022-06-13 DIAGNOSIS — Z98.89 OTHER SPECIFIED POSTPROCEDURAL STATES: Chronic | ICD-10-CM

## 2022-06-13 DIAGNOSIS — Z79.01 LONG TERM (CURRENT) USE OF ANTICOAGULANTS: ICD-10-CM

## 2022-06-13 DIAGNOSIS — Z95.0 PRESENCE OF CARDIAC PACEMAKER: Chronic | ICD-10-CM

## 2022-06-13 DIAGNOSIS — I48.91 UNSPECIFIED ATRIAL FIBRILLATION: ICD-10-CM

## 2022-06-13 DIAGNOSIS — Z95.1 PRESENCE OF AORTOCORONARY BYPASS GRAFT: Chronic | ICD-10-CM

## 2022-06-13 DIAGNOSIS — Z95.2 PRESENCE OF PROSTHETIC HEART VALVE: Chronic | ICD-10-CM

## 2022-06-13 LAB
INR PPP: 2.1 RATIO
POCT-PROTHROMBIN TIME: 24.9 SECS
QUALITY CONTROL: YES

## 2022-06-23 ENCOUNTER — APPOINTMENT (OUTPATIENT)
Dept: CARDIOLOGY | Facility: CLINIC | Age: 87
End: 2022-06-23
Payer: MEDICARE

## 2022-06-23 VITALS
SYSTOLIC BLOOD PRESSURE: 127 MMHG | TEMPERATURE: 97.8 F | HEIGHT: 70 IN | WEIGHT: 185 LBS | DIASTOLIC BLOOD PRESSURE: 75 MMHG | BODY MASS INDEX: 26.48 KG/M2 | HEART RATE: 52 BPM

## 2022-06-23 PROCEDURE — 99214 OFFICE O/P EST MOD 30 MIN: CPT

## 2022-06-23 PROCEDURE — 93280 PM DEVICE PROGR EVAL DUAL: CPT

## 2022-06-23 NOTE — HISTORY OF PRESENT ILLNESS
[Palpitations] : no palpitations [SOB] : no dyspnea [Syncope] : no syncope [Dizziness] : no dizziness [de-identified] : 89 y/o male with pmh of CAD, HTN , S/P PCI with stent to OM 1, followed by  CABG 3VD on 7/18/2011, AS S/P TAVR CHB, s/p PPM, AF on warfarin. \par Presents for routine follow up \par \par Repots progressive worsening in PELAYO . Denies CP/SOB or palpitations. No dizziness or syncope.

## 2022-06-23 NOTE — PROCEDURE
[Complete Heart Block] : complete heart block [See Scanned Paceart Report] : See scanned paceart report [See Device Printout] : See device printout [Pacemaker] : pacemaker [DDD] : DDD [Voltage: ___ volts] : Voltage was [unfilled] volts [Longevity: ___ months] : The estimated remaining battery life is [unfilled] months [Lead Imp:  ___ohms] : lead impedance was [unfilled] ohms [Sensing Amplitude ___mv] : sensing amplitude was [unfilled] mv [___V @] : [unfilled] V [___ ms] : [unfilled] ms [Programmed for Longevity] : output reprogrammed for improved battery longevity [Asense-Vsense ___ %] : Asense-Vsense [unfilled]% [Asense-Vpace ___ %] : Asense-Vpace [unfilled]% [Apace-Vsense ___ %] : Apace-Vsense [unfilled]% [Apace-Vpace ___ %] : Apace-Vpace [unfilled]% [Pace ___ %] : Pace [unfilled]% [de-identified] : Medtronic [de-identified] : Abdias [de-identified] : [id number] [de-identified] : 4/7/14 [de-identified] : 50 [de-identified] : Total AF burden 40.8%, likely higher due to undersensing. \par Normal device function.

## 2022-06-23 NOTE — ASSESSMENT
[FreeTextEntry1] : AF\par - cont wafarin as needed; no bleeding\par \par CHB\par - cont metoprolol 50 mg qd\par \par \par ·	I have spent 35 minutes of time on the encounter excluding separately reported services.\par \par

## 2022-06-27 ENCOUNTER — OUTPATIENT (OUTPATIENT)
Dept: OUTPATIENT SERVICES | Facility: HOSPITAL | Age: 87
LOS: 1 days | Discharge: HOME | End: 2022-06-27

## 2022-06-27 ENCOUNTER — APPOINTMENT (OUTPATIENT)
Dept: MEDICATION MANAGEMENT | Facility: CLINIC | Age: 87
End: 2022-06-27

## 2022-06-27 DIAGNOSIS — Z95.0 PRESENCE OF CARDIAC PACEMAKER: Chronic | ICD-10-CM

## 2022-06-27 DIAGNOSIS — Z95.2 PRESENCE OF PROSTHETIC HEART VALVE: Chronic | ICD-10-CM

## 2022-06-27 DIAGNOSIS — Z79.01 LONG TERM (CURRENT) USE OF ANTICOAGULANTS: ICD-10-CM

## 2022-06-27 DIAGNOSIS — I48.91 UNSPECIFIED ATRIAL FIBRILLATION: ICD-10-CM

## 2022-06-27 DIAGNOSIS — Z95.1 PRESENCE OF AORTOCORONARY BYPASS GRAFT: Chronic | ICD-10-CM

## 2022-06-27 DIAGNOSIS — Z98.89 OTHER SPECIFIED POSTPROCEDURAL STATES: Chronic | ICD-10-CM

## 2022-06-27 LAB
INR PPP: 1.9 RATIO
POCT-PROTHROMBIN TIME: 22.4 SECS
QUALITY CONTROL: YES

## 2022-07-12 ENCOUNTER — APPOINTMENT (OUTPATIENT)
Dept: MEDICATION MANAGEMENT | Facility: CLINIC | Age: 87
End: 2022-07-12

## 2022-07-12 ENCOUNTER — OUTPATIENT (OUTPATIENT)
Dept: OUTPATIENT SERVICES | Facility: HOSPITAL | Age: 87
LOS: 1 days | Discharge: HOME | End: 2022-07-12

## 2022-07-12 DIAGNOSIS — Z95.1 PRESENCE OF AORTOCORONARY BYPASS GRAFT: Chronic | ICD-10-CM

## 2022-07-12 DIAGNOSIS — I48.91 UNSPECIFIED ATRIAL FIBRILLATION: ICD-10-CM

## 2022-07-12 DIAGNOSIS — Z98.89 OTHER SPECIFIED POSTPROCEDURAL STATES: Chronic | ICD-10-CM

## 2022-07-12 DIAGNOSIS — Z95.2 PRESENCE OF PROSTHETIC HEART VALVE: Chronic | ICD-10-CM

## 2022-07-12 DIAGNOSIS — Z95.0 PRESENCE OF CARDIAC PACEMAKER: Chronic | ICD-10-CM

## 2022-07-12 DIAGNOSIS — Z79.01 LONG TERM (CURRENT) USE OF ANTICOAGULANTS: ICD-10-CM

## 2022-07-19 LAB
INR PPP: 2.1 RATIO
POCT-PROTHROMBIN TIME: 24.5 SECS
QUALITY CONTROL: YES

## 2022-08-09 ENCOUNTER — OUTPATIENT (OUTPATIENT)
Dept: OUTPATIENT SERVICES | Facility: HOSPITAL | Age: 87
LOS: 1 days | Discharge: HOME | End: 2022-08-09

## 2022-08-09 ENCOUNTER — APPOINTMENT (OUTPATIENT)
Dept: MEDICATION MANAGEMENT | Facility: CLINIC | Age: 87
End: 2022-08-09

## 2022-08-09 DIAGNOSIS — Z95.2 PRESENCE OF PROSTHETIC HEART VALVE: Chronic | ICD-10-CM

## 2022-08-09 DIAGNOSIS — Z95.0 PRESENCE OF CARDIAC PACEMAKER: Chronic | ICD-10-CM

## 2022-08-09 DIAGNOSIS — Z79.01 LONG TERM (CURRENT) USE OF ANTICOAGULANTS: ICD-10-CM

## 2022-08-09 DIAGNOSIS — Z95.1 PRESENCE OF AORTOCORONARY BYPASS GRAFT: Chronic | ICD-10-CM

## 2022-08-09 DIAGNOSIS — I48.91 UNSPECIFIED ATRIAL FIBRILLATION: ICD-10-CM

## 2022-08-09 DIAGNOSIS — Z98.89 OTHER SPECIFIED POSTPROCEDURAL STATES: Chronic | ICD-10-CM

## 2022-08-09 LAB
INR PPP: 2.3 RATIO
POCT-PROTHROMBIN TIME: 27.3 SECS
QUALITY CONTROL: YES

## 2022-09-06 ENCOUNTER — APPOINTMENT (OUTPATIENT)
Dept: MEDICATION MANAGEMENT | Facility: CLINIC | Age: 87
End: 2022-09-06

## 2022-09-06 ENCOUNTER — OUTPATIENT (OUTPATIENT)
Dept: OUTPATIENT SERVICES | Facility: HOSPITAL | Age: 87
LOS: 1 days | Discharge: HOME | End: 2022-09-06

## 2022-09-06 DIAGNOSIS — Z79.01 LONG TERM (CURRENT) USE OF ANTICOAGULANTS: ICD-10-CM

## 2022-09-06 DIAGNOSIS — I48.91 UNSPECIFIED ATRIAL FIBRILLATION: ICD-10-CM

## 2022-09-06 DIAGNOSIS — Z95.2 PRESENCE OF PROSTHETIC HEART VALVE: Chronic | ICD-10-CM

## 2022-09-06 DIAGNOSIS — Z95.1 PRESENCE OF AORTOCORONARY BYPASS GRAFT: Chronic | ICD-10-CM

## 2022-09-06 DIAGNOSIS — Z98.89 OTHER SPECIFIED POSTPROCEDURAL STATES: Chronic | ICD-10-CM

## 2022-09-06 DIAGNOSIS — Z95.0 PRESENCE OF CARDIAC PACEMAKER: Chronic | ICD-10-CM

## 2022-09-06 LAB
INR PPP: 2.4 RATIO
POCT-PROTHROMBIN TIME: 28.9 SECS
QUALITY CONTROL: YES

## 2022-10-11 ENCOUNTER — OUTPATIENT (OUTPATIENT)
Dept: OUTPATIENT SERVICES | Facility: HOSPITAL | Age: 87
LOS: 1 days | Discharge: HOME | End: 2022-10-11

## 2022-10-11 ENCOUNTER — APPOINTMENT (OUTPATIENT)
Dept: MEDICATION MANAGEMENT | Facility: CLINIC | Age: 87
End: 2022-10-11

## 2022-10-11 DIAGNOSIS — Z95.0 PRESENCE OF CARDIAC PACEMAKER: Chronic | ICD-10-CM

## 2022-10-11 DIAGNOSIS — Z95.2 PRESENCE OF PROSTHETIC HEART VALVE: Chronic | ICD-10-CM

## 2022-10-11 DIAGNOSIS — Z95.1 PRESENCE OF AORTOCORONARY BYPASS GRAFT: Chronic | ICD-10-CM

## 2022-10-11 DIAGNOSIS — I48.91 UNSPECIFIED ATRIAL FIBRILLATION: ICD-10-CM

## 2022-10-11 DIAGNOSIS — Z98.89 OTHER SPECIFIED POSTPROCEDURAL STATES: Chronic | ICD-10-CM

## 2022-10-11 DIAGNOSIS — Z79.01 LONG TERM (CURRENT) USE OF ANTICOAGULANTS: ICD-10-CM

## 2022-10-11 LAB
INR PPP: 3.3 RATIO
POCT-PROTHROMBIN TIME: 39.8 SECS
QUALITY CONTROL: YES

## 2022-10-14 ENCOUNTER — APPOINTMENT (OUTPATIENT)
Dept: MEDICATION MANAGEMENT | Facility: CLINIC | Age: 87
End: 2022-10-14

## 2022-10-14 ENCOUNTER — OUTPATIENT (OUTPATIENT)
Dept: OUTPATIENT SERVICES | Facility: HOSPITAL | Age: 87
LOS: 1 days | Discharge: HOME | End: 2022-10-14

## 2022-10-14 DIAGNOSIS — Z79.01 LONG TERM (CURRENT) USE OF ANTICOAGULANTS: ICD-10-CM

## 2022-10-14 DIAGNOSIS — Z95.2 PRESENCE OF PROSTHETIC HEART VALVE: Chronic | ICD-10-CM

## 2022-10-14 DIAGNOSIS — Z95.1 PRESENCE OF AORTOCORONARY BYPASS GRAFT: Chronic | ICD-10-CM

## 2022-10-14 DIAGNOSIS — Z95.0 PRESENCE OF CARDIAC PACEMAKER: Chronic | ICD-10-CM

## 2022-10-14 DIAGNOSIS — I48.91 UNSPECIFIED ATRIAL FIBRILLATION: ICD-10-CM

## 2022-10-14 DIAGNOSIS — Z98.89 OTHER SPECIFIED POSTPROCEDURAL STATES: Chronic | ICD-10-CM

## 2022-10-14 LAB
INR PPP: 2.5 RATIO
POCT-PROTHROMBIN TIME: 30 SECS
QUALITY CONTROL: YES

## 2022-10-20 ENCOUNTER — OUTPATIENT (OUTPATIENT)
Dept: OUTPATIENT SERVICES | Facility: HOSPITAL | Age: 87
LOS: 1 days | Discharge: HOME | End: 2022-10-20

## 2022-10-20 ENCOUNTER — APPOINTMENT (OUTPATIENT)
Dept: MEDICATION MANAGEMENT | Facility: CLINIC | Age: 87
End: 2022-10-20

## 2022-10-20 DIAGNOSIS — N39.0 URINARY TRACT INFECTION, SITE NOT SPECIFIED: ICD-10-CM

## 2022-10-20 DIAGNOSIS — Z95.1 PRESENCE OF AORTOCORONARY BYPASS GRAFT: Chronic | ICD-10-CM

## 2022-10-20 DIAGNOSIS — Z98.89 OTHER SPECIFIED POSTPROCEDURAL STATES: Chronic | ICD-10-CM

## 2022-10-20 DIAGNOSIS — Z95.0 PRESENCE OF CARDIAC PACEMAKER: Chronic | ICD-10-CM

## 2022-10-20 DIAGNOSIS — Z79.01 LONG TERM (CURRENT) USE OF ANTICOAGULANTS: ICD-10-CM

## 2022-10-20 DIAGNOSIS — N20.0 CALCULUS OF KIDNEY: ICD-10-CM

## 2022-10-20 DIAGNOSIS — Z95.2 PRESENCE OF PROSTHETIC HEART VALVE: Chronic | ICD-10-CM

## 2022-10-20 DIAGNOSIS — I48.91 UNSPECIFIED ATRIAL FIBRILLATION: ICD-10-CM

## 2022-10-20 LAB
INR PPP: 1.6 RATIO
POCT-PROTHROMBIN TIME: 19.5 SECS
QUALITY CONTROL: YES

## 2022-10-20 PROCEDURE — 76775 US EXAM ABDO BACK WALL LIM: CPT | Mod: 26

## 2022-10-27 ENCOUNTER — OUTPATIENT (OUTPATIENT)
Dept: OUTPATIENT SERVICES | Facility: HOSPITAL | Age: 87
LOS: 1 days | Discharge: HOME | End: 2022-10-27

## 2022-10-27 ENCOUNTER — APPOINTMENT (OUTPATIENT)
Dept: MEDICATION MANAGEMENT | Facility: CLINIC | Age: 87
End: 2022-10-27

## 2022-10-27 DIAGNOSIS — Z98.89 OTHER SPECIFIED POSTPROCEDURAL STATES: Chronic | ICD-10-CM

## 2022-10-27 DIAGNOSIS — Z95.2 PRESENCE OF PROSTHETIC HEART VALVE: Chronic | ICD-10-CM

## 2022-10-27 DIAGNOSIS — Z95.1 PRESENCE OF AORTOCORONARY BYPASS GRAFT: Chronic | ICD-10-CM

## 2022-10-27 DIAGNOSIS — I48.91 UNSPECIFIED ATRIAL FIBRILLATION: ICD-10-CM

## 2022-10-27 DIAGNOSIS — Z79.01 LONG TERM (CURRENT) USE OF ANTICOAGULANTS: ICD-10-CM

## 2022-10-27 DIAGNOSIS — Z95.0 PRESENCE OF CARDIAC PACEMAKER: Chronic | ICD-10-CM

## 2022-10-27 LAB
INR PPP: 2.4 RATIO
POCT-PROTHROMBIN TIME: 28.7 SECS
QUALITY CONTROL: YES

## 2022-10-31 DIAGNOSIS — N39.0 URINARY TRACT INFECTION, SITE NOT SPECIFIED: ICD-10-CM

## 2022-10-31 DIAGNOSIS — Z02.9 ENCOUNTER FOR ADMINISTRATIVE EXAMINATIONS, UNSPECIFIED: ICD-10-CM

## 2022-10-31 DIAGNOSIS — N20.0 CALCULUS OF KIDNEY: ICD-10-CM

## 2022-11-10 ENCOUNTER — APPOINTMENT (OUTPATIENT)
Dept: MEDICATION MANAGEMENT | Facility: CLINIC | Age: 87
End: 2022-11-10

## 2022-11-10 ENCOUNTER — OUTPATIENT (OUTPATIENT)
Dept: OUTPATIENT SERVICES | Facility: HOSPITAL | Age: 87
LOS: 1 days | Discharge: HOME | End: 2022-11-10

## 2022-11-10 DIAGNOSIS — Z98.89 OTHER SPECIFIED POSTPROCEDURAL STATES: Chronic | ICD-10-CM

## 2022-11-10 DIAGNOSIS — Z95.0 PRESENCE OF CARDIAC PACEMAKER: Chronic | ICD-10-CM

## 2022-11-10 DIAGNOSIS — I48.91 UNSPECIFIED ATRIAL FIBRILLATION: ICD-10-CM

## 2022-11-10 DIAGNOSIS — Z79.01 LONG TERM (CURRENT) USE OF ANTICOAGULANTS: ICD-10-CM

## 2022-11-10 DIAGNOSIS — Z95.2 PRESENCE OF PROSTHETIC HEART VALVE: Chronic | ICD-10-CM

## 2022-11-10 DIAGNOSIS — Z95.1 PRESENCE OF AORTOCORONARY BYPASS GRAFT: Chronic | ICD-10-CM

## 2022-11-10 LAB
INR PPP: 1.8 RATIO
POCT-PROTHROMBIN TIME: 21 SECS
QUALITY CONTROL: YES

## 2022-11-23 ENCOUNTER — OUTPATIENT (OUTPATIENT)
Dept: OUTPATIENT SERVICES | Facility: HOSPITAL | Age: 87
LOS: 1 days | Discharge: HOME | End: 2022-11-23

## 2022-11-23 ENCOUNTER — APPOINTMENT (OUTPATIENT)
Dept: MEDICATION MANAGEMENT | Facility: CLINIC | Age: 87
End: 2022-11-23

## 2022-11-23 DIAGNOSIS — Z95.2 PRESENCE OF PROSTHETIC HEART VALVE: Chronic | ICD-10-CM

## 2022-11-23 DIAGNOSIS — Z95.1 PRESENCE OF AORTOCORONARY BYPASS GRAFT: Chronic | ICD-10-CM

## 2022-11-23 DIAGNOSIS — Z95.0 PRESENCE OF CARDIAC PACEMAKER: Chronic | ICD-10-CM

## 2022-11-23 DIAGNOSIS — Z79.01 LONG TERM (CURRENT) USE OF ANTICOAGULANTS: ICD-10-CM

## 2022-11-23 DIAGNOSIS — Z98.89 OTHER SPECIFIED POSTPROCEDURAL STATES: Chronic | ICD-10-CM

## 2022-11-23 DIAGNOSIS — I48.91 UNSPECIFIED ATRIAL FIBRILLATION: ICD-10-CM

## 2022-11-23 LAB
INR PPP: 2.4 RATIO
POCT-PROTHROMBIN TIME: 28.6 SECS
QUALITY CONTROL: YES

## 2022-12-06 ENCOUNTER — APPOINTMENT (OUTPATIENT)
Dept: MEDICATION MANAGEMENT | Facility: CLINIC | Age: 87
End: 2022-12-06

## 2022-12-06 ENCOUNTER — OUTPATIENT (OUTPATIENT)
Dept: OUTPATIENT SERVICES | Facility: HOSPITAL | Age: 87
LOS: 1 days | Discharge: HOME | End: 2022-12-06

## 2022-12-06 DIAGNOSIS — Z95.0 PRESENCE OF CARDIAC PACEMAKER: Chronic | ICD-10-CM

## 2022-12-06 DIAGNOSIS — Z98.89 OTHER SPECIFIED POSTPROCEDURAL STATES: Chronic | ICD-10-CM

## 2022-12-06 DIAGNOSIS — Z95.1 PRESENCE OF AORTOCORONARY BYPASS GRAFT: Chronic | ICD-10-CM

## 2022-12-06 DIAGNOSIS — I48.91 UNSPECIFIED ATRIAL FIBRILLATION: ICD-10-CM

## 2022-12-06 DIAGNOSIS — Z79.01 LONG TERM (CURRENT) USE OF ANTICOAGULANTS: ICD-10-CM

## 2022-12-06 DIAGNOSIS — Z95.2 PRESENCE OF PROSTHETIC HEART VALVE: Chronic | ICD-10-CM

## 2022-12-06 LAB
INR PPP: 1.3 RATIO
POCT-PROTHROMBIN TIME: 15.4 SECS
QUALITY CONTROL: YES

## 2022-12-15 ENCOUNTER — APPOINTMENT (OUTPATIENT)
Dept: MEDICATION MANAGEMENT | Facility: CLINIC | Age: 87
End: 2022-12-15

## 2022-12-15 ENCOUNTER — OUTPATIENT (OUTPATIENT)
Dept: OUTPATIENT SERVICES | Facility: HOSPITAL | Age: 87
LOS: 1 days | Discharge: HOME | End: 2022-12-15

## 2022-12-15 DIAGNOSIS — I48.91 UNSPECIFIED ATRIAL FIBRILLATION: ICD-10-CM

## 2022-12-15 DIAGNOSIS — Z95.1 PRESENCE OF AORTOCORONARY BYPASS GRAFT: Chronic | ICD-10-CM

## 2022-12-15 DIAGNOSIS — Z98.89 OTHER SPECIFIED POSTPROCEDURAL STATES: Chronic | ICD-10-CM

## 2022-12-15 DIAGNOSIS — Z79.01 LONG TERM (CURRENT) USE OF ANTICOAGULANTS: ICD-10-CM

## 2022-12-15 DIAGNOSIS — Z95.0 PRESENCE OF CARDIAC PACEMAKER: Chronic | ICD-10-CM

## 2022-12-15 DIAGNOSIS — Z95.2 PRESENCE OF PROSTHETIC HEART VALVE: Chronic | ICD-10-CM

## 2022-12-15 LAB
INR PPP: 1.7 RATIO
POCT-PROTHROMBIN TIME: 20.7 SECS
QUALITY CONTROL: YES

## 2022-12-19 ENCOUNTER — RX RENEWAL (OUTPATIENT)
Age: 87
End: 2022-12-19

## 2022-12-22 ENCOUNTER — APPOINTMENT (OUTPATIENT)
Dept: CARDIOLOGY | Facility: CLINIC | Age: 87
End: 2022-12-22
Payer: MEDICARE

## 2022-12-22 VITALS
HEIGHT: 70 IN | RESPIRATION RATE: 16 BRPM | WEIGHT: 185 LBS | HEART RATE: 56 BPM | DIASTOLIC BLOOD PRESSURE: 80 MMHG | TEMPERATURE: 98 F | SYSTOLIC BLOOD PRESSURE: 150 MMHG | BODY MASS INDEX: 26.48 KG/M2

## 2022-12-22 DIAGNOSIS — I35.0 NONRHEUMATIC AORTIC (VALVE) STENOSIS: ICD-10-CM

## 2022-12-22 DIAGNOSIS — I50.32 CHRONIC DIASTOLIC (CONGESTIVE) HEART FAILURE: ICD-10-CM

## 2022-12-22 PROCEDURE — 93280 PM DEVICE PROGR EVAL DUAL: CPT

## 2022-12-22 PROCEDURE — 99214 OFFICE O/P EST MOD 30 MIN: CPT

## 2022-12-22 RX ORDER — WARFARIN 4 MG/1
4 TABLET ORAL
Qty: 145 | Refills: 3 | Status: COMPLETED | COMMUNITY
Start: 2021-12-28 | End: 2022-12-22

## 2022-12-25 NOTE — ED ADULT NURSE NOTE - DOES PATIENT HAVE ADVANCE DIRECTIVE
PAST SURGICAL HISTORY:  H/O surgical procedure s/p Mediport placement 2011    S/P Cholecystectomy s/p open cholecystectomy 2012    S/P Tonsillectomy and Adenoide     S/P  Shunt x6 revisions, last 2012 w/ Dr. Loza    Strabismus Repair x4     No

## 2022-12-29 ENCOUNTER — OUTPATIENT (OUTPATIENT)
Dept: OUTPATIENT SERVICES | Facility: HOSPITAL | Age: 87
LOS: 1 days | Discharge: HOME | End: 2022-12-29

## 2022-12-29 ENCOUNTER — APPOINTMENT (OUTPATIENT)
Dept: MEDICATION MANAGEMENT | Facility: CLINIC | Age: 87
End: 2022-12-29

## 2022-12-29 DIAGNOSIS — I48.91 UNSPECIFIED ATRIAL FIBRILLATION: ICD-10-CM

## 2022-12-29 DIAGNOSIS — Z95.1 PRESENCE OF AORTOCORONARY BYPASS GRAFT: Chronic | ICD-10-CM

## 2022-12-29 DIAGNOSIS — Z79.01 LONG TERM (CURRENT) USE OF ANTICOAGULANTS: ICD-10-CM

## 2022-12-29 DIAGNOSIS — Z95.2 PRESENCE OF PROSTHETIC HEART VALVE: Chronic | ICD-10-CM

## 2022-12-29 DIAGNOSIS — Z95.0 PRESENCE OF CARDIAC PACEMAKER: Chronic | ICD-10-CM

## 2022-12-29 DIAGNOSIS — Z98.89 OTHER SPECIFIED POSTPROCEDURAL STATES: Chronic | ICD-10-CM

## 2022-12-29 LAB
INR PPP: 2.3 RATIO
POCT-PROTHROMBIN TIME: 28 SECS
QUALITY CONTROL: YES

## 2023-01-01 NOTE — ASSESSMENT
[FreeTextEntry1] : #AF\par - cont wafarin; no bleeding\par \par #CHB\par - cont metoprolol 50 mg qd\par - Device has 5 months left until KALYAN. Pt scheduled to f/u in 4 months.\par \par \par ·	I have spent 35 minutes of time on the encounter excluding separately reported services.\par \par

## 2023-01-01 NOTE — CARDIOLOGY SUMMARY
[de-identified] : ECHO 6/26/2021 LVEF 50-55%, mild LVH, MV grade 3 diastolic dysfunction, trace MVR, anterior and posterior MV leaflets are thickened and mildly restricted in movement, mild mitral stenosis, bioprosthetic aortic valve, trace central AR, mild-mod TR

## 2023-01-01 NOTE — PROCEDURE
[Complete Heart Block] : complete heart block [See Scanned Paceart Report] : See scanned paceart report [Pacemaker] : pacemaker [DDD] : DDD [Longevity: ___ months] : The estimated remaining battery life is [unfilled] months [Threshold Testing Performed] : Threshold testing was performed [Lead Imp:  ___ohms] : lead impedance was [unfilled] ohms [Sensing Amplitude ___mv] : sensing amplitude was [unfilled] mv [___V @] : [unfilled] V [___ ms] : [unfilled] ms [Programmed for Longevity] : output reprogrammed for improved battery longevity [de-identified] : Medtronic [de-identified] : Abdias [de-identified] : [id number] [de-identified] : 04/07/2014 [de-identified] : 50

## 2023-01-01 NOTE — REVIEW OF SYSTEMS
[Negative] : Cardiovascular [Blood in stool] : no blood in stoo [Hematuria] : no hematuria [Dizziness] : no dizziness [Weakness] : no weakness

## 2023-01-01 NOTE — HISTORY OF PRESENT ILLNESS
[Palpitations] : no palpitations [SOB] : no dyspnea [Syncope] : no syncope [Dizziness] : no dizziness [de-identified] : 89 y/o male with pmh of CAD, HTN , S/P PCI with stent to OM 1, followed by CABG 3VD on 7/18/2011, AS S/P TAVR CHB, s/p PPM, AF on warfarin. \par \par Presents for routine follow up. Denies CP/SOB or palpitations. No dizziness or syncope.

## 2023-01-24 ENCOUNTER — OUTPATIENT (OUTPATIENT)
Dept: OUTPATIENT SERVICES | Facility: HOSPITAL | Age: 88
LOS: 1 days | Discharge: HOME | End: 2023-01-24
Payer: MEDICARE

## 2023-01-24 DIAGNOSIS — Z95.0 PRESENCE OF CARDIAC PACEMAKER: Chronic | ICD-10-CM

## 2023-01-24 DIAGNOSIS — Z95.2 PRESENCE OF PROSTHETIC HEART VALVE: Chronic | ICD-10-CM

## 2023-01-24 DIAGNOSIS — N40.1 BENIGN PROSTATIC HYPERPLASIA WITH LOWER URINARY TRACT SYMPTOMS: ICD-10-CM

## 2023-01-24 DIAGNOSIS — Z95.1 PRESENCE OF AORTOCORONARY BYPASS GRAFT: Chronic | ICD-10-CM

## 2023-01-24 DIAGNOSIS — R31.9 HEMATURIA, UNSPECIFIED: ICD-10-CM

## 2023-01-24 DIAGNOSIS — Z98.89 OTHER SPECIFIED POSTPROCEDURAL STATES: Chronic | ICD-10-CM

## 2023-01-24 DIAGNOSIS — R82.89 OTHER ABNORMAL FINDINGS ON CYTOLOGICAL AND HISTOLOGICAL EXAMINATION OF URINE: ICD-10-CM

## 2023-01-24 PROCEDURE — 74176 CT ABD & PELVIS W/O CONTRAST: CPT | Mod: 26,MH

## 2023-01-24 PROCEDURE — 76775 US EXAM ABDO BACK WALL LIM: CPT | Mod: 26

## 2023-01-26 ENCOUNTER — APPOINTMENT (OUTPATIENT)
Dept: MEDICATION MANAGEMENT | Facility: CLINIC | Age: 88
End: 2023-01-26

## 2023-01-26 ENCOUNTER — OUTPATIENT (OUTPATIENT)
Dept: OUTPATIENT SERVICES | Facility: HOSPITAL | Age: 88
LOS: 1 days | Discharge: HOME | End: 2023-01-26

## 2023-01-26 DIAGNOSIS — Z95.2 PRESENCE OF PROSTHETIC HEART VALVE: Chronic | ICD-10-CM

## 2023-01-26 DIAGNOSIS — Z98.89 OTHER SPECIFIED POSTPROCEDURAL STATES: Chronic | ICD-10-CM

## 2023-01-26 DIAGNOSIS — Z95.0 PRESENCE OF CARDIAC PACEMAKER: Chronic | ICD-10-CM

## 2023-01-26 DIAGNOSIS — I48.91 UNSPECIFIED ATRIAL FIBRILLATION: ICD-10-CM

## 2023-01-26 DIAGNOSIS — Z95.1 PRESENCE OF AORTOCORONARY BYPASS GRAFT: Chronic | ICD-10-CM

## 2023-01-26 DIAGNOSIS — Z79.01 LONG TERM (CURRENT) USE OF ANTICOAGULANTS: ICD-10-CM

## 2023-01-26 LAB
INR PPP: 2.3 RATIO
POCT-PROTHROMBIN TIME: 27.5 SECS
QUALITY CONTROL: YES

## 2023-03-02 ENCOUNTER — APPOINTMENT (OUTPATIENT)
Dept: MEDICATION MANAGEMENT | Facility: CLINIC | Age: 88
End: 2023-03-02

## 2023-03-07 ENCOUNTER — APPOINTMENT (OUTPATIENT)
Dept: MEDICATION MANAGEMENT | Facility: CLINIC | Age: 88
End: 2023-03-07

## 2023-03-07 ENCOUNTER — OUTPATIENT (OUTPATIENT)
Dept: OUTPATIENT SERVICES | Facility: HOSPITAL | Age: 88
LOS: 1 days | End: 2023-03-07
Payer: MEDICARE

## 2023-03-07 DIAGNOSIS — Z95.2 PRESENCE OF PROSTHETIC HEART VALVE: Chronic | ICD-10-CM

## 2023-03-07 DIAGNOSIS — Z95.1 PRESENCE OF AORTOCORONARY BYPASS GRAFT: Chronic | ICD-10-CM

## 2023-03-07 DIAGNOSIS — Z98.89 OTHER SPECIFIED POSTPROCEDURAL STATES: Chronic | ICD-10-CM

## 2023-03-07 DIAGNOSIS — Z95.0 PRESENCE OF CARDIAC PACEMAKER: Chronic | ICD-10-CM

## 2023-03-07 DIAGNOSIS — Z79.01 LONG TERM (CURRENT) USE OF ANTICOAGULANTS: ICD-10-CM

## 2023-03-07 DIAGNOSIS — I48.91 UNSPECIFIED ATRIAL FIBRILLATION: ICD-10-CM

## 2023-03-07 LAB
INR PPP: 2.3 RATIO
POCT-PROTHROMBIN TIME: 27.4 SECS
QUALITY CONTROL: YES

## 2023-03-07 PROCEDURE — 36416 COLLJ CAPILLARY BLOOD SPEC: CPT

## 2023-03-07 PROCEDURE — 85610 PROTHROMBIN TIME: CPT

## 2023-03-07 PROCEDURE — 99211 OFF/OP EST MAY X REQ PHY/QHP: CPT

## 2023-03-08 DIAGNOSIS — I48.91 UNSPECIFIED ATRIAL FIBRILLATION: ICD-10-CM

## 2023-03-08 DIAGNOSIS — Z79.01 LONG TERM (CURRENT) USE OF ANTICOAGULANTS: ICD-10-CM

## 2023-04-06 ENCOUNTER — APPOINTMENT (OUTPATIENT)
Dept: ELECTROPHYSIOLOGY | Facility: CLINIC | Age: 88
End: 2023-04-06
Payer: MEDICARE

## 2023-04-06 VITALS
HEIGHT: 70 IN | OXYGEN SATURATION: 98 % | DIASTOLIC BLOOD PRESSURE: 80 MMHG | SYSTOLIC BLOOD PRESSURE: 154 MMHG | BODY MASS INDEX: 27.2 KG/M2 | WEIGHT: 190 LBS | HEART RATE: 78 BPM

## 2023-04-06 PROCEDURE — 93280 PM DEVICE PROGR EVAL DUAL: CPT

## 2023-04-06 PROCEDURE — 99213 OFFICE O/P EST LOW 20 MIN: CPT

## 2023-04-06 RX ORDER — HYDRALAZINE HYDROCHLORIDE 50 MG/1
50 TABLET ORAL DAILY
Refills: 0 | Status: DISCONTINUED | COMMUNITY
Start: 2022-04-21 | End: 2023-04-06

## 2023-04-06 RX ORDER — ATORVASTATIN CALCIUM 40 MG/1
40 TABLET, FILM COATED ORAL
Qty: 30 | Refills: 0 | Status: DISCONTINUED | COMMUNITY
Start: 2022-06-13 | End: 2023-04-06

## 2023-04-06 NOTE — CARDIOLOGY SUMMARY
[de-identified] : ECHO 6/26/2021 LVEF 50-55%, mild LVH, MV grade 3 diastolic dysfunction, trace MVR, anterior and posterior MV leaflets are thickened and mildly restricted in movement, mild mitral stenosis, bioprosthetic aortic valve, trace central AR, mild-mod TR

## 2023-04-06 NOTE — HISTORY OF PRESENT ILLNESS
[Palpitations] : no palpitations [SOB] : no dyspnea [Syncope] : no syncope [Dizziness] : no dizziness [de-identified] : 89 y/o male with pmh of CAD, HTN , S/P PCI with stent to OM 1, followed by CABG 3VD on 7/18/2011, AS S/P TAVR CHB, s/p PPM, AF on warfarin. \par \par Presents for routine follow up. Denies CP/SOB or palpitations. No dizziness or syncope.

## 2023-04-06 NOTE — REVIEW OF SYSTEMS
[Blood in stool] : no blood in stoo [Hematuria] : no hematuria [Dizziness] : no dizziness [Weakness] : no weakness [Negative] : Cardiovascular

## 2023-04-06 NOTE — ASSESSMENT
[FreeTextEntry1] : #AF\par - cont wafarin; no bleeding\par \par #CHB\par - cont metoprolol 50 mg qd\par - Device has 5 months left until KALYAN. Pt scheduled to f/u in 1 months.\par \par \par ·	I have spent 25 minutes of time on the encounter excluding separately reported services.\par \par

## 2023-04-06 NOTE — PROCEDURE
[Complete Heart Block] : complete heart block [See Scanned Paceart Report] : See scanned paceart report [Pacemaker] : pacemaker [DDD] : DDD [Voltage: ___ volts] : Voltage was [unfilled] volts [Longevity: ___ months] : The estimated remaining battery life is [unfilled] months [Threshold Testing Performed] : Threshold testing was performed [Lead Imp:  ___ohms] : lead impedance was [unfilled] ohms [Sensing Amplitude ___mv] : sensing amplitude was [unfilled] mv [___V @] : [unfilled] V [___ ms] : [unfilled] ms [Programmed for Longevity] : output reprogrammed for improved battery longevity [de-identified] : Medtronic [de-identified] : Abdias [de-identified] : [id number] [de-identified] : 04/07/2014 [de-identified] :  [de-identified] : AP: 2.9% // : 98%\par Afib burden: 7.6% (pt. on eliquis)\par No events.

## 2023-04-18 ENCOUNTER — OUTPATIENT (OUTPATIENT)
Dept: OUTPATIENT SERVICES | Facility: HOSPITAL | Age: 88
LOS: 1 days | End: 2023-04-18
Payer: MEDICARE

## 2023-04-18 ENCOUNTER — APPOINTMENT (OUTPATIENT)
Dept: MEDICATION MANAGEMENT | Facility: CLINIC | Age: 88
End: 2023-04-18

## 2023-04-18 DIAGNOSIS — Z98.89 OTHER SPECIFIED POSTPROCEDURAL STATES: Chronic | ICD-10-CM

## 2023-04-18 DIAGNOSIS — Z79.01 LONG TERM (CURRENT) USE OF ANTICOAGULANTS: ICD-10-CM

## 2023-04-18 DIAGNOSIS — I48.91 UNSPECIFIED ATRIAL FIBRILLATION: ICD-10-CM

## 2023-04-18 DIAGNOSIS — Z95.2 PRESENCE OF PROSTHETIC HEART VALVE: Chronic | ICD-10-CM

## 2023-04-18 DIAGNOSIS — Z95.1 PRESENCE OF AORTOCORONARY BYPASS GRAFT: Chronic | ICD-10-CM

## 2023-04-18 DIAGNOSIS — Z95.0 PRESENCE OF CARDIAC PACEMAKER: Chronic | ICD-10-CM

## 2023-04-18 LAB
INR PPP: 2.7 RATIO
POCT-PROTHROMBIN TIME: 32.1 SECS
QUALITY CONTROL: YES

## 2023-04-18 PROCEDURE — 85610 PROTHROMBIN TIME: CPT

## 2023-04-18 PROCEDURE — 36416 COLLJ CAPILLARY BLOOD SPEC: CPT

## 2023-04-18 PROCEDURE — 99211 OFF/OP EST MAY X REQ PHY/QHP: CPT

## 2023-04-19 DIAGNOSIS — I48.91 UNSPECIFIED ATRIAL FIBRILLATION: ICD-10-CM

## 2023-04-19 DIAGNOSIS — Z79.01 LONG TERM (CURRENT) USE OF ANTICOAGULANTS: ICD-10-CM

## 2023-04-28 NOTE — CONSULT NOTE ADULT - NSTELEHLTHVISITTYOTHER_GEN_ALL_CORE
----- Message from Bernabe Ferrer sent at 4/28/2023 11:31 AM CDT -----  Contact: self  Type: Needs Medical Advice  Who Called:  pt  Symptoms (please be specific):  uti  How long has patient had these symptoms:  n/a  Pharmacy name and phone #:    [id number] - [place] - [address] AT [facility] [address]  [address] [address]  Phone: [phone] Fax: [phone]        Best Call Back Number: [phone]    Additional Information: pt states rx for uti isn't working would like a urine culture performed  Thank you         in person

## 2023-05-04 ENCOUNTER — APPOINTMENT (OUTPATIENT)
Dept: ELECTROPHYSIOLOGY | Facility: CLINIC | Age: 88
End: 2023-05-04
Payer: MEDICARE

## 2023-05-04 VITALS
RESPIRATION RATE: 16 BRPM | WEIGHT: 190 LBS | SYSTOLIC BLOOD PRESSURE: 150 MMHG | TEMPERATURE: 97.1 F | BODY MASS INDEX: 27.2 KG/M2 | DIASTOLIC BLOOD PRESSURE: 70 MMHG | HEART RATE: 82 BPM | HEIGHT: 70 IN

## 2023-05-04 PROCEDURE — 93280 PM DEVICE PROGR EVAL DUAL: CPT

## 2023-05-04 PROCEDURE — 99215 OFFICE O/P EST HI 40 MIN: CPT | Mod: 57

## 2023-05-04 RX ORDER — METFORMIN HYDROCHLORIDE 500 MG/1
500 TABLET, COATED ORAL
Qty: 180 | Refills: 0 | Status: ACTIVE | COMMUNITY
Start: 2022-04-07

## 2023-05-04 RX ORDER — ZOLPIDEM TARTRATE 10 MG/1
10 TABLET, FILM COATED ORAL
Refills: 0 | Status: ACTIVE | COMMUNITY

## 2023-05-12 NOTE — HISTORY OF PRESENT ILLNESS
[Palpitations] : no palpitations [SOB] : no dyspnea [Syncope] : no syncope [Dizziness] : no dizziness [de-identified] : 88 y/o male with pmh of CAD, HTN , S/P PCI with stent to OM 1, followed by CABG 3VD on 7/18/2011, AS S/P TAVR CHB, s/p PPM, AF on warfarin. \par \par Presents for routine follow up. Denies CP/SOB or palpitations. No dizziness or syncope.

## 2023-05-12 NOTE — ASSESSMENT
[FreeTextEntry1] : #AF\par - cont wafarin; no bleeding\par \par #CHB\par - cont metoprolol 50 mg qd\par - Device has 5 months left until KALYAN. Pt scheduled to f/u in 1 months.\par I have explained the risks and benefits of the procedure to the patient.  There is approximately 1% chance of any major cardiovascular complication to occur. Complications include, but are not limited to infection, bleeding, damage to the vessels, pneumothorax, stroke, death and heart attack.  The patient understands the risk and would like to proceed with the procedure. Patient indicated that all of his questions were answered to his satisfaction and verbalized understanding.\par \par \par \par \par

## 2023-05-12 NOTE — ADDENDUM
[FreeTextEntry1] : Andrés NELSON assisted in documentation on 05/10/2023  acting as a scribe for Selwyn Hearn.\par

## 2023-05-12 NOTE — CARDIOLOGY SUMMARY
[de-identified] : ECHO 6/26/2021 LVEF 50-55%, mild LVH, MV grade 3 diastolic dysfunction, trace MVR, anterior and posterior MV leaflets are thickened and mildly restricted in movement, mild mitral stenosis, bioprosthetic aortic valve, trace central AR, mild-mod TR

## 2023-05-12 NOTE — PROCEDURE
[Complete Heart Block] : complete heart block [See Scanned Paceart Report] : See scanned paceart report [Pacemaker] : pacemaker [DDD] : DDD [Voltage: ___ volts] : Voltage was [unfilled] volts [Longevity: ___ months] : The estimated remaining battery life is [unfilled] months [Threshold Testing Performed] : Threshold testing was performed [Lead Imp:  ___ohms] : lead impedance was [unfilled] ohms [Sensing Amplitude ___mv] : sensing amplitude was [unfilled] mv [___V @] : [unfilled] V [___ ms] : [unfilled] ms [Programmed for Longevity] : output reprogrammed for improved battery longevity [de-identified] : Medtronic [de-identified] : Abdias [de-identified] : [id number] [de-identified] : 04/07/2014 [de-identified] :  [de-identified] : AP: 2.7% // : 99%\par Afib burden: 3.4% (pt. on Coumadin)\par 1 RVR episode lasting 5 seconds (avg. v-rate: 160 bpm)\par Pt. not on remote

## 2023-05-13 ENCOUNTER — INPATIENT (INPATIENT)
Facility: HOSPITAL | Age: 88
LOS: 5 days | Discharge: HOME | DRG: 215 | End: 2023-05-19
Attending: INTERNAL MEDICINE | Admitting: STUDENT IN AN ORGANIZED HEALTH CARE EDUCATION/TRAINING PROGRAM
Payer: MEDICARE

## 2023-05-13 VITALS
RESPIRATION RATE: 18 BRPM | DIASTOLIC BLOOD PRESSURE: 83 MMHG | TEMPERATURE: 99 F | HEIGHT: 70 IN | WEIGHT: 190.04 LBS | OXYGEN SATURATION: 98 % | HEART RATE: 88 BPM | SYSTOLIC BLOOD PRESSURE: 188 MMHG

## 2023-05-13 DIAGNOSIS — Z95.1 PRESENCE OF AORTOCORONARY BYPASS GRAFT: Chronic | ICD-10-CM

## 2023-05-13 DIAGNOSIS — Z95.0 PRESENCE OF CARDIAC PACEMAKER: Chronic | ICD-10-CM
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
## 2023-05-13 DIAGNOSIS — Z98.89 OTHER SPECIFIED POSTPROCEDURAL STATES: Chronic | ICD-10-CM

## 2023-05-13 DIAGNOSIS — Z95.2 PRESENCE OF PROSTHETIC HEART VALVE: Chronic | ICD-10-CM

## 2023-05-13 DIAGNOSIS — R07.1 CHEST PAIN ON BREATHING: ICD-10-CM

## 2023-05-13 LAB
ALBUMIN SERPL ELPH-MCNC: 4.1 G/DL — SIGNIFICANT CHANGE UP (ref 3.5–5.2)
ALP SERPL-CCNC: 213 U/L — HIGH (ref 30–115)
ALT FLD-CCNC: 15 U/L — SIGNIFICANT CHANGE UP (ref 0–41)
ANION GAP SERPL CALC-SCNC: 13 MMOL/L — SIGNIFICANT CHANGE UP (ref 7–14)
APTT BLD: 47.8 SEC — HIGH (ref 27–39.2)
AST SERPL-CCNC: 21 U/L — SIGNIFICANT CHANGE UP (ref 0–41)
BASOPHILS # BLD AUTO: 0.06 K/UL — SIGNIFICANT CHANGE UP (ref 0–0.2)
BASOPHILS NFR BLD AUTO: 0.6 % — SIGNIFICANT CHANGE UP (ref 0–1)
BILIRUB SERPL-MCNC: 1.1 MG/DL — SIGNIFICANT CHANGE UP (ref 0.2–1.2)
BUN SERPL-MCNC: 19 MG/DL — SIGNIFICANT CHANGE UP (ref 10–20)
CALCIUM SERPL-MCNC: 9.8 MG/DL — SIGNIFICANT CHANGE UP (ref 8.4–10.5)
CHLORIDE SERPL-SCNC: 108 MMOL/L — SIGNIFICANT CHANGE UP (ref 98–110)
CO2 SERPL-SCNC: 20 MMOL/L — SIGNIFICANT CHANGE UP (ref 17–32)
CREAT SERPL-MCNC: 1.1 MG/DL — SIGNIFICANT CHANGE UP (ref 0.7–1.5)
EGFR: 64 ML/MIN/1.73M2 — SIGNIFICANT CHANGE UP
EOSINOPHIL # BLD AUTO: 0.22 K/UL — SIGNIFICANT CHANGE UP (ref 0–0.7)
EOSINOPHIL NFR BLD AUTO: 2.2 % — SIGNIFICANT CHANGE UP (ref 0–8)
GAS PNL BLDV: SIGNIFICANT CHANGE UP
GLUCOSE SERPL-MCNC: 110 MG/DL — HIGH (ref 70–99)
HCT VFR BLD CALC: 37.1 % — LOW (ref 42–52)
HGB BLD-MCNC: 12 G/DL — LOW (ref 14–18)
IMM GRANULOCYTES NFR BLD AUTO: 0.3 % — SIGNIFICANT CHANGE UP (ref 0.1–0.3)
LYMPHOCYTES # BLD AUTO: 1.57 K/UL — SIGNIFICANT CHANGE UP (ref 1.2–3.4)
LYMPHOCYTES # BLD AUTO: 15.6 % — LOW (ref 20.5–51.1)
MCHC RBC-ENTMCNC: 30.5 PG — SIGNIFICANT CHANGE UP (ref 27–31)
MCHC RBC-ENTMCNC: 32.3 G/DL — SIGNIFICANT CHANGE UP (ref 32–37)
MCV RBC AUTO: 94.4 FL — HIGH (ref 80–94)
MONOCYTES # BLD AUTO: 1.05 K/UL — HIGH (ref 0.1–0.6)
MONOCYTES NFR BLD AUTO: 10.4 % — HIGH (ref 1.7–9.3)
NEUTROPHILS # BLD AUTO: 7.16 K/UL — HIGH (ref 1.4–6.5)
NEUTROPHILS NFR BLD AUTO: 70.9 % — SIGNIFICANT CHANGE UP (ref 42.2–75.2)
NRBC # BLD: 0 /100 WBCS — SIGNIFICANT CHANGE UP (ref 0–0)
NT-PROBNP SERPL-SCNC: 3098 PG/ML — HIGH (ref 0–300)
PLATELET # BLD AUTO: 308 K/UL — SIGNIFICANT CHANGE UP (ref 130–400)
PMV BLD: 10.3 FL — SIGNIFICANT CHANGE UP (ref 7.4–10.4)
POTASSIUM SERPL-MCNC: 4.7 MMOL/L — SIGNIFICANT CHANGE UP (ref 3.5–5)
POTASSIUM SERPL-SCNC: 4.7 MMOL/L — SIGNIFICANT CHANGE UP (ref 3.5–5)
PROT SERPL-MCNC: 6.7 G/DL — SIGNIFICANT CHANGE UP (ref 6–8)
RBC # BLD: 3.93 M/UL — LOW (ref 4.7–6.1)
RBC # FLD: 13.6 % — SIGNIFICANT CHANGE UP (ref 11.5–14.5)
SODIUM SERPL-SCNC: 141 MMOL/L — SIGNIFICANT CHANGE UP (ref 135–146)
TROPONIN T SERPL-MCNC: 0.05 NG/ML — CRITICAL HIGH
TROPONIN T SERPL-MCNC: 0.06 NG/ML — CRITICAL HIGH
WBC # BLD: 10.09 K/UL — SIGNIFICANT CHANGE UP (ref 4.8–10.8)
WBC # FLD AUTO: 10.09 K/UL — SIGNIFICANT CHANGE UP (ref 4.8–10.8)

## 2023-05-13 PROCEDURE — 82962 GLUCOSE BLOOD TEST: CPT

## 2023-05-13 PROCEDURE — 93005 ELECTROCARDIOGRAM TRACING: CPT

## 2023-05-13 PROCEDURE — 80048 BASIC METABOLIC PNL TOTAL CA: CPT

## 2023-05-13 PROCEDURE — 84484 ASSAY OF TROPONIN QUANT: CPT

## 2023-05-13 PROCEDURE — 85018 HEMOGLOBIN: CPT

## 2023-05-13 PROCEDURE — 93306 TTE W/DOPPLER COMPLETE: CPT

## 2023-05-13 PROCEDURE — 80053 COMPREHEN METABOLIC PANEL: CPT

## 2023-05-13 PROCEDURE — 82247 BILIRUBIN TOTAL: CPT

## 2023-05-13 PROCEDURE — 71045 X-RAY EXAM CHEST 1 VIEW: CPT | Mod: 26

## 2023-05-13 PROCEDURE — 82330 ASSAY OF CALCIUM: CPT

## 2023-05-13 PROCEDURE — 85025 COMPLETE CBC W/AUTO DIFF WBC: CPT

## 2023-05-13 PROCEDURE — 83615 LACTATE (LD) (LDH) ENZYME: CPT

## 2023-05-13 PROCEDURE — 84443 ASSAY THYROID STIM HORMONE: CPT

## 2023-05-13 PROCEDURE — 83735 ASSAY OF MAGNESIUM: CPT

## 2023-05-13 PROCEDURE — 36430 TRANSFUSION BLD/BLD COMPNT: CPT

## 2023-05-13 PROCEDURE — 85014 HEMATOCRIT: CPT

## 2023-05-13 PROCEDURE — 85730 THROMBOPLASTIN TIME PARTIAL: CPT

## 2023-05-13 PROCEDURE — 83010 ASSAY OF HAPTOGLOBIN QUANT: CPT

## 2023-05-13 PROCEDURE — P9016: CPT

## 2023-05-13 PROCEDURE — 86900 BLOOD TYPING SEROLOGIC ABO: CPT

## 2023-05-13 PROCEDURE — 33992 RMVL PERQ LEFT HEART VAD: CPT

## 2023-05-13 PROCEDURE — 86850 RBC ANTIBODY SCREEN: CPT

## 2023-05-13 PROCEDURE — C1874: CPT

## 2023-05-13 PROCEDURE — 84132 ASSAY OF SERUM POTASSIUM: CPT

## 2023-05-13 PROCEDURE — C1894: CPT

## 2023-05-13 PROCEDURE — C1769: CPT

## 2023-05-13 PROCEDURE — 93455 CORONARY ART/GRFT ANGIO S&I: CPT

## 2023-05-13 PROCEDURE — 71045 X-RAY EXAM CHEST 1 VIEW: CPT

## 2023-05-13 PROCEDURE — 92937 PRQ TRLUML REVSC CAB GRF 1: CPT | Mod: LC

## 2023-05-13 PROCEDURE — C1889: CPT

## 2023-05-13 PROCEDURE — C1725: CPT

## 2023-05-13 PROCEDURE — 85610 PROTHROMBIN TIME: CPT

## 2023-05-13 PROCEDURE — 80061 LIPID PANEL: CPT

## 2023-05-13 PROCEDURE — 83036 HEMOGLOBIN GLYCOSYLATED A1C: CPT

## 2023-05-13 PROCEDURE — 82248 BILIRUBIN DIRECT: CPT

## 2023-05-13 PROCEDURE — C1887: CPT

## 2023-05-13 PROCEDURE — 83605 ASSAY OF LACTIC ACID: CPT

## 2023-05-13 PROCEDURE — 85027 COMPLETE CBC AUTOMATED: CPT

## 2023-05-13 PROCEDURE — 82803 BLOOD GASES ANY COMBINATION: CPT

## 2023-05-13 PROCEDURE — 36415 COLL VENOUS BLD VENIPUNCTURE: CPT

## 2023-05-13 PROCEDURE — 84295 ASSAY OF SERUM SODIUM: CPT

## 2023-05-13 PROCEDURE — 99285 EMERGENCY DEPT VISIT HI MDM: CPT | Mod: FS

## 2023-05-13 PROCEDURE — 86901 BLOOD TYPING SEROLOGIC RH(D): CPT

## 2023-05-13 PROCEDURE — 86923 COMPATIBILITY TEST ELECTRIC: CPT

## 2023-05-13 PROCEDURE — 33990 INSJ PERQ VAD L HRT ARTERIAL: CPT

## 2023-05-13 PROCEDURE — 81001 URINALYSIS AUTO W/SCOPE: CPT

## 2023-05-13 RX ORDER — LOSARTAN POTASSIUM 100 MG/1
100 TABLET, FILM COATED ORAL DAILY
Refills: 0 | Status: DISCONTINUED | OUTPATIENT
Start: 2023-05-13 | End: 2023-05-17

## 2023-05-13 RX ORDER — METOPROLOL TARTRATE 50 MG
50 TABLET ORAL
Refills: 0 | Status: DISCONTINUED | OUTPATIENT
Start: 2023-05-13 | End: 2023-05-19

## 2023-05-13 RX ORDER — ZOLPIDEM TARTRATE 10 MG/1
5 TABLET ORAL AT BEDTIME
Refills: 0 | Status: DISCONTINUED | OUTPATIENT
Start: 2023-05-13 | End: 2023-05-19

## 2023-05-13 RX ORDER — ATORVASTATIN CALCIUM 80 MG/1
40 TABLET, FILM COATED ORAL AT BEDTIME
Refills: 0 | Status: DISCONTINUED | OUTPATIENT
Start: 2023-05-13 | End: 2023-05-19

## 2023-05-13 RX ORDER — ASPIRIN/CALCIUM CARB/MAGNESIUM 324 MG
324 TABLET ORAL ONCE
Refills: 0 | Status: COMPLETED | OUTPATIENT
Start: 2023-05-13 | End: 2023-05-13

## 2023-05-13 RX ORDER — CHLORHEXIDINE GLUCONATE 213 G/1000ML
1 SOLUTION TOPICAL
Refills: 0 | Status: DISCONTINUED | OUTPATIENT
Start: 2023-05-13 | End: 2023-05-19

## 2023-05-13 RX ORDER — TAMSULOSIN HYDROCHLORIDE 0.4 MG/1
0.4 CAPSULE ORAL AT BEDTIME
Refills: 0 | Status: DISCONTINUED | OUTPATIENT
Start: 2023-05-13 | End: 2023-05-19

## 2023-05-13 RX ORDER — NITROGLYCERIN 6.5 MG
0.4 CAPSULE, EXTENDED RELEASE ORAL
Refills: 0 | Status: DISCONTINUED | OUTPATIENT
Start: 2023-05-13 | End: 2023-05-19

## 2023-05-13 RX ORDER — ERGOCALCIFEROL 1.25 MG/1
1 CAPSULE ORAL
Qty: 0 | Refills: 0 | DISCHARGE

## 2023-05-13 RX ORDER — PANTOPRAZOLE SODIUM 20 MG/1
40 TABLET, DELAYED RELEASE ORAL
Refills: 0 | Status: DISCONTINUED | OUTPATIENT
Start: 2023-05-13 | End: 2023-05-19

## 2023-05-13 RX ORDER — ASPIRIN/CALCIUM CARB/MAGNESIUM 324 MG
81 TABLET ORAL DAILY
Refills: 0 | Status: DISCONTINUED | OUTPATIENT
Start: 2023-05-14 | End: 2023-05-19

## 2023-05-13 RX ORDER — CLOPIDOGREL BISULFATE 75 MG/1
75 TABLET, FILM COATED ORAL DAILY
Refills: 0 | Status: DISCONTINUED | OUTPATIENT
Start: 2023-05-14 | End: 2023-05-19

## 2023-05-13 RX ORDER — FINASTERIDE 5 MG/1
5 TABLET, FILM COATED ORAL DAILY
Refills: 0 | Status: DISCONTINUED | OUTPATIENT
Start: 2023-05-13 | End: 2023-05-19

## 2023-05-13 RX ORDER — CLOPIDOGREL BISULFATE 75 MG/1
300 TABLET, FILM COATED ORAL ONCE
Refills: 0 | Status: COMPLETED | OUTPATIENT
Start: 2023-05-13 | End: 2023-05-13

## 2023-05-13 RX ADMIN — TAMSULOSIN HYDROCHLORIDE 0.4 MILLIGRAM(S): 0.4 CAPSULE ORAL at 22:12

## 2023-05-13 RX ADMIN — ZOLPIDEM TARTRATE 5 MILLIGRAM(S): 10 TABLET ORAL at 22:10

## 2023-05-13 RX ADMIN — CLOPIDOGREL BISULFATE 300 MILLIGRAM(S): 75 TABLET, FILM COATED ORAL at 22:12

## 2023-05-13 RX ADMIN — Medication 324 MILLIGRAM(S): at 15:42

## 2023-05-13 NOTE — H&P ADULT - TIME BILLING
Chart review, bedside evaluation, treatment of NSTEMI with acute systolic heart failure with severe pulmonary hypertension, coordination with interventional cardiology

## 2023-05-13 NOTE — ED PROVIDER NOTE - NSTIMEPROVIDERCAREINITIATE_GEN_ER
13-May-2023 13:54 [Normal Growth] : growth [Normal Development] : development  [Continue Regimen] : feeding [No Skin Concerns] : skin [Normal Sleep Pattern] : sleep [None] : no medical problems [Patient] : patient [Full Activity without restrictions including Physical Education & Athletics] : Full Activity without restrictions including Physical Education & Athletics [Mother] : mother [I have examined the above-named student and completed the preparticipation physical evaluation. The athlete does not present apparent clinical contraindications to practice and participate in sport(s) as outlined above. A copy of the physical exam is on r] : I have examined the above-named student and completed the preparticipation physical evaluation. The athlete does not present apparent clinical contraindications to practice and participate in sport(s) as outlined above. A copy of the physical exam is on record in my office and can be made available to the school at the request of the parents. If conditions arise after the athlete has been cleared for participation, the physician may rescind the clearance until the problem is resolved and the potential consequences are completely explained to the athlete (and parents/guardians). [] : The components of the vaccine(s) to be administered today are listed in the plan of care. The disease(s) for which the vaccine(s) are intended to prevent and the risks have been discussed with the caretaker.  The risks are also included in the appropriate vaccination information statements which have been provided to the patient's caregiver.  The caregiver has given consent to vaccinate. [de-identified] : constipation [FreeTextEntry6] : Influenza, menactra, Tdap [FreeTextEntry2] : Aunt [FreeTextEntry1] : Brayan is a 9yo M with PMH of nondisplaced closed fracture of right lateral epicondyle presenting for [facility]. Patient injured right elbow one month ago but superficial abrasion appears to have healed, unlikely that pus drainage was deeper than superficial wound. Discussed with Brayan that he should wear elbow and knee pads whenever riding a bike or skateboard to prevent further injury. Rectal exam did no show any fissures or hemorrhoids, but due to discomfort with stooling and frequency of bowel movements will recommend starting Miralax and increasing fiber/water intake. Discussed COVID-19 vaccine with aunt and mother--will consider it and call clinic back to schedule appointment if ok with administration. \par \par 1. Health maintenance \par - continue to diversify foods, increase vegetables/fiber/water intake\par - encourage importance of hydration\par - reviewed 5-2-1-0 rule with pt and family\par - advised on good sleep hygiene\par - f/u with dental per routine\par - limit screen time to 2 hours per day max\par - encouraged 30min of physical activity everyday, using helmet and knee/elbow pads whenever on bike or skateboard\par - Vaccines given today: Influenza, Tdap, Menactra\par - RTC 1 year for annual visit \par \par 2. Constipation\par - 1 packet of Miralax daily

## 2023-05-13 NOTE — H&P ADULT - NSHPREVIEWOFSYSTEMS_GEN_ALL_CORE
CONSTITUTIONAL - No fever, No diaphoresis, No weight change  SKIN - No rash  HEMATOLOGIC - No abnormal bleeding or bruising  EYES - No eye pain, No blurred vision  ENT - No change in hearing, No sore throat, No neck pain, No rhinorrhea, No ear pain  RESPIRATORY - No shortness of breath, No cough  CARDIAC + chest pain, No palpitations  GI - No abdominal pain, No nausea, No vomiting, No diarrhea, No constipation, No bright red blood per rectum or melena. No flank pain  - No dysuria, frequency, hematuria.   ENDO - No polydypsia, No polyuria, No heat/cold intolerance  MUSCULOSKELETAL - No joint paint, No swelling, No back pain  NEUROLOGIC - No numbness, No focal weakness, No headache, No dizziness  All other systems negative, unless specified in HPI

## 2023-05-13 NOTE — ED ADULT NURSE NOTE - NSFALLUNIVINTERV_ED_ALL_ED
Assistance with ambulation/Communicate risk of Fall with Harm to all staff, patient, and family/Monitor for mental status changes and reorient to person, place, and time, as needed/Reinforce activity limits and safety measures with patient and family/Use of alarms - bed, stretcher, chair and/or video monitoring/Bed/Stretcher in lowest position, wheels locked, appropriate side rails in place/Call bell, personal items and telephone in reach/Instruct patient to call for assistance before getting out of bed/chair/stretcher/Non-slip footwear applied when patient is off stretcher/[place] to call system/Physically safe environment - no spills, clutter or unnecessary equipment/Purposeful proactive rounding/Room/bathroom lighting operational, light cord in reach

## 2023-05-13 NOTE — H&P ADULT - CONVERSATION DETAILS
patient who is AOx3  states that he has prior advanced directive and is DNR DNI.   He understands that if he is offered and wishes to undergo a cardiac cath, he will reverse those wishes for the procedure. patient who is AOx3  states that he has prior advanced directive and is DNR DNI.   He is willing to rescind DNR DNI and be FULL CODE for cardiac cath

## 2023-05-13 NOTE — H&P ADULT - ATTENDING COMMENTS
In brief, Mr. Llanos is an 89-year-old man with history of ischemic CM s/p CABG and PCI, severe AS s/p TAVR in 2016, atrial fibrillation s/p MAZE and PPM for ?SSS, carotid stenosis s/p R CEA 2013, and COPD presenting with exertional dyspnea and chest tightness, ruled in for NSTEMI and planned for [facility] tomorrow with Dr. Dan. TTE revealed a newly depressed EF along with a lateral RWMA and severe pHTN with PASP >80mmHg; normal TAVR function.    Peak troponin 0.06  BNP 3098  Cr 1.1, K 4.4, BUN 19, CO2 20    Plan:  - Diuresis aiming for 1-2L net negative daily.  - SLN as needed. Low threshold for initiating nitro gtt.  - Reloaded with plavix; continue 75mg daily  - Confirm plans with Dr. Dan for C +/- [facility] tomorrow; if scheduled will need premedication for contrast allergy.  - NPO at midnight

## 2023-05-13 NOTE — ED PROVIDER NOTE - CARE PLAN
Principal Discharge DX:	Chest pain  Secondary Diagnosis:	Angina pectoris  Secondary Diagnosis:	Elevated troponin  Secondary Diagnosis:	Pacemaker   1

## 2023-05-13 NOTE — H&P ADULT - NSHPPHYSICALEXAM_GEN_ALL_CORE
GENERAL: NAD, well-developed, AAOx3  HEENT:  Atraumatic, Normocephalic. conjunctiva and sclera clear, No JVD  PULMONARY: Bilateral bibasilar crackles, no wheezing.   CARDIOVASCULAR: Regular rate and rhythm; No murmurs, rubs, or gallops  GASTROINTESTINAL: Soft, Nontender, Nondistended; Bowel sounds present  MUSCULOSKELETAL:  2+ Peripheral Pulses, No clubbing, cyanosis, +1 edema bilateral LE  NEUROLOGY: non-focal  SKIN: No rashes or lesions

## 2023-05-13 NOTE — H&P ADULT - ASSESSMENT
89yo M hx of aortic stenosis s/p TAVR (2016), CAD s/p PCI and CABG 3v  (LIMA => LAD, SVG to OM1, SVG to PDA), chronic atrial fibrillation (on Coumadin s/p MAZE procedure s/p PPM). 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     #NSTEMI  #CAD s/p CABG 2011 (LIMA => LAD, SVG to OM1, SVG to PDA)  #AS s/p TAVR  #AFib on coumadin, s/p PPM  #HFmrEF  - vitals stable  - Trop x1 0.05  - EKG: V paced rhythm   - BNP 3098  - Last Echo: 4/2022: EF 65%, Mod TR, Pulm HTN  - sublingual nitro prn for chest pain. will upgrade to ccu and start nitro gtt if persistent chest pain  - Plavix 300mg x 1. Continue palvix 75mg  QD  - check PTT/INR tonight. If INR is subtherapeutic, start heparin gtt  - Trend troponins to peak  - Repeat ECG   -c/w ASA/BB/simvastatin/Losartan  - Admit to cardiology step down    #HTN      #HLD      #Asthma/ COPD, not on home O2       #Misc  - GI ppx:  - DVT ppx:  - Diet:  - Codes Status:  - Dispo:    88yo M hx of aortic stenosis s/p TAVR (2016), CAD s/p PCI and CABG 3v  (LIMA => LAD, SVG to OM1, SVG to PDA), chronic atrial fibrillation (on Coumadin s/p MAZE procedure s/p PPM). 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     #NSTEMI  #CAD s/p CABG 2011 (LIMA => LAD, SVG to OM1, SVG to PDA)  #AS s/p TAVR  #AFib on coumadin, s/p PPM (4mg M/T/TH/F/Sun, 6mg Wed/ Sat)  #HFmrEF  - vitals stable  - Trop x1 0.05  - EKG: V paced rhythm   - BNP 3098  - Last Echo: 4/2022: EF 65%, Mod TR, Pulm HTN  - sublingual nitro prn for chest pain. will upgrade to ccu and start nitro gtt if persistent chest pain  - Plavix 300mg x 1. Continue palvix 75mg  QD  - check PTT/INR tonight. If INR is subtherapeutic, start heparin gtt  - Trend troponins to peak  - Repeat ECG, Echo   - EP f/u   - NPO after midnight on 5/14 for possible cath 5/15   -c/w ASA/BB/simvastatin/Losartan  - Admit to cardiology step down  - Note: if patient is to go for cath, he has contrast allergy (hives), will need solumedrol benadryl treatment prior     #HTN  -cw losartan 100mg, metoprolol 50mg     #HLD  - c/w statin  - f/u lipid profile     #Asthma/ COPD, not on home O2   - not in exacerbation   - c/w home inhalers     #BPH  - c/w flomax, finasteride     #DM  - f/u a1c  - hold home metformin 500mg qd  - Hold Insulin for now, start insulin regimen if FS persistently elevated >200     #Insomnia  - c/w ambien 5mg prn (he takes this daily)     #Misc  - GI ppx: PPI   - DVT ppx: coumadin vs heparin gtt, waiting INR  - Diet: Dash  - Codes Status: DNR DNI (patient at bedside reports that he wishes for DNR DNI but will reverse for cath if needed)   - Dispo: admit to cardiac step down    89yo M hx of aortic stenosis s/p TAVR (2016), CAD s/p PCI and CABG 3v  (LIMA => LAD, SVG to OM1, SVG to PDA), chronic atrial fibrillation (on Coumadin s/p MAZE procedure s/p PPM). 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     #NSTEMI   #CAD s/p CABG 2011 (LIMA => LAD, SVG to OM1, SVG to PDA)  #AS s/p TAVR  #AFib on coumadin, s/p PPM (4mg M/T/TH/F/Sun, 6mg Wed/ Sat)  #HFmrEF  - vitals stable  - Trop x1 0.05  - EKG: V paced rhythm   - BNP 3098  - Last Echo: 4/2022: EF 65%, Mod TR, Pulm HTN  - sublingual nitro prn for chest pain. will upgrade to ccu and start nitro gtt if persistent chest pain  - Plavix 300mg x 1. Continue palvix 75mg  QD  - check PTT/INR tonight. If INR is subtherapeutic, start heparin gtt  - Trend troponins to peak  - Repeat ECG, Echo   - EP f/u   - NPO after midnight on 5/14 for possible cath 5/15   -c/w ASA/BB/simvastatin/Losartan  - Admit to cardiology step down  - Note: if patient is to go for cath, he has contrast allergy (hives), will need solumedrol benadryl treatment prior     #HTN  -cw losartan 100mg, metoprolol 50mg     #HLD  - c/w statin  - f/u lipid profile     #Asthma/ COPD, not on home O2   - not in exacerbation   - c/w home inhalers     #BPH  - c/w flomax, finasteride     #DM  - f/u a1c  - hold home metformin 500mg qd  - Hold Insulin for now, start insulin regimen if FS persistently elevated >200     #Insomnia  - c/w ambien 5mg prn (he takes this daily)     #Misc  - GI ppx: PPI   - DVT ppx: coumadin vs heparin gtt, waiting INR  - Diet: Dash  - Codes Status: DNR DNI (patient at bedside reports that he wishes for DNR DNI but will rescind and be full code for cardiac cath)   - Dispo: admit to cardiac step down

## 2023-05-13 NOTE — CONSULT NOTE ADULT - SUBJECTIVE AND OBJECTIVE BOX
Outpt cardiologist:    HPI:      ---  Cardiology Fellow notes:      Pt describes exertional dyspnea and l-sided chest pain on ambulation or when climbing stairs. His symptoms started around 1 month ago and has been getting worse since then which prompted him to come to the Ed. he denies associated symptoms such as dizziness, lightheadedness, palpitations, or syncope.    PAST MEDICAL & SURGICAL HISTORY  Asthma with COPD    HLD (hyperlipidemia)    Essential hypertension    Aortic stenosis, severe    A-fib    DM (diabetes mellitus)    Pacemaker    BPH (benign prostatic hyperplasia)    S/P CABG x 3    History of CEA (carotid endarterectomy)    History of permanent cardiac pacemaker placement    H/O aortic valve replacement        FAMILY HISTORY:  FAMILY HISTORY:      SOCIAL HISTORY:  Social History:      ALLERGIES:  IV Contrast (Hives)      MEDICATIONS:    PRN:      HOME MEDICATIONS:  Home Medications:  Ambien 10 mg oral tablet: 1 tab(s) orally once a day (at bedtime), As Needed (2022 14:02)  metFORMIN 500 mg oral tablet: 1 tab(s) orally 2 times a day (2022 14:02)  Metoprolol Tartrate 50 mg oral tablet: 1 tab(s) orally 2 times a day (2022 14:02)  Vitamin D2 50 mcg (2000 intl units) oral capsule: 1 cap(s) orally once a day (2022 14:02)      VITALS:   T(F): 97.3 ( @ 15:21), Max: 98.8 (05- @ 13:43)  HR: 72 ( @ 15:21) (72 - 88)  BP: 177/83 (- @ 15:21) (177/83 - 188/83)  BP(mean): --  RR: 18 (- @ 15:21) (18 - 18)  SpO2: 97% (- @ 15:21) (97% - 98%)    I&O's Summary      REVIEW OF SYSTEMS:  CONSTITUTIONAL: No weakness, fevers or chills  HEENT: No visual changes, neck/ear pain  RESPIRATORY: No cough, sob  CARDIOVASCULAR: See HPI  GASTROINTESTINAL: No abdominal pain. No nausea, vomiting, diarrhea   GENITOURINARY: No dysuria, frequency or hematuria  NEUROLOGICAL: No new focal deficits  SKIN: No new rashes    PHYSICAL EXAM:  General: Not in distress.  Non-toxic appearing.   HEENT: EOMI  Cardio: regular, S1, S2, no murmur  Pulm: B/L BS.  No wheezing / crackles / rales  Abdomen: Soft, non-tender, non-distended. Normoactive bowel sounds  Extremities: No edema b/l le  Neuro: A&O x3. No focal deficits    LABS:                        12.0   10.09 )-----------( 308      ( 13 May 2023 16:05 )             37.1         141  |  108  |  19  ----------------------------<  110<H>  4.7   |  20  |  1.1    Ca    9.8      13 May 2023 16:05    TPro  6.7  /  Alb  4.1  /  TBili  1.1  /  DBili  x   /  AST  21  /  ALT  15  /  AlkPhos  213<H>        Troponin T, Serum: 0.05 ng/mL *HH* (23 @ 16:05)    CARDIAC MARKERS ( 13 May 2023 16:05 )  x     / 0.05 ng/mL / x     / x     / x            Troponin trend:            RADIOLOGY:  -CXR:  -TTE:  -CCTA:  -STRESS TEST:  -CATHETERIZATION:  -OTHER:  EC Lead ECG:   Ventricular Rate 86 BPM    Atrial Rate 86 BPM    QRS Duration 158 ms    Q-T Interval 496 ms    QTC Calculation(Bazett) 593 ms    P Axis -31 degrees    R Axis -43 degrees    T Axis 92 degrees    Diagnosis Line Ventricular-paced rhythm  Abnormal ECG    Confirmed by Sachin Carlson ([id number]) on 2023 5:03:53 PM ( @ 15:50)      TELEMETRY EVENTS:

## 2023-05-13 NOTE — ED PROVIDER NOTE - OBJECTIVE STATEMENT
89 year old male with a history of aortic stenosis s/p TAVR (2016), CAD s/p PCI and CABG 3v, chronic atrial fibrillation (on Coumadin s/p MAZE procedure), HFmrEF s/p pacemaker placement, carotid stenosis s/p right CEA (2013), HTN, HLD, asthma/COPD not on home O2 presents to the ED with chest pain. Patient reports that for the past week he has been experiencing progressive dyspnea on exertion. Today walking up the stairs he developed left sided chest pain non radiating moderate intensity. States that he has had this pain before and it usually relieves with rest however today the pain persisted. Admits to orthopnea and lower extremity swelling. Denies fever, chills, chest pain, abdominal pain, nausea, vomiting, diarrhea, constipation, dysuria, hematuria, rash.

## 2023-05-13 NOTE — CONSULT NOTE ADULT - ASSESSMENT
#NSTEMI  #CAD s/p CABG 2011 (LIMA => LAD, SVG to OM1, SVG to PDA)  #AS s/p TAVR  #AFib on coumadin , s/p PPM  #HFmrEF    - ADmit to cardiology step down  - sublingual nitro prn for chest pain. will upgrade to ccu and start nitro gtt if persistent chest pain  - Plavix 300mg x 1. Continue palvix 75mg  QD  - check PTT/INR tonight. If INR is subtherapeutic, start heparin gtt  - Trend troponins to peak  - Repeat ECG   -c/w ASA/BB/simvastatin/Losartan     #NSTEMI  #CAD s/p CABG 2011 (LIMA => LAD, SVG to OM1, SVG to PDA)  #AS s/p TAVR  #AFib on coumadin , s/p PPM  #HFmrEF    - ADmit to cardiology step down  - sublingual nitro prn for chest pain. will upgrade to ccu and start nitro gtt if persistent chest pain  - Plavix 300mg x 1. Continue palvix 75mg  QD  - check PTT/INR tonight. If INR is subtherapeutic, start heparin gtt  - Trend troponins to peak  - Repeat ECG   -c/w ASA/BB/simvastatin/Losartan  Needed SL NTG yesterday seems comfortable will cath on Tuesday

## 2023-05-13 NOTE — ED PROVIDER NOTE - CLINICAL SUMMARY MEDICAL DECISION MAKING FREE TEXT BOX
Labs and EKG were ordered and reviewed.  Imaging was ordered and reviewed by me.  Appropriate medications for patient's presenting complaints were ordered and effects were reassessed.  Patient's records (prior hospital, ED visit, and/or nursing home notes if available) were reviewed.  Additional history was obtained from EMS, family, and/or PCP (where available).  Escalation to admission/observation was considered.  Patient requires inpatient hospitalization - monitored setting.  ACS workup and CCU admission for likely cardiac cath and further treatment.

## 2023-05-13 NOTE — ED ADULT NURSE REASSESSMENT NOTE - NS ED NURSE REASSESS COMMENT FT1
Pt received from previous shift RN. Aox3, family member present at bedside. Voiced no complaints at this time. Safety precautions maintained.

## 2023-05-13 NOTE — H&P ADULT - HISTORY OF PRESENT ILLNESS
89yo M hx of aortic stenosis s/p TAVR (2016), CAD s/p PCI and CABG 3v  (LIMA => LAD, SVG to OM1, SVG to PDA), chronic atrial fibrillation (on Coumadin s/p MAZE procedure s/p PPM). 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 His symptoms started around 1 month ago and has been getting worse since then which prompted him to come to the Ed. he denies associated symptoms such as dizziness, lightheadedness, palpitations, or syncope.     In the ED: Afebrile, HR 88, /83 > 177/83, 98%RA, Labs with Hgb 12.0 (bl 10-11), CMp wnl, trop 0.05 x1 (prev 0.05 in 4/2022), BNP 3098 (prev 2226).   EKG with Ventricular-paced rhythm   Chest Xray with cardiomegaly     Admit to cardio- stepdown, planned for cath Monday with Dr. Dan.  88yo M hx of aortic stenosis s/p TAVR (2016), CAD s/p PCI and CABG 3v  (LIMA => LAD, SVG to OM1, SVG to PDA), chronic atrial fibrillation (on Coumadin s/p MAZE procedure s/p PPM). 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 His symptoms started around 1 month ago and has been getting worse since then which prompted him to come to the Ed. He denies associated symptoms such as dizziness, lightheadedness, palpitations, or syncope. He sleeps with 2 pillows, denies PND. Reports that he is due for new PPM next month with Dr. Tai. Last dose of coumadin 4/12 am. At this time of eval, he reports mild, dull chest pain.     In the ED: Afebrile, HR 88, /83 > 177/83, 98%RA, Labs with Hgb 12.0 (bl 10-11), CMP wnl, trop 0.05 x1 (prev 0.05 in 4/2022), BNP 3098 (prev 2226).   EKG with Ventricular-paced rhythm   Chest Xray with cardiomegaly     Admit to cardio- stepdown, planned for cath Monday with Dr. Dan.

## 2023-05-13 NOTE — ED PROVIDER NOTE - PRINCIPAL DIAGNOSIS
Pt states she has a hx of migraines, states she has had this headache since last night. States she has fatigue with nausea and vomiting as well.      Serg Christopher RN  06/30/22 [id number] Chest pain

## 2023-05-14 LAB
ALBUMIN SERPL ELPH-MCNC: 4.2 G/DL — SIGNIFICANT CHANGE UP (ref 3.5–5.2)
ALP SERPL-CCNC: 211 U/L — HIGH (ref 30–115)
ALT FLD-CCNC: 16 U/L — SIGNIFICANT CHANGE UP (ref 0–41)
ANION GAP SERPL CALC-SCNC: 14 MMOL/L — SIGNIFICANT CHANGE UP (ref 7–14)
APTT BLD: 44.7 SEC — HIGH (ref 27–39.2)
APTT BLD: 50 SEC — HIGH (ref 27–39.2)
AST SERPL-CCNC: 22 U/L — SIGNIFICANT CHANGE UP (ref 0–41)
BASOPHILS # BLD AUTO: 0.07 K/UL — SIGNIFICANT CHANGE UP (ref 0–0.2)
BASOPHILS NFR BLD AUTO: 0.6 % — SIGNIFICANT CHANGE UP (ref 0–1)
BILIRUB SERPL-MCNC: 1.3 MG/DL — HIGH (ref 0.2–1.2)
BLD GP AB SCN SERPL QL: SIGNIFICANT CHANGE UP
BUN SERPL-MCNC: 19 MG/DL — SIGNIFICANT CHANGE UP (ref 10–20)
CALCIUM SERPL-MCNC: 9.5 MG/DL — SIGNIFICANT CHANGE UP (ref 8.4–10.5)
CHLORIDE SERPL-SCNC: 104 MMOL/L — SIGNIFICANT CHANGE UP (ref 98–110)
CHOLEST SERPL-MCNC: 106 MG/DL — SIGNIFICANT CHANGE UP
CO2 SERPL-SCNC: 20 MMOL/L — SIGNIFICANT CHANGE UP (ref 17–32)
CREAT SERPL-MCNC: 1.1 MG/DL — SIGNIFICANT CHANGE UP (ref 0.7–1.5)
EGFR: 64 ML/MIN/1.73M2 — SIGNIFICANT CHANGE UP
EOSINOPHIL # BLD AUTO: 0.34 K/UL — SIGNIFICANT CHANGE UP (ref 0–0.7)
EOSINOPHIL NFR BLD AUTO: 2.7 % — SIGNIFICANT CHANGE UP (ref 0–8)
GLUCOSE BLDC GLUCOMTR-MCNC: 150 MG/DL — HIGH (ref 70–99)
GLUCOSE BLDC GLUCOMTR-MCNC: 167 MG/DL — HIGH (ref 70–99)
GLUCOSE BLDC GLUCOMTR-MCNC: 212 MG/DL — HIGH (ref 70–99)
GLUCOSE BLDC GLUCOMTR-MCNC: 235 MG/DL — HIGH (ref 70–99)
GLUCOSE SERPL-MCNC: 134 MG/DL — HIGH (ref 70–99)
HCT VFR BLD CALC: 39 % — LOW (ref 42–52)
HDLC SERPL-MCNC: 46 MG/DL — SIGNIFICANT CHANGE UP
HGB BLD-MCNC: 12.8 G/DL — LOW (ref 14–18)
IMM GRANULOCYTES NFR BLD AUTO: 0.4 % — HIGH (ref 0.1–0.3)
INR BLD: 2.5 RATIO — HIGH (ref 0.65–1.3)
INR BLD: 2.63 RATIO — HIGH (ref 0.65–1.3)
LIPID PNL WITH DIRECT LDL SERPL: 38 MG/DL — SIGNIFICANT CHANGE UP
LYMPHOCYTES # BLD AUTO: 1.7 K/UL — SIGNIFICANT CHANGE UP (ref 1.2–3.4)
LYMPHOCYTES # BLD AUTO: 13.7 % — LOW (ref 20.5–51.1)
MAGNESIUM SERPL-MCNC: 2 MG/DL — SIGNIFICANT CHANGE UP (ref 1.8–2.4)
MCHC RBC-ENTMCNC: 31.2 PG — HIGH (ref 27–31)
MCHC RBC-ENTMCNC: 32.8 G/DL — SIGNIFICANT CHANGE UP (ref 32–37)
MCV RBC AUTO: 95.1 FL — HIGH (ref 80–94)
MONOCYTES # BLD AUTO: 1.05 K/UL — HIGH (ref 0.1–0.6)
MONOCYTES NFR BLD AUTO: 8.5 % — SIGNIFICANT CHANGE UP (ref 1.7–9.3)
NEUTROPHILS # BLD AUTO: 9.16 K/UL — HIGH (ref 1.4–6.5)
NEUTROPHILS NFR BLD AUTO: 74.1 % — SIGNIFICANT CHANGE UP (ref 42.2–75.2)
NON HDL CHOLESTEROL: 60 MG/DL — SIGNIFICANT CHANGE UP
NRBC # BLD: 0 /100 WBCS — SIGNIFICANT CHANGE UP (ref 0–0)
PLATELET # BLD AUTO: 302 K/UL — SIGNIFICANT CHANGE UP (ref 130–400)
PMV BLD: 10.1 FL — SIGNIFICANT CHANGE UP (ref 7.4–10.4)
POTASSIUM SERPL-MCNC: 4.4 MMOL/L — SIGNIFICANT CHANGE UP (ref 3.5–5)
POTASSIUM SERPL-SCNC: 4.4 MMOL/L — SIGNIFICANT CHANGE UP (ref 3.5–5)
PROT SERPL-MCNC: 6.9 G/DL — SIGNIFICANT CHANGE UP (ref 6–8)
PROTHROM AB SERPL-ACNC: 29.3 SEC — HIGH (ref 9.95–12.87)
PROTHROM AB SERPL-ACNC: 30.9 SEC — HIGH (ref 9.95–12.87)
RBC # BLD: 4.1 M/UL — LOW (ref 4.7–6.1)
RBC # FLD: 13.8 % — SIGNIFICANT CHANGE UP (ref 11.5–14.5)
SODIUM SERPL-SCNC: 138 MMOL/L — SIGNIFICANT CHANGE UP (ref 135–146)
TRIGL SERPL-MCNC: 111 MG/DL — SIGNIFICANT CHANGE UP
TROPONIN T SERPL-MCNC: 0.06 NG/ML — CRITICAL HIGH
TROPONIN T SERPL-MCNC: 0.06 NG/ML — CRITICAL HIGH
WBC # BLD: 12.37 K/UL — HIGH (ref 4.8–10.8)
WBC # FLD AUTO: 12.37 K/UL — HIGH (ref 4.8–10.8)

## 2023-05-14 PROCEDURE — 93306 TTE W/DOPPLER COMPLETE: CPT | Mod: 26

## 2023-05-14 PROCEDURE — 99223 1ST HOSP IP/OBS HIGH 75: CPT

## 2023-05-14 RX ORDER — POLYETHYLENE GLYCOL 3350 17 G/17G
17 POWDER, FOR SOLUTION ORAL DAILY
Refills: 0 | Status: DISCONTINUED | OUTPATIENT
Start: 2023-05-14 | End: 2023-05-19

## 2023-05-14 RX ORDER — FUROSEMIDE 40 MG
40 TABLET ORAL DAILY
Refills: 0 | Status: DISCONTINUED | OUTPATIENT
Start: 2023-05-14 | End: 2023-05-14

## 2023-05-14 RX ORDER — FUROSEMIDE 40 MG
40 TABLET ORAL EVERY 12 HOURS
Refills: 0 | Status: DISCONTINUED | OUTPATIENT
Start: 2023-05-14 | End: 2023-05-17

## 2023-05-14 RX ORDER — NIFEDIPINE 30 MG
60 TABLET, EXTENDED RELEASE 24 HR ORAL DAILY
Refills: 0 | Status: DISCONTINUED | OUTPATIENT
Start: 2023-05-15 | End: 2023-05-17

## 2023-05-14 RX ORDER — NIFEDIPINE 30 MG
30 TABLET, EXTENDED RELEASE 24 HR ORAL ONCE
Refills: 0 | Status: COMPLETED | OUTPATIENT
Start: 2023-05-14 | End: 2023-05-14

## 2023-05-14 RX ORDER — NIFEDIPINE 30 MG
30 TABLET, EXTENDED RELEASE 24 HR ORAL DAILY
Refills: 0 | Status: DISCONTINUED | OUTPATIENT
Start: 2023-05-14 | End: 2023-05-14

## 2023-05-14 RX ADMIN — CHLORHEXIDINE GLUCONATE 1 APPLICATION(S): 213 SOLUTION TOPICAL at 06:43

## 2023-05-14 RX ADMIN — Medication 30 MILLIGRAM(S): at 20:40

## 2023-05-14 RX ADMIN — Medication 30 MILLIGRAM(S): at 12:42

## 2023-05-14 RX ADMIN — POLYETHYLENE GLYCOL 3350 17 GRAM(S): 17 POWDER, FOR SOLUTION ORAL at 11:51

## 2023-05-14 RX ADMIN — LOSARTAN POTASSIUM 100 MILLIGRAM(S): 100 TABLET, FILM COATED ORAL at 04:15

## 2023-05-14 RX ADMIN — Medication 40 MILLIGRAM(S): at 11:48

## 2023-05-14 RX ADMIN — ZOLPIDEM TARTRATE 5 MILLIGRAM(S): 10 TABLET ORAL at 22:11

## 2023-05-14 RX ADMIN — CLOPIDOGREL BISULFATE 75 MILLIGRAM(S): 75 TABLET, FILM COATED ORAL at 11:49

## 2023-05-14 RX ADMIN — PANTOPRAZOLE SODIUM 40 MILLIGRAM(S): 20 TABLET, DELAYED RELEASE ORAL at 05:49

## 2023-05-14 RX ADMIN — Medication 50 MILLIGRAM(S): at 17:13

## 2023-05-14 RX ADMIN — TAMSULOSIN HYDROCHLORIDE 0.4 MILLIGRAM(S): 0.4 CAPSULE ORAL at 22:12

## 2023-05-14 RX ADMIN — Medication 40 MILLIGRAM(S): at 17:13

## 2023-05-14 RX ADMIN — ATORVASTATIN CALCIUM 40 MILLIGRAM(S): 80 TABLET, FILM COATED ORAL at 22:11

## 2023-05-14 RX ADMIN — Medication 50 MILLIGRAM(S): at 04:15

## 2023-05-14 RX ADMIN — ATORVASTATIN CALCIUM 40 MILLIGRAM(S): 80 TABLET, FILM COATED ORAL at 00:05

## 2023-05-14 RX ADMIN — Medication 0.4 MILLIGRAM(S): at 08:29

## 2023-05-14 RX ADMIN — FINASTERIDE 5 MILLIGRAM(S): 5 TABLET, FILM COATED ORAL at 11:49

## 2023-05-14 RX ADMIN — Medication 81 MILLIGRAM(S): at 11:49

## 2023-05-14 NOTE — PATIENT PROFILE ADULT - FUNCTIONAL ASSESSMENT - DAILY ACTIVITY 1.
Render Post-Care Instructions In Note?: no
Consent: The patient's consent was obtained including but not limited to risks of crusting, scabbing, blistering, scarring, darker or lighter pigmentary change, recurrence, incomplete removal and infection.
Post-Care Instructions: I reviewed with the patient in detail post-care instructions. Patient is to wear sunprotection, and avoid picking at any of the treated lesions. Pt may apply Vaseline to crusted or scabbing areas.
Detail Level: Simple
Number Of Freeze-Thaw Cycles: 2 freeze-thaw cycles
Total Number Of Aks Treated: 20
Duration Of Freeze Thaw-Cycle (Seconds): 10
3 = A little assistance

## 2023-05-14 NOTE — PATIENT PROFILE ADULT - FALL HARM RISK - HARM RISK INTERVENTIONS

## 2023-05-14 NOTE — PATIENT PROFILE ADULT - FUNCTIONAL ASSESSMENT - BASIC MOBILITY 4.
Pt was scheduled to have pre-op H&P on 4/12/23 in preparation for surgery with Dr Mayers on 4/24/23. Reviewed pt's chart to look for pre-op H&P documentation. However, pt cancelled this appointment on the day of this appointment. Called pt to discuss. Pt stated that he needed to cancel this appointment due to personal reasons. This was rescheduled to 4/20/23. Discussed with pt that if his PCP gives recommendations for follow-up, such as lab work, after this appointment he will need to do so as soon as possible since surgery is 4/24. Pt verbalized an understanding of this and stated this would be no issue. He reaffirmed that the RN at his group home will be emptying his drains BID after surgery and that his roommate will be driving him to and from surgery and postop appointments. Will review documentation from pt's appointment on 4/20 to ensure follow up needed is followed. Pt is accepting to this plan and thanked writer for calling.      
3 = A little assistance

## 2023-05-15 LAB
A1C WITH ESTIMATED AVERAGE GLUCOSE RESULT: 6.2 % — HIGH (ref 4–5.6)
ALBUMIN SERPL ELPH-MCNC: 3.9 G/DL — SIGNIFICANT CHANGE UP (ref 3.5–5.2)
ALP SERPL-CCNC: 195 U/L — HIGH (ref 30–115)
ALT FLD-CCNC: 15 U/L — SIGNIFICANT CHANGE UP (ref 0–41)
ANION GAP SERPL CALC-SCNC: 14 MMOL/L — SIGNIFICANT CHANGE UP (ref 7–14)
AST SERPL-CCNC: 19 U/L — SIGNIFICANT CHANGE UP (ref 0–41)
BASOPHILS # BLD AUTO: 0.06 K/UL — SIGNIFICANT CHANGE UP (ref 0–0.2)
BASOPHILS NFR BLD AUTO: 0.7 % — SIGNIFICANT CHANGE UP (ref 0–1)
BILIRUB SERPL-MCNC: 1.1 MG/DL — SIGNIFICANT CHANGE UP (ref 0.2–1.2)
BUN SERPL-MCNC: 18 MG/DL — SIGNIFICANT CHANGE UP (ref 10–20)
CALCIUM SERPL-MCNC: 9.2 MG/DL — SIGNIFICANT CHANGE UP (ref 8.4–10.5)
CHLORIDE SERPL-SCNC: 105 MMOL/L — SIGNIFICANT CHANGE UP (ref 98–110)
CO2 SERPL-SCNC: 21 MMOL/L — SIGNIFICANT CHANGE UP (ref 17–32)
CREAT SERPL-MCNC: 1 MG/DL — SIGNIFICANT CHANGE UP (ref 0.7–1.5)
EGFR: 72 ML/MIN/1.73M2 — SIGNIFICANT CHANGE UP
EOSINOPHIL # BLD AUTO: 0.46 K/UL — SIGNIFICANT CHANGE UP (ref 0–0.7)
EOSINOPHIL NFR BLD AUTO: 5.4 % — SIGNIFICANT CHANGE UP (ref 0–8)
ESTIMATED AVERAGE GLUCOSE: 131 MG/DL — HIGH (ref 68–114)
GLUCOSE BLDC GLUCOMTR-MCNC: 152 MG/DL — HIGH (ref 70–99)
GLUCOSE BLDC GLUCOMTR-MCNC: 162 MG/DL — HIGH (ref 70–99)
GLUCOSE BLDC GLUCOMTR-MCNC: 165 MG/DL — HIGH (ref 70–99)
GLUCOSE BLDC GLUCOMTR-MCNC: 243 MG/DL — HIGH (ref 70–99)
GLUCOSE SERPL-MCNC: 134 MG/DL — HIGH (ref 70–99)
HCT VFR BLD CALC: 37.8 % — LOW (ref 42–52)
HGB BLD-MCNC: 12.2 G/DL — LOW (ref 14–18)
IMM GRANULOCYTES NFR BLD AUTO: 0.3 % — SIGNIFICANT CHANGE UP (ref 0.1–0.3)
INR BLD: 2.65 RATIO — HIGH (ref 0.65–1.3)
LYMPHOCYTES # BLD AUTO: 1.58 K/UL — SIGNIFICANT CHANGE UP (ref 1.2–3.4)
LYMPHOCYTES # BLD AUTO: 18.4 % — LOW (ref 20.5–51.1)
MAGNESIUM SERPL-MCNC: 1.9 MG/DL — SIGNIFICANT CHANGE UP (ref 1.8–2.4)
MCHC RBC-ENTMCNC: 30 PG — SIGNIFICANT CHANGE UP (ref 27–31)
MCHC RBC-ENTMCNC: 32.3 G/DL — SIGNIFICANT CHANGE UP (ref 32–37)
MCV RBC AUTO: 92.9 FL — SIGNIFICANT CHANGE UP (ref 80–94)
MONOCYTES # BLD AUTO: 1.04 K/UL — HIGH (ref 0.1–0.6)
MONOCYTES NFR BLD AUTO: 12.1 % — HIGH (ref 1.7–9.3)
NEUTROPHILS # BLD AUTO: 5.42 K/UL — SIGNIFICANT CHANGE UP (ref 1.4–6.5)
NEUTROPHILS NFR BLD AUTO: 63.1 % — SIGNIFICANT CHANGE UP (ref 42.2–75.2)
NRBC # BLD: 0 /100 WBCS — SIGNIFICANT CHANGE UP (ref 0–0)
PLATELET # BLD AUTO: 303 K/UL — SIGNIFICANT CHANGE UP (ref 130–400)
PMV BLD: 10.3 FL — SIGNIFICANT CHANGE UP (ref 7.4–10.4)
POTASSIUM SERPL-MCNC: 3.9 MMOL/L — SIGNIFICANT CHANGE UP (ref 3.5–5)
POTASSIUM SERPL-SCNC: 3.9 MMOL/L — SIGNIFICANT CHANGE UP (ref 3.5–5)
PROT SERPL-MCNC: 6.6 G/DL — SIGNIFICANT CHANGE UP (ref 6–8)
PROTHROM AB SERPL-ACNC: 31.1 SEC — HIGH (ref 9.95–12.87)
RBC # BLD: 4.07 M/UL — LOW (ref 4.7–6.1)
RBC # FLD: 13.6 % — SIGNIFICANT CHANGE UP (ref 11.5–14.5)
SODIUM SERPL-SCNC: 140 MMOL/L — SIGNIFICANT CHANGE UP (ref 135–146)
TSH SERPL-MCNC: 2.86 UIU/ML — SIGNIFICANT CHANGE UP (ref 0.27–4.2)
WBC # BLD: 8.59 K/UL — SIGNIFICANT CHANGE UP (ref 4.8–10.8)
WBC # FLD AUTO: 8.59 K/UL — SIGNIFICANT CHANGE UP (ref 4.8–10.8)

## 2023-05-15 PROCEDURE — 99233 SBSQ HOSP IP/OBS HIGH 50: CPT

## 2023-05-15 RX ADMIN — LOSARTAN POTASSIUM 100 MILLIGRAM(S): 100 TABLET, FILM COATED ORAL at 06:04

## 2023-05-15 RX ADMIN — PANTOPRAZOLE SODIUM 40 MILLIGRAM(S): 20 TABLET, DELAYED RELEASE ORAL at 06:04

## 2023-05-15 RX ADMIN — ZOLPIDEM TARTRATE 5 MILLIGRAM(S): 10 TABLET ORAL at 21:44

## 2023-05-15 RX ADMIN — Medication 40 MILLIGRAM(S): at 06:13

## 2023-05-15 RX ADMIN — Medication 81 MILLIGRAM(S): at 11:56

## 2023-05-15 RX ADMIN — CHLORHEXIDINE GLUCONATE 1 APPLICATION(S): 213 SOLUTION TOPICAL at 06:11

## 2023-05-15 RX ADMIN — Medication 60 MILLIGRAM(S): at 06:04

## 2023-05-15 RX ADMIN — Medication 50 MILLIGRAM(S): at 17:18

## 2023-05-15 RX ADMIN — CLOPIDOGREL BISULFATE 75 MILLIGRAM(S): 75 TABLET, FILM COATED ORAL at 11:56

## 2023-05-15 RX ADMIN — ATORVASTATIN CALCIUM 40 MILLIGRAM(S): 80 TABLET, FILM COATED ORAL at 21:33

## 2023-05-15 RX ADMIN — Medication 40 MILLIGRAM(S): at 17:18

## 2023-05-15 RX ADMIN — FINASTERIDE 5 MILLIGRAM(S): 5 TABLET, FILM COATED ORAL at 11:56

## 2023-05-15 RX ADMIN — POLYETHYLENE GLYCOL 3350 17 GRAM(S): 17 POWDER, FOR SOLUTION ORAL at 11:55

## 2023-05-15 RX ADMIN — TAMSULOSIN HYDROCHLORIDE 0.4 MILLIGRAM(S): 0.4 CAPSULE ORAL at 21:34

## 2023-05-15 RX ADMIN — Medication 50 MILLIGRAM(S): at 06:04

## 2023-05-15 NOTE — PROGRESS NOTE ADULT - ASSESSMENT
89yo M hx of aortic stenosis s/p TAVR (2016), CAD s/p PCI and CABG 3v  (LIMA => LAD, SVG to OM1, SVG to PDA), chronic atrial fibrillation (on Coumadin s/p MAZE procedure s/p PPM). 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     #Unstable angina   #CAD s/p CABG 2011 (LIMA => LAD, SVG to OM1, SVG to PDA)  #AS s/p TAVR  #AFib on coumadin, s/p PPM (4mg M/T/TH/F/Sun, 6mg Wed/ Sat)  #HFmrEF  - vitals stable  - Trop 0.06 peak  - EKG: V paced rhythm   - BNP 3098  - Last Echo: 4/2022: EF 65%, Mod TR, Pulm HTN  - Echo 5/14:  Diffuse left ventricular hypokinesis with a large region of lateral akinesis and overall severely decreased global left ventricular systolic function with ejection fraction, by visual estimation, of 25 to 30%. Mod TR, Severe Pulm HTN  - systolic function has worsened and pulmonary pressures have increased.  - sublingual nitro prn for chest pain. will start nitro gtt if persistent chest pain  - s/p Plavix 300mg x 1. C/w plavix 75mg  QD  - check PTT/INR . If INR is subtherapeutic, start heparin gtt  - Trend troponins to peak  - EP f/u   - NPO after midnight possible cath 5/16  - c/w ASA/BB/simvastatin/Losartan  - Note: if patient is to go for cath, he has contrast allergy (hives), will need solumedrol benadryl treatment prior     #HTN  -cw losartan 100mg, metoprolol 50mg     #HLD  - c/w statin  - f/u lipid profile     #Asthma/ COPD, not on home O2   - not in exacerbation   - c/w home inhalers     #BPH  - c/w flomax, finasteride     #DM  - a1c 6.2  - hold home metformin 500mg qd  - Hold Insulin for now, start insulin regimen if FS persistently elevated >200     #Insomnia  - c/w ambien 5mg prn (he takes this daily)     #Misc  - GI ppx: PPI   - DVT ppx: coumadin INR therapeutic  - Diet: Dash  - Codes Status: DNR DNI (patient at bedside reports that he wishes for DNR DNI but will rescind and be full code for cardiac cath)

## 2023-05-15 NOTE — PROGRESS NOTE ADULT - SUBJECTIVE AND OBJECTIVE BOX
Location: [address] 0[address] ([address])  Patient Name: DENITA PINO  Age: 89y  Gender: Male    Past Medical and Surgical History:  Asthma with COPD    HLD (hyperlipidemia)    Essential hypertension    Aortic stenosis, severe    A-fib    DM (diabetes mellitus)    Pacemaker    BPH (benign prostatic hyperplasia)    S/P CABG x 3    History of CEA (carotid endarterectomy)    History of permanent cardiac pacemaker placement    H/O aortic valve replacement        Social History:  Social History:      Allergies:  IV Contrast (Hives)  contrast media (gadolinium-based) (Hives)      Patient is a 89y old Male who presents with a chief complaint of Chest pain (13 May 2023 20:21)    Primary diagnosis of Chest pain on breathing        Progress Note  This morning patient was seen and examined at bedside.    Today is hospital day 2d.  Mr. XXX is doing fine.   He reports he had a good night sleep. His XXX (chief complaint) has improved. His appetite is adequate, and he denies nausea or vomiting. He denies any abdominal pain, diarrhea, or constipation. His last bowel movement was soft and was on 03/08/2020 in AM. He is ambulating and denies any shortness of breath, chest pain, palpitations, or light headedness. He is voiding freely/ via pizano catheter and denies urinary symptoms (dysuria, urgency, frequency, intermittence).    Hospital Course      Overnight events      Vital Signs in the last 24 hours   Vitals Summary T(C): 36.3 (05-15-23 @ 11:55), Max: 36.9 (05-14-23 @ 16:29)  HR: 71 (05-15-23 @ 11:55) (59 - 83)  BP: 104/73 (05-15-23 @ 11:55) (104/58 - 175/76)  RR: 29 (05-15-23 @ 11:55) (18 - 29)  SpO2: 97% (05-15-23 @ 07:49) (91% - 98%)  Vent Data   Intake/ Output   05-14-23 @ 07:01  -  05-15-23 @ 07:00  --------------------------------------------------------  IN: 430 mL / OUT: 1820 mL / NET: -1390 mL    05-15-23 @ 07:01  -  05-15-23 @ 16:19  --------------------------------------------------------  IN: 200 mL / OUT: 700 mL / NET: -500 mL          Physical Exam  GENERAL: NAD, well-developed, AAOx3  HEENT:  Atraumatic, Normocephalic. conjunctiva and sclera clear, No JVD  PULMONARY: Bilateral bibasilar crackles, no wheezing.   CARDIOVASCULAR: Regular rate and rhythm; No murmurs, rubs, or gallops  GASTROINTESTINAL: Soft, Nontender, Nondistended; Bowel sounds present  MUSCULOSKELETAL:  2+ Peripheral Pulses, No clubbing, cyanosis, +1 edema bilateral LE  NEUROLOGY: non-focal  SKIN: No rashes or lesions    Investigations   Laboratory Workup  - CBC:                        12.2   8.59  )-----------( 303      ( 15 May 2023 05:37 )             37.8     - Chemistry:  05-15    140  |  105  |  18  ----------------------------<  134<H>  3.9   |  21  |  1.0    Ca    9.2      15 May 2023 05:37  Mg     1.9     05-15    TPro  6.6  /  Alb  3.9  /  TBili  1.1  /  DBili  x   /  AST  19  /  ALT  15  /  AlkPhos  195<H>  05-15    - Coagulation Studies:  PT/INR - ( 15 May 2023 05:37 )   PT: 31.10 sec;   INR: 2.65 ratio         PTT - ( 14 May 2023 05:29 )  PTT:50.0 sec  - ABG:    - Cardiac Markers:  CARDIAC MARKERS ( 14 May 2023 05:29 )  x     / 0.06 ng/mL / x     / x     / x      CARDIAC MARKERS ( 14 May 2023 01:40 )  x     / 0.06 ng/mL / x     / x     / x      CARDIAC MARKERS ( 13 May 2023 21:06 )  x     / 0.06 ng/mL / x     / x     / x            Microbiological Workup        Radiological Workup  *      Current Medications  Standing Medications  aspirin enteric coated 81 milliGRAM(s) Oral daily  atorvastatin 40 milliGRAM(s) Oral at bedtime  chlorhexidine 2% Cloths 1 Application(s) Topical <User Schedule>  clopidogrel Tablet 75 milliGRAM(s) Oral daily  finasteride 5 milliGRAM(s) Oral daily  furosemide   Injectable 40 milliGRAM(s) IV Push every 12 hours  losartan 100 milliGRAM(s) Oral daily  metoprolol tartrate 50 milliGRAM(s) Oral two times a day  NIFEdipine XL 60 milliGRAM(s) Oral daily  pantoprazole    Tablet 40 milliGRAM(s) Oral before breakfast  polyethylene glycol 3350 17 Gram(s) Oral daily  tamsulosin 0.4 milliGRAM(s) Oral at bedtime    PRN Medications  nitroglycerin     SubLingual 0.4 milliGRAM(s) SubLingual every 10 minutes PRN Chest Pain  zolpidem 5 milliGRAM(s) Oral at bedtime PRN Insomnia    Singles Doses Administered  (ADM OVERRIDE) 1 each &lt;see task&gt; GiveOnce  (ADM OVERRIDE) 4 each &lt;see task&gt; GiveOnce  aspirin  chewable 324 milliGRAM(s) Oral once  clopidogrel Tablet 300 milliGRAM(s) Oral once  NIFEdipine XL 30 milliGRAM(s) Oral once

## 2023-05-16 LAB
ANION GAP SERPL CALC-SCNC: 11 MMOL/L — SIGNIFICANT CHANGE UP (ref 7–14)
APTT BLD: 39.7 SEC — HIGH (ref 27–39.2)
BUN SERPL-MCNC: 30 MG/DL — HIGH (ref 10–20)
CALCIUM SERPL-MCNC: 9.6 MG/DL — SIGNIFICANT CHANGE UP (ref 8.4–10.4)
CHLORIDE SERPL-SCNC: 107 MMOL/L — SIGNIFICANT CHANGE UP (ref 98–110)
CO2 SERPL-SCNC: 24 MMOL/L — SIGNIFICANT CHANGE UP (ref 17–32)
CREAT SERPL-MCNC: 1.4 MG/DL — SIGNIFICANT CHANGE UP (ref 0.7–1.5)
EGFR: 48 ML/MIN/1.73M2 — LOW
GLUCOSE BLDC GLUCOMTR-MCNC: 179 MG/DL — HIGH (ref 70–99)
GLUCOSE BLDC GLUCOMTR-MCNC: 212 MG/DL — HIGH (ref 70–99)
GLUCOSE BLDC GLUCOMTR-MCNC: 267 MG/DL — HIGH (ref 70–99)
GLUCOSE SERPL-MCNC: 133 MG/DL — HIGH (ref 70–99)
HCT VFR BLD CALC: 36.2 % — LOW (ref 42–52)
HGB BLD-MCNC: 11.8 G/DL — LOW (ref 14–18)
INR BLD: 2.31 RATIO — HIGH (ref 0.65–1.3)
MAGNESIUM SERPL-MCNC: 2 MG/DL — SIGNIFICANT CHANGE UP (ref 1.8–2.4)
MCHC RBC-ENTMCNC: 30.3 PG — SIGNIFICANT CHANGE UP (ref 27–31)
MCHC RBC-ENTMCNC: 32.6 G/DL — SIGNIFICANT CHANGE UP (ref 32–37)
MCV RBC AUTO: 93.1 FL — SIGNIFICANT CHANGE UP (ref 80–94)
NRBC # BLD: 0 /100 WBCS — SIGNIFICANT CHANGE UP (ref 0–0)
PLATELET # BLD AUTO: 286 K/UL — SIGNIFICANT CHANGE UP (ref 130–400)
PMV BLD: 10.2 FL — SIGNIFICANT CHANGE UP (ref 7.4–10.4)
POTASSIUM SERPL-MCNC: 4.1 MMOL/L — SIGNIFICANT CHANGE UP (ref 3.5–5)
POTASSIUM SERPL-SCNC: 4.1 MMOL/L — SIGNIFICANT CHANGE UP (ref 3.5–5)
PROTHROM AB SERPL-ACNC: 27 SEC — HIGH (ref 9.95–12.87)
RBC # BLD: 3.89 M/UL — LOW (ref 4.7–6.1)
RBC # FLD: 13.6 % — SIGNIFICANT CHANGE UP (ref 11.5–14.5)
SODIUM SERPL-SCNC: 142 MMOL/L — SIGNIFICANT CHANGE UP (ref 135–146)
TROPONIN T SERPL-MCNC: 0.07 NG/ML — CRITICAL HIGH
WBC # BLD: 9.86 K/UL — SIGNIFICANT CHANGE UP (ref 4.8–10.8)
WBC # FLD AUTO: 9.86 K/UL — SIGNIFICANT CHANGE UP (ref 4.8–10.8)

## 2023-05-16 PROCEDURE — 99232 SBSQ HOSP IP/OBS MODERATE 35: CPT

## 2023-05-16 RX ORDER — SODIUM CHLORIDE 9 MG/ML
1000 INJECTION INTRAMUSCULAR; INTRAVENOUS; SUBCUTANEOUS
Refills: 0 | Status: DISCONTINUED | OUTPATIENT
Start: 2023-05-16 | End: 2023-05-17

## 2023-05-16 RX ORDER — DIPHENHYDRAMINE HCL 50 MG
50 CAPSULE ORAL ONCE
Refills: 0 | Status: DISCONTINUED | OUTPATIENT
Start: 2023-05-16 | End: 2023-05-16

## 2023-05-16 RX ADMIN — TAMSULOSIN HYDROCHLORIDE 0.4 MILLIGRAM(S): 0.4 CAPSULE ORAL at 22:08

## 2023-05-16 RX ADMIN — Medication 60 MILLIGRAM(S): at 05:32

## 2023-05-16 RX ADMIN — Medication 50 MILLIGRAM(S): at 05:35

## 2023-05-16 RX ADMIN — LOSARTAN POTASSIUM 100 MILLIGRAM(S): 100 TABLET, FILM COATED ORAL at 05:32

## 2023-05-16 RX ADMIN — CHLORHEXIDINE GLUCONATE 1 APPLICATION(S): 213 SOLUTION TOPICAL at 05:31

## 2023-05-16 RX ADMIN — Medication 40 MILLIGRAM(S): at 05:32

## 2023-05-16 RX ADMIN — PANTOPRAZOLE SODIUM 40 MILLIGRAM(S): 20 TABLET, DELAYED RELEASE ORAL at 05:35

## 2023-05-16 RX ADMIN — Medication 81 MILLIGRAM(S): at 11:22

## 2023-05-16 RX ADMIN — CLOPIDOGREL BISULFATE 75 MILLIGRAM(S): 75 TABLET, FILM COATED ORAL at 11:21

## 2023-05-16 RX ADMIN — SODIUM CHLORIDE 75 MILLILITER(S): 9 INJECTION INTRAMUSCULAR; INTRAVENOUS; SUBCUTANEOUS at 17:30

## 2023-05-16 RX ADMIN — ATORVASTATIN CALCIUM 40 MILLIGRAM(S): 80 TABLET, FILM COATED ORAL at 22:10

## 2023-05-16 RX ADMIN — FINASTERIDE 5 MILLIGRAM(S): 5 TABLET, FILM COATED ORAL at 11:21

## 2023-05-16 RX ADMIN — Medication 40 MILLIGRAM(S): at 06:32

## 2023-05-16 RX ADMIN — ZOLPIDEM TARTRATE 5 MILLIGRAM(S): 10 TABLET ORAL at 22:10

## 2023-05-16 RX ADMIN — Medication 40 MILLIGRAM(S): at 14:35

## 2023-05-16 RX ADMIN — Medication 50 MILLIGRAM(S): at 18:55

## 2023-05-16 NOTE — PROGRESS NOTE ADULT - ASSESSMENT
88yo M hx of aortic stenosis s/p TAVR (2016), CAD s/p PCI and CABG 3v  (LIMA => LAD, SVG to OM1, SVG to PDA), chronic atrial fibrillation (on Coumadin s/p MAZE procedure s/p PPM). 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     #Unstable angina   #CAD s/p CABG 2011 (LIMA => LAD, SVG to OM1, SVG to PDA)  #AS s/p TAVR  #AFib on coumadin, s/p PPM (4mg M/T/TH/F/Sun, 6mg Wed/ Sat)  #HFmrEF  - vitals stable  - Trop 0.07 peak  - EKG: V paced rhythm   - BNP 3098  - Last Echo: 4/2022: EF 65%, Mod TR, Pulm HTN  - Echo 5/14:  Diffuse left ventricular hypokinesis with a large region of lateral akinesis and overall severely decreased global left ventricular systolic function with ejection fraction, by visual estimation, of 25 to 30%. Mod TR, Severe Pulm HTN  - systolic function has worsened and pulmonary pressures have increased.  - sublingual nitro prn for chest pain. will start nitro gtt if persistent chest pain  - s/p Plavix 300mg x 1. C/w plavix 75mg  QD  - check PTT/INR . If INR is subtherapeutic, start heparin gtt  - Trend troponins to peak  - EP f/u   - Cath today 5/16  - c/w ASA/BB/simvastatin/Losartan  - Note: if patient is to go for cath, he has contrast allergy (hives), will need solumedrol benadryl treatment prior     #HTN  -cw losartan 100mg, metoprolol 50mg     #HLD  - c/w statin  - f/u lipid profile     #Asthma/ COPD, not on home O2   - not in exacerbation   - c/w home inhalers     #BPH  - c/w flomax, finasteride     #DM  - a1c 6.2  - hold home metformin 500mg qd  - Hold Insulin for now, start insulin regimen if FS persistently elevated >200     #Insomnia  - c/w ambien 5mg prn (he takes this daily)     #Misc  - GI ppx: PPI   - DVT ppx: coumadin INR therapeutic  - Diet: Dash  - Codes Status: DNR DNI (patient at bedside reports that he wishes for DNR DNI but will rescind and be full code for cardiac cath)

## 2023-05-16 NOTE — CHART NOTE - NSCHARTNOTEFT_GEN_A_CORE
PRE-OP DIAGNOSIS:    NSTEMI    PROCEDURE:   [x] Coronary Angiogram   [] LHC   [] RHC   [] Intervention (see below)        PHYSICIAN: Dr. Armstrong/Dr Arias  CARDIOLOGY FELLOW: Dr. Jo      PROCEDURE DESCRIPTION:     Consent:  [x] Patient   [] Family Member   []  Used     Anesthesia:   [] General   [x] Sedation   [x] Local     Access & Closure:   [] 6Fr right Radial Artery   [x] 6Fr right Femoral Artery --> Perclose   [] 6Fr right Femoral Vein   [] 6Fr right Brachial Vein       IV Contrast:      100  mL        Intervention: none    Implants: none     FINDINGS:     Coronary Dominance: Left    LM: Mild disease  LAD: 90% stenosis prox LAD, 100% stenosis  in mid segment, distal vessel supplied by a patent LIMA  Diag: Moderate disease  LCx: Moderate disease  OM: 100% stenosis  at the site of a previous stent, distal vessel supplied by a diseased SVG  LPDA: Mild disease, supplied by a patent SVG  RCA: Small non dominant, 80% stenosis in RV marginal    SVG to OM1: 99% stenosis in proximal segment, 80% stenosis in mid and distal segments  SVG to LPDA: Patent with mild diffuse disease  LIMA to LAD: Patent, minor irregularities    ESTIMATED BLOOD LOSS: < 10 mL      CONDITION:   [x] Good   [] Fair   [] Critical     SPECIMEN REMOVED: N/A     POST-OP DIAGNOSIS:    [] Normal Coronary Angiogram   [] Minor luminal irregularities  [] Mild Non-obstructive Coronary Artery Disease (< 50% stenosis)   [x] Significant 3-Vessel Coronary Artery Disease, Patent LIMA to LAD, patent SVG to LPDA, severely diseased SVG to OM1 (culprit)    PLAN OF CARE:   [] D/C Home Today   [x] Return to In-patient bed   [] Admit for observation   [] Return for Staged Procedure   [] CT Surgery Consult   [x] Medications: Aspirin, plavix, recheck INR if <2 start heparin drip without bolus at midnight target PTT 50-70  [x] IV Fluids: 75cc/hr for 4 hours NS  [x] Plan for high risk PCI of SVG to OM1 tomorrow with impella support

## 2023-05-16 NOTE — CHART NOTE - NSCHARTNOTEFT_GEN_A_CORE
PREOPERATIVE DAY OF PROCEDURE EVALUATION:  I have personally seen and examined the patient.  I agree with the history and physical which I have reviewed and noted any changes below.  (Signed electronically by __________)  05-16-23 @ 14:18    88yo M hx of aortic stenosis s/p TAVR (2016), CAD s/p PCI and CABG 3v  (LIMA => LAD, SVG to OM1, SVG to PDA), chronic atrial fibrillation (on Coumadin s/p MAZE procedure s/p PPM). HFrEF (65% in 4/2022, Mod TR--NOW reduced 25-30%), carotid stenosis s/p right CEA (2013), HTN, HLD, asthma/COPD not on home O2 , admitted for exertional dyspnea and Left sided chest pain and presents to cardiology department for [facility] with possible intervention.       < from: TTE Echo Complete w/o Contrast w/ Doppler (05.14.23 @ 09:56) >    Summary:   1. Diffuse left ventricular hypokinesis with a large region of lateral   akinesis and overall severely decreased global left ventricular systolic   function with ejection fraction, by visual estimation, of 25 to 30%. The   left ventricular diastolic function could not be assessed in this study   due to LV pacing. Moderate concentric left ventricular hypertrophy.   Endocardial visualization was enhanced with intravenous echo contrast.   2. Normal right ventricular size and function.   3. Severely enlarged left atrium.   4. Mildly enlarged right atrium.   5. S/p Mehrdad S3 26mm TAVR with trace central regurgitation and no   evidence of stenosis (Vmax 1.9m/s, mean PG 8mmHg, DI 0.48, AT 70ms).   6. Mitral annular calcification with mild to moderate mitral valve   regurgitation.   7. Moderate tricuspid regurgitation.   8. Severe pulmonary hypertension (PASP = 83mmHg).   9. No pericardial effusion.  10. Compared to prior study from 2022, systolic function has worsened and   pulmonary pressures have increased.      Patient has allergy to IV contrast- Patient received SoluMedrol 40mg at 0600, repeat dose to be given now.  Benadryl 50mg  IV to be in given in cath lab.   Patient received ASA 81mg today on floor.     Last known INR: 5/16/23 2.31      Cath Bleeding Risk: 2.1%    Prehydration: patient being diuresed for newly reduced EF, severe pulmonary HTN.  no bolus at this time.    Right sahra test: PREOPERATIVE DAY OF PROCEDURE EVALUATION:  I have personally seen and examined the patient.  I agree with the history and physical which I have reviewed and noted any changes below.  (Signed electronically by __________)  05-16-23 @ 14:18    88yo M hx of aortic stenosis s/p TAVR (2016), CAD s/p PCI and CABG 3v  (LIMA => LAD, SVG to OM1, SVG to PDA), chronic atrial fibrillation (on Coumadin s/p MAZE procedure s/p PPM). HFrEF (65% in 4/2022, Mod TR--NOW reduced 25-30%), carotid stenosis s/p right CEA (2013), HTN, HLD, asthma/COPD not on home O2 , admitted for exertional dyspnea and Left sided chest pain and presents to cardiology department for [facility] with possible intervention.       < from: TTE Echo Complete w/o Contrast w/ Doppler (05.14.23 @ 09:56) >    Summary:   1. Diffuse left ventricular hypokinesis with a large region of lateral   akinesis and overall severely decreased global left ventricular systolic   function with ejection fraction, by visual estimation, of 25 to 30%. The   left ventricular diastolic function could not be assessed in this study   due to LV pacing. Moderate concentric left ventricular hypertrophy.   Endocardial visualization was enhanced with intravenous echo contrast.   2. Normal right ventricular size and function.   3. Severely enlarged left atrium.   4. Mildly enlarged right atrium.   5. S/p Mehrdad S3 26mm TAVR with trace central regurgitation and no   evidence of stenosis (Vmax 1.9m/s, mean PG 8mmHg, DI 0.48, AT 70ms).   6. Mitral annular calcification with mild to moderate mitral valve   regurgitation.   7. Moderate tricuspid regurgitation.   8. Severe pulmonary hypertension (PASP = 83mmHg).   9. No pericardial effusion.  10. Compared to prior study from 2022, systolic function has worsened and   pulmonary pressures have increased.      Patient has allergy to IV contrast- Patient received SoluMedrol 40mg at 0600, repeat dose to be given now.  Benadryl 50mg  IV to be in given in cath lab.   Patient received ASA 81mg today on floor.     Patient states he is NOT currently DNR/DNI as he has not signed the paperwork yet- patient still full code at this time.     Last known INR: 5/16/23 2.31      Cath Bleeding Risk: 2.1%    Prehydration: patient being diuresed for newly reduced EF, severe pulmonary HTN.  no bolus at this time.    Right sahra test:

## 2023-05-16 NOTE — PROGRESS NOTE ADULT - SUBJECTIVE AND OBJECTIVE BOX
Location: [address] 012 [address] ([address])  Patient Name: DENITA PINO  Age: 89y  Gender: Male    Past Medical and Surgical History:  Asthma with COPD    HLD (hyperlipidemia)    Essential hypertension    Aortic stenosis, severe    A-fib    DM (diabetes mellitus)    Pacemaker    BPH (benign prostatic hyperplasia)    S/P CABG x 3    History of CEA (carotid endarterectomy)    History of permanent cardiac pacemaker placement    H/O aortic valve replacement        Social History:  Social History:      Allergies:  IV Contrast (Hives)  contrast media (gadolinium-based) (Hives)      Patient is a 89y old Male who presents with a chief complaint of Chest pain (13 May 2023 20:21)    Primary diagnosis of Chest pain on breathing        Progress Note  This morning patient was seen and examined at bedside.    Today is hospital day 2d.  Pt is doing well today, He has no complaints. He will go for cath today       Vital Signs in the last 24 hours   Vitals Summary T(C): 36.3 (05-15-23 @ 11:55), Max: 36.9 (05-14-23 @ 16:29)  HR: 71 (05-15-23 @ 11:55) (59 - 83)  BP: 104/73 (05-15-23 @ 11:55) (104/58 - 175/76)  RR: 29 (05-15-23 @ 11:55) (18 - 29)  SpO2: 97% (05-15-23 @ 07:49) (91% - 98%)  Vent Data   Intake/ Output   05-14-23 @ 07:01  -  05-15-23 @ 07:00  --------------------------------------------------------  IN: 430 mL / OUT: 1820 mL / NET: -1390 mL    05-15-23 @ 07:01  -  05-15-23 @ 16:19  --------------------------------------------------------  IN: 200 mL / OUT: 700 mL / NET: -500 mL      Physical Exam  GENERAL: NAD, well-developed, AAOx3  HEENT:  Atraumatic, Normocephalic. conjunctiva and sclera clear, No JVD  PULMONARY: Bilateral bibasilar crackles, no wheezing.   CARDIOVASCULAR: Regular rate and rhythm; No murmurs, rubs, or gallops  GASTROINTESTINAL: Soft, Nontender, Nondistended; Bowel sounds present  MUSCULOSKELETAL:  2+ Peripheral Pulses, No clubbing, cyanosis, +1 edema bilateral LE  NEUROLOGY: non-focal  SKIN: No rashes or lesions    Investigations   Laboratory Workup  - CBC:                        12.2   8.59  )-----------( 303      ( 15 May 2023 05:37 )             37.8     - Chemistry:  05-15    140  |  105  |  18  ----------------------------<  134<H>  3.9   |  21  |  1.0    Ca    9.2      15 May 2023 05:37  Mg     1.9     05-15    TPro  6.6  /  Alb  3.9  /  TBili  1.1  /  DBili  x   /  AST  19  /  ALT  15  /  AlkPhos  195<H>  05-15    - Coagulation Studies:  PT/INR - ( 15 May 2023 05:37 )   PT: 31.10 sec;   INR: 2.65 ratio         PTT - ( 14 May 2023 05:29 )  PTT:50.0 sec  - ABG:    - Cardiac Markers:  CARDIAC MARKERS ( 14 May 2023 05:29 )  x     / 0.06 ng/mL / x     / x     / x      CARDIAC MARKERS ( 14 May 2023 01:40 )  x     / 0.06 ng/mL / x     / x     / x      CARDIAC MARKERS ( 13 May 2023 21:06 )  x     / 0.06 ng/mL / x     / x     / x            Microbiological Workup        Radiological Workup  *      Current Medications  Standing Medications  aspirin enteric coated 81 milliGRAM(s) Oral daily  atorvastatin 40 milliGRAM(s) Oral at bedtime  chlorhexidine 2% Cloths 1 Application(s) Topical <User Schedule>  clopidogrel Tablet 75 milliGRAM(s) Oral daily  finasteride 5 milliGRAM(s) Oral daily  furosemide   Injectable 40 milliGRAM(s) IV Push every 12 hours  losartan 100 milliGRAM(s) Oral daily  metoprolol tartrate 50 milliGRAM(s) Oral two times a day  NIFEdipine XL 60 milliGRAM(s) Oral daily  pantoprazole    Tablet 40 milliGRAM(s) Oral before breakfast  polyethylene glycol 3350 17 Gram(s) Oral daily  tamsulosin 0.4 milliGRAM(s) Oral at bedtime    PRN Medications  nitroglycerin     SubLingual 0.4 milliGRAM(s) SubLingual every 10 minutes PRN Chest Pain  zolpidem 5 milliGRAM(s) Oral at bedtime PRN Insomnia    Singles Doses Administered  (ADM OVERRIDE) 1 each &lt;see task&gt; GiveOnce  (ADM OVERRIDE) 4 each &lt;see task&gt; GiveOnce  aspirin  chewable 324 milliGRAM(s) Oral once  clopidogrel Tablet 300 milliGRAM(s) Oral once  NIFEdipine XL 30 milliGRAM(s) Oral once

## 2023-05-17 LAB
ALBUMIN SERPL ELPH-MCNC: 3.8 G/DL — SIGNIFICANT CHANGE UP (ref 3.5–5.2)
ALBUMIN SERPL ELPH-MCNC: 3.9 G/DL — SIGNIFICANT CHANGE UP (ref 3.5–5.2)
ALP SERPL-CCNC: 173 U/L — HIGH (ref 30–115)
ALP SERPL-CCNC: 174 U/L — HIGH (ref 30–115)
ALT FLD-CCNC: 12 U/L — SIGNIFICANT CHANGE UP (ref 0–41)
ALT FLD-CCNC: 14 U/L — SIGNIFICANT CHANGE UP (ref 0–41)
ANION GAP SERPL CALC-SCNC: 13 MMOL/L — SIGNIFICANT CHANGE UP (ref 7–14)
ANION GAP SERPL CALC-SCNC: 14 MMOL/L — SIGNIFICANT CHANGE UP (ref 7–14)
APPEARANCE UR: CLEAR — SIGNIFICANT CHANGE UP
APTT BLD: 33 SEC — SIGNIFICANT CHANGE UP (ref 27–39.2)
APTT BLD: 35.9 SEC — SIGNIFICANT CHANGE UP (ref 27–39.2)
APTT BLD: 70.8 SEC — CRITICAL HIGH (ref 27–39.2)
AST SERPL-CCNC: 16 U/L — SIGNIFICANT CHANGE UP (ref 0–41)
AST SERPL-CCNC: 18 U/L — SIGNIFICANT CHANGE UP (ref 0–41)
BACTERIA # UR AUTO: NEGATIVE — SIGNIFICANT CHANGE UP
BASE EXCESS BLDMV CALC-SCNC: -0.2 MMOL/L — SIGNIFICANT CHANGE UP
BASE EXCESS BLDMV CALC-SCNC: -1.1 MMOL/L — SIGNIFICANT CHANGE UP
BASOPHILS # BLD AUTO: 0.02 K/UL — SIGNIFICANT CHANGE UP (ref 0–0.2)
BASOPHILS # BLD AUTO: 0.03 K/UL — SIGNIFICANT CHANGE UP (ref 0–0.2)
BASOPHILS NFR BLD AUTO: 0.1 % — SIGNIFICANT CHANGE UP (ref 0–1)
BASOPHILS NFR BLD AUTO: 0.2 % — SIGNIFICANT CHANGE UP (ref 0–1)
BILIRUB DIRECT SERPL-MCNC: 0.3 MG/DL — SIGNIFICANT CHANGE UP (ref 0–0.3)
BILIRUB INDIRECT FLD-MCNC: 0.8 MG/DL — SIGNIFICANT CHANGE UP (ref 0.2–1.2)
BILIRUB SERPL-MCNC: 0.6 MG/DL — SIGNIFICANT CHANGE UP (ref 0.2–1.2)
BILIRUB SERPL-MCNC: 1.1 MG/DL — SIGNIFICANT CHANGE UP (ref 0.2–1.2)
BILIRUB SERPL-MCNC: 1.1 MG/DL — SIGNIFICANT CHANGE UP (ref 0.2–1.2)
BILIRUB UR-MCNC: NEGATIVE — SIGNIFICANT CHANGE UP
BUN SERPL-MCNC: 35 MG/DL — HIGH (ref 10–20)
BUN SERPL-MCNC: 36 MG/DL — HIGH (ref 10–20)
CALCIUM SERPL-MCNC: 9.1 MG/DL — SIGNIFICANT CHANGE UP (ref 8.4–10.5)
CALCIUM SERPL-MCNC: 9.4 MG/DL — SIGNIFICANT CHANGE UP (ref 8.4–10.5)
CHLORIDE SERPL-SCNC: 103 MMOL/L — SIGNIFICANT CHANGE UP (ref 98–110)
CHLORIDE SERPL-SCNC: 106 MMOL/L — SIGNIFICANT CHANGE UP (ref 98–110)
CO2 SERPL-SCNC: 20 MMOL/L — SIGNIFICANT CHANGE UP (ref 17–32)
CO2 SERPL-SCNC: 22 MMOL/L — SIGNIFICANT CHANGE UP (ref 17–32)
COLOR SPEC: SIGNIFICANT CHANGE UP
CREAT SERPL-MCNC: 1.2 MG/DL — SIGNIFICANT CHANGE UP (ref 0.7–1.5)
CREAT SERPL-MCNC: 1.3 MG/DL — SIGNIFICANT CHANGE UP (ref 0.7–1.5)
DIFF PNL FLD: ABNORMAL
EGFR: 53 ML/MIN/1.73M2 — LOW
EGFR: 58 ML/MIN/1.73M2 — LOW
EOSINOPHIL # BLD AUTO: 0 K/UL — SIGNIFICANT CHANGE UP (ref 0–0.7)
EOSINOPHIL # BLD AUTO: 0.01 K/UL — SIGNIFICANT CHANGE UP (ref 0–0.7)
EOSINOPHIL NFR BLD AUTO: 0 % — SIGNIFICANT CHANGE UP (ref 0–8)
EOSINOPHIL NFR BLD AUTO: 0.1 % — SIGNIFICANT CHANGE UP (ref 0–8)
EPI CELLS # UR: 2 /HPF — SIGNIFICANT CHANGE UP (ref 0–5)
GAS PNL BLDA: SIGNIFICANT CHANGE UP
GAS PNL BLDA: SIGNIFICANT CHANGE UP
GAS PNL BLDMV: SIGNIFICANT CHANGE UP
GLUCOSE BLDC GLUCOMTR-MCNC: 152 MG/DL — HIGH (ref 70–99)
GLUCOSE SERPL-MCNC: 166 MG/DL — HIGH (ref 70–99)
GLUCOSE SERPL-MCNC: 222 MG/DL — HIGH (ref 70–99)
GLUCOSE UR QL: NEGATIVE — SIGNIFICANT CHANGE UP
HCO3 BLDMV-SCNC: 22 MMOL/L — SIGNIFICANT CHANGE UP
HCO3 BLDMV-SCNC: 24 MMOL/L — SIGNIFICANT CHANGE UP
HCT VFR BLD CALC: 33.4 % — LOW (ref 42–52)
HCT VFR BLD CALC: 35.8 % — LOW (ref 42–52)
HGB BLD-MCNC: 11 G/DL — LOW (ref 14–18)
HGB BLD-MCNC: 11.7 G/DL — LOW (ref 14–18)
HYALINE CASTS # UR AUTO: 1 /LPF — SIGNIFICANT CHANGE UP (ref 0–7)
IMM GRANULOCYTES NFR BLD AUTO: 0.6 % — HIGH (ref 0.1–0.3)
IMM GRANULOCYTES NFR BLD AUTO: 0.8 % — HIGH (ref 0.1–0.3)
INR BLD: 1.65 RATIO — HIGH (ref 0.65–1.3)
KETONES UR-MCNC: NEGATIVE — SIGNIFICANT CHANGE UP
LDH SERPL L TO P-CCNC: 255 U/L — HIGH (ref 50–242)
LEUKOCYTE ESTERASE UR-ACNC: NEGATIVE — SIGNIFICANT CHANGE UP
LYMPHOCYTES # BLD AUTO: 0.7 K/UL — LOW (ref 1.2–3.4)
LYMPHOCYTES # BLD AUTO: 1.3 K/UL — SIGNIFICANT CHANGE UP (ref 1.2–3.4)
LYMPHOCYTES # BLD AUTO: 4.9 % — LOW (ref 20.5–51.1)
LYMPHOCYTES # BLD AUTO: 9.6 % — LOW (ref 20.5–51.1)
MAGNESIUM SERPL-MCNC: 2.1 MG/DL — SIGNIFICANT CHANGE UP (ref 1.8–2.4)
MCHC RBC-ENTMCNC: 30.1 PG — SIGNIFICANT CHANGE UP (ref 27–31)
MCHC RBC-ENTMCNC: 30.4 PG — SIGNIFICANT CHANGE UP (ref 27–31)
MCHC RBC-ENTMCNC: 32.7 G/DL — SIGNIFICANT CHANGE UP (ref 32–37)
MCHC RBC-ENTMCNC: 32.9 G/DL — SIGNIFICANT CHANGE UP (ref 32–37)
MCV RBC AUTO: 91.3 FL — SIGNIFICANT CHANGE UP (ref 80–94)
MCV RBC AUTO: 93 FL — SIGNIFICANT CHANGE UP (ref 80–94)
MONOCYTES # BLD AUTO: 0.64 K/UL — HIGH (ref 0.1–0.6)
MONOCYTES # BLD AUTO: 1.52 K/UL — HIGH (ref 0.1–0.6)
MONOCYTES NFR BLD AUTO: 11.2 % — HIGH (ref 1.7–9.3)
MONOCYTES NFR BLD AUTO: 4.5 % — SIGNIFICANT CHANGE UP (ref 1.7–9.3)
NEUTROPHILS # BLD AUTO: 10.65 K/UL — HIGH (ref 1.4–6.5)
NEUTROPHILS # BLD AUTO: 12.86 K/UL — HIGH (ref 1.4–6.5)
NEUTROPHILS NFR BLD AUTO: 78.4 % — HIGH (ref 42.2–75.2)
NEUTROPHILS NFR BLD AUTO: 89.6 % — HIGH (ref 42.2–75.2)
NITRITE UR-MCNC: NEGATIVE — SIGNIFICANT CHANGE UP
NRBC # BLD: 0 /100 WBCS — SIGNIFICANT CHANGE UP (ref 0–0)
NRBC # BLD: 0 /100 WBCS — SIGNIFICANT CHANGE UP (ref 0–0)
O2 CT VFR BLD CALC: 37 MMHG — SIGNIFICANT CHANGE UP
O2 CT VFR BLD CALC: 39 MMHG — SIGNIFICANT CHANGE UP
PCO2 BLDMV: 33 MMHG — SIGNIFICANT CHANGE UP
PCO2 BLDMV: 37 MMHG — SIGNIFICANT CHANGE UP
PH BLDMV: 7.42 — SIGNIFICANT CHANGE UP
PH BLDMV: 7.44 — SIGNIFICANT CHANGE UP
PH UR: 6 — SIGNIFICANT CHANGE UP (ref 5–8)
PLATELET # BLD AUTO: 304 K/UL — SIGNIFICANT CHANGE UP (ref 130–400)
PLATELET # BLD AUTO: 309 K/UL — SIGNIFICANT CHANGE UP (ref 130–400)
PMV BLD: 10.2 FL — SIGNIFICANT CHANGE UP (ref 7.4–10.4)
PMV BLD: 10.6 FL — HIGH (ref 7.4–10.4)
POTASSIUM SERPL-MCNC: 3.9 MMOL/L — SIGNIFICANT CHANGE UP (ref 3.5–5)
POTASSIUM SERPL-MCNC: 4.5 MMOL/L — SIGNIFICANT CHANGE UP (ref 3.5–5)
POTASSIUM SERPL-SCNC: 3.9 MMOL/L — SIGNIFICANT CHANGE UP (ref 3.5–5)
POTASSIUM SERPL-SCNC: 4.5 MMOL/L — SIGNIFICANT CHANGE UP (ref 3.5–5)
PROT SERPL-MCNC: 6.3 G/DL — SIGNIFICANT CHANGE UP (ref 6–8)
PROT SERPL-MCNC: 6.4 G/DL — SIGNIFICANT CHANGE UP (ref 6–8)
PROT UR-MCNC: NEGATIVE — SIGNIFICANT CHANGE UP
PROTHROM AB SERPL-ACNC: 19.1 SEC — HIGH (ref 9.95–12.87)
RBC # BLD: 3.66 M/UL — LOW (ref 4.7–6.1)
RBC # BLD: 3.85 M/UL — LOW (ref 4.7–6.1)
RBC # FLD: 13.4 % — SIGNIFICANT CHANGE UP (ref 11.5–14.5)
RBC # FLD: 13.5 % — SIGNIFICANT CHANGE UP (ref 11.5–14.5)
RBC CASTS # UR COMP ASSIST: 5 /HPF — HIGH (ref 0–4)
SAO2 % BLDMV: 63.5 % — SIGNIFICANT CHANGE UP
SAO2 % BLDMV: 70.7 % — SIGNIFICANT CHANGE UP
SODIUM SERPL-SCNC: 137 MMOL/L — SIGNIFICANT CHANGE UP (ref 135–146)
SODIUM SERPL-SCNC: 141 MMOL/L — SIGNIFICANT CHANGE UP (ref 135–146)
SP GR SPEC: 1.02 — SIGNIFICANT CHANGE UP (ref 1.01–1.03)
UROBILINOGEN FLD QL: SIGNIFICANT CHANGE UP
WBC # BLD: 13.58 K/UL — HIGH (ref 4.8–10.8)
WBC # BLD: 14.35 K/UL — HIGH (ref 4.8–10.8)
WBC # FLD AUTO: 13.58 K/UL — HIGH (ref 4.8–10.8)
WBC # FLD AUTO: 14.35 K/UL — HIGH (ref 4.8–10.8)
WBC UR QL: 5 /HPF — SIGNIFICANT CHANGE UP (ref 0–5)

## 2023-05-17 PROCEDURE — 99291 CRITICAL CARE FIRST HOUR: CPT

## 2023-05-17 PROCEDURE — 99232 SBSQ HOSP IP/OBS MODERATE 35: CPT

## 2023-05-17 PROCEDURE — 71045 X-RAY EXAM CHEST 1 VIEW: CPT | Mod: 26

## 2023-05-17 RX ORDER — SODIUM CHLORIDE 9 MG/ML
500 INJECTION, SOLUTION INTRAVENOUS
Refills: 0 | Status: DISCONTINUED | OUTPATIENT
Start: 2023-05-17 | End: 2023-05-19

## 2023-05-17 RX ORDER — HYDRALAZINE HCL 50 MG
20 TABLET ORAL THREE TIMES A DAY
Refills: 0 | Status: DISCONTINUED | OUTPATIENT
Start: 2023-05-17 | End: 2023-05-18

## 2023-05-17 RX ORDER — SODIUM CHLORIDE 9 MG/ML
1000 INJECTION INTRAMUSCULAR; INTRAVENOUS; SUBCUTANEOUS
Refills: 0 | Status: DISCONTINUED | OUTPATIENT
Start: 2023-05-17 | End: 2023-05-19

## 2023-05-17 RX ORDER — HYDROCORTISONE 20 MG
200 TABLET ORAL ONCE
Refills: 0 | Status: COMPLETED | OUTPATIENT
Start: 2023-05-17 | End: 2023-05-17

## 2023-05-17 RX ORDER — DIPHENHYDRAMINE HCL 50 MG
50 CAPSULE ORAL ONCE
Refills: 0 | Status: DISCONTINUED | OUTPATIENT
Start: 2023-05-17 | End: 2023-05-19

## 2023-05-17 RX ORDER — SODIUM CHLORIDE 9 MG/ML
250 INJECTION INTRAMUSCULAR; INTRAVENOUS; SUBCUTANEOUS ONCE
Refills: 0 | Status: DISCONTINUED | OUTPATIENT
Start: 2023-05-17 | End: 2023-05-19

## 2023-05-17 RX ORDER — SODIUM CHLORIDE 9 MG/ML
500 INJECTION INTRAMUSCULAR; INTRAVENOUS; SUBCUTANEOUS ONCE
Refills: 0 | Status: COMPLETED | OUTPATIENT
Start: 2023-05-17 | End: 2023-05-17

## 2023-05-17 RX ORDER — HEPARIN SODIUM 5000 [USP'U]/ML
700 INJECTION INTRAVENOUS; SUBCUTANEOUS
Qty: 25000 | Refills: 0 | Status: DISCONTINUED | OUTPATIENT
Start: 2023-05-17 | End: 2023-05-19

## 2023-05-17 RX ADMIN — SODIUM CHLORIDE 15 MILLILITER(S): 9 INJECTION, SOLUTION INTRAVENOUS at 20:20

## 2023-05-17 RX ADMIN — Medication 50 MILLIGRAM(S): at 05:42

## 2023-05-17 RX ADMIN — Medication 40 MILLIGRAM(S): at 05:42

## 2023-05-17 RX ADMIN — LOSARTAN POTASSIUM 100 MILLIGRAM(S): 100 TABLET, FILM COATED ORAL at 05:42

## 2023-05-17 RX ADMIN — Medication 200 MILLIGRAM(S): at 12:21

## 2023-05-17 RX ADMIN — HEPARIN SODIUM 7 UNIT(S)/HR: 5000 INJECTION INTRAVENOUS; SUBCUTANEOUS at 09:00

## 2023-05-17 RX ADMIN — HEPARIN SODIUM 7 UNIT(S)/HR: 5000 INJECTION INTRAVENOUS; SUBCUTANEOUS at 23:21

## 2023-05-17 RX ADMIN — ZOLPIDEM TARTRATE 5 MILLIGRAM(S): 10 TABLET ORAL at 21:20

## 2023-05-17 RX ADMIN — Medication 20 MILLIGRAM(S): at 22:17

## 2023-05-17 RX ADMIN — SODIUM CHLORIDE 50 MILLILITER(S): 9 INJECTION INTRAMUSCULAR; INTRAVENOUS; SUBCUTANEOUS at 20:28

## 2023-05-17 RX ADMIN — Medication 40 MILLIGRAM(S): at 18:39

## 2023-05-17 RX ADMIN — TAMSULOSIN HYDROCHLORIDE 0.4 MILLIGRAM(S): 0.4 CAPSULE ORAL at 21:20

## 2023-05-17 RX ADMIN — Medication 60 MILLIGRAM(S): at 05:42

## 2023-05-17 RX ADMIN — Medication 20 MILLIGRAM(S): at 20:25

## 2023-05-17 RX ADMIN — SODIUM CHLORIDE 1500 MILLILITER(S): 9 INJECTION INTRAMUSCULAR; INTRAVENOUS; SUBCUTANEOUS at 23:31

## 2023-05-17 RX ADMIN — CHLORHEXIDINE GLUCONATE 1 APPLICATION(S): 213 SOLUTION TOPICAL at 05:43

## 2023-05-17 RX ADMIN — Medication 50 MILLIGRAM(S): at 18:39

## 2023-05-17 RX ADMIN — CLOPIDOGREL BISULFATE 75 MILLIGRAM(S): 75 TABLET, FILM COATED ORAL at 12:02

## 2023-05-17 RX ADMIN — PANTOPRAZOLE SODIUM 40 MILLIGRAM(S): 20 TABLET, DELAYED RELEASE ORAL at 05:42

## 2023-05-17 RX ADMIN — Medication 81 MILLIGRAM(S): at 12:02

## 2023-05-17 RX ADMIN — ATORVASTATIN CALCIUM 40 MILLIGRAM(S): 80 TABLET, FILM COATED ORAL at 21:20

## 2023-05-17 NOTE — CONSULT NOTE ADULT - ASSESSMENT
Assess hemodynamics   Plan for impella removal tomorrow morning   If hemodynamics stable- repeat at midnight and 6 am recheck hemodynamics   at 6am BMP, hemolysis panel and lactate   Impella repositioned  No need for diuretics IVC 2cm non collapsing, CVP 7-8 mmHg  Consider giving NS 75cc/hr x 4-6 hours   CMP, CBC, iron profile, lactate  Chest xray  Full Echo tomorrow 5/18  Get hemolysis panel- UA, LDH  Monitor for bleeding,   Monitor B/L LE pulses  Assessment: 89yo M hx of aortic stenosis s/p TAVR (2016), CAD s/p PCI and CABG 3v (LIMA => LAD, SVG to OM1, SVG to PDA), chronic atrial fibrillation (on Coumadin s/p MAZE procedure s/p PPM). 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 Patient taken to cath 5/16, found to have significant 3-Vessel Coronary Artery Disease, Patent LIMA to LAD, patent SVG to LPDA, severely diseased SVG to OM1 (culprit). Patient taken back to cath 5/17 for high risk PCI with impella device- SVG to OM disease, s/p PCI to prox, mid and distal segments. Transferred post cath to CCU w/impella device in place.    Plan:  Impella device repositioned via bedside Echo  Settings decreased from P-4 to P-2   Assess hemodynamics now on P-2 including hemolysis panel (+ UA, LDH), CBC, CMP, lactate, iron profile  If hemodynamics stable- repeat again at midnight and 6 am  Monitor groin site for bleeding  Continue to monitor B/L LE pulses   Heparin gtt as per protocol   Can plan for possible impella removal tomorrow if patient remains hemodynamically stable  IVC 2cm, collapsing with respirations, CVP 7-8 mmHg - No need for diuretics at this time  Consider giving NS 75cc/hr x 4-6 hours   Get chest xray  Obtain Full Echo tomorrow 5/18  Plan discussed with CCU team  Will continue to follow

## 2023-05-17 NOTE — CHART NOTE - NSCHARTNOTEFT_GEN_A_CORE
PRE-OP DIAGNOSIS:    NSTEMI, cardiogenic shock    PROCEDURE:   [] Coronary Angiogram   [] LHC   [] RHC   [x] Intervention (see below)        PHYSICIAN: Dr. Armstrong   CARDIOLOGY FELLOW: Dr. Jo      PROCEDURE DESCRIPTION:     Consent:  [x] Patient   [] Family Member   []  Used     Anesthesia:   [] General   [x] Sedation   [x] Local     Access & Closure:   [] 6Fr right Radial Artery   [x] 14Fr right Femoral Artery --> Sutured in place   [] 6Fr right Femoral Vein   [] 6Fr right Brachial Vein       IV Contrast:      120  mL        Intervention:   - PCI to SVG to OM  (AUC 8 for revascularization)  - Impella device insertion    Implants:   - Synergy TRISTIAN 3.5x12 to distal SVG  - Synergy TRISTIAN 3.5x16 to mid SVG  - Synergy TRISTIAN 4.0x16 to prox SVG     FINDINGS:     SVG to OM1: 99% stenosis in prox segment, 80% stenosis in mid segment, 80% stenosis in distal segment     CI by thermodilution: 1.95    ESTIMATED BLOOD LOSS: < 10 mL      CONDITION:   [x] Good   [] Fair   [] Critical     SPECIMEN REMOVED: N/A     POST-OP DIAGNOSIS:    [] Significant SVG to OM disease, s/p PCI to prox, mid and distal segments    PLAN OF CARE:   [] D/C Home Today   [x] Return to In-patient bed - CCU  [] Admit for observation   [] Return for Staged Procedure   [] CT Surgery Consult   [x] Medications: Continue same medications, heparin drip to target PTT 50-60  [x] IV Fluids: 50cc/hr for 6 hours NS

## 2023-05-17 NOTE — CHART NOTE - NSCHARTNOTEFT_GEN_A_CORE
PREOPERATIVE DAY OF PROCEDURE EVALUATION:  I have personally seen and examined the patient. I agree with the history and physical which I have reviewed and noted any changes below. ( to be signed electronically by MD ) 05-17-23 @ 11:59    RIGHT RADIAL ARTERY EVALUATION:    89yo M hx of aortic stenosis s/p TAVR (2016), CAD s/p PCI and CABG 3v  (LIMA => LAD, SVG to OM1, SVG to PDA), chronic atrial fibrillation (on Coumadin s/p MAZE procedure s/p PPM). HFrEF (65% in 4/2022, Mod TR--NOW reduced 25-30%), carotid stenosis s/p right CEA (2013), HTN, HLD, asthma/COPD not on home O2 , admitted for exertional dyspnea and Left sided chest pain and presents to cardiology department for [facility] with possible intervention.       < from: TTE Echo Complete w/o Contrast w/ Doppler (05.14.23 @ 09:56) >    Summary:   1. Diffuse left ventricular hypokinesis with a large region of lateral   akinesis and overall severely decreased global left ventricular systolic   function with ejection fraction, by visual estimation, of 25 to 30%. The   left ventricular diastolic function could not be assessed in this study   due to LV pacing. Moderate concentric left ventricular hypertrophy.   Endocardial visualization was enhanced with intravenous echo contrast.   2. Normal right ventricular size and function.   3. Severely enlarged left atrium.   4. Mildly enlarged right atrium.   5. S/p Mehrdad S3 26mm TAVR with trace central regurgitation and no   evidence of stenosis (Vmax 1.9m/s, mean PG 8mmHg, DI 0.48, AT 70ms).   6. Mitral annular calcification with mild to moderate mitral valve   regurgitation.   7. Moderate tricuspid regurgitation.   8. Severe pulmonary hypertension (PASP = 83mmHg).   9. No pericardial effusion.  10. Compared to prior study from 2022, systolic function has worsened and   pulmonary pressures have increased.      Patient has allergy to IV contrast - Patient to be given SoluMedrol 200 mg IVP x1 and  Benadryl 50mg  IV to be in given in cath lab.     Patient states he is NOT currently DNR/DNI as he has not signed the paperwork yet- patient still full code at this time.     Last known INR: 5/17/23 1.65      Cath Bleeding Risk: 2.1%    Prehydration: patient being diuresed for newly reduced EF, severe pulmonary HTN.  no bolus at this time.    Right sahra test: positive

## 2023-05-17 NOTE — PROGRESS NOTE ADULT - ASSESSMENT
88yo M hx of aortic stenosis s/p TAVR (2016), CAD s/p PCI and CABG 3v  (LIMA => LAD, SVG to OM1, SVG to PDA), chronic atrial fibrillation (on Coumadin s/p MAZE procedure s/p PPM). 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     #Unstable angina   #CAD s/p CABG 2011 (LIMA => LAD, SVG to OM1, SVG to PDA)  #AS s/p TAVR  #AFib on coumadin, s/p PPM (4mg M/T/TH/F/Sun, 6mg Wed/ Sat)  #HFmrEF  - vitals stable  - Trop 0.07 peak  - EKG: V paced rhythm   - BNP 3098  - Last Echo: 4/2022: EF 65%, Mod TR, Pulm HTN  - Echo 5/14:  Diffuse left ventricular hypokinesis with a large region of lateral akinesis and overall severely decreased global left ventricular systolic function with ejection fraction, by visual estimation, of 25 to 30%. Mod TR, Severe Pulm HTN  - systolic function has worsened and pulmonary pressures have increased.  - sublingual nitro prn for chest pain. will start nitro gtt if persistent chest pain  - s/p Plavix 300mg x 1. C/w plavix 75mg  QD  - now on heparin bridge   - Trend troponins to peak  - EP following   - Cath 5/16: Significant 3-Vessel Coronary Artery Disease, Patent LIMA to LAD, patent SVG to LPDA, severely diseased SVG to OM1 (culprit)  - Plan for high risk PCI of SVG to OM1 tomorrow with impella today  - c/w ASA/BB/simvastatin/Losartan  - Note: if patient is to go for cath, he has contrast allergy (hives), will need solumedrol benadryl treatment prior     #HTN  -cw losartan 100mg, metoprolol 50mg     #HLD  - c/w statin  - f/u lipid profile     #Asthma/ COPD, not on home O2   - not in exacerbation   - c/w home inhalers     #BPH  - c/w flomax, finasteride     #DM  - a1c 6.2  - hold home metformin 500mg qd  - Hold Insulin for now, start insulin regimen if FS persistently elevated >200     #Insomnia  - c/w ambien 5mg prn (he takes this daily)     #Misc  - GI ppx: PPI   - DVT ppx: coumadin INR therapeutic  - Diet: Dash  - Codes Status: DNR DNI (patient at bedside reports that he wishes for DNR DNI but will rescind and be full code for cardiac cath)

## 2023-05-17 NOTE — CONSULT NOTE ADULT - SUBJECTIVE AND OBJECTIVE BOX
Date of Admission:    CHIEF COMPLAINT:    HISTORY OF PRESENT ILLNESS: 89yMale with PMH below presented to the hospital for     PAST MEDICAL & SURGICAL HISTORY:  Asthma with COPD      HLD (hyperlipidemia)      Essential hypertension      Aortic stenosis, severe      A-fib      DM (diabetes mellitus)      Pacemaker      BPH (benign prostatic hyperplasia)      S/P CABG x 3      History of CEA (carotid endarterectomy)      History of permanent cardiac pacemaker placement      H/O aortic valve replacement          FAMILY HISTORY:  [ ] no pertinent family history of premature cardiovascular disease in first degree relatives.  Mother:   Father:   Siblings:     SOCIAL HISTORY:    [ ] Non-smoker  [ ] Smoker  [ ] Alcohol    Allergies    IV Contrast (Hives)    Intolerances    contrast media (gadolinium-based) (Hives)  	    REVIEW OF SYSTEMS:  CONSTITUTIONAL: No fever, weight loss, or fatigue.  CARDIOLOGY: Patient denies chest pain, shortness of breath or syncopal episodes.   RESPIRATORY: denies shortness of breath, wheezing.   NEUROLOGICAL: No weakness, no focal deficits to report.  ENDOCRINOLOGICAL: no recent change in diabetic medications.   GI: no BRBPR, no n/v, diarrhea.    PSYCHIATRY: normal mood and affect  HEENT: no nasal discharge, no ecchymosis  SKIN: no ecchymosis, no breakdown  MUSCULOSKELETAL: Full range of motion x4.     PHYSICAL EXAM:  T(C): 36.6 (05-17-23 @ 07:39), Max: 36.6 (05-16-23 @ 20:22)  HR: 71 (05-17-23 @ 07:39) (71 - 89)  BP: 106/58 (05-17-23 @ 07:39) (106/58 - 141/70)  RR: 21 (05-17-23 @ 07:39) (15 - 21)  SpO2: 96% (05-17-23 @ 04:00) (95% - 97%)  Wt(kg): --  I&O's Summary    16 May 2023 07:01  -  17 May 2023 07:00  --------------------------------------------------------  IN: 345 mL / OUT: 1100 mL / NET: -755 mL    17 May 2023 07:01  -  17 May 2023 17:01  --------------------------------------------------------  IN: 28 mL / OUT: 360 mL / NET: -332 mL        General Appearance: well appearing, normal for age and gender. 	  Neck: normal JVP, no bruit.   Eyes: Extra Ocular muscles intact.   Cardiovascular: regular rate and rhythm S1 S2, No JVD, No murmurs, No edema  Respiratory: Lungs clear to auscultation	  Psychiatry: Alert and oriented x 3, Mood & affect appropriate  Gastrointestinal:  Soft, Non-tender  Skin/Integumen: No rashes, No ecchymoses, No cyanosis	  Neurologic: Non-focal  Musculoskeletal/ extremities: Normal range of motion, No clubbing, cyanosis or edema  Vascular: Peripheral pulses palpable 2+ bilaterally    LABS:	 	                          11.7   13.58 )-----------( 309      ( 17 May 2023 06:12 )             35.8     05-17    141  |  106  |  35<H>  ----------------------------<  166<H>  4.5   |  22  |  1.3    Ca    9.4      17 May 2023 06:12  Mg     2.1     05-17    TPro  6.4  /  Alb  3.9  /  TBili  0.6  /  DBili  x   /  AST  16  /  ALT  14  /  AlkPhos  173<H>  05-17    CARDIAC MARKERS ( 16 May 2023 05:16 )  x     / 0.07 ng/mL / x     / x     / x          PT/INR - ( 17 May 2023 06:12 )   PT: 19.10 sec;   INR: 1.65 ratio         PTT - ( 17 May 2023 06:12 )  PTT:35.9 sec    CARDIAC MARKERS:  Pro-Brain Natriuretic Peptide: 3098 pg/mL (05.13.23 @ 16:05)        TELEMETRY EVENTS: 	    ECG:  	      RADIOLOGY:  OTHER: 	    PREVIOUS DIAGNOSTIC TESTING:    TTE 5/14/23     Summary:   1. Diffuse left ventricular hypokinesis with a large region of lateral   akinesis and overall severely decreased global left ventricular systolic   function with ejection fraction, by visual estimation, of 25 to 30%. The   left ventricular diastolic function could not be assessed in this study   due to LV pacing. Moderate concentric left ventricular hypertrophy.   Endocardial visualization was enhanced with intravenous echo contrast.   2. Normal right ventricular size and function.   3. Severely enlarged left atrium.   4. Mildly enlarged right atrium.   5. S/p Mehrdad S3 26mm TAVR with trace central regurgitation and no   evidence of stenosis (Vmax 1.9m/s, mean PG 8mmHg, DI 0.48, AT 70ms).   6. Mitral annular calcification with mild to moderate mitral valve   regurgitation.   7. Moderate tricuspid regurgitation.   8. Severe pulmonary hypertension (PASP = 83mmHg).   9. No pericardial effusion.  10. Compared to prior study from 2022, systolic function has worsened and   pulmonary pressures have increased.      Left Heart Catheterization 5/17/23    Intervention:   - PCI to SVG to OM  (AUC 8 for revascularization)  - Impella device insertion    Implants:   - Synergy TRISTIAN 3.5x12 to distal SVG  - Synergy TRISTIAN 3.5x16 to mid SVG  - Synergy TRISTIAN 4.0x16 to prox SVG     FINDINGS:     SVG to OM1: 99% stenosis in prox segment, 80% stenosis in mid segment, 80% stenosis in distal segment     CI by thermodilution: 1.95        Left Heart Catheterization 5/16/23  FINDINGS:     Coronary Dominance: Left    LM: Mild disease  LAD: 90% stenosis prox LAD, 100% stenosis  in mid segment, distal vessel supplied by a patent LIMA  Diag: Moderate disease  LCx: Moderate disease  OM: 100% stenosis  at the site of a previous stent, distal vessel supplied by a diseased SVG  LPDA: Mild disease, supplied by a patent SVG  RCA: Small non dominant, 80% stenosis in RV marginal    SVG to OM1: 99% stenosis in proximal segment, 80% stenosis in mid and distal segments  SVG to LPDA: Patent with mild diffuse disease  LIMA to LAD: Patent, minor irregularities    	    Home Medications:  Ambien 10 mg oral tablet: 1 tab(s) orally once a day (at bedtime), As Needed (25 Apr 2022 14:02)  metFORMIN 500 mg oral tablet: 1 tab(s) orally 2 times a day (25 Apr 2022 14:02)  Metoprolol Tartrate 50 mg oral tablet: 1 tab(s) orally 2 times a day (25 Apr 2022 14:02)    MEDICATIONS  (STANDING):  aspirin enteric coated 81 milliGRAM(s) Oral daily  atorvastatin 40 milliGRAM(s) Oral at bedtime  chlorhexidine 2% Cloths 1 Application(s) Topical <User Schedule>  clopidogrel Tablet 75 milliGRAM(s) Oral daily  diphenhydrAMINE Injectable 50 milliGRAM(s) IV Push once  finasteride 5 milliGRAM(s) Oral daily  furosemide   Injectable 40 milliGRAM(s) IV Push every 12 hours  heparin  Infusion 700 Unit(s)/Hr (7 mL/Hr) IV Continuous <Continuous>  losartan 100 milliGRAM(s) Oral daily  metoprolol tartrate 50 milliGRAM(s) Oral two times a day  NIFEdipine XL 60 milliGRAM(s) Oral daily  pantoprazole    Tablet 40 milliGRAM(s) Oral before breakfast  polyethylene glycol 3350 17 Gram(s) Oral daily  sodium chloride 0.9%. 1000 milliLiter(s) (50 mL/Hr) IV Continuous <Continuous>  tamsulosin 0.4 milliGRAM(s) Oral at bedtime    MEDICATIONS  (PRN):  nitroglycerin     SubLingual 0.4 milliGRAM(s) SubLingual every 10 minutes PRN Chest Pain  zolpidem 5 milliGRAM(s) Oral at bedtime PRN Insomnia         Date of Admission: 23    CHIEF COMPLAINT: Patient is a 89y old  Male who presents with a chief complaint of Chest pain (17 May 2023 17:00)    HISTORY OF PRESENT ILLNESS: 88yo M hx of aortic stenosis s/p TAVR (), CAD s/p PCI and CABG 3v  (LIMA => LAD, SVG to OM1, SVG to PDA), chronic atrial fibrillation (on Coumadin s/p MAZE procedure s/p PPM). 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 His symptoms started around 1 month ago and has been getting worse since then which prompted him to come to the Ed. He denies associated symptoms such as dizziness, lightheadedness, palpitations, or syncope. He sleeps with 2 pillows, denies PND. Reports that he is due for new PPM next month with Dr. Tai. Last dose of coumadin 412 am. At this time of eval, he reports mild, dull chest pain.   In the ED: Afebrile, HR 88, /83 > 177/83, 98%RA, Labs with Hgb 12.0 (bl 10-11), CMP wnl, trop 0.05 x1 (prev 0.05 in 2022), BNP 3098 (prev 2226).   EKG with Ventricular-paced rhythm   Chest Xray with cardiomegaly   Admit to cardio- stepdown, planned for cath Monday with Dr. Dan.  (13 May 2023 20:21)    PAST MEDICAL & SURGICAL HISTORY:  Asthma with COPD  HLD (hyperlipidemia)  Essential hypertension  Aortic stenosis, severe  A-fib  DM (diabetes mellitus)  Pacemaker  BPH (benign prostatic hyperplasia)  S/P CABG x 3  History of CEA (carotid endarterectomy)  History of permanent cardiac pacemaker placement  H/O aortic valve replacement      FAMILY HISTORY: No pertinent family history of premature cardiovascular disease in first degree relatives as   Mother:   Father:   Siblings:     SOCIAL HISTORY:    [ ] Non-smoker  [ ] Smoker  [ ] Alcohol    Allergies    IV Contrast (Hives)    Intolerances    contrast media (gadolinium-based) (Hives)  	    REVIEW OF SYSTEMS:  CONSTITUTIONAL: No fever, weight loss, or fatigue.  CARDIOLOGY: Patient denies chest pain, shortness of breath or syncopal episodes.   RESPIRATORY: denies shortness of breath, wheezing.   NEUROLOGICAL: No weakness, no focal deficits to report.  ENDOCRINOLOGICAL: no recent change in diabetic medications.   GI: no BRBPR, no n/v, diarrhea.    PSYCHIATRY: normal mood and affect  HEENT: no nasal discharge, no ecchymosis  SKIN: no ecchymosis, no breakdown  MUSCULOSKELETAL: Full range of motion x4.     PHYSICAL EXAM:  T(C): 36.6 (23 @ 07:39), Max: 36.6 (23 @ 20:22)  HR: 71 (23 @ 07:39) (71 - 89)  BP: 106/58 (23 @ 07:39) (106/58 - 141/70)  RR: 21 (23 @ 07:39) (15 - 21)  SpO2: 96% (23 @ 04:00) (95% - 97%)  Wt(kg): --  I&O's Summary    16 May 2023 07:01  -  17 May 2023 07:00  --------------------------------------------------------  IN: 345 mL / OUT: 1100 mL / NET: -755 mL    17 May 2023 07:01  -  17 May 2023 17:01  --------------------------------------------------------  IN: 28 mL / OUT: 360 mL / NET: -332 mL        General Appearance: well appearing, normal for age and gender. 	  Neck: normal JVP, no bruit.   Eyes: Extra Ocular muscles intact.   Cardiovascular: regular rate and rhythm S1 S2, No JVD, No murmurs, No edema  Respiratory: Lungs clear to auscultation	  Psychiatry: Alert and oriented x 3, Mood & affect appropriate  Gastrointestinal:  Soft, Non-tender  Skin/Integumen: No rashes, No ecchymoses, No cyanosis	  Neurologic: Non-focal  Musculoskeletal/ extremities: Normal range of motion, No clubbing, cyanosis or edema  Vascular: Peripheral pulses palpable 2+ bilaterally    LABS:	 	                          11.7   13.58 )-----------( 309      ( 17 May 2023 06:12 )             35.8         141  |  106  |  35<H>  ----------------------------<  166<H>  4.5   |  22  |  1.3    Ca    9.4      17 May 2023 06:12  Mg     2.1         TPro  6.4  /  Alb  3.9  /  TBili  0.6  /  DBili  x   /  AST  16  /  ALT  14  /  AlkPhos  173<H>      CARDIAC MARKERS ( 16 May 2023 05:16 )  x     / 0.07 ng/mL / x     / x     / x          PT/INR - ( 17 May 2023 06:12 )   PT: 19.10 sec;   INR: 1.65 ratio         PTT - ( 17 May 2023 06:12 )  PTT:35.9 sec    CARDIAC MARKERS:  Pro-Brain Natriuretic Peptide: 3098 pg/mL (23 @ 16:05)        TELEMETRY EVENTS: 	    EC23    Ventricular Rate 86 BPM  Atrial Rate 86 BPM  QRS Duration 158 ms  Q-T Interval 496 ms  QTC Calculation(Bazett) 593 ms  P Axis -31 degrees  R Axis -43 degrees  T Axis 92 degrees    Diagnosis Line Ventricular-paced rhythm  Abnormal ECG    Confirmed by Sachin Carlson ([id number]) on 2023 5:03:53 PM      	    PREVIOUS DIAGNOSTIC TESTING:    TTE 23     Summary:   1. Diffuse left ventricular hypokinesis with a large region of lateral   akinesis and overall severely decreased global left ventricular systolic   function with ejection fraction, by visual estimation, of 25 to 30%. The   left ventricular diastolic function could not be assessed in this study   due to LV pacing. Moderate concentric left ventricular hypertrophy.   Endocardial visualization was enhanced with intravenous echo contrast.   2. Normal right ventricular size and function.   3. Severely enlarged left atrium.   4. Mildly enlarged right atrium.   5. S/p Mehrdad S3 26mm TAVR with trace central regurgitation and no   evidence of stenosis (Vmax 1.9m/s, mean PG 8mmHg, DI 0.48, AT 70ms).   6. Mitral annular calcification with mild to moderate mitral valve   regurgitation.   7. Moderate tricuspid regurgitation.   8. Severe pulmonary hypertension (PASP = 83mmHg).   9. No pericardial effusion.  10. Compared to prior study from , systolic function has worsened and   pulmonary pressures have increased.      Left Heart Catheterization 23    Intervention:   - PCI to SVG to OM  (AUC 8 for revascularization)  - Impella device insertion    Implants:   - Synergy TRISTIAN 3.5x12 to distal SVG  - Synergy TRISTIAN 3.5x16 to mid SVG  - Synergy TRISTIAN 4.0x16 to prox SVG     FINDINGS:     SVG to OM1: 99% stenosis in prox segment, 80% stenosis in mid segment, 80% stenosis in distal segment     CI by thermodilution: 1.95        Left Heart Catheterization 23  FINDINGS:     Coronary Dominance: Left    LM: Mild disease  LAD: 90% stenosis prox LAD, 100% stenosis  in mid segment, distal vessel supplied by a patent LIMA  Diag: Moderate disease  LCx: Moderate disease  OM: 100% stenosis  at the site of a previous stent, distal vessel supplied by a diseased SVG  LPDA: Mild disease, supplied by a patent SVG  RCA: Small non dominant, 80% stenosis in RV marginal    SVG to OM1: 99% stenosis in proximal segment, 80% stenosis in mid and distal segments  SVG to LPDA: Patent with mild diffuse disease  LIMA to LAD: Patent, minor irregularities    	    Home Medications:  Ambien 10 mg oral tablet: 1 tab(s) orally once a day (at bedtime), As Needed (2022 14:02)  metFORMIN 500 mg oral tablet: 1 tab(s) orally 2 times a day (2022 14:02)  Metoprolol Tartrate 50 mg oral tablet: 1 tab(s) orally 2 times a day (2022 14:02)    MEDICATIONS  (STANDING):  aspirin enteric coated 81 milliGRAM(s) Oral daily  atorvastatin 40 milliGRAM(s) Oral at bedtime  chlorhexidine 2% Cloths 1 Application(s) Topical <User Schedule>  clopidogrel Tablet 75 milliGRAM(s) Oral daily  diphenhydrAMINE Injectable 50 milliGRAM(s) IV Push once  finasteride 5 milliGRAM(s) Oral daily  furosemide   Injectable 40 milliGRAM(s) IV Push every 12 hours  heparin  Infusion 700 Unit(s)/Hr (7 mL/Hr) IV Continuous <Continuous>  losartan 100 milliGRAM(s) Oral daily  metoprolol tartrate 50 milliGRAM(s) Oral two times a day  NIFEdipine XL 60 milliGRAM(s) Oral daily  pantoprazole    Tablet 40 milliGRAM(s) Oral before breakfast  polyethylene glycol 3350 17 Gram(s) Oral daily  sodium chloride 0.9%. 1000 milliLiter(s) (50 mL/Hr) IV Continuous <Continuous>  tamsulosin 0.4 milliGRAM(s) Oral at bedtime    MEDICATIONS  (PRN):  nitroglycerin     SubLingual 0.4 milliGRAM(s) SubLingual every 10 minutes PRN Chest Pain  zolpidem 5 milliGRAM(s) Oral at bedtime PRN Insomnia         Date of Admission: 23    CHIEF COMPLAINT: Patient is a 89y old  Male who presents with a chief complaint of Chest pain (17 May 2023 17:00)    HISTORY OF PRESENT ILLNESS: 89yo M hx of aortic stenosis s/p TAVR (), CAD s/p PCI and CABG 3v  (LIMA => LAD, SVG to OM1, SVG to PDA), chronic atrial fibrillation (on Coumadin s/p MAZE procedure s/p PPM). 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 His symptoms started around 1 month ago and has been getting worse since then which prompted him to come to the Ed. He denies associated symptoms such as dizziness, lightheadedness, palpitations, or syncope. He sleeps with 2 pillows, denies PND. Reports that he is due for new PPM next month with Dr. Tai. Last dose of coumadin 412 am. At this time of eval, he reports mild, dull chest pain.   In the ED: Afebrile, HR 88, /83 > 177/83, 98%RA, Labs with Hgb 12.0 (bl 10-11), CMP wnl, trop 0.05 x1 (prev 0.05 in 2022), BNP 3098 (prev 2226).   EKG with Ventricular-paced rhythm   Chest Xray with cardiomegaly   Admit to cardio- stepdown, planned for cath Monday with Dr. Dan.  (13 May 2023 20:21)    Patient seen and examined in CCU, s/p high risk PCI with impella device still in place at P-4. Bedside echo completed- device repositioned. Patient reported he came in for progressively worsening CP for several months, currently denies CP at this time. R groin bleeding noted, now well controlled. +Sesser catheter     PAST MEDICAL & SURGICAL HISTORY:  Asthma with COPD  HLD (hyperlipidemia)  Essential hypertension  Aortic stenosis, severe  A-fib  DM (diabetes mellitus)  Pacemaker  BPH (benign prostatic hyperplasia)  S/P CABG x 3  History of CEA (carotid endarterectomy)  History of permanent cardiac pacemaker placement  H/O aortic valve replacement      FAMILY HISTORY: No pertinent family history of premature cardiovascular disease in first degree relatives as patient is 89 years old.     SOCIAL HISTORY:  Denies smoking, alcohol, or drug use.    Allergies  IV Contrast (Hives)  Intolerances    contrast media (gadolinium-based) (Hives)  	    REVIEW OF SYSTEMS:  CONSTITUTIONAL: No fever, weight loss, or fatigue.  CARDIOLOGY: Patient denies chest pain (resolved), shortness of breath or syncopal episodes.   RESPIRATORY: denies shortness of breath, wheezing.   NEUROLOGICAL: No weakness, no focal deficits to report.  ENDOCRINOLOGICAL: no recent change in diabetic medications.   GI: no BRBPR, no n/v, diarrhea.    PSYCHIATRY: normal mood and affect  HEENT: no nasal discharge, no ecchymosis  SKIN: no ecchymosis, no breakdown  MUSCULOSKELETAL: Full range of motion x4.     PHYSICAL EXAM:  T(C): 36.6 (23 @ 07:39), Max: 36.6 (23 @ 20:22)  HR: 71 (23 @ 07:39) (71 - 89)  BP: 106/58 (23 @ 07:39) (106/58 - 141/70)  RR: 21 (23 @ 07:39) (15 - 21)  SpO2: 96% (23 @ 04:00) (95% - 97%)  Wt(kg): --  I&O's Summary    16 May 2023 07:  -  17 May 2023 07:00  --------------------------------------------------------  IN: 345 mL / OUT: 1100 mL / NET: -755 mL    17 May 2023 07:01  -  17 May 2023 17:01  --------------------------------------------------------  IN: 28 mL / OUT: 360 mL / NET: -332 mL        General Appearance: well appearing, normal for age and gender. 	  Neck: +central line  Eyes: Extra Ocular muscles intact.   Cardiovascular: regular rate and rhythm S1 S2, No edema  Respiratory: B/L breath sounds  Psychiatry: Alert and oriented x 3, Mood & affect appropriate  Gastrointestinal:  Soft, Non-tender  Skin/Integumentary: No rashes, No ecchymoses, No cyanosis, +R groin impella device	  Neurologic: Non-focal  Musculoskeletal/ extremities: Normal range of motion, No clubbing, cyanosis or edema  Vascular: Peripheral pulses palpable 2+ bilaterally    LABS:	 	                          11.7   13.58 )-----------( 309      ( 17 May 2023 06:12 )             35.8         141  |  106  |  35<H>  ----------------------------<  166<H>  4.5   |  22  |  1.3    Ca    9.4      17 May 2023 06:12  Mg     2.1         TPro  6.4  /  Alb  3.9  /  TBili  0.6  /  DBili  x   /  AST  16  /  ALT  14  /  AlkPhos  173<H>      CARDIAC MARKERS ( 16 May 2023 05:16 )  x     / 0.07 ng/mL / x     / x     / x          PT/INR - ( 17 May 2023 06:12 )   PT: 19.10 sec;   INR: 1.65 ratio         PTT - ( 17 May 2023 06:12 )  PTT:35.9 sec    CARDIAC MARKERS:  Pro-Brain Natriuretic Peptide: 3098 pg/mL (23 @ 16:05)        TELEMETRY EVENTS: 	    EC23    Ventricular Rate 86 BPM  Atrial Rate 86 BPM  QRS Duration 158 ms  Q-T Interval 496 ms  QTC Calculation(Bazett) 593 ms  P Axis -31 degrees  R Axis -43 degrees  T Axis 92 degrees    Diagnosis Line Ventricular-paced rhythm  Abnormal ECG    Confirmed by Sachin Carlson ([id number]) on 2023 5:03:53 PM      	    PREVIOUS DIAGNOSTIC TESTING:    TTE 23     Summary:   1. Diffuse left ventricular hypokinesis with a large region of lateral   akinesis and overall severely decreased global left ventricular systolic   function with ejection fraction, by visual estimation, of 25 to 30%. The   left ventricular diastolic function could not be assessed in this study   due to LV pacing. Moderate concentric left ventricular hypertrophy.   Endocardial visualization was enhanced with intravenous echo contrast.   2. Normal right ventricular size and function.   3. Severely enlarged left atrium.   4. Mildly enlarged right atrium.   5. S/p Mehrdad S3 26mm TAVR with trace central regurgitation and no   evidence of stenosis (Vmax 1.9m/s, mean PG 8mmHg, DI 0.48, AT 70ms).   6. Mitral annular calcification with mild to moderate mitral valve   regurgitation.   7. Moderate tricuspid regurgitation.   8. Severe pulmonary hypertension (PASP = 83mmHg).   9. No pericardial effusion.  10. Compared to prior study from , systolic function has worsened and   pulmonary pressures have increased.      Left Heart Catheterization 23    Intervention:   - PCI to SVG to OM  (AUC 8 for revascularization)  - Impella device insertion    Implants:   - Synergy TRISTIAN 3.5x12 to distal SVG  - Synergy TRISTIAN 3.5x16 to mid SVG  - Synergy TRISTIAN 4.0x16 to prox SVG     FINDINGS:     SVG to OM1: 99% stenosis in prox segment, 80% stenosis in mid segment, 80% stenosis in distal segment     CI by thermodilution: 1.95        Left Heart Catheterization 23  FINDINGS:     Coronary Dominance: Left    LM: Mild disease  LAD: 90% stenosis prox LAD, 100% stenosis  in mid segment, distal vessel supplied by a patent LIMA  Diag: Moderate disease  LCx: Moderate disease  OM: 100% stenosis  at the site of a previous stent, distal vessel supplied by a diseased SVG  LPDA: Mild disease, supplied by a patent SVG  RCA: Small non dominant, 80% stenosis in RV marginal    SVG to OM1: 99% stenosis in proximal segment, 80% stenosis in mid and distal segments  SVG to LPDA: Patent with mild diffuse disease  LIMA to LAD: Patent, minor irregularities    	  Home Medications:  Ambien 10 mg oral tablet: 1 tab(s) orally once a day (at bedtime), As Needed (2022 14:02)  metFORMIN 500 mg oral tablet: 1 tab(s) orally 2 times a day (2022 14:02)  Metoprolol Tartrate 50 mg oral tablet: 1 tab(s) orally 2 times a day (2022 14:02)      MEDICATIONS  (STANDING):  aspirin enteric coated 81 milliGRAM(s) Oral daily  atorvastatin 40 milliGRAM(s) Oral at bedtime  chlorhexidine 2% Cloths 1 Application(s) Topical <User Schedule>  clopidogrel Tablet 75 milliGRAM(s) Oral daily  diphenhydrAMINE Injectable 50 milliGRAM(s) IV Push once  finasteride 5 milliGRAM(s) Oral daily  furosemide   Injectable 40 milliGRAM(s) IV Push every 12 hours  heparin  Infusion 700 Unit(s)/Hr (7 mL/Hr) IV Continuous <Continuous>  losartan 100 milliGRAM(s) Oral daily  metoprolol tartrate 50 milliGRAM(s) Oral two times a day  NIFEdipine XL 60 milliGRAM(s) Oral daily  pantoprazole    Tablet 40 milliGRAM(s) Oral before breakfast  polyethylene glycol 3350 17 Gram(s) Oral daily  sodium chloride 0.9%. 1000 milliLiter(s) (50 mL/Hr) IV Continuous <Continuous>  tamsulosin 0.4 milliGRAM(s) Oral at bedtime    MEDICATIONS  (PRN):  nitroglycerin     SubLingual 0.4 milliGRAM(s) SubLingual every 10 minutes PRN Chest Pain  zolpidem 5 milliGRAM(s) Oral at bedtime PRN Insomnia

## 2023-05-18 LAB
ALBUMIN SERPL ELPH-MCNC: 3.7 G/DL — SIGNIFICANT CHANGE UP (ref 3.5–5.2)
ALP SERPL-CCNC: 154 U/L — HIGH (ref 30–115)
ALT FLD-CCNC: 10 U/L — SIGNIFICANT CHANGE UP (ref 0–41)
ANION GAP SERPL CALC-SCNC: 14 MMOL/L — SIGNIFICANT CHANGE UP (ref 7–14)
APTT BLD: 29 SEC — SIGNIFICANT CHANGE UP (ref 27–39.2)
APTT BLD: 73.9 SEC — CRITICAL HIGH (ref 27–39.2)
AST SERPL-CCNC: 18 U/L — SIGNIFICANT CHANGE UP (ref 0–41)
BASE EXCESS BLDMV CALC-SCNC: -0.4 MMOL/L — SIGNIFICANT CHANGE UP
BASE EXCESS BLDMV CALC-SCNC: -2.7 MMOL/L — SIGNIFICANT CHANGE UP
BASE EXCESS BLDMV CALC-SCNC: 0.1 MMOL/L — SIGNIFICANT CHANGE UP
BASOPHILS # BLD AUTO: 0.04 K/UL — SIGNIFICANT CHANGE UP (ref 0–0.2)
BASOPHILS # BLD AUTO: 0.05 K/UL — SIGNIFICANT CHANGE UP (ref 0–0.2)
BASOPHILS # BLD AUTO: 0.07 K/UL — SIGNIFICANT CHANGE UP (ref 0–0.2)
BASOPHILS NFR BLD AUTO: 0.3 % — SIGNIFICANT CHANGE UP (ref 0–1)
BASOPHILS NFR BLD AUTO: 0.4 % — SIGNIFICANT CHANGE UP (ref 0–1)
BASOPHILS NFR BLD AUTO: 0.5 % — SIGNIFICANT CHANGE UP (ref 0–1)
BILIRUB SERPL-MCNC: 0.9 MG/DL — SIGNIFICANT CHANGE UP (ref 0.2–1.2)
BLD GP AB SCN SERPL QL: SIGNIFICANT CHANGE UP
BUN SERPL-MCNC: 34 MG/DL — HIGH (ref 10–20)
CALCIUM SERPL-MCNC: 8.7 MG/DL — SIGNIFICANT CHANGE UP (ref 8.4–10.5)
CHLORIDE SERPL-SCNC: 105 MMOL/L — SIGNIFICANT CHANGE UP (ref 98–110)
CO2 SERPL-SCNC: 21 MMOL/L — SIGNIFICANT CHANGE UP (ref 17–32)
CREAT SERPL-MCNC: 1.1 MG/DL — SIGNIFICANT CHANGE UP (ref 0.7–1.5)
EGFR: 64 ML/MIN/1.73M2 — SIGNIFICANT CHANGE UP
EOSINOPHIL # BLD AUTO: 0.01 K/UL — SIGNIFICANT CHANGE UP (ref 0–0.7)
EOSINOPHIL # BLD AUTO: 0.08 K/UL — SIGNIFICANT CHANGE UP (ref 0–0.7)
EOSINOPHIL # BLD AUTO: 0.1 K/UL — SIGNIFICANT CHANGE UP (ref 0–0.7)
EOSINOPHIL NFR BLD AUTO: 0.1 % — SIGNIFICANT CHANGE UP (ref 0–8)
EOSINOPHIL NFR BLD AUTO: 0.5 % — SIGNIFICANT CHANGE UP (ref 0–8)
EOSINOPHIL NFR BLD AUTO: 0.7 % — SIGNIFICANT CHANGE UP (ref 0–8)
GAS PNL BLDA: SIGNIFICANT CHANGE UP
GAS PNL BLDMV: SIGNIFICANT CHANGE UP
GLUCOSE BLDC GLUCOMTR-MCNC: 159 MG/DL — HIGH (ref 70–99)
GLUCOSE BLDC GLUCOMTR-MCNC: 193 MG/DL — HIGH (ref 70–99)
GLUCOSE BLDC GLUCOMTR-MCNC: 202 MG/DL — HIGH (ref 70–99)
GLUCOSE SERPL-MCNC: 154 MG/DL — HIGH (ref 70–99)
HAPTOGLOB SERPL-MCNC: 101 MG/DL — SIGNIFICANT CHANGE UP (ref 34–200)
HCO3 BLDMV-SCNC: 20 MMOL/L — SIGNIFICANT CHANGE UP
HCO3 BLDMV-SCNC: 23 MMOL/L — SIGNIFICANT CHANGE UP
HCO3 BLDMV-SCNC: 24 MMOL/L — SIGNIFICANT CHANGE UP
HCT VFR BLD CALC: 23.7 % — LOW (ref 42–52)
HCT VFR BLD CALC: 25.7 % — LOW (ref 42–52)
HCT VFR BLD CALC: 29.8 % — LOW (ref 42–52)
HCT VFR BLD CALC: 29.9 % — LOW (ref 42–52)
HGB BLD-MCNC: 10 G/DL — LOW (ref 14–18)
HGB BLD-MCNC: 10 G/DL — LOW (ref 14–18)
HGB BLD-MCNC: 7.8 G/DL — LOW (ref 14–18)
HGB BLD-MCNC: 8.5 G/DL — LOW (ref 14–18)
HOROWITZ INDEX BLDMV+IHG-RTO: 21 — SIGNIFICANT CHANGE UP
IMM GRANULOCYTES NFR BLD AUTO: 0.5 % — HIGH (ref 0.1–0.3)
IMM GRANULOCYTES NFR BLD AUTO: 0.6 % — HIGH (ref 0.1–0.3)
IMM GRANULOCYTES NFR BLD AUTO: 0.8 % — HIGH (ref 0.1–0.3)
LYMPHOCYTES # BLD AUTO: 1.57 K/UL — SIGNIFICANT CHANGE UP (ref 1.2–3.4)
LYMPHOCYTES # BLD AUTO: 1.73 K/UL — SIGNIFICANT CHANGE UP (ref 1.2–3.4)
LYMPHOCYTES # BLD AUTO: 1.93 K/UL — SIGNIFICANT CHANGE UP (ref 1.2–3.4)
LYMPHOCYTES # BLD AUTO: 10.9 % — LOW (ref 20.5–51.1)
LYMPHOCYTES # BLD AUTO: 11.2 % — LOW (ref 20.5–51.1)
LYMPHOCYTES # BLD AUTO: 12.7 % — LOW (ref 20.5–51.1)
MAGNESIUM SERPL-MCNC: 1.9 MG/DL — SIGNIFICANT CHANGE UP (ref 1.8–2.4)
MCHC RBC-ENTMCNC: 30.4 PG — SIGNIFICANT CHANGE UP (ref 27–31)
MCHC RBC-ENTMCNC: 30.7 PG — SIGNIFICANT CHANGE UP (ref 27–31)
MCHC RBC-ENTMCNC: 30.9 PG — SIGNIFICANT CHANGE UP (ref 27–31)
MCHC RBC-ENTMCNC: 31 PG — SIGNIFICANT CHANGE UP (ref 27–31)
MCHC RBC-ENTMCNC: 32.9 G/DL — SIGNIFICANT CHANGE UP (ref 32–37)
MCHC RBC-ENTMCNC: 33.1 G/DL — SIGNIFICANT CHANGE UP (ref 32–37)
MCHC RBC-ENTMCNC: 33.4 G/DL — SIGNIFICANT CHANGE UP (ref 32–37)
MCHC RBC-ENTMCNC: 33.6 G/DL — SIGNIFICANT CHANGE UP (ref 32–37)
MCV RBC AUTO: 91.7 FL — SIGNIFICANT CHANGE UP (ref 80–94)
MCV RBC AUTO: 92 FL — SIGNIFICANT CHANGE UP (ref 80–94)
MCV RBC AUTO: 92.2 FL — SIGNIFICANT CHANGE UP (ref 80–94)
MCV RBC AUTO: 93.8 FL — SIGNIFICANT CHANGE UP (ref 80–94)
MONOCYTES # BLD AUTO: 1.69 K/UL — HIGH (ref 0.1–0.6)
MONOCYTES # BLD AUTO: 1.74 K/UL — HIGH (ref 0.1–0.6)
MONOCYTES # BLD AUTO: 1.78 K/UL — HIGH (ref 0.1–0.6)
MONOCYTES NFR BLD AUTO: 11.1 % — HIGH (ref 1.7–9.3)
MONOCYTES NFR BLD AUTO: 11.2 % — HIGH (ref 1.7–9.3)
MONOCYTES NFR BLD AUTO: 12.4 % — HIGH (ref 1.7–9.3)
NEUTROPHILS # BLD AUTO: 10.43 K/UL — HIGH (ref 1.4–6.5)
NEUTROPHILS # BLD AUTO: 11.37 K/UL — HIGH (ref 1.4–6.5)
NEUTROPHILS # BLD AUTO: 12.18 K/UL — HIGH (ref 1.4–6.5)
NEUTROPHILS NFR BLD AUTO: 74.7 % — SIGNIFICANT CHANGE UP (ref 42.2–75.2)
NEUTROPHILS NFR BLD AUTO: 74.7 % — SIGNIFICANT CHANGE UP (ref 42.2–75.2)
NEUTROPHILS NFR BLD AUTO: 76.7 % — HIGH (ref 42.2–75.2)
NRBC # BLD: 0 /100 WBCS — SIGNIFICANT CHANGE UP (ref 0–0)
O2 CT VFR BLD CALC: 37 MMHG — SIGNIFICANT CHANGE UP
O2 CT VFR BLD CALC: 40 MMHG — SIGNIFICANT CHANGE UP
O2 CT VFR BLD CALC: 59 MMHG — SIGNIFICANT CHANGE UP
PCO2 BLDMV: 30 MMHG — SIGNIFICANT CHANGE UP
PCO2 BLDMV: 33 MMHG — SIGNIFICANT CHANGE UP
PCO2 BLDMV: 34 MMHG — SIGNIFICANT CHANGE UP
PH BLDMV: 7.44 — SIGNIFICANT CHANGE UP
PH BLDMV: 7.45 — SIGNIFICANT CHANGE UP
PH BLDMV: 7.45 — SIGNIFICANT CHANGE UP
PLATELET # BLD AUTO: 252 K/UL — SIGNIFICANT CHANGE UP (ref 130–400)
PLATELET # BLD AUTO: 255 K/UL — SIGNIFICANT CHANGE UP (ref 130–400)
PLATELET # BLD AUTO: 309 K/UL — SIGNIFICANT CHANGE UP (ref 130–400)
PLATELET # BLD AUTO: 315 K/UL — SIGNIFICANT CHANGE UP (ref 130–400)
PMV BLD: 10 FL — SIGNIFICANT CHANGE UP (ref 7.4–10.4)
PMV BLD: 10.1 FL — SIGNIFICANT CHANGE UP (ref 7.4–10.4)
PMV BLD: 10.3 FL — SIGNIFICANT CHANGE UP (ref 7.4–10.4)
PMV BLD: 10.5 FL — HIGH (ref 7.4–10.4)
POTASSIUM SERPL-MCNC: 3.5 MMOL/L — SIGNIFICANT CHANGE UP (ref 3.5–5)
POTASSIUM SERPL-SCNC: 3.5 MMOL/L — SIGNIFICANT CHANGE UP (ref 3.5–5)
PROT SERPL-MCNC: 5.8 G/DL — LOW (ref 6–8)
RBC # BLD: 2.57 M/UL — LOW (ref 4.7–6.1)
RBC # BLD: 2.74 M/UL — LOW (ref 4.7–6.1)
RBC # BLD: 3.24 M/UL — LOW (ref 4.7–6.1)
RBC # BLD: 3.26 M/UL — LOW (ref 4.7–6.1)
RBC # FLD: 13.5 % — SIGNIFICANT CHANGE UP (ref 11.5–14.5)
RBC # FLD: 13.5 % — SIGNIFICANT CHANGE UP (ref 11.5–14.5)
RBC # FLD: 13.6 % — SIGNIFICANT CHANGE UP (ref 11.5–14.5)
RBC # FLD: 13.8 % — SIGNIFICANT CHANGE UP (ref 11.5–14.5)
SAO2 % BLDMV: 67 % — SIGNIFICANT CHANGE UP
SAO2 % BLDMV: 71.9 % — SIGNIFICANT CHANGE UP
SAO2 % BLDMV: 92.6 % — SIGNIFICANT CHANGE UP
SODIUM SERPL-SCNC: 140 MMOL/L — SIGNIFICANT CHANGE UP (ref 135–146)
WBC # BLD: 13.98 K/UL — HIGH (ref 4.8–10.8)
WBC # BLD: 15.22 K/UL — HIGH (ref 4.8–10.8)
WBC # BLD: 15.23 K/UL — HIGH (ref 4.8–10.8)
WBC # BLD: 15.87 K/UL — HIGH (ref 4.8–10.8)
WBC # FLD AUTO: 13.98 K/UL — HIGH (ref 4.8–10.8)
WBC # FLD AUTO: 15.22 K/UL — HIGH (ref 4.8–10.8)
WBC # FLD AUTO: 15.23 K/UL — HIGH (ref 4.8–10.8)
WBC # FLD AUTO: 15.87 K/UL — HIGH (ref 4.8–10.8)

## 2023-05-18 PROCEDURE — 93010 ELECTROCARDIOGRAM REPORT: CPT

## 2023-05-18 PROCEDURE — 93306 TTE W/DOPPLER COMPLETE: CPT | Mod: 26

## 2023-05-18 PROCEDURE — 99233 SBSQ HOSP IP/OBS HIGH 50: CPT

## 2023-05-18 PROCEDURE — 99291 CRITICAL CARE FIRST HOUR: CPT

## 2023-05-18 RX ORDER — ATROPINE SULFATE 0.1 MG/ML
1 SYRINGE (ML) INJECTION ONCE
Refills: 0 | Status: DISCONTINUED | OUTPATIENT
Start: 2023-05-18 | End: 2023-05-19

## 2023-05-18 RX ORDER — POTASSIUM CHLORIDE 20 MEQ
20 PACKET (EA) ORAL ONCE
Refills: 0 | Status: COMPLETED | OUTPATIENT
Start: 2023-05-18 | End: 2023-05-18

## 2023-05-18 RX ORDER — LIDOCAINE HYDROCHLORIDE AND EPINEPHRINE 10; 10 MG/ML; UG/ML
2 INJECTION, SOLUTION INFILTRATION; PERINEURAL ONCE
Refills: 0 | Status: COMPLETED | OUTPATIENT
Start: 2023-05-18 | End: 2023-05-18

## 2023-05-18 RX ORDER — SODIUM CHLORIDE 9 MG/ML
500 INJECTION INTRAMUSCULAR; INTRAVENOUS; SUBCUTANEOUS ONCE
Refills: 0 | Status: COMPLETED | OUTPATIENT
Start: 2023-05-18 | End: 2023-05-18

## 2023-05-18 RX ORDER — MAGNESIUM SULFATE 500 MG/ML
2 VIAL (ML) INJECTION ONCE
Refills: 0 | Status: COMPLETED | OUTPATIENT
Start: 2023-05-18 | End: 2023-05-18

## 2023-05-18 RX ADMIN — ATORVASTATIN CALCIUM 40 MILLIGRAM(S): 80 TABLET, FILM COATED ORAL at 21:04

## 2023-05-18 RX ADMIN — Medication 50 MILLIEQUIVALENT(S): at 08:47

## 2023-05-18 RX ADMIN — CHLORHEXIDINE GLUCONATE 1 APPLICATION(S): 213 SOLUTION TOPICAL at 05:25

## 2023-05-18 RX ADMIN — ZOLPIDEM TARTRATE 5 MILLIGRAM(S): 10 TABLET ORAL at 21:09

## 2023-05-18 RX ADMIN — FINASTERIDE 5 MILLIGRAM(S): 5 TABLET, FILM COATED ORAL at 15:55

## 2023-05-18 RX ADMIN — LIDOCAINE HYDROCHLORIDE AND EPINEPHRINE 2 MILLILITER(S): 10; 10 INJECTION, SOLUTION INFILTRATION; PERINEURAL at 01:50

## 2023-05-18 RX ADMIN — Medication 25 GRAM(S): at 08:48

## 2023-05-18 RX ADMIN — SODIUM CHLORIDE 500 MILLILITER(S): 9 INJECTION INTRAMUSCULAR; INTRAVENOUS; SUBCUTANEOUS at 10:45

## 2023-05-18 RX ADMIN — SODIUM CHLORIDE 500 MILLILITER(S): 9 INJECTION INTRAMUSCULAR; INTRAVENOUS; SUBCUTANEOUS at 09:45

## 2023-05-18 RX ADMIN — CLOPIDOGREL BISULFATE 75 MILLIGRAM(S): 75 TABLET, FILM COATED ORAL at 15:55

## 2023-05-18 RX ADMIN — Medication 20 MILLIGRAM(S): at 05:22

## 2023-05-18 RX ADMIN — HEPARIN SODIUM 5 UNIT(S)/HR: 5000 INJECTION INTRAVENOUS; SUBCUTANEOUS at 06:49

## 2023-05-18 RX ADMIN — Medication 50 MILLIGRAM(S): at 05:24

## 2023-05-18 RX ADMIN — Medication 81 MILLIGRAM(S): at 15:55

## 2023-05-18 RX ADMIN — PANTOPRAZOLE SODIUM 40 MILLIGRAM(S): 20 TABLET, DELAYED RELEASE ORAL at 06:48

## 2023-05-18 RX ADMIN — POLYETHYLENE GLYCOL 3350 17 GRAM(S): 17 POWDER, FOR SOLUTION ORAL at 15:59

## 2023-05-18 RX ADMIN — TAMSULOSIN HYDROCHLORIDE 0.4 MILLIGRAM(S): 0.4 CAPSULE ORAL at 21:03

## 2023-05-18 NOTE — PROGRESS NOTE ADULT - NS ATTEND AMEND GEN_ALL_CORE FT
Patient is on Impella at P2 and hemodynamics are stable with good urine output. He was hypotensive in AM and CI dropped to 1.7, however, these improved with fluid resuscitation. There is significant bleeding from around the PA catheter.       Get type and screen   Get repeat CBC - If Hb < 9 gr/dl transfuse 2 units   Prepare for Impella Removal   Keep CVP ~10  Trend lactate and hourly urine output   Discussed with interventional and CCU teams

## 2023-05-18 NOTE — PROGRESS NOTE ADULT - TIME BILLING
unstable angina.
unstable angina.
unstable angina, ischemic CMP, A-fib.
unstable angina, TAVR, blood loss anemia.

## 2023-05-18 NOTE — PROGRESS NOTE ADULT - SUBJECTIVE AND OBJECTIVE BOX
Date of Admission: 23    Interval History: Patient seen and examined in bed, in NAD. +R groin impella device in place. Bleeding noted from R IJ SWAN site. Hypotensive this AM to 70/40- fluids given with good response.     CHIEF COMPLAINT: Patient is a 89y old  Male who presents with a chief complaint of Chest pain (17 May 2023 17:00)    HISTORY OF PRESENT ILLNESS: 88yo M hx of aortic stenosis s/p TAVR (), CAD s/p PCI and CABG 3v  (LIMA => LAD, SVG to OM1, SVG to PDA), chronic atrial fibrillation (on Coumadin s/p MAZE procedure s/p PPM). 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 His symptoms started around 1 month ago and has been getting worse since then which prompted him to come to the Ed. He denies associated symptoms such as dizziness, lightheadedness, palpitations, or syncope. He sleeps with 2 pillows, denies PND. Reports that he is due for new PPM next month with Dr. Tai. Last dose of coumadin 412 am. At this time of eval, he reports mild, dull chest pain.   In the ED: Afebrile, HR 88, /83 > 177/83, 98%RA, Labs with Hgb 12.0 (bl 10-11), CMP wnl, trop 0.05 x1 (prev 0.05 in 2022), BNP 3098 (prev 2226).   EKG with Ventricular-paced rhythm   Chest Xray with cardiomegaly   Admit to cardio- stepdown, planned for cath Monday with Dr. Dan.  (13 May 2023 20:21)      PAST MEDICAL & SURGICAL HISTORY:  Asthma with COPD  HLD (hyperlipidemia)  Essential hypertension  Aortic stenosis, severe  A-fib  DM (diabetes mellitus)  Pacemaker  BPH (benign prostatic hyperplasia)  S/P CABG x 3  History of CEA (carotid endarterectomy)  History of permanent cardiac pacemaker placement  H/O aortic valve replacement      FAMILY HISTORY: No pertinent family history of premature cardiovascular disease in first degree relatives as patient is 89 years old.     SOCIAL HISTORY:  Denies smoking, alcohol, or drug use.    Allergies  IV Contrast (Hives)  Intolerances    contrast media (gadolinium-based) (Hives)  	    PHYSICAL EXAM:  ICU Vital Signs Last 24 Hrs  T(C): 36.5 (18 May 2023 11:00), Max: 36.8 (17 May 2023 20:00)  T(F): 97.7 (18 May 2023 11:00), Max: 98.2 (17 May 2023 20:00)  HR: 76 (18 May 2023 11:00) (55 - 92)  BP: 70/50 (18 May 2023 09:39) (70/50 - 103/48)  BP(mean): 55 (18 May 2023 09:39) (55 - 55)  ABP: 119/42 (18 May 2023 11:00) (78/41 - 162/71)  ABP(mean): 65 (18 May 2023 11:00) (50 - 100)  RR: 26 (18 May 2023 11:00) (14 - 31)  SpO2: 100% (18 May 2023 11:00) (96% - 100%)    O2 Parameters below as of 18 May 2023 11:00  Patient On (Oxygen Delivery Method): room air      General Appearance: well appearing, normal for age and gender. 	  Neck: +central line  Eyes: Extra Ocular muscles intact.   Cardiovascular: regular rate and rhythm S1 S2, No edema  Respiratory: B/L breath sounds  Psychiatry: Alert and oriented x 3, Mood & affect appropriate  Gastrointestinal:  Soft, Non-tender  Skin/Integumentary: No rashes, No ecchymoses, No cyanosis, +R groin impella device	  Neurologic: Non-focal  Musculoskeletal/ extremities: Normal range of motion, No clubbing, cyanosis or edema  Vascular: Peripheral pulses palpable 2+ bilaterally      LABS:	 	      CBC Full  -  ( 18 May 2023 10:42 )  WBC Count : 15.22 K/uL  RBC Count : 2.74 M/uL  Hemoglobin : 8.5 g/dL  Hematocrit : 25.7 %  Platelet Count - Automated : 255 K/uL  Mean Cell Volume : 93.8 fL  Mean Cell Hemoglobin : 31.0 pg  Mean Cell Hemoglobin Concentration : 33.1 g/dL  Auto Neutrophil # : 11.37 K/uL  Auto Lymphocyte # : 1.93 K/uL  Auto Monocyte # : 1.69 K/uL  Auto Eosinophil # : 0.08 K/uL  Auto Basophil # : 0.07 K/uL  Auto Neutrophil % : 74.7 %  Auto Lymphocyte % : 12.7 %  Auto Monocyte % : 11.1 %  Auto Eosinophil % : 0.5 %  Auto Basophil % : 0.5 %      Comprehensive Metabolic Panel (23 @ 04:50)    Sodium, Serum: 140 mmol/L   Potassium, Serum: 3.5 mmol/L   Chloride, Serum: 105 mmol/L   Carbon Dioxide, Serum: 21 mmol/L   Anion Gap, Serum: 14 mmol/L   Blood Urea Nitrogen, Serum: 34 mg/dL   Creatinine, Serum: 1.1 mg/dL   Glucose, Serum: 154 mg/dL   Calcium, Total Serum: 8.7 mg/dL   Protein Total, Serum: 5.8 g/dL   Albumin, Serum: 3.7 g/dL   Bilirubin Total, Serum: 0.9 mg/dL   Alkaline Phosphatase, Serum: 154 U/L   Aspartate Aminotransferase (AST/SGOT): 18 U/L   Alanine Aminotransferase (ALT/SGPT): 10 U/L   eGFR: 64: The estimated glomerular filtration rate (eGFR) is calculated using the   CKD-EPI creatinine equation, which does not have a coefficient for  race and is validated in individuals 18 years of age and older (N Engl J  Med ; [id number]). Creatinine-based eGFR may be inaccurate in  various situations including but not limited to extremes of muscle mass,  altered dietary protein intake, or medications that affect renal tubular  creatinine secretion. mL/min/1.73m2      CARDIAC MARKERS:  Pro-Brain Natriuretic Peptide: 3098 pg/mL (23 @ 16:05)        TELEMETRY EVENTS: 	    EC23    Ventricular Rate 86 BPM  Atrial Rate 86 BPM  QRS Duration 158 ms  Q-T Interval 496 ms  QTC Calculation(Bazett) 593 ms  P Axis -31 degrees  R Axis -43 degrees  T Axis 92 degrees    Diagnosis Line Ventricular-paced rhythm  Abnormal ECG    Confirmed by Sachin Carlson ([id number]) on 2023 5:03:53 PM      	    PREVIOUS DIAGNOSTIC TESTING:    TTE 23     Summary:   1. Diffuse left ventricular hypokinesis with a large region of lateral   akinesis and overall severely decreased global left ventricular systolic   function with ejection fraction, by visual estimation, of 25 to 30%. The   left ventricular diastolic function could not be assessed in this study   due to LV pacing. Moderate concentric left ventricular hypertrophy.   Endocardial visualization was enhanced with intravenous echo contrast.   2. Normal right ventricular size and function.   3. Severely enlarged left atrium.   4. Mildly enlarged right atrium.   5. S/p Mehrdad S3 26mm TAVR with trace central regurgitation and no   evidence of stenosis (Vmax 1.9m/s, mean PG 8mmHg, DI 0.48, AT 70ms).   6. Mitral annular calcification with mild to moderate mitral valve   regurgitation.   7. Moderate tricuspid regurgitation.   8. Severe pulmonary hypertension (PASP = 83mmHg).   9. No pericardial effusion.  10. Compared to prior study from , systolic function has worsened and   pulmonary pressures have increased.      Left Heart Catheterization 23    Intervention:   - PCI to SVG to OM  (AUC 8 for revascularization)  - Impella device insertion    Implants:   - Synergy TRISTIAN 3.5x12 to distal SVG  - Synergy TRISTIAN 3.5x16 to mid SVG  - Synergy TRISTIAN 4.0x16 to prox SVG     FINDINGS:     SVG to OM1: 99% stenosis in prox segment, 80% stenosis in mid segment, 80% stenosis in distal segment     CI by thermodilution: 1.95        Left Heart Catheterization 23  FINDINGS:     Coronary Dominance: Left    LM: Mild disease  LAD: 90% stenosis prox LAD, 100% stenosis  in mid segment, distal vessel supplied by a patent LIMA  Diag: Moderate disease  LCx: Moderate disease  OM: 100% stenosis  at the site of a previous stent, distal vessel supplied by a diseased SVG  LPDA: Mild disease, supplied by a patent SVG  RCA: Small non dominant, 80% stenosis in RV marginal    SVG to OM1: 99% stenosis in proximal segment, 80% stenosis in mid and distal segments  SVG to LPDA: Patent with mild diffuse disease  LIMA to LAD: Patent, minor irregularities    	  Home Medications:  Ambien 10 mg oral tablet: 1 tab(s) orally once a day (at bedtime), As Needed (2022 14:02)  metFORMIN 500 mg oral tablet: 1 tab(s) orally 2 times a day (2022 14:02)  Metoprolol Tartrate 50 mg oral tablet: 1 tab(s) orally 2 times a day (2022 14:02)      MEDICATIONS  (STANDING):  aspirin enteric coated 81 milliGRAM(s) Oral daily  atorvastatin 40 milliGRAM(s) Oral at bedtime  chlorhexidine 2% Cloths 1 Application(s) Topical <User Schedule>  clopidogrel Tablet 75 milliGRAM(s) Oral daily  diphenhydrAMINE Injectable 50 milliGRAM(s) IV Push once  finasteride 5 milliGRAM(s) Oral daily  furosemide   Injectable 40 milliGRAM(s) IV Push every 12 hours  heparin  Infusion 700 Unit(s)/Hr (7 mL/Hr) IV Continuous <Continuous>  losartan 100 milliGRAM(s) Oral daily  metoprolol tartrate 50 milliGRAM(s) Oral two times a day  NIFEdipine XL 60 milliGRAM(s) Oral daily  pantoprazole    Tablet 40 milliGRAM(s) Oral before breakfast  polyethylene glycol 3350 17 Gram(s) Oral daily  sodium chloride 0.9%. 1000 milliLiter(s) (50 mL/Hr) IV Continuous <Continuous>  tamsulosin 0.4 milliGRAM(s) Oral at bedtime    MEDICATIONS  (PRN):  nitroglycerin     SubLingual 0.4 milliGRAM(s) SubLingual every 10 minutes PRN Chest Pain  zolpidem 5 milliGRAM(s) Oral at bedtime PRN Insomnia

## 2023-05-18 NOTE — PROGRESS NOTE ADULT - SUBJECTIVE AND OBJECTIVE BOX
Patient is a 89y old  Male who presents with a chief complaint of Chest pain (17 May 2023 17:00)      INTERVAL HPI/OVERNIGHT EVENTS:   No overnight events. Pt with seeping blood from [place] site of right IJ, dressing changed in AM. Afebrile, hemodynamically stable     Subjective: Patient seen and examined at bedside.    ICU Vital Signs Last 24 Hrs  T(C): 36 (18 May 2023 08:00), Max: 36.8 (17 May 2023 20:00)  T(F): 96.8 (18 May 2023 08:00), Max: 98.2 (17 May 2023 20:00)  HR: 64 (18 May 2023 07:00) (64 - 92)  BP: 103/48 (18 May 2023 08:00) (103/48 - 103/48)  BP(mean): --  ABP: 131/50 (18 May 2023 07:00) (125/47 - 162/71)  ABP(mean): 71 (18 May 2023 07:00) (70 - 100)  RR: 25 (18 May 2023 07:00) (14 - 31)  SpO2: 100% (18 May 2023 07:00) (96% - 100%)    O2 Parameters below as of 18 May 2023 08:00  Patient On (Oxygen Delivery Method): room air          I&O's Summary    17 May 2023 07:01  -  18 May 2023 07:00  --------------------------------------------------------  IN: 1240.6 mL / OUT: 2385 mL / NET: -1144.4 mL          PHYSICAL EXAM:  GENERAL: No acute distress   HEAD:  Atraumatic, Normocephalic  EYES: EOMI, PERRLA, conjunctiva and sclera clear  ENMT: No tonsillar erythema, exudates, or enlargement; Moist mucous membranes  NECK: Supple, No JVD, Normal thyroid  HEART: Regular rate and rhythm; No murmurs, rubs, or gallops  RESPIRATORY: CTA B/L, No W/R/R  ABDOMEN: Soft, Nontender, Nondistended; Bowel sounds present  NEUROLOGY: A&Ox3, nonfocal, moving all extremities  EXTREMITIES:  2+ Peripheral Pulses, No clubbing, cyanosis, or edema  SKIN: warm, dry, normal color, no rash or abnormal lesions    LABS:                        10.0   15.87 )-----------( 315      ( 18 May 2023 04:50 )             29.9         140  |  105  |  34<H>  ----------------------------<  154<H>  3.5   |  21  |  1.1    Ca    8.7      18 May 2023 04:50  Mg     1.9         TPro  5.8<L>  /  Alb  3.7  /  TBili  0.9  /  DBili  x   /  AST  18  /  ALT  10  /  AlkPhos  154<H>      PT/INR - ( 17 May 2023 06:12 )   PT: 19.10 sec;   INR: 1.65 ratio         PTT - ( 18 May 2023 04:50 )  PTT:73.9 sec  Urinalysis Basic - ( 17 May 2023 21:53 )    Color: Light Yellow / Appearance: Clear / S.021 / pH: x  Gluc: x / Ketone: Negative  / Bili: Negative / Urobili: <2 mg/dL   Blood: x / Protein: Negative / Nitrite: Negative   Leuk Esterase: Negative / RBC: 5 /HPF / WBC 5 /HPF   Sq Epi: x / Non Sq Epi: x / Bacteria: Negative      CAPILLARY BLOOD GLUCOSE      POCT Blood Glucose.: 159 mg/dL (18 May 2023 05:29)    ABG - ( 18 May 2023 06:21 )  pH, Arterial: 7.52  pH, Blood: x     /  pCO2: 27    /  pO2: 118   / HCO3: 22    / Base Excess: -0.2  /  SaO2: 99.1                Consultant(s) Notes Reviewed:  [x ] YES  [ ] NO    MEDICATIONS  (STANDING):  aspirin enteric coated 81 milliGRAM(s) Oral daily  atorvastatin 40 milliGRAM(s) Oral at bedtime  chlorhexidine 2% Cloths 1 Application(s) Topical <User Schedule>  clopidogrel Tablet 75 milliGRAM(s) Oral daily  dextrose 5% 500 milliLiter(s) (15 mL/Hr) IV Continuous <Continuous>  diphenhydrAMINE Injectable 50 milliGRAM(s) IV Push once  finasteride 5 milliGRAM(s) Oral daily  heparin  Infusion 700 Unit(s)/Hr (5 mL/Hr) IV Continuous <Continuous>  hydrALAZINE 20 milliGRAM(s) Oral three times a day  magnesium sulfate  IVPB 2 Gram(s) IV Intermittent once  metoprolol tartrate 50 milliGRAM(s) Oral two times a day  pantoprazole    Tablet 40 milliGRAM(s) Oral before breakfast  polyethylene glycol 3350 17 Gram(s) Oral daily  potassium chloride  20 mEq/100 mL IVPB 20 milliEquivalent(s) IV Intermittent once  sodium chloride 0.9% Bolus 250 milliLiter(s) IV Bolus once  sodium chloride 0.9%. 1000 milliLiter(s) (50 mL/Hr) IV Continuous <Continuous>  tamsulosin 0.4 milliGRAM(s) Oral at bedtime    MEDICATIONS  (PRN):  nitroglycerin     SubLingual 0.4 milliGRAM(s) SubLingual every 10 minutes PRN Chest Pain  zolpidem 5 milliGRAM(s) Oral at bedtime PRN Insomnia      Care Discussed with Consultants/Other Providers [ x] YES  [ ] NO    RADIOLOGY & ADDITIONAL TESTS: Patient is a 89y old  Male who presents with a chief complaint of Chest pain (17 May 2023 17:00)      INTERVAL HPI/OVERNIGHT EVENTS:   No overnight events. Pt with seeping blood from [place] site of right IJ, dressing changed in AM. Afebrile, hemodynamically stable. During AM rounds     Subjective: Patient seen and examined at bedside.    ICU Vital Signs Last 24 Hrs  T(C): 36 (18 May 2023 08:00), Max: 36.8 (17 May 2023 20:00)  T(F): 96.8 (18 May 2023 08:00), Max: 98.2 (17 May 2023 20:00)  HR: 64 (18 May 2023 07:00) (64 - 92)  BP: 103/48 (18 May 2023 08:00) (103/48 - 103/48)  BP(mean): --  ABP: 131/50 (18 May 2023 07:00) (125/47 - 162/71)  ABP(mean): 71 (18 May 2023 07:00) (70 - 100)  RR: 25 (18 May 2023 07:00) (14 - 31)  SpO2: 100% (18 May 2023 07:00) (96% - 100%)    O2 Parameters below as of 18 May 2023 08:00  Patient On (Oxygen Delivery Method): room air          I&O's Summary    17 May 2023 07:01  -  18 May 2023 07:00  --------------------------------------------------------  IN: 1240.6 mL / OUT: 2385 mL / NET: -1144.4 mL          PHYSICAL EXAM:  GENERAL: No acute distress   HEAD:  Atraumatic, Normocephalic  EYES: EOMI, PERRLA, conjunctiva and sclera clear  ENMT: No tonsillar erythema, exudates, or enlargement; Moist mucous membranes  NECK: Supple, No JVD, Normal thyroid  HEART: Regular rate and rhythm; No murmurs, rubs, or gallops  RESPIRATORY: CTA B/L, No W/R/R  ABDOMEN: Soft, Nontender, Nondistended; Bowel sounds present  NEUROLOGY: A&Ox3, nonfocal, moving all extremities  EXTREMITIES:  2+ Peripheral Pulses, No clubbing, cyanosis, or edema  SKIN: warm, dry, normal color, no rash or abnormal lesions    LABS:                        10.0   15.87 )-----------( 315      ( 18 May 2023 04:50 )             29.9         140  |  105  |  34<H>  ----------------------------<  154<H>  3.5   |  21  |  1.1    Ca    8.7      18 May 2023 04:50  Mg     1.9         TPro  5.8<L>  /  Alb  3.7  /  TBili  0.9  /  DBili  x   /  AST  18  /  ALT  10  /  AlkPhos  154<H>      PT/INR - ( 17 May 2023 06:12 )   PT: 19.10 sec;   INR: 1.65 ratio         PTT - ( 18 May 2023 04:50 )  PTT:73.9 sec  Urinalysis Basic - ( 17 May 2023 21:53 )    Color: Light Yellow / Appearance: Clear / S.021 / pH: x  Gluc: x / Ketone: Negative  / Bili: Negative / Urobili: <2 mg/dL   Blood: x / Protein: Negative / Nitrite: Negative   Leuk Esterase: Negative / RBC: 5 /HPF / WBC 5 /HPF   Sq Epi: x / Non Sq Epi: x / Bacteria: Negative      CAPILLARY BLOOD GLUCOSE      POCT Blood Glucose.: 159 mg/dL (18 May 2023 05:29)    ABG - ( 18 May 2023 06:21 )  pH, Arterial: 7.52  pH, Blood: x     /  pCO2: 27    /  pO2: 118   / HCO3: 22    / Base Excess: -0.2  /  SaO2: 99.1                Consultant(s) Notes Reviewed:  [x ] YES  [ ] NO    MEDICATIONS  (STANDING):  aspirin enteric coated 81 milliGRAM(s) Oral daily  atorvastatin 40 milliGRAM(s) Oral at bedtime  chlorhexidine 2% Cloths 1 Application(s) Topical <User Schedule>  clopidogrel Tablet 75 milliGRAM(s) Oral daily  dextrose 5% 500 milliLiter(s) (15 mL/Hr) IV Continuous <Continuous>  diphenhydrAMINE Injectable 50 milliGRAM(s) IV Push once  finasteride 5 milliGRAM(s) Oral daily  heparin  Infusion 700 Unit(s)/Hr (5 mL/Hr) IV Continuous <Continuous>  hydrALAZINE 20 milliGRAM(s) Oral three times a day  magnesium sulfate  IVPB 2 Gram(s) IV Intermittent once  metoprolol tartrate 50 milliGRAM(s) Oral two times a day  pantoprazole    Tablet 40 milliGRAM(s) Oral before breakfast  polyethylene glycol 3350 17 Gram(s) Oral daily  potassium chloride  20 mEq/100 mL IVPB 20 milliEquivalent(s) IV Intermittent once  sodium chloride 0.9% Bolus 250 milliLiter(s) IV Bolus once  sodium chloride 0.9%. 1000 milliLiter(s) (50 mL/Hr) IV Continuous <Continuous>  tamsulosin 0.4 milliGRAM(s) Oral at bedtime    MEDICATIONS  (PRN):  nitroglycerin     SubLingual 0.4 milliGRAM(s) SubLingual every 10 minutes PRN Chest Pain  zolpidem 5 milliGRAM(s) Oral at bedtime PRN Insomnia      Care Discussed with Consultants/Other Providers [ x] YES  [ ] NO    RADIOLOGY & ADDITIONAL TESTS: Patient is a 89y old  Male who presents with a chief complaint of Chest pain (17 May 2023 17:00)      INTERVAL HPI/OVERNIGHT EVENTS:   No overnight events. Pt with seeping blood from [place] site of right IJ, dressing changed in AM. Afebrile, hemodynamically stable. During AM rounds pt experienced mild drop of BP, given 500 cc bolus IVF, normalized. Plan for [place] Catalina and Impalla removal today.    Subjective: Patient seen and examined at bedside.    ICU Vital Signs Last 24 Hrs  T(C): 36 (18 May 2023 08:00), Max: 36.8 (17 May 2023 20:00)  T(F): 96.8 (18 May 2023 08:00), Max: 98.2 (17 May 2023 20:00)  HR: 64 (18 May 2023 07:00) (64 - 92)  BP: 103/48 (18 May 2023 08:00) (103/48 - 103/48)  BP(mean): --  ABP: 131/50 (18 May 2023 07:00) (125/47 - 162/71)  ABP(mean): 71 (18 May 2023 07:00) (70 - 100)  RR: 25 (18 May 2023 07:00) (14 - 31)  SpO2: 100% (18 May 2023 07:00) (96% - 100%)    O2 Parameters below as of 18 May 2023 08:00  Patient On (Oxygen Delivery Method): room air          I&O's Summary    17 May 2023 07:01  -  18 May 2023 07:00  --------------------------------------------------------  IN: 1240.6 mL / OUT: 2385 mL / NET: -1144.4 mL          PHYSICAL EXAM:  GENERAL: No acute distress   HEAD:  Atraumatic, Normocephalic  EYES: EOMI, PERRLA, conjunctiva and sclera clear  ENMT: No tonsillar erythema, exudates, or enlargement; Moist mucous membranes  NECK: Supple, No JVD, Normal thyroid, Right IJ w/some seeping blood  HEART: Regular rate and rhythm; No murmurs, rubs, or gallops  RESPIRATORY: CTA B/L, No W/R/R  ABDOMEN: Soft, Nontender, Nondistended; Bowel sounds present  NEUROLOGY: A&Ox3, nonfocal, moving all extremities  EXTREMITIES:  2+ Peripheral Pulses, No clubbing, cyanosis, or edema  SKIN: warm, dry, normal color, no rash or abnormal lesions    LABS:                        10.0   15.87 )-----------( 315      ( 18 May 2023 04:50 )             29.9         140  |  105  |  34<H>  ----------------------------<  154<H>  3.5   |  21  |  1.1    Ca    8.7      18 May 2023 04:50  Mg     1.9         TPro  5.8<L>  /  Alb  3.7  /  TBili  0.9  /  DBili  x   /  AST  18  /  ALT  10  /  AlkPhos  154<H>      PT/INR - ( 17 May 2023 06:12 )   PT: 19.10 sec;   INR: 1.65 ratio         PTT - ( 18 May 2023 04:50 )  PTT:73.9 sec  Urinalysis Basic - ( 17 May 2023 21:53 )    Color: Light Yellow / Appearance: Clear / S.021 / pH: x  Gluc: x / Ketone: Negative  / Bili: Negative / Urobili: <2 mg/dL   Blood: x / Protein: Negative / Nitrite: Negative   Leuk Esterase: Negative / RBC: 5 /HPF / WBC 5 /HPF   Sq Epi: x / Non Sq Epi: x / Bacteria: Negative      CAPILLARY BLOOD GLUCOSE      POCT Blood Glucose.: 159 mg/dL (18 May 2023 05:29)    ABG - ( 18 May 2023 06:21 )  pH, Arterial: 7.52  pH, Blood: x     /  pCO2: 27    /  pO2: 118   / HCO3: 22    / Base Excess: -0.2  /  SaO2: 99.1                Consultant(s) Notes Reviewed:  [x ] YES  [ ] NO    MEDICATIONS  (STANDING):  aspirin enteric coated 81 milliGRAM(s) Oral daily  atorvastatin 40 milliGRAM(s) Oral at bedtime  chlorhexidine 2% Cloths 1 Application(s) Topical <User Schedule>  clopidogrel Tablet 75 milliGRAM(s) Oral daily  dextrose 5% 500 milliLiter(s) (15 mL/Hr) IV Continuous <Continuous>  diphenhydrAMINE Injectable 50 milliGRAM(s) IV Push once  finasteride 5 milliGRAM(s) Oral daily  heparin  Infusion 700 Unit(s)/Hr (5 mL/Hr) IV Continuous <Continuous>  hydrALAZINE 20 milliGRAM(s) Oral three times a day  magnesium sulfate  IVPB 2 Gram(s) IV Intermittent once  metoprolol tartrate 50 milliGRAM(s) Oral two times a day  pantoprazole    Tablet 40 milliGRAM(s) Oral before breakfast  polyethylene glycol 3350 17 Gram(s) Oral daily  potassium chloride  20 mEq/100 mL IVPB 20 milliEquivalent(s) IV Intermittent once  sodium chloride 0.9% Bolus 250 milliLiter(s) IV Bolus once  sodium chloride 0.9%. 1000 milliLiter(s) (50 mL/Hr) IV Continuous <Continuous>  tamsulosin 0.4 milliGRAM(s) Oral at bedtime    MEDICATIONS  (PRN):  nitroglycerin     SubLingual 0.4 milliGRAM(s) SubLingual every 10 minutes PRN Chest Pain  zolpidem 5 milliGRAM(s) Oral at bedtime PRN Insomnia      Care Discussed with Consultants/Other Providers [ x] YES  [ ] NO    RADIOLOGY & ADDITIONAL TESTS: Patient is a 89y old  Male who presents with a chief complaint of Chest pain (17 May 2023 17:00)      INTERVAL HPI/OVERNIGHT EVENTS:   No overnight events. Pt with seeping blood from [place] site of right IJ, dressing changed in AM. Afebrile, hemodynamically stable. During AM rounds pt experienced mild drop of BP, given 500 cc bolus IVF, normalized. Plan for [place] Catalina and Impalla removal today.    Subjective: Patient seen and examined at bedside.    ICU Vital Signs Last 24 Hrs  T(C): 36 (18 May 2023 08:00), Max: 36.8 (17 May 2023 20:00)  T(F): 96.8 (18 May 2023 08:00), Max: 98.2 (17 May 2023 20:00)  HR: 64 (18 May 2023 07:00) (64 - 92)  BP: 103/48 (18 May 2023 08:00) (103/48 - 103/48)  ABP: 131/50 (18 May 2023 07:00) (125/47 - 162/71)  ABP(mean): 71 (18 May 2023 07:00) (70 - 100)  RR: 25 (18 May 2023 07:00) (14 - 31)  SpO2: 100% (18 May 2023 07:00) (96% - 100%)    O2 Parameters below as of 18 May 2023 08:00  Patient On (Oxygen Delivery Method): room air          I&O's Summary    17 May 2023 07:01  -  18 May 2023 07:00  --------------------------------------------------------  IN: 1240.6 mL / OUT: 2385 mL / NET: -1144.4 mL          PHYSICAL EXAM:  GENERAL: No acute distress   HEAD:  Atraumatic, Normocephalic  EYES: EOMI, PERRLA, conjunctiva and sclera clear  NECK: Supple, No JVD, Normal thyroid, Right IJ w/some seeping blood  HEART: Regular rate and rhythm; No murmurs, rubs, or gallops  RESPIRATORY: CTA B/L, No W/R/R  ABDOMEN: Soft, Nontender, Nondistended; Bowel sounds present  NEUROLOGY: A&Ox3, nonfocal, moving all extremities  EXTREMITIES:  Right groing Impella notes. + Peripheral Pulses, No clubbing, cyanosis, or edema  SKIN: warm, dry, normal color, no rash or abnormal lesions    LABS:                        10.0   15.87 )-----------( 315      ( 18 May 2023 04:50 )             29.9     05    140  |  105  |  34<H>  ----------------------------<  154<H>  3.5   |  21  |  1.1    Ca    8.7      18 May 2023 04:50  Mg     1.9         TPro  5.8<L>  /  Alb  3.7  /  TBili  0.9  /  DBili  x   /  AST  18  /  ALT  10  /  AlkPhos  154<H>  05-    PT/INR - ( 17 May 2023 06:12 )   PT: 19.10 sec;   INR: 1.65 ratio         PTT - ( 18 May 2023 04:50 )  PTT:73.9 sec  Urinalysis Basic - ( 17 May 2023 21:53 )    Color: Light Yellow / Appearance: Clear / S.021 / pH: x  Gluc: x / Ketone: Negative  / Bili: Negative / Urobili: <2 mg/dL   Blood: x / Protein: Negative / Nitrite: Negative   Leuk Esterase: Negative / RBC: 5 /HPF / WBC 5 /HPF   Sq Epi: x / Non Sq Epi: x / Bacteria: Negative      CAPILLARY BLOOD GLUCOSE      POCT Blood Glucose.: 159 mg/dL (18 May 2023 05:29)    ABG - ( 18 May 2023 06:21 )  pH, Arterial: 7.52  pH, Blood: x     /  pCO2: 27    /  pO2: 118   / HCO3: 22    / Base Excess: -0.2  /  SaO2: 99.1                Consultant(s) Notes Reviewed:  [x ] YES  [ ] NO    MEDICATIONS  (STANDING):  aspirin enteric coated 81 milliGRAM(s) Oral daily  atorvastatin 40 milliGRAM(s) Oral at bedtime  chlorhexidine 2% Cloths 1 Application(s) Topical <User Schedule>  clopidogrel Tablet 75 milliGRAM(s) Oral daily  dextrose 5% 500 milliLiter(s) (15 mL/Hr) IV Continuous <Continuous>  diphenhydrAMINE Injectable 50 milliGRAM(s) IV Push once  finasteride 5 milliGRAM(s) Oral daily  heparin  Infusion 700 Unit(s)/Hr (5 mL/Hr) IV Continuous <Continuous>  hydrALAZINE 20 milliGRAM(s) Oral three times a day  magnesium sulfate  IVPB 2 Gram(s) IV Intermittent once  metoprolol tartrate 50 milliGRAM(s) Oral two times a day  pantoprazole    Tablet 40 milliGRAM(s) Oral before breakfast  polyethylene glycol 3350 17 Gram(s) Oral daily  potassium chloride  20 mEq/100 mL IVPB 20 milliEquivalent(s) IV Intermittent once  sodium chloride 0.9% Bolus 250 milliLiter(s) IV Bolus once  sodium chloride 0.9%. 1000 milliLiter(s) (50 mL/Hr) IV Continuous <Continuous>  tamsulosin 0.4 milliGRAM(s) Oral at bedtime    MEDICATIONS  (PRN):  nitroglycerin     SubLingual 0.4 milliGRAM(s) SubLingual every 10 minutes PRN Chest Pain  zolpidem 5 milliGRAM(s) Oral at bedtime PRN Insomnia      Care Discussed with Consultants/Other Providers [ x] YES  [ ] NO    RADIOLOGY & ADDITIONAL TESTS: Patient is a 89y old  Male who presents with a chief complaint of Chest pain (17 May 2023 17:00).      INTERVAL HPI/OVERNIGHT EVENTS:   No overnight events. Pt with seeping blood from [place] site of right IJ, dressing changed in AM. Afebrile, hemodynamically stable. During AM rounds pt experienced mild drop of BP, given 500 cc bolus IVF, normalized. Plan for [place] and Impella removal today.    Subjective: Patient seen and examined at bedside.    ICU Vital Signs Last 24 Hrs  T(C): 36 (18 May 2023 08:00), Max: 36.8 (17 May 2023 20:00)  T(F): 96.8 (18 May 2023 08:00), Max: 98.2 (17 May 2023 20:00)  HR: 64 (18 May 2023 07:00) (64 - 92)  BP: 103/48 (18 May 2023 08:00) (103/48 - 103/48)  ABP: 131/50 (18 May 2023 07:00) (125/47 - 162/71)  ABP(mean): 71 (18 May 2023 07:00) (70 - 100)  RR: 25 (18 May 2023 07:00) (14 - 31)  SpO2: 100% (18 May 2023 07:00) (96% - 100%)    O2 Parameters below as of 18 May 2023 08:00  Patient On (Oxygen Delivery Method): room air          I&O's Summary    17 May 2023 07:01  -  18 May 2023 07:00  --------------------------------------------------------  IN: 1240.6 mL / OUT: 2385 mL / NET: -1144.4 mL          PHYSICAL EXAM:  GENERAL: No acute distress   HEAD:  Atraumatic, Normocephalic  EYES: EOMI, PERRLA, conjunctiva and sclera clear  NECK: Supple, No JVD, Normal thyroid, Right IJ w/some seeping blood  HEART: Regular rate and rhythm; No murmurs, rubs, or gallops  RESPIRATORY: CTA B/L, No W/R/R  ABDOMEN: Soft, Nontender, Nondistended; Bowel sounds present  NEUROLOGY: A&Ox3, nonfocal, moving all extremities  EXTREMITIES:  Right groing Impella notes. + Peripheral Pulses, No clubbing, cyanosis, or edema  SKIN: warm, dry, normal color, no rash or abnormal lesions    LABS:                        10.0   15.87 )-----------( 315      ( 18 May 2023 04:50 )             29.9     05    140  |  105  |  34<H>  ----------------------------<  154<H>  3.5   |  21  |  1.1    Ca    8.7      18 May 2023 04:50  Mg     1.9         TPro  5.8<L>  /  Alb  3.7  /  TBili  0.9  /  DBili  x   /  AST  18  /  ALT  10  /  AlkPhos  154<H>  05-    PT/INR - ( 17 May 2023 06:12 )   PT: 19.10 sec;   INR: 1.65 ratio         PTT - ( 18 May 2023 04:50 )  PTT:73.9 sec  Urinalysis Basic - ( 17 May 2023 21:53 )    Color: Light Yellow / Appearance: Clear / S.021 / pH: x  Gluc: x / Ketone: Negative  / Bili: Negative / Urobili: <2 mg/dL   Blood: x / Protein: Negative / Nitrite: Negative   Leuk Esterase: Negative / RBC: 5 /HPF / WBC 5 /HPF   Sq Epi: x / Non Sq Epi: x / Bacteria: Negative      CAPILLARY BLOOD GLUCOSE      POCT Blood Glucose.: 159 mg/dL (18 May 2023 05:29)    ABG - ( 18 May 2023 06:21 )  pH, Arterial: 7.52  pH, Blood: x     /  pCO2: 27    /  pO2: 118   / HCO3: 22    / Base Excess: -0.2  /  SaO2: 99.1                Consultant(s) Notes Reviewed:  [x ] YES  [ ] NO    MEDICATIONS  (STANDING):  aspirin enteric coated 81 milliGRAM(s) Oral daily  atorvastatin 40 milliGRAM(s) Oral at bedtime  chlorhexidine 2% Cloths 1 Application(s) Topical <User Schedule>  clopidogrel Tablet 75 milliGRAM(s) Oral daily  dextrose 5% 500 milliLiter(s) (15 mL/Hr) IV Continuous <Continuous>  diphenhydrAMINE Injectable 50 milliGRAM(s) IV Push once  finasteride 5 milliGRAM(s) Oral daily  heparin  Infusion 700 Unit(s)/Hr (5 mL/Hr) IV Continuous <Continuous>  hydrALAZINE 20 milliGRAM(s) Oral three times a day  magnesium sulfate  IVPB 2 Gram(s) IV Intermittent once  metoprolol tartrate 50 milliGRAM(s) Oral two times a day  pantoprazole    Tablet 40 milliGRAM(s) Oral before breakfast  polyethylene glycol 3350 17 Gram(s) Oral daily  potassium chloride  20 mEq/100 mL IVPB 20 milliEquivalent(s) IV Intermittent once  sodium chloride 0.9% Bolus 250 milliLiter(s) IV Bolus once  sodium chloride 0.9%. 1000 milliLiter(s) (50 mL/Hr) IV Continuous <Continuous>  tamsulosin 0.4 milliGRAM(s) Oral at bedtime    MEDICATIONS  (PRN):  nitroglycerin     SubLingual 0.4 milliGRAM(s) SubLingual every 10 minutes PRN Chest Pain  zolpidem 5 milliGRAM(s) Oral at bedtime PRN Insomnia      Care Discussed with Consultants/Other Providers [ x] YES  [ ] NO    RADIOLOGY & ADDITIONAL TESTS:

## 2023-05-18 NOTE — PROGRESS NOTE ADULT - ASSESSMENT
89yo M hx of aortic stenosis s/p TAVR (2016), CAD s/p PCI and CABG 3v  (LIMA => LAD, SVG to OM1, SVG to PDA), chronic atrial fibrillation (on Coumadin s/p MAZE procedure s/p PPM). 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     #Unstable angina   #CAD s/p CABG 2011 (LIMA => LAD, SVG to OM1, SVG to PDA)  #AS s/p TAVR  #AFib on coumadin, s/p PPM (4mg M/T/TH/F/Sun, 6mg Wed/ Sat)  #HFmrEF  - vitals stable  - EKG: V paced rhythm   - BNP 3098  - Last Echo: 4/2022: EF 65%, Mod TR, Pulm HTN  - Echo 5/14:  Diffuse left ventricular hypokinesis with a large region of lateral akinesis and overall severely decreased global left ventricular systolic function with ejection fraction, by visual estimation, of 25 to 30%. Mod TR, Severe Pulm HTN  - systolic function has worsened and pulmonary pressures have increased.  - sublingual nitro prn for chest pain. will start nitro gtt if persistent chest pain  - on heparin   - Trend troponins to peak (0.07)  - EP following   - Cath 5/16: Significant 3-Vessel Coronary Artery Disease, Patent LIMA to LAD, patent SVG to LPDA, severely diseased SVG to OM1 (culprit)  - Consider removing Impella today  - c/w ASA/BB/simvastatin/Losartan  - Note: has contrast allergy (hives), will need solumedrol benadryl treatment prior     #HTN  -cw losartan 100mg, metoprolol 50mg     #HLD  - c/w statin  - f/u lipid profile     #Asthma/ COPD, not on home O2   - not in exacerbation   - c/w home inhalers     #BPH  - c/w flomax, finasteride     #DM  - a1c 6.2  - hold home metformin 500mg qd  - Hold Insulin for now, start insulin regimen if FS persistently elevated >200     #Insomnia  - c/w ambien 5mg prn (he takes this daily)     #Misc  - GI ppx: PPI   - DVT ppx: coumadin INR therapeutic  - Diet: Dash  - Codes Status: DNR DNI (patient at bedside reports that he wishes for DNR DNI but will rescind and be full code for cardiac cath)  89yo M hx of aortic stenosis s/p TAVR (2016), CAD s/p PCI and CABG 3v  (LIMA => LAD, SVG to OM1, SVG to PDA), chronic atrial fibrillation (on Coumadin s/p MAZE procedure s/p PPM). 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     #Unstable angina   #CAD s/p CABG 2011 (LIMA => LAD, SVG to OM1, SVG to PDA)  #AS s/p TAVR  #AFib on coumadin, s/p PPM (4mg M/T/TH/F/Sun, 6mg Wed/ Sat)  #HFmrEF  - vitals stable  - EKG: V paced rhythm   - BNP 3098  - Last Echo: 4/2022: EF 65%, Mod TR, Pulm HTN  - Echo 5/14:  Diffuse left ventricular hypokinesis with a large region of lateral akinesis and overall severely decreased global left ventricular systolic function with ejection fraction, by visual estimation, of 25 to 30%. Mod TR, Severe Pulm HTN  - systolic function has worsened and pulmonary pressures have increased.  - sublingual nitro prn for chest pain. will start nitro gtt if persistent chest pain  - on heparin   - Trend troponins to peak (0.07)  - EP following   - Cath 5/16: Significant 3-Vessel Coronary Artery Disease, Patent LIMA to LAD, patent SVG to LPDA, severely diseased SVG to OM1 (culprit)  - Consider removing Impella and [place] today  - c/w ASA/BB/simvastatin/Losartan  - Note: has contrast allergy (hives), will need solumedrol benadryl treatment prior     #HTN  -cw losartan 100mg, metoprolol 50mg     #HLD  - c/w statin  - f/u lipid profile     #Asthma/ COPD, not on home O2   - not in exacerbation   - c/w home inhalers     #BPH  - c/w flomax, finasteride     #DM  - a1c 6.2  - hold home metformin 500mg qd  - Hold Insulin for now, start insulin regimen if FS persistently elevated >200     #Insomnia  - c/w ambien 5mg prn (he takes this daily)     #Misc  - GI ppx: PPI   - DVT ppx: coumadin INR therapeutic  - Diet: Dash  - Codes Status: DNR DNI (patient at bedside reports that he wishes for DNR DNI but will rescind and be full code for cardiac cath)  89yo M hx of aortic stenosis s/p TAVR (2016), CAD s/p PCI and CABG 3v  (LIMA => LAD, SVG to OM1, SVG to PDA), chronic atrial fibrillation (on Coumadin s/p MAZE procedure s/p PPM). 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     #Unstable angina s/p Impella assisted PCI of SVG to OM  #CAD s/p CABG 2011 (LIMA => LAD, SVG to OM1, SVG to PDA)  #AS s/p TAVR  #AFib on coumadin, s/p PPM (4mg M/T/TH/F/Sun, 6mg Wed/ Sat)  #HFmrEF  - vitals stable  - EKG: V paced rhythm   - BNP 3098  - Last Echo: 4/2022: EF 65%, Mod TR, Pulm HTN  - Echo 5/14:  Diffuse left ventricular hypokinesis with a large region of lateral akinesis and overall severely decreased global left ventricular systolic function with ejection fraction, by visual estimation, of 25 to 30%. Mod TR, Severe Pulm HTN  - on heparin ggt, currently on hold for Impella removal  - Cath 5/16: Significant 3-Vessel Coronary Artery Disease, Patent LIMA to LAD, patent SVG to LPDA, severely diseased SVG to OM1 s/p Imeplla assisted PCI  - Consider removing Impella and [place] today  - c/w ASA/BB/lipitor 40  - cw Lopressor 50mg BID and Hydralazine 20mg TID  - Note: has contrast allergy (hives), will need solumedrol benadryl treatment prior   - Monitor H/H, keep Hb >8, transfuse prn    A Fib on Coumadi   - resume heparin from tomorrow and bridge back to coumadin    #HTN  -cw metoprolol 50mg BID and Hydralazine for now  -hold losartan and Nifedipine     #HLD  - c/w statin  - f/u lipid profile     #Asthma/ COPD, not on home O2   - not in exacerbation   - c/w home inhalers     #BPH  - c/w flomax, finasteride     #DM  - a1c 6.2  - hold home metformin 500mg qd  - Hold Insulin for now, start insulin regimen if FS persistently elevated >200     #Insomnia  - c/w ambien 5mg prn (he takes this daily)     #Misc  - GI ppx: PPI   - DVT ppx: coumadin INR therapeutic  - Diet: Dash  - Codes Status: DNR DNI (patient at bedside reports that he wishes for DNR DNI but will rescind and be full code for cardiac cath)

## 2023-05-18 NOTE — PROGRESS NOTE ADULT - ASSESSMENT
Assessment: 89yo M hx of aortic stenosis s/p TAVR (2016), CAD s/p PCI and CABG 3v (LIMA => LAD, SVG to OM1, SVG to PDA), chronic atrial fibrillation (on Coumadin s/p MAZE procedure s/p PPM). 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 Patient taken to cath 5/16, found to have significant 3-Vessel Coronary Artery Disease, Patent LIMA to LAD, patent SVG to LPDA, severely diseased SVG to OM1 (culprit). Patient taken back to cath 5/17 for high risk PCI with impella device- SVG to OM disease, s/p PCI to prox, mid and distal segments. Transferred post cath to CCU w/impella device in place.    Plan:  Impella device at P-2  Obtain full Echo  Patient hypotensive this AM- fluids given with good response  Would give another 500cc fluid bolus  Repeat CBC- if hgb < 9 mg/dL give blood transfusion   Plan for impella and SWAN removal today  Plan discussed with CCU team  Will continue to follow

## 2023-05-18 NOTE — PROGRESS NOTE ADULT - SUBJECTIVE AND OBJECTIVE BOX
Cardiology Follow up s/p PCI DENITA CORNELL   89y Male  PAST MEDICAL & SURGICAL HISTORY:    Asthma with COPD    HLD (hyperlipidemia)    Essential hypertension    Aortic stenosis, severe    A-fib    DM (diabetes mellitus)    Pacemaker    BPH (benign prostatic hyperplasia)    S/P CABG x 3    History of CEA (carotid endarterectomy)    History of permanent cardiac pacemaker placement    H/O aortic valve replacement         HPI:  89yo M hx of aortic stenosis s/p TAVR (2016), CAD s/p PCI and CABG 3v  (LIMA => LAD, SVG to OM1, SVG to PDA), chronic atrial fibrillation (on Coumadin s/p MAZE procedure s/p PPM). 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 His symptoms started around 1 month ago and has been getting worse since then which prompted him to come to the Ed. He denies associated symptoms such as dizziness, lightheadedness, palpitations, or syncope. He sleeps with 2 pillows, denies PND. Reports that he is due for new PPM next month with Dr. Tai. Last dose of coumadin 4/12 am. At this time of eval, he reports mild, dull chest pain.     In the ED: Afebrile, HR 88, /83 > 177/83, 98%RA, Labs with Hgb 12.0 (bl 10-11), CMP wnl, trop 0.05 x1 (prev 0.05 in 4/2022), BNP 3098 (prev 2226).   EKG with Ventricular-paced rhythm   Chest Xray with cardiomegaly     Admit to cardio- stepdown, planned for cath Monday with Dr. Dan.  (13 May 2023 20:21)    Allergies    IV Contrast (Hives)    Intolerances    contrast media (gadolinium-based) (Hives)    Patient seen and examined at bedside.  Pt with oozing blood from [place] site of right IJ, multiple dressing changes in AM.  Patient without complaints. Denies CP, SOB, palpitations, or dizziness  No events on telemetry overnight    Vital Signs Last 24 Hrs  T(C): 36 (18 May 2023 08:00), Max: 36.8 (17 May 2023 20:00)  T(F): 96.8 (18 May 2023 08:00), Max: 98.2 (17 May 2023 20:00)  HR: 67 (18 May 2023 09:00) (62 - 92)  BP: 103/48 (18 May 2023 08:00) (103/48 - 103/48)  BP(mean): --  RR: 23 (18 May 2023 09:00) (14 - 31)  SpO2: 100% (18 May 2023 09:00) (96% - 100%)    Parameters below as of 18 May 2023 09:00  Patient On (Oxygen Delivery Method): room air    MEDICATIONS  (STANDING):  aspirin enteric coated 81 milliGRAM(s) Oral daily  atorvastatin 40 milliGRAM(s) Oral at bedtime  chlorhexidine 2% Cloths 1 Application(s) Topical <User Schedule>  clopidogrel Tablet 75 milliGRAM(s) Oral daily  dextrose 5% 500 milliLiter(s) (15 mL/Hr) IV Continuous <Continuous>  diphenhydrAMINE Injectable 50 milliGRAM(s) IV Push once  finasteride 5 milliGRAM(s) Oral daily  heparin  Infusion 700 Unit(s)/Hr (5 mL/Hr) IV Continuous <Continuous>  hydrALAZINE 20 milliGRAM(s) Oral three times a day  metoprolol tartrate 50 milliGRAM(s) Oral two times a day  pantoprazole    Tablet 40 milliGRAM(s) Oral before breakfast  polyethylene glycol 3350 17 Gram(s) Oral daily  sodium chloride 0.9% Bolus 500 milliLiter(s) IV Bolus once  sodium chloride 0.9% Bolus 500 milliLiter(s) IV Bolus once  sodium chloride 0.9% Bolus 250 milliLiter(s) IV Bolus once  sodium chloride 0.9%. 1000 milliLiter(s) (50 mL/Hr) IV Continuous <Continuous>  tamsulosin 0.4 milliGRAM(s) Oral at bedtime    MEDICATIONS  (PRN):  nitroglycerin     SubLingual 0.4 milliGRAM(s) SubLingual every 10 minutes PRN Chest Pain  zolpidem 5 milliGRAM(s) Oral at bedtime PRN Insomnia      REVIEW OF SYSTEMS:          All negative except as mentioned in HPI    PHYSICAL EXAM:           CONSTITUTIONAL: Well-developed; well-nourished; in no acute distress  	SKIN: warm, dry  	HEAD: Normocephalic; atraumatic  	EYES: PERRL.  	ENT: No nasal discharge, airway clear, mucous membranes moist  	NECK: Supple; non tender.  	CARD: +S1, +S2, no murmurs, gallops, or rubs. irregular rate and rhythm    	RESP: No wheezes, rales or rhonchi. CTA B/L  	ABD: soft ntnd, + BS x 4 quadrants              Uro: Drew to gravity   	EXT: moves all extremities,  no clubbing, cyanosis or edema  	NEURO: Alert and oriented x3, no focal deficits          PSYCH: Cooperative, appropriate          VASCULAR:  + Rad / + PTs / +  DPs          EXTREMITY:              Right Groin: Impella P2, Dressing D/C/I, access site soft, no hematoma, no pain, + pulses, no sign of infection, no numbness              Right IJ: [place], Dressing Bloody -> oozing, access site soft, no hematoma, no pain, + pulses, no sign of infection  	Left Radial: A-Line Dressing D/C/I, pressure dressing removed, access site soft, no hematoma, no pain, + pulses, no sign of infection, no numbness            ECG:   < from: 12 Lead ECG (05.18.23 @ 05:18) >  Ventricular Rate 74 BPM    Atrial Rate 78 BPM    QRS Duration 160 ms    Q-T Interval 504 ms    QTC Calculation(Bazett) 559 ms    P Axis 99 degrees    R Axis -43 degrees    T Axis 125 degrees    Diagnosis Line Ventricular pacing with occasional Premature ventricular complexes  Abnormal ECG    Confirmed by Ángel Rock ([id number]) on 5/18/2023 8:21:51 AM                                                                                   2D ECHO:  < from: TTE Echo Complete w/o Contrast w/ Doppler (05.14.23 @ 09:56) >  Summary:   1. Diffuse left ventricular hypokinesis with a large region of lateral   akinesis and overall severely decreased global left ventricular systolic   function with ejection fraction, by visual estimation, of 25 to 30%. The   left ventricular diastolic function could not be assessed in this study   due to LV pacing. Moderate concentric left ventricular hypertrophy.   Endocardial visualization was enhanced with intravenous echo contrast.   2. Normal right ventricular size and function.   3. Severely enlarged left atrium.   4. Mildly enlarged right atrium.   5. S/p Mehrdad S3 26mm TAVR with trace central regurgitation and no   evidence of stenosis (Vmax 1.9m/s, mean PG 8mmHg, DI 0.48, AT 70ms).   6. Mitral annular calcification with mild to moderate mitral valve   regurgitation.   7. Moderate tricuspid regurgitation.   8. Severe pulmonary hypertension (PASP = 83mmHg).   9. No pericardial effusion.  10. Compared to prior study from 2022, systolic function has worsened and   pulmonary pressures have increased.    LABS:                        10.0   15.87 )-----------( 315      ( 18 May 2023 04:50 )             29.9     05-18    140  |  105  |  34<H>  ----------------------------<  154<H>  3.5   |  21  |  1.1    Ca    8.7      18 May 2023 04:50  Mg     1.9     05-18    TPro  5.8<L>  /  Alb  3.7  /  TBili  0.9  /  DBili  x   /  AST  18  /  ALT  10  /  AlkPhos  154<H>  05-18    Magnesium, Serum: 1.9 mg/dL [1.8 - 2.4] (05-18-23 @ 04:50)  LIVER FUNCTIONS - ( 18 May 2023 04:50 )  Alb: 3.7 g/dL / Pro: 5.8 g/dL / ALK PHOS: 154 U/L / ALT: 10 U/L / AST: 18 U/L / GGT: x           A/P:  I discussed the case with Cardiologist Dr. Armstrong and recommend the following:  S/P PCI:  Intervention:   - PCI to SVG to OM  (AUC 8 for revascularization)  - Impella device insertion    Implants:   - Synergy TRISTIAN 3.5x12 to distal SVG  - Synergy TRISTIAN 3.5x16 to mid SVG  - Synergy TRISTIAN 4.0x16 to prox SVG     FINDINGS:     SVG to OM1: 99% stenosis in prox segment, 80% stenosis in mid segment, 80% stenosis in distal segment     CI by thermodilution: 1.95                         Care as per CCU team                     Stop Heparin gtt, anticipating D/C Impella and [place] in AM in Cath Lab                   Control oozing of [place] insertion site                    Monitor Hg, Cr, aPTT, INR, UO                    Consider restarting home dose of Losartan                    Consider Starting PPI                   Keep K = 4, Mg = 2                   HF recs. noted  	     Continue DAPT ( Aspirin 81 mg daily and Plavix 75 mg daily ), Hydralazine, B-Blocker, Statin Therapy                   Patient given 30 day supply of ( Aspirin 81 mg daily and Plavix 75 mg daily ) to take at home                   Patient agreeing to take DAPT for at least one year or as directed by cardiologist                    Pt given instructions on importance of taking antiplatelet medication or risk acute stent thrombosis/death                   Post cath instructions, access site care and activity restrictions reviewed with patient                     Discussed with patient to return to hospital if experience chest pain, shortness breath, dizziness and site bleeding                   Aggressive risk factor modification, diet counseling, smoking cessation discussed with patient                       Monitor in CCU

## 2023-05-19 ENCOUNTER — TRANSCRIPTION ENCOUNTER (OUTPATIENT)
Age: 88
End: 2023-05-19

## 2023-05-19 VITALS
RESPIRATION RATE: 25 BRPM | OXYGEN SATURATION: 100 % | HEART RATE: 93 BPM | DIASTOLIC BLOOD PRESSURE: 60 MMHG | SYSTOLIC BLOOD PRESSURE: 142 MMHG

## 2023-05-19 LAB
ALBUMIN SERPL ELPH-MCNC: 3 G/DL — LOW (ref 3.5–5.2)
ALP SERPL-CCNC: 125 U/L — HIGH (ref 30–115)
ALT FLD-CCNC: 12 U/L — SIGNIFICANT CHANGE UP (ref 0–41)
ANION GAP SERPL CALC-SCNC: 7 MMOL/L — SIGNIFICANT CHANGE UP (ref 7–14)
AST SERPL-CCNC: 17 U/L — SIGNIFICANT CHANGE UP (ref 0–41)
BASOPHILS # BLD AUTO: 0.05 K/UL — SIGNIFICANT CHANGE UP (ref 0–0.2)
BASOPHILS # BLD AUTO: 0.06 K/UL — SIGNIFICANT CHANGE UP (ref 0–0.2)
BASOPHILS NFR BLD AUTO: 0.5 % — SIGNIFICANT CHANGE UP (ref 0–1)
BASOPHILS NFR BLD AUTO: 0.5 % — SIGNIFICANT CHANGE UP (ref 0–1)
BILIRUB SERPL-MCNC: 1.2 MG/DL — SIGNIFICANT CHANGE UP (ref 0.2–1.2)
BUN SERPL-MCNC: 28 MG/DL — HIGH (ref 10–20)
CALCIUM SERPL-MCNC: 8.6 MG/DL — SIGNIFICANT CHANGE UP (ref 8.4–10.4)
CHLORIDE SERPL-SCNC: 109 MMOL/L — SIGNIFICANT CHANGE UP (ref 98–110)
CO2 SERPL-SCNC: 22 MMOL/L — SIGNIFICANT CHANGE UP (ref 17–32)
CREAT SERPL-MCNC: 1 MG/DL — SIGNIFICANT CHANGE UP (ref 0.7–1.5)
EGFR: 72 ML/MIN/1.73M2 — SIGNIFICANT CHANGE UP
EOSINOPHIL # BLD AUTO: 0.32 K/UL — SIGNIFICANT CHANGE UP (ref 0–0.7)
EOSINOPHIL # BLD AUTO: 0.39 K/UL — SIGNIFICANT CHANGE UP (ref 0–0.7)
EOSINOPHIL NFR BLD AUTO: 3 % — SIGNIFICANT CHANGE UP (ref 0–8)
EOSINOPHIL NFR BLD AUTO: 3 % — SIGNIFICANT CHANGE UP (ref 0–8)
GLUCOSE BLDC GLUCOMTR-MCNC: 131 MG/DL — HIGH (ref 70–99)
GLUCOSE BLDC GLUCOMTR-MCNC: 151 MG/DL — HIGH (ref 70–99)
GLUCOSE BLDC GLUCOMTR-MCNC: 152 MG/DL — HIGH (ref 70–99)
GLUCOSE BLDC GLUCOMTR-MCNC: 256 MG/DL — HIGH (ref 70–99)
GLUCOSE SERPL-MCNC: 131 MG/DL — HIGH (ref 70–99)
HCT VFR BLD CALC: 29.2 % — LOW (ref 42–52)
HCT VFR BLD CALC: 31.9 % — LOW (ref 42–52)
HGB BLD-MCNC: 10.5 G/DL — LOW (ref 14–18)
HGB BLD-MCNC: 9.9 G/DL — LOW (ref 14–18)
IMM GRANULOCYTES NFR BLD AUTO: 0.5 % — HIGH (ref 0.1–0.3)
IMM GRANULOCYTES NFR BLD AUTO: 0.5 % — HIGH (ref 0.1–0.3)
LYMPHOCYTES # BLD AUTO: 1.72 K/UL — SIGNIFICANT CHANGE UP (ref 1.2–3.4)
LYMPHOCYTES # BLD AUTO: 16.2 % — LOW (ref 20.5–51.1)
LYMPHOCYTES # BLD AUTO: 17 % — LOW (ref 20.5–51.1)
LYMPHOCYTES # BLD AUTO: 2.2 K/UL — SIGNIFICANT CHANGE UP (ref 1.2–3.4)
MAGNESIUM SERPL-MCNC: 2.1 MG/DL — SIGNIFICANT CHANGE UP (ref 1.8–2.4)
MCHC RBC-ENTMCNC: 30.2 PG — SIGNIFICANT CHANGE UP (ref 27–31)
MCHC RBC-ENTMCNC: 30.7 PG — SIGNIFICANT CHANGE UP (ref 27–31)
MCHC RBC-ENTMCNC: 32.9 G/DL — SIGNIFICANT CHANGE UP (ref 32–37)
MCHC RBC-ENTMCNC: 33.9 G/DL — SIGNIFICANT CHANGE UP (ref 32–37)
MCV RBC AUTO: 90.4 FL — SIGNIFICANT CHANGE UP (ref 80–94)
MCV RBC AUTO: 91.7 FL — SIGNIFICANT CHANGE UP (ref 80–94)
MONOCYTES # BLD AUTO: 1.38 K/UL — HIGH (ref 0.1–0.6)
MONOCYTES # BLD AUTO: 1.86 K/UL — HIGH (ref 0.1–0.6)
MONOCYTES NFR BLD AUTO: 13 % — HIGH (ref 1.7–9.3)
MONOCYTES NFR BLD AUTO: 14.4 % — HIGH (ref 1.7–9.3)
NEUTROPHILS # BLD AUTO: 7.08 K/UL — HIGH (ref 1.4–6.5)
NEUTROPHILS # BLD AUTO: 8.35 K/UL — HIGH (ref 1.4–6.5)
NEUTROPHILS NFR BLD AUTO: 64.6 % — SIGNIFICANT CHANGE UP (ref 42.2–75.2)
NEUTROPHILS NFR BLD AUTO: 66.8 % — SIGNIFICANT CHANGE UP (ref 42.2–75.2)
NRBC # BLD: 0 /100 WBCS — SIGNIFICANT CHANGE UP (ref 0–0)
NRBC # BLD: 0 /100 WBCS — SIGNIFICANT CHANGE UP (ref 0–0)
PLATELET # BLD AUTO: 203 K/UL — SIGNIFICANT CHANGE UP (ref 130–400)
PLATELET # BLD AUTO: 215 K/UL — SIGNIFICANT CHANGE UP (ref 130–400)
PMV BLD: 10.2 FL — SIGNIFICANT CHANGE UP (ref 7.4–10.4)
PMV BLD: 10.4 FL — SIGNIFICANT CHANGE UP (ref 7.4–10.4)
POTASSIUM SERPL-MCNC: 3.9 MMOL/L — SIGNIFICANT CHANGE UP (ref 3.5–5)
POTASSIUM SERPL-SCNC: 3.9 MMOL/L — SIGNIFICANT CHANGE UP (ref 3.5–5)
PROT SERPL-MCNC: 5.1 G/DL — LOW (ref 6–8)
RBC # BLD: 3.23 M/UL — LOW (ref 4.7–6.1)
RBC # BLD: 3.48 M/UL — LOW (ref 4.7–6.1)
RBC # FLD: 14 % — SIGNIFICANT CHANGE UP (ref 11.5–14.5)
RBC # FLD: 14 % — SIGNIFICANT CHANGE UP (ref 11.5–14.5)
SODIUM SERPL-SCNC: 138 MMOL/L — SIGNIFICANT CHANGE UP (ref 135–146)
WBC # BLD: 10.6 K/UL — SIGNIFICANT CHANGE UP (ref 4.8–10.8)
WBC # BLD: 12.93 K/UL — HIGH (ref 4.8–10.8)
WBC # FLD AUTO: 10.6 K/UL — SIGNIFICANT CHANGE UP (ref 4.8–10.8)
WBC # FLD AUTO: 12.93 K/UL — HIGH (ref 4.8–10.8)

## 2023-05-19 PROCEDURE — 71045 X-RAY EXAM CHEST 1 VIEW: CPT | Mod: 26

## 2023-05-19 PROCEDURE — 99233 SBSQ HOSP IP/OBS HIGH 50: CPT

## 2023-05-19 PROCEDURE — 93010 ELECTROCARDIOGRAM REPORT: CPT

## 2023-05-19 PROCEDURE — 99223 1ST HOSP IP/OBS HIGH 75: CPT

## 2023-05-19 RX ORDER — HEPARIN SODIUM 5000 [USP'U]/ML
7000 INJECTION INTRAVENOUS; SUBCUTANEOUS ONCE
Refills: 0 | Status: DISCONTINUED | OUTPATIENT
Start: 2023-05-19 | End: 2023-05-19

## 2023-05-19 RX ORDER — CLOPIDOGREL BISULFATE 75 MG/1
1 TABLET, FILM COATED ORAL
Qty: 30 | Refills: 3
Start: 2023-05-19 | End: 2023-09-15

## 2023-05-19 RX ORDER — HEPARIN SODIUM 5000 [USP'U]/ML
INJECTION INTRAVENOUS; SUBCUTANEOUS
Qty: 25000 | Refills: 0 | Status: DISCONTINUED | OUTPATIENT
Start: 2023-05-19 | End: 2023-05-19

## 2023-05-19 RX ORDER — APIXABAN 2.5 MG/1
5 TABLET, FILM COATED ORAL
Refills: 0 | Status: DISCONTINUED | OUTPATIENT
Start: 2023-05-19 | End: 2023-05-19

## 2023-05-19 RX ORDER — SACUBITRIL AND VALSARTAN 24; 26 MG/1; MG/1
1 TABLET, FILM COATED ORAL
Qty: 60 | Refills: 1
Start: 2023-05-19 | End: 2023-07-17

## 2023-05-19 RX ORDER — HEPARIN SODIUM 5000 [USP'U]/ML
3500 INJECTION INTRAVENOUS; SUBCUTANEOUS EVERY 6 HOURS
Refills: 0 | Status: DISCONTINUED | OUTPATIENT
Start: 2023-05-19 | End: 2023-05-19

## 2023-05-19 RX ORDER — APIXABAN 2.5 MG/1
1 TABLET, FILM COATED ORAL
Qty: 60 | Refills: 3
Start: 2023-05-19 | End: 2023-09-15

## 2023-05-19 RX ORDER — ATORVASTATIN CALCIUM 80 MG/1
1 TABLET, FILM COATED ORAL
Qty: 0 | Refills: 0 | DISCHARGE
Start: 2023-05-19

## 2023-05-19 RX ORDER — FUROSEMIDE 40 MG
1 TABLET ORAL
Qty: 10 | Refills: 3
Start: 2023-05-19 | End: 2023-06-27

## 2023-05-19 RX ORDER — ASPIRIN/CALCIUM CARB/MAGNESIUM 324 MG
1 TABLET ORAL
Qty: 7 | Refills: 0
Start: 2023-05-19 | End: 2023-05-25

## 2023-05-19 RX ORDER — SACUBITRIL AND VALSARTAN 24; 26 MG/1; MG/1
1 TABLET, FILM COATED ORAL
Refills: 0 | Status: DISCONTINUED | OUTPATIENT
Start: 2023-05-19 | End: 2023-05-19

## 2023-05-19 RX ORDER — POTASSIUM CHLORIDE 20 MEQ
20 PACKET (EA) ORAL ONCE
Refills: 0 | Status: COMPLETED | OUTPATIENT
Start: 2023-05-19 | End: 2023-05-19

## 2023-05-19 RX ORDER — APIXABAN 2.5 MG/1
1 TABLET, FILM COATED ORAL
Qty: 0 | Refills: 0 | DISCHARGE
Start: 2023-05-19

## 2023-05-19 RX ORDER — PANTOPRAZOLE SODIUM 20 MG/1
1 TABLET, DELAYED RELEASE ORAL
Qty: 30 | Refills: 0
Start: 2023-05-19 | End: 2023-06-17

## 2023-05-19 RX ORDER — CLOPIDOGREL BISULFATE 75 MG/1
1 TABLET, FILM COATED ORAL
Qty: 0 | Refills: 0 | DISCHARGE
Start: 2023-05-19

## 2023-05-19 RX ORDER — HEPARIN SODIUM 5000 [USP'U]/ML
7000 INJECTION INTRAVENOUS; SUBCUTANEOUS EVERY 6 HOURS
Refills: 0 | Status: DISCONTINUED | OUTPATIENT
Start: 2023-05-19 | End: 2023-05-19

## 2023-05-19 RX ORDER — METOPROLOL TARTRATE 50 MG
1 TABLET ORAL
Qty: 60 | Refills: 3
Start: 2023-05-19 | End: 2023-09-15

## 2023-05-19 RX ORDER — ATORVASTATIN CALCIUM 80 MG/1
1 TABLET, FILM COATED ORAL
Qty: 30 | Refills: 3
Start: 2023-05-19 | End: 2023-09-15

## 2023-05-19 RX ORDER — PANTOPRAZOLE SODIUM 20 MG/1
1 TABLET, DELAYED RELEASE ORAL
Qty: 0 | Refills: 0 | DISCHARGE
Start: 2023-05-19

## 2023-05-19 RX ORDER — METOPROLOL TARTRATE 50 MG
1 TABLET ORAL
Qty: 0 | Refills: 0 | DISCHARGE

## 2023-05-19 RX ADMIN — Medication 20 MILLIEQUIVALENT(S): at 11:47

## 2023-05-19 RX ADMIN — Medication 81 MILLIGRAM(S): at 11:47

## 2023-05-19 RX ADMIN — FINASTERIDE 5 MILLIGRAM(S): 5 TABLET, FILM COATED ORAL at 11:48

## 2023-05-19 RX ADMIN — SACUBITRIL AND VALSARTAN 1 TABLET(S): 24; 26 TABLET, FILM COATED ORAL at 18:11

## 2023-05-19 RX ADMIN — POLYETHYLENE GLYCOL 3350 17 GRAM(S): 17 POWDER, FOR SOLUTION ORAL at 11:48

## 2023-05-19 RX ADMIN — CHLORHEXIDINE GLUCONATE 1 APPLICATION(S): 213 SOLUTION TOPICAL at 13:11

## 2023-05-19 RX ADMIN — SACUBITRIL AND VALSARTAN 1 TABLET(S): 24; 26 TABLET, FILM COATED ORAL at 11:47

## 2023-05-19 RX ADMIN — Medication 50 MILLIGRAM(S): at 05:53

## 2023-05-19 RX ADMIN — PANTOPRAZOLE SODIUM 40 MILLIGRAM(S): 20 TABLET, DELAYED RELEASE ORAL at 05:53

## 2023-05-19 RX ADMIN — APIXABAN 5 MILLIGRAM(S): 2.5 TABLET, FILM COATED ORAL at 11:46

## 2023-05-19 RX ADMIN — Medication 50 MILLIGRAM(S): at 18:11

## 2023-05-19 RX ADMIN — CLOPIDOGREL BISULFATE 75 MILLIGRAM(S): 75 TABLET, FILM COATED ORAL at 11:47

## 2023-05-19 RX ADMIN — APIXABAN 5 MILLIGRAM(S): 2.5 TABLET, FILM COATED ORAL at 18:11

## 2023-05-19 NOTE — CONSULT NOTE ADULT - ASSESSMENT
89 year old male with the past medical history of aortic stenosis s/p TAVR (2016), CAD s/p PCI and CABG 3v  (LIMA => LAD, SVG to OM1, SVG to PDA), chronic atrial fibrillation (on Coumadin s/p MAZE procedure s/p PPM). 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 electrophysiology was consulted for a life-vest. The patient has had minimum troponin spillage on presentation and an elevated BNP. He has a new ischemic cardiomyopathy with an EF 30-35%.    Plan:   - Discussed the risks and benefits of the life-vest with the patient for which he said he will think about it. In view of his new cardiomyopathy and high risk percutaneous coronary intervention the patient meets criteria for a life-vest. However, giving his age the management and manipulation of the device may be cumbersome. Further a discussion around goals of care should be had prior to consideration of an internal cardioverter defibrillator which should occur after 90 days post percutaneous coronary intervention.   - Please discuss this further with his family      These are preliminary recommendations, official recommendations to follow

## 2023-05-19 NOTE — PROGRESS NOTE ADULT - ASSESSMENT
88yo M hx of aortic stenosis s/p TAVR (2016), CAD s/p PCI and CABG 3v (LIMA => LAD, SVG to OM1, SVG to PDA), chronic atrial fibrillation (on Coumadin s/p MAZE procedure s/p PPM). 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 Patient taken to cath 5/16, found to have significant 3-Vessel Coronary Artery Disease, Patent LIMA to LAD, patent SVG to LPDA, severely diseased SVG to OM1 (culprit). Patient taken back to cath 5/17 for high risk PCI with impella device- SVG to OM disease, s/p PCI to prox, mid and distal segments. Transferred post cath to CCU w/impella device in place. Impella was removed 5/18.   Repeat ECHO with EF of 30-35%.     Plan:   89yo M hx of aortic stenosis s/p TAVR (2016), CAD s/p PCI and CABG 3v (LIMA => LAD, SVG to OM1, SVG to PDA), chronic atrial fibrillation (on Coumadin s/p MAZE procedure s/p PPM). 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 Patient taken to cath 5/16, found to have significant 3-Vessel Coronary Artery Disease, Patent LIMA to LAD, patent SVG to LPDA, severely diseased SVG to OM1 (culprit). Patient taken back to cath 5/17 for high risk PCI with impella device- SVG to OM disease, s/p PCI to prox, mid and distal segments. Transferred post cath to CCU w/impella device in place. Impella was removed 5/18.   Repeat ECHO with EF of 30-35%.     Plan:  -Clinically euvolemic   -remove Ramona  -Monitor Intake and output   -Monitor electrolytes and replace as needed , with goal of potassium above 4 and mag above 2  -goal is to keep euvolemic   -GDMT:    switch metoprolol 50 BID to metoprolol succinate 100 daily   agree with starting Entresto 24/26 mg  cont with DAPT plus Eliquis for Afib  Recommend PT and Observation for one more night given access bleeding and anemia   Follow up with HF team as outpatient    88yo M hx of aortic stenosis s/p TAVR (2016), CAD s/p PCI and CABG 3v (LIMA => LAD, SVG to OM1, SVG to PDA), chronic atrial fibrillation (on Coumadin s/p MAZE procedure s/p PPM). 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 Patient taken to cath 5/16, found to have significant 3-Vessel Coronary Artery Disease, Patent LIMA to LAD, patent SVG to LPDA, severely diseased SVG to OM1 (culprit). Patient taken back to cath 5/17 for high risk PCI with impella device- SVG to OM disease, s/p PCI to prox, mid and distal segments. Transferred post cath to CCU w/impella device in place. Impella was removed 5/18.   Repeat ECHO with EF of 30-35%.     Plan:  -Clinically euvolemic   -remove Ramona  -Monitor Intake and output   -Monitor electrolytes and replace as needed , with goal of potassium above 4 and mag above 2  -goal is to keep euvolemic   -GDMT:    switch metoprolol 50 BID to metoprolol succinate 100 daily   agree with starting Entresto 24/26 mg  cont with DAPT plus Eliquis for Afib  Recommend PT and Observation for one more night given access bleeding and anemia   t

## 2023-05-19 NOTE — DISCHARGE NOTE NURSING/CASE MANAGEMENT/SOCIAL WORK - PATIENT PORTAL LINK FT
You can access the FollowMyHealth Patient Portal offered by [facility] by registering at the following website: [email or web address]. By joining [employer]’s FollowMyHealth portal, you will also be able to view your health information using other applications (apps) compatible with our system.

## 2023-05-19 NOTE — DISCHARGE NOTE NURSING/CASE MANAGEMENT/SOCIAL WORK - NSDCVIVACCINE_GEN_ALL_CORE_FT
Tdap; 03-Jul-2021 15:00; Montserrat Kimble (CELIA); [employer]; [id number] (Exp. Date: 18-Nov-2022); IntraMuscular; Deltoid Left.; 0.5 milliLiter(s); VIS (VIS Published: 09-May-2013, VIS Presented: 03-Jul-2021);

## 2023-05-19 NOTE — DISCHARGE NOTE PROVIDER - HOSPITAL COURSE
88yo M hx of aortic stenosis s/p TAVR (2016), CAD s/p PCI and CABG 3v  (LIMA => LAD, SVG to OM1, SVG to PDA), chronic atrial fibrillation (on Coumadin s/p MAZE procedure s/p PPM). 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 His symptoms started around 1 month ago and has been getting worse since then which prompted him to come to the Ed. He denies associated symptoms such as dizziness, lightheadedness, palpitations, or syncope. He sleeps with 2 pillows, denies PND. Reports that he is due for new PPM next month with Dr. Tai. Last dose of coumadin 4/12 am. At this time of eval, he reports mild, dull chest pain.     In the ED: Afebrile, HR 88, /83 > 177/83, 98%RA, Labs with Hgb 12.0 (bl 10-11), CMP wnl, trop 0.05 x1 (prev 0.05 in 4/2022), BNP 3098 (prev 2226). EKG with Ventricular-paced rhythm.   Chest Xray with cardiomegaly.  Cath showed Significant 3-Vessel Coronary Artery Disease, Patent LIMA to LAD, patent SVG to LPDA, severely diseased SVG to OM1 (culprit) w/ Plan for high risk PCI of SVG to OM1 the next day with impella. Patient taken back to cath 5/17 for high risk PCI with impella device- SVG to OM disease, s/p PCI to prox, mid and distal segments. Transferred post cath to CCU w/impella device in place. On 5/18, Impalla and [place] catheters removed, pt remained stable with no complications. Stable for downgrade.       #Unstable angina s/p Impella assisted PCI of SVG to OM  #CAD s/p CABG 2011 (LIMA => LAD, SVG to OM1, SVG to PDA)  #AS s/p TAVR  #AFib on coumadin, s/p PPM (4mg M/T/TH/F/Sun, 6mg Wed/ Sat)  #HFmrEF  - vitals stable  - EKG: V paced rhythm   - BNP 3098  - Last Echo: 4/2022: EF 65%, Mod TR, Pulm HTN  - Echo 5/14:  Diffuse left ventricular hypokinesis with a large region of lateral akinesis and overall severely decreased global left ventricular systolic function with ejection fraction, by visual estimation, of 25 to 30%. Mod TR, Severe Pulm HTN, F/u Echo 30-35%  - on heparin ggt, currently on hold for Impella removal  - Significant 3-Vessel Coronary Artery Disease, Patent LIMA to LAD, patent SVG to LPDA, severely diseased SVG to OM1 s/p Imeplla assisted PCI  - c/w ASA/BB/lipitor 40  - Note: has contrast allergy (hives), will need solumedrol benadryl treatment prior   - Monitor H/H, keep Hb >8, s/p 1 unit pRBC (Hgb 9.9)  - Entresto   - Refused lifevest    A Fib on Coumadin  - continue with Eliquis    #HTN  -cw metoprolol 50mg BID and Hydralazine for now  -hold losartan and Nifedipine     #HLD  - c/w statin  - f/u lipid profile     #Asthma/ COPD, not on home O2   - not in exacerbation   - c/w home inhalers     #BPH  - c/w flomax, finasteride     #DM  - a1c 6.2  - hold home metformin 500mg qd  - Hold Insulin for now, start insulin regimen if FS persistently elevated >200     #Insomnia  - c/w ambien 5mg prn (he takes this daily)    88yo M hx of aortic stenosis s/p TAVR (2016), CAD s/p PCI and CABG 3v  (LIMA => LAD, SVG to OM1, SVG to PDA), chronic atrial fibrillation (on Coumadin s/p MAZE procedure s/p PPM). 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 His symptoms started around 1 month ago and has been getting worse since then which prompted him to come to the Ed. He denies associated symptoms such as dizziness, lightheadedness, palpitations, or syncope. He sleeps with 2 pillows, denies PND. Reports that he is due for new PPM next month with Dr. Tai. Last dose of coumadin 4/12 am. At this time of eval, he reports mild, dull chest pain.     In the ED: Afebrile, HR 88, /83 > 177/83, 98%RA, Labs with Hgb 12.0 (bl 10-11), CMP wnl, trop 0.05 x1 (prev 0.05 in 4/2022), BNP 3098 (prev 2226). EKG with Ventricular-paced rhythm.   Chest Xray with cardiomegaly.  Cath showed Significant 3-Vessel Coronary Artery Disease, Patent LIMA to LAD, patent SVG to LPDA, severely diseased SVG to OM1 (culprit) w/ Plan for high risk PCI of SVG to OM1 the next day with impella. Patient taken back to cath 5/17 for high risk PCI with impella device- SVG to OM disease, s/p PCI to prox, mid and distal segments. Transferred post cath to CCU w/impella device in place. On 5/18, Impalla and [place] catheters removed, pt remained stable with no complications. Stable for downgrade.       #Unstable angina s/p Impella assisted PCI of SVG to OM  #CAD s/p CABG 2011 (LIMA => LAD, SVG to OM1, SVG to PDA)  #AS s/p TAVR  #AFib on coumadin, s/p PPM (4mg M/T/TH/F/Sun, 6mg Wed/ Sat)  #HFmrEF  - BNP 3098  - Last Echo: 4/2022: EF 65%, Mod TR, Pulm HTN  - Echo 5/14:  Diffuse left ventricular hypokinesis with a large region of lateral akinesis and overall severely decreased global left ventricular systolic function with ejection fraction, by visual estimation, of 25 to 30%. Mod TR, Severe Pulm HTN, F/u Echo 30-35%  - Triple therapy Aspirin + Plavix + Eliquis for 7 days followed by Plavix + Eliquis only and d/c aspirin after 7 days  - c/w Entresto 24/26 BID and Lopressor 50mg BID    A Fib   - continue with Eliquis    #HTN  -cw metoprolol 50mg BID     #HLD  - c/w statin      #Asthma/ COPD, not on home O2   - c/w home inhalers     #BPH  - c/w flomax, finasteride    88yo M hx of aortic stenosis s/p TAVR (2016), CAD s/p PCI and CABG 3v  (LIMA => LAD, SVG to OM1, SVG to PDA), chronic atrial fibrillation (on Coumadin s/p MAZE procedure s/p PPM). 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 His symptoms started around 1 month ago and has been getting worse since then which prompted him to come to the Ed. He denies associated symptoms such as dizziness, lightheadedness, palpitations, or syncope. He sleeps with 2 pillows, denies PND. Reports that he is due for new PPM next month with Dr. Tai. Last dose of coumadin 4/12 am. At this time of eval, he reports mild, dull chest pain.     In the ED: Afebrile, HR 88, /83 > 177/83, 98%RA, Labs with Hgb 12.0 (bl 10-11), CMP wnl, trop 0.05 x1 (prev 0.05 in 4/2022), BNP 3098 (prev 2226). EKG with Ventricular-paced rhythm.   Chest Xray with cardiomegaly.  Cath showed Significant 3-Vessel Coronary Artery Disease, Patent LIMA to LAD, patent SVG to LPDA, severely diseased SVG to OM1 (culprit) w/ Plan for high risk PCI of SVG to OM1 the next day with impella. Patient taken back to cath 5/17 for high risk PCI with impella device- SVG to OM disease, s/p PCI to prox, mid and distal segments. Transferred post cath to CCU w/impella device in place. On 5/18, Impalla and [place] catheters removed, pt remained stable with no complications. Stable for discharge.       #Unstable angina s/p Impella assisted PCI of SVG to OM  #CAD s/p CABG 2011 (LIMA => LAD, SVG to OM1, SVG to PDA)  #AS s/p TAVR  #AFib on coumadin, s/p PPM (4mg M/T/TH/F/Sun, 6mg Wed/ Sat)  #HFmrEF  - BNP 3098  - Last Echo: 4/2022: EF 65%, Mod TR, Pulm HTN  - Echo 5/14:  Diffuse left ventricular hypokinesis with a large region of lateral akinesis and overall severely decreased global left ventricular systolic function with ejection fraction, by visual estimation, of 25 to 30%. Mod TR, Severe Pulm HTN, F/u Echo 30-35%  - Triple therapy Aspirin + Plavix + Eliquis for 7 days followed by Plavix + Eliquis only and d/c aspirin after 7 days  - c/w Entresto 24/26 BID and Lopressor 50mg BID    A Fib   - continue with Eliquis    #HTN  -cw metoprolol 50mg BID     #HLD  - c/w statin      #Asthma/ COPD, not on home O2   - c/w home inhalers     #BPH  - c/w flomax, finasteride

## 2023-05-19 NOTE — DISCHARGE NOTE PROVIDER - NSDCCPCAREPLAN_GEN_ALL_CORE_FT
PRINCIPAL DISCHARGE DIAGNOSIS  Diagnosis: CAD (coronary artery disease)  Assessment and Plan of Treatment: You have been found to have blockages in your arteries which have had stents put in to keepp them open. A catheter was inserted into an artery in your groin or arm. Then it was carefully guided up to your heart. Then contrast dye was injected to see any areas in your coronary arteries that were blocked or narrowed.  You may have bruising and pain in your groin or arm where the catheters were placed.   Walking short distances on a flat surface is OK. Limit going up and downstairs to around twice a day for the first 2 to 3 days.  DO NOT do any heavy pushing, lifting, or sexual activity until cleared by your cardiologist to do so. DO NOT take a bath or swim for the first week. You may take showers, protect incision sites.   Your provider will tell you how often to change your dressing.  If your incision bleeds, lie down and put pressure on it for 30 minutes. There is bleeding at the catheter insertion site that does not stop when you apply pressure. Your arm or leg below where the catheter was inserted changes color, is cool to the touch, or is numb.  The small incision for your catheter in your wrist or by your groin becomes red or painful, or yellow or green discharge is draining from it.  It is absolutely vital that you take your aspirin, plavix, and eliquis for the first week and afterwards stop aspirin as recommended by your cardiologist.   You have chest pain or shortness of breath that does not go away with rest. Your pulse feels irregular -- it is very slow  or very fast.  You have dizziness, fainting, fatigue, coughing blood. You have chills or a fever over 101°F (38.3°C).      SECONDARY DISCHARGE DIAGNOSES  Diagnosis: Acute HF (heart failure)  Assessment and Plan of Treatment: You have been found to have a decreased ejection fraction on this admission likely due to the blockage in your heart.   Congestive heart failure is a chronic condition in which the heart has trouble pumping blood. In some cases of heart failure, fluid may back up into your lungs or you may have swelling (edema) in your lower legs. There are many causes of heart failure including high blood pressure, coronary artery disease, abnormal heart valves, heart muscle disease, lung disease, diabetes, etc. Symptoms include shortness of breath with activity or when lying flat, cough, swelling of the legs, fatigue, or increased urination during the night every day for early recognition of fluid accumulation.   Treatment is aimed at managing the symptoms of heart failure and may include lifestyle changes, medications, or surgical procedures. Take medicines only as directed by your health care provider and do not stop unless instructed to do so. Eat heart-healthy foods with low or no trans/saturated fats, cholesterol and salt. Weigh yourself.   We have started you on medication such as entresto. It is vital that you check your BP at home and follow up with your cardiologist.   SEEK IMMEDIATE MEDICAL CARE IF YOU HAVE ANY OF THE FOLLOWING SYMPTOMS: shortness of breath, change in mental status, chest pain, lightheadedness/dizziness/fainting, or worsening of symptoms including not being able to conduct normal physical activity.

## 2023-05-19 NOTE — DISCHARGE NOTE PROVIDER - NSDCMRMEDTOKEN_GEN_ALL_CORE_FT
Ambien 10 mg oral tablet: 1 tab(s) orally once a day (at bedtime), As Needed  Aspirin Enteric Coated 81 mg oral delayed release tablet: 1 tab(s) orally once a day  Coumadin 6 mg oral tablet: 1 tab(s) orally once a day EVERY MONDAY/WEDNESDAY/FRIDAY  Flomax 0.4 mg oral capsule: 1 cap(s) orally once a day  losartan 100 mg oral tablet: 1 tab(s) orally once a day  metFORMIN 500 mg oral tablet: 1 tab(s) orally 2 times a day  Metoprolol Tartrate 50 mg oral tablet: 1 tab(s) orally 2 times a day  Proscar 5 mg oral tablet: 1 tab(s) orally once a day  simvastatin 40 mg oral tablet: 1 tab(s) orally once a day (at bedtime)   Ambien 10 mg oral tablet: 1 tab(s) orally once a day (at bedtime), As Needed  apixaban 5 mg oral tablet: 1 tab(s) orally 2 times a day  Aspirin Enteric Coated 81 mg oral delayed release tablet: 1 tab(s) orally once a day  atorvastatin 40 mg oral tablet: 1 tab(s) orally once a day (at bedtime)  clopidogrel 75 mg oral tablet: 1 tab(s) orally once a day  Flomax 0.4 mg oral capsule: 1 cap(s) orally once a day  metFORMIN 500 mg oral tablet: 1 tab(s) orally 2 times a day  pantoprazole 40 mg oral delayed release tablet: 1 tab(s) orally once a day (before a meal)  Proscar 5 mg oral tablet: 1 tab(s) orally once a day   Ambien 10 mg oral tablet: 1 tab(s) orally once a day (at bedtime), As Needed  apixaban 5 mg oral tablet: 1 tab(s) orally 2 times a day  Aspirin Enteric Coated 81 mg oral delayed release tablet: 1 tab(s) orally once a day Stop taking this medicine after 1 week.  atorvastatin 40 mg oral tablet: 1 tab(s) orally once a day (at bedtime)  clopidogrel 75 mg oral tablet: 1 tab(s) orally once a day  Flomax 0.4 mg oral capsule: 1 cap(s) orally once a day  Lasix 20 mg oral tablet: 1 tab(s) orally once a day as needed for  shortness of breath and/or wheezing If you notice weight gain, swelling of your legs or shortness of breath take this meidicine  metFORMIN 500 mg oral tablet: 1 tab(s) orally 2 times a day  pantoprazole 40 mg oral delayed release tablet: 1 tab(s) orally once a day (before a meal)  Proscar 5 mg oral tablet: 1 tab(s) orally once a day  sacubitril-valsartan 24 mg-26 mg oral tablet: 1 tab(s) orally 2 times a day   Ambien 10 mg oral tablet: 1 tab(s) orally once a day (at bedtime), As Needed  apixaban 5 mg oral tablet: 1 tab(s) orally 2 times a day  Aspirin Enteric Coated 81 mg oral delayed release tablet: 1 tab(s) orally once a day Stop taking this medicine after 1 week.  atorvastatin 40 mg oral tablet: 1 tab(s) orally once a day (at bedtime)  clopidogrel 75 mg oral tablet: 1 tab(s) orally once a day  Flomax 0.4 mg oral capsule: 1 cap(s) orally once a day  Lasix 20 mg oral tablet: 1 tab(s) orally once a day as needed for  shortness of breath and/or wheezing If you notice weight gain, swelling of your legs or shortness of breath take this meidicine  metFORMIN 500 mg oral tablet: 1 tab(s) orally 2 times a day  Metoprolol Tartrate 50 mg oral tablet: 1 tab(s) orally 2 times a day  pantoprazole 40 mg oral delayed release tablet: 1 tab(s) orally once a day (before a meal)  Proscar 5 mg oral tablet: 1 tab(s) orally once a day  sacubitril-valsartan 24 mg-26 mg oral tablet: 1 tab(s) orally 2 times a day

## 2023-05-19 NOTE — DISCHARGE NOTE PROVIDER - CARE PROVIDER_API CALL
Shaw Armstrong (MD)  Cardiovascular Disease; Interventional Cardiology  [address]  [place] [address]  Phone: [phone]  Fax: [phone]  Follow Up Time: 1 week

## 2023-05-19 NOTE — DISCHARGE NOTE PROVIDER - NSDCFUSCHEDAPPT_GEN_ALL_CORE_FT
[facility] PreAdmits  Scheduled Appointment: 05/30/2023    Roxana Church  [facility]  [facility] [address]  Scheduled Appointment: 05/30/2023    Selwyn Tai  [facility]  [address]   Scheduled Appointment: 06/28/2023

## 2023-05-19 NOTE — CONSULT NOTE ADULT - SUBJECTIVE AND OBJECTIVE BOX
Patient is a 89y old  Male who presents with a chief complaint of Chest pain (19 May 2023 08:58)    HPI: 89 year old male with the past medical history of aortic stenosis s/p TAVR (), CAD s/p PCI and CABG 3v  (LIMA => LAD, SVG to OM1, SVG to PDA), chronic atrial fibrillation (on Coumadin s/p MAZE procedure s/p PPM). 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 electrophysiology was consulted for a life-vest. The patient has had minimum troponin spillage on presentation and an elevated BNP. He has a new ischemic cardiomyopathy with an EF 30-35%. In view of his new cardiomyopathy and high risk percutaneous coronary intervention the patient meets criteria for a life-vest. However, giving his age the management and manipulation of the device may be cumbersome.        PAST MEDICAL & SURGICAL HISTORY:  Asthma with COPD  HLD (hyperlipidemia)  Essential hypertension  Aortic stenosis, severe  A-fib  DM (diabetes mellitus)  Pacemaker  BPH (benign prostatic hyperplasia)  S/P CABG x 3  History of CEA (carotid endarterectomy)  History of permanent cardiac pacemaker placement  H/O aortic valve replacement    PREVIOUS DIAGNOSTIC TESTING:      ECHO  FINDINGS: 23: 1. Left ventricular ejection fraction, by visual estimation, is 30 to 35%. 2. Severely decreased global left ventricular systolic function. 3. Moderate concentric left ventricular hypertrophy. 4. The left ventricular diastolic function could not be assessed in this   study. 5. Normal right ventricular size and function. 6. Mildly enlarged right atrium. 7. Moderately enlarged left atrium. 8. Moderate mitral annular calcification. 9. Moderate mitral valve regurgitation.10. Mild tricuspid regurgitation.11. S/p Mehrdad S3 26mm TAVR with trace central regurgitation and no evidence of stenosis (Vmax 2.04m/s, mean PG 9mmHg, MARCI by VTI 1.8cm2). No paravalular leak.12. Estimated pulmonary artery systolic pressure is 47.9 mmHg assuming aright atrial pressure of 3 mmHg, which is consistent with mild pulmonary hypertension.  13. Endocardial visualization was enhanced with intravenous echo contrast.    CATHETERIZATION  FINDINGS: 23: Intervention:   - PCI to SVG to OM  (AUC 8 for revascularization)  - Impella device insertion    Implants:   - Synergy TRISTIAN 3.5x12 to distal SVG  - Synergy TRISTIAN 3.5x16 to mid SVG  - Synergy TRISTIAN 4.0x16 to prox SVG     FINDINGS:     SVG to OM1: 99% stenosis in prox segment, 80% stenosis in mid segment, 80% stenosis in distal segment     CI by thermodilution: 1.95    MEDICATIONS  (STANDING):  aspirin enteric coated 81 milliGRAM(s) Oral daily  atorvastatin 40 milliGRAM(s) Oral at bedtime  chlorhexidine 2% Cloths 1 Application(s) Topical <User Schedule>  clopidogrel Tablet 75 milliGRAM(s) Oral daily  dextrose 5% 500 milliLiter(s) (15 mL/Hr) IV Continuous <Continuous>  diphenhydrAMINE Injectable 50 milliGRAM(s) IV Push once  finasteride 5 milliGRAM(s) Oral daily  heparin   Injectable 7000 Unit(s) IV Push once  heparin  Infusion.  Unit(s)/Hr (16 mL/Hr) IV Continuous <Continuous>  metoprolol tartrate 50 milliGRAM(s) Oral two times a day  pantoprazole    Tablet 40 milliGRAM(s) Oral before breakfast  polyethylene glycol 3350 17 Gram(s) Oral daily  sodium chloride 0.9% Bolus 250 milliLiter(s) IV Bolus once  sodium chloride 0.9%. 1000 milliLiter(s) (50 mL/Hr) IV Continuous <Continuous>  tamsulosin 0.4 milliGRAM(s) Oral at bedtime    MEDICATIONS  (PRN):  atropine Injectable 1 milliGRAM(s) IntraMuscular once PRN bradycardia  heparin   Injectable 7000 Unit(s) IV Push every 6 hours PRN For aPTT less than 40  heparin   Injectable 3500 Unit(s) IV Push every 6 hours PRN For aPTT between 40 - 57  nitroglycerin     SubLingual 0.4 milliGRAM(s) SubLingual every 10 minutes PRN Chest Pain  zolpidem 5 milliGRAM(s) Oral at bedtime PRN Insomnia    FAMILY HISTORY: No history of sudden death    SOCIAL HISTORY: lives at home    CIGARETTES: no    ALCOHOL: no    Past Surgical History: as above    Allergies:    IV Contrast (Hives)  contrast media (gadolinium-based) (Hives)      REVIEW OF SYSTEMS:  CONSTITUTIONAL: No fever, weight loss, chills, shakes, or fatigue  EYES: No eye pain, visual disturbances, or discharge  ENMT:  No difficulty hearing, tinnitus, vertigo; No sinus or throat pain  NECK: No pain or stiffness  BREASTS: No pain, masses, or nipple discharge  RESPIRATORY: No cough, wheezing, hemoptysis, or shortness of breath  CARDIOVASCULAR: No chest pain, dyspnea, palpitations, dizziness, syncope, paroxysmal nocturnal dyspnea, orthopnea, or arm or leg swelling  GASTROINTESTINAL: No abdominal  or epigastric pain, nausea, vomiting, hematemesis, diarrhea, constipation, melena or bright red blood.  GENITOURINARY: No dysuria, nocturia, hematuria, or urinary incontinence  NEUROLOGICAL: No headaches, memory loss, slurred speech, limb weakness, loss of strength, numbness, or tremors  SKIN: No itching, burning, rashes, or lesions   LYMPH NODES: No enlarged glands  ENDOCRINE: No heat or cold intolerance, or hair loss  MUSCULOSKELETAL: No joint pain or swelling, muscle, back, or extremity pain  PSYCHIATRIC: No depression, anxiety, or difficulty sleeping  HEME/LYMPH: No easy bruising or bleeding gums  ALLERY AND IMMUNOLOGIC: No hives or rash.      Vital Signs Last 24 Hrs  T(C): 36.8 (19 May 2023 08:00), Max: 36.8 (19 May 2023 08:00)  T(F): 98.2 (19 May 2023 08:00), Max: 98.2 (19 May 2023 08:00)  HR: 69 (19 May 2023 08:00) (55 - 93)  BP: 113/48 (18 May 2023 14:35) (70/50 - 113/48)  BP(mean): 69 (18 May 2023 14:35) (55 - 69)  RR: 14 (19 May 2023 08:00) (14 - 31)  SpO2: 99% (19 May 2023 08:00) (94% - 100%)    Parameters below as of 19 May 2023 08:00  Patient On (Oxygen Delivery Method): room air        PHYSICAL EXAM:  GENERAL: In no apparent distress, well nourished, and hydrated.  HEAD:  Atraumatic, Normocephalic  EYES: EOMI, PERRLA, conjunctiva and sclera clear  ENMT: No tonsillar erythema, exudates, or enlargements; ist mucous membranes, Good dentition, No lesions  NECK: Supple and normal thyroid.  No JVD or carotid bruit.  Carotid pulse is 2+ bilaterally.  HEART: Regular rate and rhythm; No murmurs, rubs, or gallops.  PULMONARY: Clear to auscultation and perfusion.  No rales, wheezing, or rhonchi bilaterally.  ABDOMEN: Soft, Nontender, Nondistended; Bowel sounds present  EXTREMITIES:  2+ Peripheral Pulses, No clubbing, cyanosis, or edema  LYMPH: No lymphadenopathy noted  NEUROLOGICAL: Grossly nonfocal    ECG: Ventricular Rate 99 BPM    Atrial Rate 99 BPM    QRS Duration 166 ms    Q-T Interval 430 ms    QTC Calculation(Bazett) 551 ms    P Axis 114 degrees    R Axis -53 degrees    T Axis 113 degrees    Diagnosis Line Ventricular-paced rhythm  Abnormal ECG    I&O's Detail    18 May 2023 07:01  -  19 May 2023 07:00  --------------------------------------------------------  IN:    Heparin: 10 mL    IV PiggyBack: 150 mL    PRBCs (Packed Red Blood Cells): 342 mL    Sodium Chloride 0.9% Bolus: 1000 mL  Total IN: 1502 mL    OUT:    Indwelling Catheter - Urethral (mL): 1085 mL  Total OUT: 1085 mL    Total NET: 417 mL      19 May 2023 07:01  -  19 May 2023 09:18  --------------------------------------------------------  IN:  Total IN: 0 mL    OUT:    Indwelling Catheter - Urethral (mL): 150 mL  Total OUT: 150 mL    Total NET: -150 mL          LABS:                        9.9    10.60 )-----------( 203      ( 19 May 2023 00:15 )             29.2         138  |  109  |  28<H>  ----------------------------<  131<H>  3.9   |  22  |  1.0    Ca    8.6      19 May 2023 04:30  Mg     2.1         TPro  5.1<L>  /  Alb  3.0<L>  /  TBili  1.2  /  DBili  x   /  AST  17  /  ALT  12  /  AlkPhos  125<H>          PTT - ( 18 May 2023 15:42 )  PTT:29.0 sec  Urinalysis Basic - ( 17 May 2023 21:53 )    Color: Light Yellow / Appearance: Clear / S.021 / pH: x  Gluc: x / Ketone: Negative  / Bili: Negative / Urobili: <2 mg/dL   Blood: x / Protein: Negative / Nitrite: Negative   Leuk Esterase: Negative / RBC: 5 /HPF / WBC 5 /HPF   Sq Epi: x / Non Sq Epi: x / Bacteria: Negative      BNP  I&O's Detail    18 May 2023 07:01  -  19 May 2023 07:00  --------------------------------------------------------  IN:    Heparin: 10 mL    IV PiggyBack: 150 mL    PRBCs (Packed Red Blood Cells): 342 mL    Sodium Chloride 0.9% Bolus: 1000 mL  Total IN: 1502 mL    OUT:    Indwelling Catheter - Urethral (mL): 1085 mL  Total OUT: 1085 mL    Total NET: 417 mL      19 May 2023 07:01  -  19 May 2023 09:18  --------------------------------------------------------  IN:  Total IN: 0 mL    OUT:    Indwelling Catheter - Urethral (mL): 150 mL  Total OUT: 150 mL    Total NET: -150 mL        Daily     Daily Weight in k.7 (19 May 2023 05:00)    RADIOLOGY & ADDITIONAL STUDIES: Patient is a 89y old  Male who presents with a chief complaint of Chest pain (19 May 2023 08:58)    HPI: 89 year old male with the past medical history of aortic stenosis s/p TAVR (), CAD s/p PCI and CABG 3v  (LIMA => LAD, SVG to OM1, SVG to PDA), chronic atrial fibrillation (on Coumadin s/p MAZE procedure s/p PPM). 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 electrophysiology was consulted for a life-vest. The patient has had minimum troponin spillage on presentation and an elevated BNP. He has a new ischemic cardiomyopathy with an EF 30-35%. In view of his new cardiomyopathy and high risk percutaneous coronary intervention the patient meets criteria for a life-vest.        PAST MEDICAL & SURGICAL HISTORY:  Asthma with COPD  HLD (hyperlipidemia)  Essential hypertension  Aortic stenosis, severe  A-fib  DM (diabetes mellitus)  Pacemaker  BPH (benign prostatic hyperplasia)  S/P CABG x 3  History of CEA (carotid endarterectomy)  History of permanent cardiac pacemaker placement  H/O aortic valve replacement    PREVIOUS DIAGNOSTIC TESTING:      ECHO FINDINGS: 23:   1. Left ventricular ejection fraction, by visual estimation, is 30 to 35%. 2. Severely decreased global left ventricular systolic function. 3. Moderate concentric left ventricular hypertrophy. 4. The left ventricular diastolic function could not be assessed in this   study. 5. Normal right ventricular size and function. 6. Mildly enlarged right atrium. 7. Moderately enlarged left atrium. 8. Moderate mitral annular calcification. 9. Moderate mitral valve regurgitation.10. Mild tricuspid regurgitation.11. S/p Mehrdad S3 26mm TAVR with trace central regurgitation and no evidence of stenosis (Vmax 2.04m/s, mean PG 9mmHg, MARCI by VTI 1.8cm2). No paravalular leak.12. Estimated pulmonary artery systolic pressure is 47.9 mmHg assuming aright atrial pressure of 3 mmHg, which is consistent with mild pulmonary hypertension.  13. Endocardial visualization was enhanced with intravenous echo contrast.    CATHETERIZATION FINDINGS: 5/17/23: Intervention:   - PCI to SVG to OM  (AUC 8 for revascularization)  - Impella device insertion    Implants:   - Synergy TRISTIAN 3.5x12 to distal SVG  - Synergy TRISTIAN 3.5x16 to mid SVG  - Synergy TRISTIAN 4.0x16 to prox SVG     FINDINGS:     SVG to OM1: 99% stenosis in prox segment, 80% stenosis in mid segment, 80% stenosis in distal segment     CI by thermodilution: 1.95    MEDICATIONS  (STANDING):  aspirin enteric coated 81 milliGRAM(s) Oral daily  atorvastatin 40 milliGRAM(s) Oral at bedtime  chlorhexidine 2% Cloths 1 Application(s) Topical <User Schedule>  clopidogrel Tablet 75 milliGRAM(s) Oral daily  dextrose 5% 500 milliLiter(s) (15 mL/Hr) IV Continuous <Continuous>  diphenhydrAMINE Injectable 50 milliGRAM(s) IV Push once  finasteride 5 milliGRAM(s) Oral daily  heparin   Injectable 7000 Unit(s) IV Push once  heparin  Infusion.  Unit(s)/Hr (16 mL/Hr) IV Continuous <Continuous>  metoprolol tartrate 50 milliGRAM(s) Oral two times a day  pantoprazole    Tablet 40 milliGRAM(s) Oral before breakfast  polyethylene glycol 3350 17 Gram(s) Oral daily  sodium chloride 0.9% Bolus 250 milliLiter(s) IV Bolus once  sodium chloride 0.9%. 1000 milliLiter(s) (50 mL/Hr) IV Continuous <Continuous>  tamsulosin 0.4 milliGRAM(s) Oral at bedtime    MEDICATIONS  (PRN):  atropine Injectable 1 milliGRAM(s) IntraMuscular once PRN bradycardia  heparin   Injectable 7000 Unit(s) IV Push every 6 hours PRN For aPTT less than 40  heparin   Injectable 3500 Unit(s) IV Push every 6 hours PRN For aPTT between 40 - 57  nitroglycerin     SubLingual 0.4 milliGRAM(s) SubLingual every 10 minutes PRN Chest Pain  zolpidem 5 milliGRAM(s) Oral at bedtime PRN Insomnia    FAMILY HISTORY: No history of sudden death    SOCIAL HISTORY: lives at home    CIGARETTES: no    ALCOHOL: no    Past Surgical History: as above    Allergies:    IV Contrast (Hives)  contrast media (gadolinium-based) (Hives)      REVIEW OF SYSTEMS:  CONSTITUTIONAL: No fever, weight loss, chills, shakes, or fatigue  EYES: No eye pain, visual disturbances, or discharge  ENMT:  No difficulty hearing, tinnitus, vertigo; No sinus or throat pain  NECK: No pain or stiffness  BREASTS: No pain, masses, or nipple discharge  RESPIRATORY: No cough, wheezing, hemoptysis, or shortness of breath  CARDIOVASCULAR: No chest pain, dyspnea, palpitations, dizziness, syncope, paroxysmal nocturnal dyspnea, orthopnea, or arm or leg swelling  GASTROINTESTINAL: No abdominal  or epigastric pain, nausea, vomiting, hematemesis, diarrhea, constipation, melena or bright red blood.  GENITOURINARY: No dysuria, nocturia, hematuria, or urinary incontinence  NEUROLOGICAL: No headaches, memory loss, slurred speech, limb weakness, loss of strength, numbness, or tremors  SKIN: No itching, burning, rashes, or lesions   LYMPH NODES: No enlarged glands  ENDOCRINE: No heat or cold intolerance, or hair loss  MUSCULOSKELETAL: No joint pain or swelling, muscle, back, or extremity pain  PSYCHIATRIC: No depression, anxiety, or difficulty sleeping  HEME/LYMPH: No easy bruising or bleeding gums  ALLERY AND IMMUNOLOGIC: No hives or rash.      Vital Signs Last 24 Hrs  T(C): 36.8 (19 May 2023 08:00), Max: 36.8 (19 May 2023 08:00)  T(F): 98.2 (19 May 2023 08:00), Max: 98.2 (19 May 2023 08:00)  HR: 69 (19 May 2023 08:00) (55 - 93)  BP: 113/48 (18 May 2023 14:35) (70/50 - 113/48)  BP(mean): 69 (18 May 2023 14:35) (55 - 69)  RR: 14 (19 May 2023 08:00) (14 - 31)  SpO2: 99% (19 May 2023 08:00) (94% - 100%)    Parameters below as of 19 May 2023 08:00  Patient On (Oxygen Delivery Method): room air        PHYSICAL EXAM:  GENERAL: In no apparent distress, well nourished, and hydrated.  HEAD:  Atraumatic, Normocephalic  EYES: EOMI, PERRLA, conjunctiva and sclera clear  ENMT: MMM  NECK: Supple   HEART: Regular rate and rhythm; No murmurs, rubs, or gallops.  PULMONARY: Clear to auscultation and perfusion.  No rales, wheezing, or rhonchi bilaterally.  ABDOMEN: Soft, Nontender, Nondistended; Bowel sounds present  EXTREMITIES:  2+ Peripheral Pulses, No clubbing, cyanosis, or edema  NEUROLOGICAL: Grossly nonfocal    ECG: Ventricular Rate 99 BPM    Atrial Rate 99 BPM    QRS Duration 166 ms    Q-T Interval 430 ms    QTC Calculation(Bazett) 551 ms    P Axis 114 degrees    R Axis -53 degrees    T Axis 113 degrees    Diagnosis Line Ventricular-paced rhythm  Abnormal ECG    I&O's Detail    18 May 2023 07:  -  19 May 2023 07:00  --------------------------------------------------------  IN:    Heparin: 10 mL    IV PiggyBack: 150 mL    PRBCs (Packed Red Blood Cells): 342 mL    Sodium Chloride 0.9% Bolus: 1000 mL  Total IN: 1502 mL    OUT:    Indwelling Catheter - Urethral (mL): 1085 mL  Total OUT: 1085 mL    Total NET: 417 mL      19 May 2023 07:01  -  19 May 2023 09:18  --------------------------------------------------------  IN:  Total IN: 0 mL    OUT:    Indwelling Catheter - Urethral (mL): 150 mL  Total OUT: 150 mL    Total NET: -150 mL          LABS:                        9.9    10.60 )-----------( 203      ( 19 May 2023 00:15 )             29.2         138  |  109  |  28<H>  ----------------------------<  131<H>  3.9   |  22  |  1.0    Ca    8.6      19 May 2023 04:30  Mg     2.1         TPro  5.1<L>  /  Alb  3.0<L>  /  TBili  1.2  /  DBili  x   /  AST  17  /  ALT  12  /  AlkPhos  125<H>          PTT - ( 18 May 2023 15:42 )  PTT:29.0 sec  Urinalysis Basic - ( 17 May 2023 21:53 )    Color: Light Yellow / Appearance: Clear / S.021 / pH: x  Gluc: x / Ketone: Negative  / Bili: Negative / Urobili: <2 mg/dL   Blood: x / Protein: Negative / Nitrite: Negative   Leuk Esterase: Negative / RBC: 5 /HPF / WBC 5 /HPF   Sq Epi: x / Non Sq Epi: x / Bacteria: Negative      BNP  I&O's Detail    18 May 2023 07:01  -  19 May 2023 07:00  --------------------------------------------------------  IN:    Heparin: 10 mL    IV PiggyBack: 150 mL    PRBCs (Packed Red Blood Cells): 342 mL    Sodium Chloride 0.9% Bolus: 1000 mL  Total IN: 1502 mL    OUT:    Indwelling Catheter - Urethral (mL): 1085 mL  Total OUT: 1085 mL    Total NET: 417 mL      19 May 2023 07:01  -  19 May 2023 09:18  --------------------------------------------------------  IN:  Total IN: 0 mL    OUT:    Indwelling Catheter - Urethral (mL): 150 mL  Total OUT: 150 mL    Total NET: -150 mL        Daily     Daily Weight in k.7 (19 May 2023 05:00)    RADIOLOGY & ADDITIONAL STUDIES:

## 2023-05-19 NOTE — PROGRESS NOTE ADULT - ASSESSMENT
88yo M hx of aortic stenosis s/p TAVR (2016), CAD s/p PCI and CABG 3v  (LIMA => LAD, SVG to OM1, SVG to PDA), chronic atrial fibrillation (on Coumadin s/p MAZE procedure s/p PPM). 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     #Unstable angina s/p Impella assisted PCI of SVG to OM  #CAD s/p CABG 2011 (LIMA => LAD, SVG to OM1, SVG to PDA)  #AS s/p TAVR  #AFib on coumadin, s/p PPM (4mg M/T/TH/F/Sun, 6mg Wed/ Sat)  #HFmrEF  - vitals stable  - EKG: V paced rhythm   - BNP 3098  - Last Echo: 4/2022: EF 65%, Mod TR, Pulm HTN  - Echo 5/14:  Diffuse left ventricular hypokinesis with a large region of lateral akinesis and overall severely decreased global left ventricular systolic function with ejection fraction, by visual estimation, of 25 to 30%. Mod TR, Severe Pulm HTN  - Cath 5/16: Significant 3-Vessel Coronary Artery Disease, Patent LIMA to LAD, patent SVG to LPDA, severely diseased SVG to OM1 s/p Imeplla assisted PCI  - Consider removing Impella and [place] today  - c/w ASA/BB/lipitor 40  - cw Lopressor 50mg BID  - Monitor H/H, keep Hb >8, transfuse prn  - start Entresto 24/26 BID today    A Fib on Coumadin   - started on Eliquis 5mg BID    #HTN  -cw metoprolol 50mg BID      #HLD  - c/w statin  - f/u lipid profile     #Asthma/ COPD, not on home O2   - not in exacerbation   - c/w home inhalers     #BPH  - c/w flomax, finasteride     #DM  - a1c 6.2  - hold home metformin 500mg qd  - Hold Insulin for now, start insulin regimen if FS persistently elevated >200     #Insomnia  - c/w ambien 5mg prn (he takes this daily)     #Misc  - GI ppx: PPI   - DVT ppx: Eliquis  - Diet: Dash  - Codes Status: DNR DNI (patient at bedside reports that he wishes for DNR DNI but will rescind and be full code for cardiac cath)

## 2023-05-19 NOTE — PROGRESS NOTE ADULT - SUBJECTIVE AND OBJECTIVE BOX
CHIEF COMPLAINT:  Patient is a 89y old  Male who presents with a chief complaint of Chest pain (18 May 2023 10:50)      INTERVAL HISTORY/OVERNIGHT EVENTS:  Patient seen and examined this morning. patient Impella was removed yesterday. little while after patient start to bleed from the access site, fem stop was applied. Patient currently stable with no complaints.     ======================  MEDICATIONS:  aspirin enteric coated 81 milliGRAM(s) Oral daily  atorvastatin 40 milliGRAM(s) Oral at bedtime  chlorhexidine 2% Cloths 1 Application(s) Topical <User Schedule>  clopidogrel Tablet 75 milliGRAM(s) Oral daily  diphenhydrAMINE Injectable 50 milliGRAM(s) IV Push once  finasteride 5 milliGRAM(s) Oral daily  metoprolol tartrate 50 milliGRAM(s) Oral two times a day  pantoprazole    Tablet 40 milliGRAM(s) Oral before breakfast  polyethylene glycol 3350 17 Gram(s) Oral daily  sodium chloride 0.9% Bolus 250 milliLiter(s) IV Bolus once  tamsulosin 0.4 milliGRAM(s) Oral at bedtime    DRIPS:  dextrose 5% 500 milliLiter(s) (15 mL/Hr) IV Continuous <Continuous>  heparin  Infusion 700 Unit(s)/Hr (5 mL/Hr) IV Continuous <Continuous>  sodium chloride 0.9%. 1000 milliLiter(s) (50 mL/Hr) IV Continuous <Continuous>    ======================  OBJECTIVE:  T(F): 98.2 ( @ 08:00), Max: 98.2 ( @ 08:00)  HR: 69 ( @ 08:00) (55 - 93)  BP: 113/48 ( @ 14:35) (70/50 - 113/48)  RR: 14 ( @ 08:00) (14 - 31)  SpO2: 99% ( @ 08:00) (94% - 100%)  CVP(mm Hg): 0 ( @ 11:25) (0 - 24)  CO: 5.8 ( @ 10:23) (3.5 - 5.8)  CI: 2.7 ( @ 10:23) (1.7 - 2.7)  PA: 40/8 ( @ 11:25) ( - )  PCWP: --    I/O:      16 @ 07:01  -   @ 07:00  --------------------------------------------------------  IN: 345 mL / OUT: 1100 mL / NET: -755 mL     @ 07:01  -   @ 07:00  --------------------------------------------------------  IN: 1240.6 mL / OUT: 2385 mL / NET: -1144.4 mL     @ 07:01  -   @ 07:00  --------------------------------------------------------  IN: 1502 mL / OUT: 1085 mL / NET: 417 mL     @ 07:01  -   @ 08:34  --------------------------------------------------------  IN: 0 mL / OUT: 150 mL / NET: -150 mL        PHYSICAL EXAMINATION:  NEURO: patient is awake , alert and oriented  GEN: Not in acute distress  NECK: no thyroid enlargement, no JVD  LUNGS: Clear to auscultation bilaterally   CARDIOVASCULAR: S1/S2 present, RRR , no murmurs or rubs, no carotid bruits,  + PP bilaterally  ABD: Soft, non-tender, non-distended, +BS  EXT: No DAT  SKIN: Intact    ======================    LABS:  ABG - ( 18 May 2023 10:50 )  pH, Arterial: 7.44  pH, Blood: x     /  pCO2: 32    /  pO2: 103   / HCO3: 22    / Base Excess: -2.0  /  SaO2: 99.7                                    9.9    10.60 )-----------( 203      ( 19 May 2023 00:15 )             29.2         138  |  109  |  28<H>  ----------------------------<  131<H>  3.9   |  22  |  1.0    Ca    8.6      19 May 2023 04:30  Mg     2.1         TPro  5.1<L>  /  Alb  3.0<L>  /  TBili  1.2  /  DBili  x   /  AST  17  /  ALT  12  /  AlkPhos  125<H>      LIVER FUNCTIONS - ( 19 May 2023 04:30 )  Alb: 3.0 g/dL / Pro: 5.1 g/dL / ALK PHOS: 125 U/L / ALT: 12 U/L / AST: 17 U/L / GGT: x           PTT - ( 18 May 2023 15:42 )  PTT:29.0 sec          Urinalysis Basic - ( 17 May 2023 21:53 )    Color: Light Yellow / Appearance: Clear / S.021 / pH: x  Gluc: x / Ketone: Negative  / Bili: Negative / Urobili: <2 mg/dL   Blood: x / Protein: Negative / Nitrite: Negative   Leuk Esterase: Negative / RBC: 5 /HPF / WBC 5 /HPF   Sq Epi: x / Non Sq Epi: x / Bacteria: Negative        Cultures:    EKG  12 Lead ECG:   Ventricular Rate 99 BPM    Atrial Rate 99 BPM    QRS Duration 166 ms    Q-T Interval 430 ms    QTC Calculation(Bazett) 551 ms    P Axis 114 degrees    R Axis -53 degrees    T Axis 113 degrees    Diagnosis Line Ventricular-paced rhythm  Abnormal ECG    Confirmed by ITZEL CASTORENA MD ([id number]) on 2023 6:59:19 AM (23 @ 05:10)        Imaging     < from: TTE Echo Complete w/o Contrast w/ Doppler (23 @ 16:12) >  Summary:   1. Left ventricular ejection fraction, by visual estimation, is 30 to   35%.   2. Severely decreased global left ventricular systolic function.   3. Moderate concentric left ventricular hypertrophy.   4. The left ventricular diastolic function could not be assessed in this   study.   5. Normal right ventricular size and function.   6. Mildly enlarged right atrium.   7. Moderately enlarged left atrium.   8. Moderate mitral annular calcification.   9. Moderate mitral valve regurgitation.  10. Mild tricuspid regurgitation.  11. S/p Mehrdad S3 26mm TAVR with trace central regurgitation and no   evidence of stenosis (Vmax 2.04m/s, mean PG 9mmHg, MARCI by VTI 1.8cm2). No   paravalular leak.  12. Estimated pulmonary artery systolic pressure is 47.9 mmHg assuming a  right atrial pressure of 3 mmHg, which is consistent with mild pulmonary   hypertension.  13. Endocardial visualization was enhanced with intravenous echo contrast.    < end of copied text >      Coronary angiogram     FINDINGS:     Coronary Dominance: Left    LM: Mild disease  LAD: 90% stenosis prox LAD, 100% stenosis  in mid segment, distal vessel supplied by a patent LIMA  Diag: Moderate disease  LCx: Moderate disease  OM: 100% stenosis  at the site of a previous stent, distal vessel supplied by a diseased SVG  LPDA: Mild disease, supplied by a patent SVG  RCA: Small non dominant, 80% stenosis in RV marginal    SVG to OM1: 99% stenosis in proximal segment, 80% stenosis in mid and distal segments  SVG to LPDA: Patent with mild diffuse disease  LIMA to LAD: Patent, minor irregularities        Intervention:   - PCI to SVG to OM  (AUC 8 for revascularization)  - Impella device insertion    Implants:   - Synergy TRISTIAN 3.5x12 to distal SVG  - Synergy TRISTIAN 3.5x16 to mid SVG  - Synergy TRISTIAN 4.0x16 to prox SVG    CHIEF COMPLAINT:  Patient is a 89y old  Male who presents with a chief complaint of Chest pain (18 May 2023 10:50)      INTERVAL HISTORY/OVERNIGHT EVENTS:  Patient seen and examined this morning. patient Impella was removed yesterday. Little while after patient start to bleed from the access site, fem stop was applied. Patient currently stable with no complaints. feels fine and he wants to go home    ======================  MEDICATIONS:  aspirin enteric coated 81 milliGRAM(s) Oral daily  atorvastatin 40 milliGRAM(s) Oral at bedtime  chlorhexidine 2% Cloths 1 Application(s) Topical <User Schedule>  clopidogrel Tablet 75 milliGRAM(s) Oral daily  diphenhydrAMINE Injectable 50 milliGRAM(s) IV Push once  finasteride 5 milliGRAM(s) Oral daily  metoprolol tartrate 50 milliGRAM(s) Oral two times a day  pantoprazole    Tablet 40 milliGRAM(s) Oral before breakfast  polyethylene glycol 3350 17 Gram(s) Oral daily  sodium chloride 0.9% Bolus 250 milliLiter(s) IV Bolus once  tamsulosin 0.4 milliGRAM(s) Oral at bedtime    DRIPS:  dextrose 5% 500 milliLiter(s) (15 mL/Hr) IV Continuous <Continuous>  heparin  Infusion 700 Unit(s)/Hr (5 mL/Hr) IV Continuous <Continuous>  sodium chloride 0.9%. 1000 milliLiter(s) (50 mL/Hr) IV Continuous <Continuous>    ======================  OBJECTIVE:  T(F): 98.2 ( @ 08:00), Max: 98.2 ( @ 08:00)  HR: 69 ( @ 08:00) (55 - 93)  BP: 113/48 ( @ 14:35) (70/50 - 113/48)  RR: 14 ( @ 08:00) (14 - 31)  SpO2: 99% ( @ 08:00) (94% - 100%)  CVP(mm Hg): 0 ( @ 11:25) (0 - 24)  CO: 5.8 ( @ 10:23) (3.5 - 5.8)  CI: 2.7 ( @ 10:23) (1.7 - 2.7)  PA: 40/8 ( @ 11:25) ( - )  PCWP: --    I/O:       @ 07:01  -   @ 07:00  --------------------------------------------------------  IN: 345 mL / OUT: 1100 mL / NET: -755 mL     @ 07:01  -   @ 07:00  --------------------------------------------------------  IN: 1240.6 mL / OUT: 2385 mL / NET: -1144.4 mL     @ 07:01  -   @ 07:00  --------------------------------------------------------  IN: 1502 mL / OUT: 1085 mL / NET: 417 mL     @ 07:01  -   @ 08:34  --------------------------------------------------------  IN: 0 mL / OUT: 150 mL / NET: -150 mL        PHYSICAL EXAMINATION:  NEURO: patient is awake , alert and oriented  GEN: Not in acute distress  NECK: no thyroid enlargement, no JVD  LUNGS: Clear to auscultation bilaterally   CARDIOVASCULAR: S1/S2 present, RRR , no murmurs or rubs, no carotid bruits,  + PP bilaterally  ABD: Soft, non-tender, non-distended, +BS  EXT: No DAT  SKIN: Intact    ======================    LABS:  ABG - ( 18 May 2023 10:50 )  pH, Arterial: 7.44  pH, Blood: x     /  pCO2: 32    /  pO2: 103   / HCO3: 22    / Base Excess: -2.0  /  SaO2: 99.7                                    9.9    10.60 )-----------( 203      ( 19 May 2023 00:15 )             29.2         138  |  109  |  28<H>  ----------------------------<  131<H>  3.9   |  22  |  1.0    Ca    8.6      19 May 2023 04:30  Mg     2.1         TPro  5.1<L>  /  Alb  3.0<L>  /  TBili  1.2  /  DBili  x   /  AST  17  /  ALT  12  /  AlkPhos  125<H>      LIVER FUNCTIONS - ( 19 May 2023 04:30 )  Alb: 3.0 g/dL / Pro: 5.1 g/dL / ALK PHOS: 125 U/L / ALT: 12 U/L / AST: 17 U/L / GGT: x           PTT - ( 18 May 2023 15:42 )  PTT:29.0 sec          Urinalysis Basic - ( 17 May 2023 21:53 )    Color: Light Yellow / Appearance: Clear / S.021 / pH: x  Gluc: x / Ketone: Negative  / Bili: Negative / Urobili: <2 mg/dL   Blood: x / Protein: Negative / Nitrite: Negative   Leuk Esterase: Negative / RBC: 5 /HPF / WBC 5 /HPF   Sq Epi: x / Non Sq Epi: x / Bacteria: Negative        Cultures:    EKG  12 Lead ECG:   Ventricular Rate 99 BPM    Atrial Rate 99 BPM    QRS Duration 166 ms    Q-T Interval 430 ms    QTC Calculation(Bazett) 551 ms    P Axis 114 degrees    R Axis -53 degrees    T Axis 113 degrees    Diagnosis Line Ventricular-paced rhythm  Abnormal ECG    Confirmed by ITZEL CASTORENA MD ([id number]) on 2023 6:59:19 AM (23 @ 05:10)        Imaging     < from: TTE Echo Complete w/o Contrast w/ Doppler (05.18.23 @ 16:12) >  Summary:   1. Left ventricular ejection fraction, by visual estimation, is 30 to   35%.   2. Severely decreased global left ventricular systolic function.   3. Moderate concentric left ventricular hypertrophy.   4. The left ventricular diastolic function could not be assessed in this   study.   5. Normal right ventricular size and function.   6. Mildly enlarged right atrium.   7. Moderately enlarged left atrium.   8. Moderate mitral annular calcification.   9. Moderate mitral valve regurgitation.  10. Mild tricuspid regurgitation.  11. S/p Mehrdad S3 26mm TAVR with trace central regurgitation and no   evidence of stenosis (Vmax 2.04m/s, mean PG 9mmHg, MARCI by VTI 1.8cm2). No   paravalular leak.  12. Estimated pulmonary artery systolic pressure is 47.9 mmHg assuming a  right atrial pressure of 3 mmHg, which is consistent with mild pulmonary   hypertension.  13. Endocardial visualization was enhanced with intravenous echo contrast.    < end of copied text >      Coronary angiogram     FINDINGS:     Coronary Dominance: Left    LM: Mild disease  LAD: 90% stenosis prox LAD, 100% stenosis  in mid segment, distal vessel supplied by a patent LIMA  Diag: Moderate disease  LCx: Moderate disease  OM: 100% stenosis  at the site of a previous stent, distal vessel supplied by a diseased SVG  LPDA: Mild disease, supplied by a patent SVG  RCA: Small non dominant, 80% stenosis in RV marginal    SVG to OM1: 99% stenosis in proximal segment, 80% stenosis in mid and distal segments  SVG to LPDA: Patent with mild diffuse disease  LIMA to LAD: Patent, minor irregularities        Intervention:   - PCI to SVG to OM  (AUC 8 for revascularization)  - Impella device insertion    Implants:   - Synergy TRISTIAN 3.5x12 to distal SVG  - Synergy TRISTIAN 3.5x16 to mid SVG  - Synergy TRISTIAN 4.0x16 to prox SVG

## 2023-05-19 NOTE — PROGRESS NOTE ADULT - ATTENDING COMMENTS
Patient seen, case d/w stepdown team on rounds.  Agree with assessment and plan.  Stable overnight, may need CCU bed after the intervention.  Continue treatment.
Patient seen, case d/w stepdown team on rounds.  Agree with assessment and plan.  Continue current therapy.  Awaiting [facility] tomorrow.  Continue cardiac monitoring.
Patient hemodynamically stable, impella removed yesterday, s/p blood transfusion for anemia with lowest Hb ~7.9 gr/dl. Hb is stable today as well as  renal function. He seems euvolemic on exam.     Get A- line out   Start Entresto low dose BID  Convert metoprolol to succinate   Ambulate   DC planning in next 12-24 hrs   Follow up with cardiology as outpatient  Discussed with CCU and interventional teams
Patient seen, case d/w stepdown team on rounds.  Agree with assessment and plan.  [facility] today, will reassess treatment after that.  CBC, CMP in AM.  Continue cardiac monitoring.
Patient seen, case d/w CCU team on rounds.  Agree with assessment and plan.  S/p Impella-assisted PCI of SVG. Cardiac status stable.  Blood loss today (oozing from right IJ site and bleeding from right groin after Impella removal). All stopped now.  Will transfuse 2nd unit RBC and monitor H/H.  Will start Eliquis tomorrow if stable.  Continue CCU monitoring.

## 2023-05-19 NOTE — CHART NOTE - NSCHARTNOTEFT_GEN_A_CORE
88yo M hx of aortic stenosis s/p TAVR (2016), CAD s/p PCI and CABG 3v  (LIMA => LAD, SVG to OM1, SVG to PDA), chronic atrial fibrillation (on Coumadin s/p MAZE procedure s/p PPM). 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 His symptoms started around 1 month ago and has been getting worse since then which prompted him to come to the Ed. He denies associated symptoms such as dizziness, lightheadedness, palpitations, or syncope. He sleeps with 2 pillows, denies PND. Reports that he is due for new PPM next month with Dr. Tai. Last dose of coumadin 4/12 am. At this time of eval, he reports mild, dull chest pain.     In the ED: Afebrile, HR 88, /83 > 177/83, 98%RA, Labs with Hgb 12.0 (bl 10-11), CMP wnl, trop 0.05 x1 (prev 0.05 in 4/2022), BNP 3098 (prev 2226). EKG with Ventricular-paced rhythm.   Chest Xray with cardiomegaly.  Cath showed Significant 3-Vessel Coronary Artery Disease, Patent LIMA to LAD, patent SVG to LPDA, severely diseased SVG to OM1 (culprit) w/ Plan for high risk PCI of SVG to OM1 the next day with impella. Patient taken back to cath 5/17 for high risk PCI with impella device- SVG to OM disease, s/p PCI to prox, mid and distal segments. Transferred post cath to CCU w/impella device in place. On 5/18, Impalla and [place] catheters removed, pt remained stable with no complications. Stable for downgrade.       #Unstable angina s/p Impella assisted PCI of SVG to OM  #CAD s/p CABG 2011 (LIMA => LAD, SVG to OM1, SVG to PDA)  #AS s/p TAVR  #AFib on coumadin, s/p PPM (4mg M/T/TH/F/Sun, 6mg Wed/ Sat)  #HFmrEF  - vitals stable  - EKG: V paced rhythm   - BNP 3098  - Last Echo: 4/2022: EF 65%, Mod TR, Pulm HTN  - Echo 5/14:  Diffuse left ventricular hypokinesis with a large region of lateral akinesis and overall severely decreased global left ventricular systolic function with ejection fraction, by visual estimation, of 25 to 30%. Mod TR, Severe Pulm HTN, F/u Echo 30-35%  - on heparin ggt, currently on hold for Impella removal  - Significant 3-Vessel Coronary Artery Disease, Patent LIMA to LAD, patent SVG to LPDA, severely diseased SVG to OM1 s/p Imeplla assisted PCI  - c/w ASA/BB/lipitor 40  - Note: has contrast allergy (hives), will need solumedrol benadryl treatment prior   - Monitor H/H, keep Hb >8, s/p 1 unit pRBC (Hgb 9.9)  - Entresto   - Refuse lifevest    A Fib on Coumadi   - start Eliquis    #HTN  -cw metoprolol 50mg BID and Hydralazine for now  -hold losartan and Nifedipine     #HLD  - c/w statin  - f/u lipid profile     #Asthma/ COPD, not on home O2   - not in exacerbation   - c/w home inhalers     #BPH  - c/w flomax, finasteride     #DM  - a1c 6.2  - hold home metformin 500mg qd  - Hold Insulin for now, start insulin regimen if FS persistently elevated >200     #Insomnia  - c/w ambien 5mg prn (he takes this daily)     #Misc  - GI ppx: PPI   - DVT ppx: coumadin INR therapeutic  - Diet: Dash  - Codes Status: DNR DNI (patient at bedside reports that he wishes for DNR DNI but will rescind and be full code for cardiac cath) 89yo M hx of aortic stenosis s/p TAVR (2016), CAD s/p PCI and CABG 3v  (LIMA => LAD, SVG to OM1, SVG to PDA), chronic atrial fibrillation (on Coumadin s/p MAZE procedure s/p PPM). 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 His symptoms started around 1 month ago and has been getting worse since then which prompted him to come to the Ed. He denies associated symptoms such as dizziness, lightheadedness, palpitations, or syncope. He sleeps with 2 pillows, denies PND. Reports that he is due for new PPM next month with Dr. Tai. Last dose of coumadin 4/12 am. At this time of eval, he reports mild, dull chest pain.     In the ED: Afebrile, HR 88, /83 > 177/83, 98%RA, Labs with Hgb 12.0 (bl 10-11), CMP wnl, trop 0.05 x1 (prev 0.05 in 4/2022), BNP 3098 (prev 2226). EKG with Ventricular-paced rhythm.   Chest Xray with cardiomegaly.  Cath showed Significant 3-Vessel Coronary Artery Disease, Patent LIMA to LAD, patent SVG to LPDA, severely diseased SVG to OM1 (culprit) w/ Plan for high risk PCI of SVG to OM1 the next day with impella. Patient taken back to cath 5/17 for high risk PCI with impella device- SVG to OM disease, s/p PCI to prox, mid and distal segments. Transferred post cath to CCU w/impella device in place. On 5/18, Impalla and [place] catheters removed, pt remained stable with no complications. Stable for downgrade.       #Unstable angina s/p Impella assisted PCI of SVG to OM  #CAD s/p CABG 2011 (LIMA => LAD, SVG to OM1, SVG to PDA)  #AS s/p TAVR  #AFib on coumadin, s/p PPM (4mg M/T/TH/F/Sun, 6mg Wed/ Sat)  #HFmrEF  - vitals stable  - EKG: V paced rhythm   - BNP 3098  - Last Echo: 4/2022: EF 65%, Mod TR, Pulm HTN  - Echo 5/14:  Diffuse left ventricular hypokinesis with a large region of lateral akinesis and overall severely decreased global left ventricular systolic function with ejection fraction, by visual estimation, of 25 to 30%. Mod TR, Severe Pulm HTN, F/u Echo 30-35%  - on heparin ggt, currently on hold for Impella removal  - Significant 3-Vessel Coronary Artery Disease, Patent LIMA to LAD, patent SVG to LPDA, severely diseased SVG to OM1 s/p Imeplla assisted PCI  - c/w ASA/BB/lipitor 40  - Note: has contrast allergy (hives), will need solumedrol benadryl treatment prior   - Monitor H/H, keep Hb >8, s/p 1 unit pRBC (Hgb 9.9)  - Entresto   - Refuse lifevest    A Fib on Coumadi   - start Eliquis    #HTN  -cw metoprolol 50mg BID and Hydralazine for now  -hold losartan and Nifedipine     #HLD  - c/w statin  - f/u lipid profile     #Asthma/ COPD, not on home O2   - not in exacerbation   - c/w home inhalers     #BPH  - c/w flomax, finasteride     #DM  - a1c 6.2  - hold home metformin 500mg qd  - Hold Insulin for now, start insulin regimen if FS persistently elevated >200     #Insomnia  - c/w ambien 5mg prn (he takes this daily)     #Misc  - GI ppx: PPI   - DVT ppx: coumadin INR therapeutic  - Diet: Dash  - Codes Status: DNR DNI (patient at bedside reports that he wishes for DNR DNI but will rescind and be full code for cardiac cath)    Cardiology attending:  Patient seen and reassessed repeatedly throughout the day. Remains stable. Ambulates without difficulty. CBC repeated, HB stable. Will d/c home today.

## 2023-05-19 NOTE — CONSULT NOTE ADULT - NS ATTEND AMEND GEN_ALL_CORE FT
Added about needed SL NTG and will cath on Tuesday
Patient s/p high risk intervention supported by PCI; he is s/p TRISTIAN x3 to SVG-OM. Patient is hemodynamically stable in CCU. Impella support weaned to P2. CI 2.4. Good urine output, clear urine. Impella reposition at bedside. CVP 7-8 mmHg.      Monitor lactate, HD and urine output at midnight and 6 am  Give fluid bolus  Monitor for groin bleeding  Get full set of labs   TTE tomorrow   Plan for Impella removal in AM if stable HD  Discussed with CCU and interventional teams
Ischemic cardiomyopathy  AF on coumadin s/p MAZE  Discussed possible Lifevest for the patient, but he wishes to be DNR. He will consider it further and let us know.

## 2023-05-19 NOTE — CHART NOTE - NSCHARTNOTESELECT_GEN_ALL_CORE
Cardiology NP/Event Note
Cardiology NP/Event Note
Event Note
Brief cath report
Brief cath report
Event Note
Interventional Cardiology/Event Note

## 2023-05-19 NOTE — PROGRESS NOTE ADULT - SUBJECTIVE AND OBJECTIVE BOX
Cardiology Follow up    DENITA PINO   89y Male  PAST MEDICAL & SURGICAL HISTORY:  Asthma with COPD    HLD (hyperlipidemia)    Essential hypertension    Aortic stenosis, severe    A-fib    DM (diabetes mellitus)    Pacemaker    BPH (benign prostatic hyperplasia)    S/P CABG x 3    History of CEA (carotid endarterectomy)    History of permanent cardiac pacemaker placement    H/O aortic valve replacement       HPI:  89yo M hx of aortic stenosis s/p TAVR (), CAD s/p PCI and CABG 3v  (LIMA => LAD, SVG to OM1, SVG to PDA), chronic atrial fibrillation (on Coumadin s/p MAZE procedure s/p PPM). 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 His symptoms started around 1 month ago and has been getting worse since then which prompted him to come to the Ed. He denies associated symptoms such as dizziness, lightheadedness, palpitations, or syncope. He sleeps with 2 pillows, denies PND. Reports that he is due for new PPM next month with Dr. Tai. Last dose of coumadin 4/12 am. At this time of eval, he reports mild, dull chest pain.     In the ED: Afebrile, HR 88, /83 > 177/83, 98%RA, Labs with Hgb 12.0 (bl 10-11), CMP wnl, trop 0.05 x1 (prev 0.05 in 2022), BNP 3098 (prev 2226).   EKG with Ventricular-paced rhythm   Chest Xray with cardiomegaly     Admit to cardio- stepdown, planned for cath Monday with Dr. Dan.  (13 May 2023 20:21)    Allergies    IV Contrast (Hives)    Intolerances    contrast media (gadolinium-based) (Hives)    Patient seen and examined at bedside. No acute events overnight.  Patient without complaints. Drew in place draining clear urine.  [place] in place dressing clean.   Denies CP, SOB, palpitations, or dizziness  No events on telemetry overnight    Vital Signs Last 24 Hrs  T(C): 36.8 (19 May 2023 08:00), Max: 36.8 (19 May 2023 08:00)  T(F): 98.2 (19 May 2023 08:00), Max: 98.2 (19 May 2023 08:00)  HR: 69 (19 May 2023 08:00) (55 - 93)  BP: 113/48 (18 May 2023 14:35) (70/50 - 113/48)  BP(mean): 69 (18 May 2023 14:35) (55 - 69)  RR: 14 (19 May 2023 08:00) (14 - 31)  SpO2: 99% (19 May 2023 08:00) (94% - 100%)    Parameters below as of 19 May 2023 08:00  Patient On (Oxygen Delivery Method): room air        MEDICATIONS  (STANDING):  aspirin enteric coated 81 milliGRAM(s) Oral daily  atorvastatin 40 milliGRAM(s) Oral at bedtime  chlorhexidine 2% Cloths 1 Application(s) Topical <User Schedule>  clopidogrel Tablet 75 milliGRAM(s) Oral daily  dextrose 5% 500 milliLiter(s) (15 mL/Hr) IV Continuous <Continuous>  diphenhydrAMINE Injectable 50 milliGRAM(s) IV Push once  finasteride 5 milliGRAM(s) Oral daily  heparin   Injectable 7000 Unit(s) IV Push once  heparin  Infusion.  Unit(s)/Hr (16 mL/Hr) IV Continuous <Continuous>  metoprolol tartrate 50 milliGRAM(s) Oral two times a day  pantoprazole    Tablet 40 milliGRAM(s) Oral before breakfast  polyethylene glycol 3350 17 Gram(s) Oral daily  sodium chloride 0.9% Bolus 250 milliLiter(s) IV Bolus once  sodium chloride 0.9%. 1000 milliLiter(s) (50 mL/Hr) IV Continuous <Continuous>  tamsulosin 0.4 milliGRAM(s) Oral at bedtime    MEDICATIONS  (PRN):  atropine Injectable 1 milliGRAM(s) IntraMuscular once PRN bradycardia  heparin   Injectable 7000 Unit(s) IV Push every 6 hours PRN For aPTT less than 40  heparin   Injectable 3500 Unit(s) IV Push every 6 hours PRN For aPTT between 40 - 57  nitroglycerin     SubLingual 0.4 milliGRAM(s) SubLingual every 10 minutes PRN Chest Pain  zolpidem 5 milliGRAM(s) Oral at bedtime PRN Insomnia      REVIEW OF SYSTEMS:          All negative except as mentioned in HPI    PHYSICAL EXAM:           CONSTITUTIONAL: Well-developed; well-nourished; in no acute distress  	SKIN: warm, dry  	HEAD: Normocephalic; atraumatic  	EYES: PERRL.  	ENT: No nasal discharge, airway clear, mucous membranes moist  	NECK: Supple; non tender.  	CARD: +S1, +S2, no murmurs, gallops, or rubs. Regular rate and rhythm    	RESP: No wheezes, rales or rhonchi. CTA B/L  	ABD: soft ntnd, + BS x 4 quadrants  	EXT: moves all extremities,  no clubbing, cyanosis or edema  	NEURO: Alert and oriented x3, no focal deficits          PSYCH: Cooperative, appropriate          VASCULAR:  + Rad / + PTs / +  DPs          EXTREMITY:             Right Groin:  access site soft, no hematoma, no pain, + pulses, no sign of infection, no numbness  	   Left Radial  [place]: access site soft, no hematoma, no pain, + pulses, no sign of infection, no numbness            EC Lead ECG (23 @ 05:10)     Ventricular Rate 99 BPM      Diagnosis Line Ventricular-paced rhythm  Abnormal ECG    Confirmed by ITZEL CASTORENA MD ([id number]) on 2023 6:59:19 AM                                                                                                     2D ECHO:    TTE Echo Complete w/o Contrast w/ Doppler (23 @ 16:12)   Summary:   1. Left ventricular ejection fraction, by visual estimation, is 30 to   35%.   2. Severely decreased global left ventricular systolic function.   3. Moderate concentric left ventricular hypertrophy.   4. The left ventricular diastolic function could not be assessed in this   study.   5. Normal right ventricular size and function.   6. Mildly enlarged right atrium.   7. Moderately enlarged left atrium.   8. Moderate mitral annular calcification.   9. Moderate mitral valve regurgitation.  10. Mild tricuspid regurgitation.  11. S/p Mehrdad S3 26mm TAVR with trace central regurgitation and no   evidence of stenosis (Vmax 2.04m/s, mean PG 9mmHg, MARCI by VTI 1.8cm2). No   paravalular leak.  12. Estimated pulmonary artery systolic pressure is 47.9 mmHg assuming a  right atrial pressure of 3 mmHg, which is consistent with mild pulmonary   hypertension.  13. Endocardial visualization was enhanced with intravenous echo contrast.    PHYSICIAN INTERPRETATION:  Left Ventricle: Endocardial visualization was enhanced with intravenous   echo contrast. The left ventricular internal cavity size is normal. There   is moderate concentric left ventricular hypertrophy. Global LV systolic   function was severely decreased. Left ventricular ejection fraction, by   visualestimation, is 30 to 35%. The left ventricular diastolic function   could not be assessed in this study.  Right Ventricle: Normal right ventricular size and function.  Left Atrium: Moderately enlarged left atrium.  Right Atrium: Mildly enlarged rightatrium.  Mitral Valve: The mitral valve is normal in structure. There is moderate   mitral annular calcification. No evidence of mitral valve stenosis.   Moderate mitral valve regurgitation is seen.  Tricuspid Valve: Structurally normal tricuspid valve, with normal leaflet   excursion. Mild tricuspid regurgitation is visualized. Estimated   pulmonary artery systolic pressure is 47.9 mmHg assuming a right atrial   pressure of 3 mmHg, which is consistent with mild pulmonary hypertension.  Aortic Valve: Trivial aortic valve regurgitation is seen. S/p Mehrdad S3   26mm TAVR with trace central regurgitation and no evidence of stenosis   (Vmax 2.04m/s, mean PG 9mmHg, MARCI by VTI 1.8cm2). No paravalular leak.  Pulmonic Valve: The pulmonic valve was not well visualized. The pulmonic   valve is normal. Trace pulmonic valve regurgitation.  Aorta: The aortic root and ascending aorta are structurally normal, with   no evidence of dilitation.  Venous: The inferior vena cava was normal sized, with respiratory size   variation greater than 50%.  Additional Comments: A pacer wire is visualized in the right ventricle   and right atrium.            LABS:                        9.9    10.60 )-----------( 203      ( 19 May 2023 00:15 )             29.2         138  |  109  |  28<H>  ----------------------------<  131<H>  3.9   |  22  |  1.0    Ca    8.6      19 May 2023 04:30  Mg     2.1         TPro  5.1<L>  /  Alb  3.0<L>  /  TBili  1.2  /  DBili  x   /  AST  17  /  ALT  12  /  AlkPhos  125<H>          Magnesium, Serum: 2.1 mg/dL [1.8 - 2.4] (- @ 04:30)  LIVER FUNCTIONS - ( 19 May 2023 04:30 )  Alb: 3.0 g/dL / Pro: 5.1 g/dL / ALK PHOS: 125 U/L / ALT: 12 U/L / AST: 17 U/L / GGT: x             A/P:  I discussed the case with Cardiologist   -------       and recommend the following:        S/P PCI:  Intervention:   - PCI to SVG to OM  (AUC 8 for revascularization)  - Impella device insertion    Implants:   - Synergy TRISTIAN 3.5x12 to distal SVG  - Synergy TRISTIAN 3.5x16 to mid SVG  - Synergy TRISTIAN 4.0x16 to prox SVG     FINDINGS:     SVG to OM1: 99% stenosis in prox segment, 80% stenosis in mid segment, 80% stenosis in distal segment     CI by thermodilution: 1.95                         Care as per CCU team                      Monitor Hg, Cr, aPTT, INR, UO                    Consider Starting PPI                   Keep K = 4, Mg = 2                   HF recs. noted  	     Continue DAPT ( Aspirin 81 mg daily and Plavix 75 mg daily ), Hydralazine, B-Blocker, Statin Therapy                   Patient given 30 day supply of ( Aspirin 81 mg daily and Plavix 75 mg daily ) to take at home                   Patient agreeing to take DAPT for at least one year or as directed by cardiologist                    Pt given instructions on importance of taking antiplatelet medication or risk acute stent thrombosis/death                   Post cath instructions, access site care and activity restrictions reviewed with patient                     Discussed with patient to return to hospital if experience chest pain, shortness breath, dizziness and site bleeding                   Aggressive risk factor modification, diet counseling, smoking cessation discussed with patient                       Monitor in CCU               Cardiology Follow up    DENITA PINO   89y Male  PAST MEDICAL & SURGICAL HISTORY:  Asthma with COPD    HLD (hyperlipidemia)    Essential hypertension    Aortic stenosis, severe    A-fib    DM (diabetes mellitus)    Pacemaker    BPH (benign prostatic hyperplasia)    S/P CABG x 3    History of CEA (carotid endarterectomy)    History of permanent cardiac pacemaker placement    H/O aortic valve replacement       HPI:  88yo M hx of aortic stenosis s/p TAVR (), CAD s/p PCI and CABG 3v  (LIMA => LAD, SVG to OM1, SVG to PDA), chronic atrial fibrillation (on Coumadin s/p MAZE procedure s/p PPM). 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 His symptoms started around 1 month ago and has been getting worse since then which prompted him to come to the Ed. He denies associated symptoms such as dizziness, lightheadedness, palpitations, or syncope. He sleeps with 2 pillows, denies PND. Reports that he is due for new PPM next month with Dr. Tai. Last dose of coumadin 4/12 am. At this time of eval, he reports mild, dull chest pain.     In the ED: Afebrile, HR 88, /83 > 177/83, 98%RA, Labs with Hgb 12.0 (bl 10-11), CMP wnl, trop 0.05 x1 (prev 0.05 in 2022), BNP 3098 (prev 2226).   EKG with Ventricular-paced rhythm   Chest Xray with cardiomegaly     Admit to cardio- stepdown, planned for cath Monday with Dr. Dan.  (13 May 2023 20:21)    Allergies    IV Contrast (Hives)    Intolerances    contrast media (gadolinium-based) (Hives)    Patient seen and examined at bedside. No acute events overnight.  Patient without complaints. Drew in place draining clear urine.  [place] in place dressing clean, dry, and intact.   Denies CP, SOB, palpitations, or dizziness  No events on telemetry overnight    Vital Signs Last 24 Hrs  T(C): 36.8 (19 May 2023 08:00), Max: 36.8 (19 May 2023 08:00)  T(F): 98.2 (19 May 2023 08:00), Max: 98.2 (19 May 2023 08:00)  HR: 69 (19 May 2023 08:00) (55 - 93)  BP: 113/48 (18 May 2023 14:35) (70/50 - 113/48)  BP(mean): 69 (18 May 2023 14:35) (55 - 69)  RR: 14 (19 May 2023 08:00) (14 - 31)  SpO2: 99% (19 May 2023 08:00) (94% - 100%)    Parameters below as of 19 May 2023 08:00  Patient On (Oxygen Delivery Method): room air        MEDICATIONS  (STANDING):  aspirin enteric coated 81 milliGRAM(s) Oral daily  atorvastatin 40 milliGRAM(s) Oral at bedtime  chlorhexidine 2% Cloths 1 Application(s) Topical <User Schedule>  clopidogrel Tablet 75 milliGRAM(s) Oral daily  dextrose 5% 500 milliLiter(s) (15 mL/Hr) IV Continuous <Continuous>  diphenhydrAMINE Injectable 50 milliGRAM(s) IV Push once  finasteride 5 milliGRAM(s) Oral daily  heparin   Injectable 7000 Unit(s) IV Push once  heparin  Infusion.  Unit(s)/Hr (16 mL/Hr) IV Continuous <Continuous>  metoprolol tartrate 50 milliGRAM(s) Oral two times a day  pantoprazole    Tablet 40 milliGRAM(s) Oral before breakfast  polyethylene glycol 3350 17 Gram(s) Oral daily  sodium chloride 0.9% Bolus 250 milliLiter(s) IV Bolus once  sodium chloride 0.9%. 1000 milliLiter(s) (50 mL/Hr) IV Continuous <Continuous>  tamsulosin 0.4 milliGRAM(s) Oral at bedtime    MEDICATIONS  (PRN):  atropine Injectable 1 milliGRAM(s) IntraMuscular once PRN bradycardia  heparin   Injectable 7000 Unit(s) IV Push every 6 hours PRN For aPTT less than 40  heparin   Injectable 3500 Unit(s) IV Push every 6 hours PRN For aPTT between 40 - 57  nitroglycerin     SubLingual 0.4 milliGRAM(s) SubLingual every 10 minutes PRN Chest Pain  zolpidem 5 milliGRAM(s) Oral at bedtime PRN Insomnia      REVIEW OF SYSTEMS:          All negative except as mentioned in HPI    PHYSICAL EXAM:           CONSTITUTIONAL: Well-developed; well-nourished; in no acute distress  	SKIN: warm, dry  	HEAD: Normocephalic; atraumatic  	EYES: PERRL.  	ENT: No nasal discharge, airway clear, mucous membranes moist  	NECK: Supple; non tender.  	CARD: +S1, +S2, no murmurs, gallops, or rubs. Regular rate and rhythm    	RESP: No wheezes, rales or rhonchi. CTA B/L  	ABD: soft ntnd, + BS x 4 quadrants  	EXT: moves all extremities,  no clubbing, cyanosis or edema  	NEURO: Alert and oriented x3, no focal deficits          PSYCH: Cooperative, appropriate          VASCULAR:  + Rad / + PTs / +  DPs          EXTREMITY:             Right Groin:  access site soft, no hematoma, no pain, + pulses, no sign of infection, no numbness  	   Left Radial  [place]: access site soft, no hematoma, no pain, + pulses, no sign of infection, no numbness            EC Lead ECG (23 @ 05:10)     Ventricular Rate 99 BPM      Diagnosis Line Ventricular-paced rhythm  Abnormal ECG    Confirmed by ITZEL CASTORENA MD ([id number]) on 2023 6:59:19 AM                                                                                                     2D ECHO:    TTE Echo Complete w/o Contrast w/ Doppler (23 @ 16:12)   Summary:   1. Left ventricular ejection fraction, by visual estimation, is 30 to   35%.   2. Severely decreased global left ventricular systolic function.   3. Moderate concentric left ventricular hypertrophy.   4. The left ventricular diastolic function could not be assessed in this   study.   5. Normal right ventricular size and function.   6. Mildly enlarged right atrium.   7. Moderately enlarged left atrium.   8. Moderate mitral annular calcification.   9. Moderate mitral valve regurgitation.  10. Mild tricuspid regurgitation.  11. S/p Mehrdad S3 26mm TAVR with trace central regurgitation and no   evidence of stenosis (Vmax 2.04m/s, mean PG 9mmHg, MARCI by VTI 1.8cm2). No   paravalular leak.  12. Estimated pulmonary artery systolic pressure is 47.9 mmHg assuming a  right atrial pressure of 3 mmHg, which is consistent with mild pulmonary   hypertension.  13. Endocardial visualization was enhanced with intravenous echo contrast.    PHYSICIAN INTERPRETATION:  Left Ventricle: Endocardial visualization was enhanced with intravenous   echo contrast. The left ventricular internal cavity size is normal. There   is moderate concentric left ventricular hypertrophy. Global LV systolic   function was severely decreased. Left ventricular ejection fraction, by   visualestimation, is 30 to 35%. The left ventricular diastolic function   could not be assessed in this study.  Right Ventricle: Normal right ventricular size and function.  Left Atrium: Moderately enlarged left atrium.  Right Atrium: Mildly enlarged rightatrium.  Mitral Valve: The mitral valve is normal in structure. There is moderate   mitral annular calcification. No evidence of mitral valve stenosis.   Moderate mitral valve regurgitation is seen.  Tricuspid Valve: Structurally normal tricuspid valve, with normal leaflet   excursion. Mild tricuspid regurgitation is visualized. Estimated   pulmonary artery systolic pressure is 47.9 mmHg assuming a right atrial   pressure of 3 mmHg, which is consistent with mild pulmonary hypertension.  Aortic Valve: Trivial aortic valve regurgitation is seen. S/p Mehrdad S3   26mm TAVR with trace central regurgitation and no evidence of stenosis   (Vmax 2.04m/s, mean PG 9mmHg, MARCI by VTI 1.8cm2). No paravalular leak.  Pulmonic Valve: The pulmonic valve was not well visualized. The pulmonic   valve is normal. Trace pulmonic valve regurgitation.  Aorta: The aortic root and ascending aorta are structurally normal, with   no evidence of dilitation.  Venous: The inferior vena cava was normal sized, with respiratory size   variation greater than 50%.  Additional Comments: A pacer wire is visualized in the right ventricle   and right atrium.            LABS:                        9.9    10.60 )-----------( 203      ( 19 May 2023 00:15 )             29.2         138  |  109  |  28<H>  ----------------------------<  131<H>  3.9   |  22  |  1.0    Ca    8.6      19 May 2023 04:30  Mg     2.1         TPro  5.1<L>  /  Alb  3.0<L>  /  TBili  1.2  /  DBili  x   /  AST  17  /  ALT  12  /  AlkPhos  125<H>          Magnesium, Serum: 2.1 mg/dL [1.8 - 2.4] (23 @ 04:30)  LIVER FUNCTIONS - ( 19 May 2023 04:30 )  Alb: 3.0 g/dL / Pro: 5.1 g/dL / ALK PHOS: 125 U/L / ALT: 12 U/L / AST: 17 U/L / GGT: x             A/P:  I discussed the case with Cardiologist Dr. Armstrong and recommend the following:        S/P PCI:  Intervention:   - PCI to SVG to OM  (AUC 8 for revascularization)  - Impella device insertion    Implants:   - Synergy TRISTIAN 3.5x12 to distal SVG  - Synergy TRISTIAN 3.5x16 to mid SVG  - Synergy TRISTIAN 4.0x16 to prox SVG     FINDINGS:     SVG to OM1: 99% stenosis in prox segment, 80% stenosis in mid segment, 80% stenosis in distal segment     CI by thermodilution: 1.95                         Care as per CCU team                     D/C [place]                   D/C A-line                   OOB to chair                    Monitor Hg, Cr, aPTT, INR, UO                    Consider Starting PPI                   Keep K = 4, Mg = 2                   HF recs. noted  	     Continue DAPT ( Aspirin 81 mg daily and Plavix 75 mg daily ), Hydralazine, B-Blocker, Statin Therapy                   Patient given 30 day supply of ( Aspirin 81 mg daily and Plavix 75 mg daily ) to take at home                   Patient agreeing to take DAPT for at least one year or as directed by cardiologist                    Pt given instructions on importance of taking antiplatelet medication or risk acute stent thrombosis/death                   Post cath instructions, access site care and activity restrictions reviewed with patient                     Discussed with patient to return to hospital if experience chest pain, shortness breath, dizziness and site bleeding                   Aggressive risk factor modification, diet counseling, smoking cessation discussed with patient                       Monitor in CCU               Cardiology Follow up    DENITA PINO   89y Male  PAST MEDICAL & SURGICAL HISTORY:  Asthma with COPD    HLD (hyperlipidemia)    Essential hypertension    Aortic stenosis, severe    A-fib    DM (diabetes mellitus)    Pacemaker    BPH (benign prostatic hyperplasia)    S/P CABG x 3    History of CEA (carotid endarterectomy)    History of permanent cardiac pacemaker placement    H/O aortic valve replacement       HPI:  88yo M hx of aortic stenosis s/p TAVR (), CAD s/p PCI and CABG 3v  (LIMA => LAD, SVG to OM1, SVG to PDA), chronic atrial fibrillation (on Coumadin s/p MAZE procedure s/p PPM). 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 His symptoms started around 1 month ago and has been getting worse since then which prompted him to come to the Ed. He denies associated symptoms such as dizziness, lightheadedness, palpitations, or syncope. He sleeps with 2 pillows, denies PND. Reports that he is due for new PPM next month with Dr. Tai. Last dose of coumadin 4/12 am. At this time of eval, he reports mild, dull chest pain.     In the ED: Afebrile, HR 88, /83 > 177/83, 98%RA, Labs with Hgb 12.0 (bl 10-11), CMP wnl, trop 0.05 x1 (prev 0.05 in 2022), BNP 3098 (prev 2226).   EKG with Ventricular-paced rhythm   Chest Xray with cardiomegaly     Admit to cardio- stepdown, planned for cath Monday with Dr. Dan.  (13 May 2023 20:21)    Allergies    IV Contrast (Hives)    Intolerances    contrast media (gadolinium-based) (Hives)    Patient seen and examined at bedside. No acute events overnight.  Patient without complaints. [place] in place draining clear urine.  [place] in place dressing clean, dry, and intact.   Denies CP, SOB, palpitations, or dizziness  No events on telemetry overnight    Vital Signs Last 24 Hrs  T(C): 36.8 (19 May 2023 08:00), Max: 36.8 (19 May 2023 08:00)  T(F): 98.2 (19 May 2023 08:00), Max: 98.2 (19 May 2023 08:00)  HR: 69 (19 May 2023 08:00) (55 - 93)  BP: 113/48 (18 May 2023 14:35) (70/50 - 113/48)  BP(mean): 69 (18 May 2023 14:35) (55 - 69)  RR: 14 (19 May 2023 08:00) (14 - 31)  SpO2: 99% (19 May 2023 08:00) (94% - 100%)    Parameters below as of 19 May 2023 08:00  Patient On (Oxygen Delivery Method): room air        MEDICATIONS  (STANDING):  aspirin enteric coated 81 milliGRAM(s) Oral daily  atorvastatin 40 milliGRAM(s) Oral at bedtime  chlorhexidine 2% Cloths 1 Application(s) Topical <User Schedule>  clopidogrel Tablet 75 milliGRAM(s) Oral daily  dextrose 5% 500 milliLiter(s) (15 mL/Hr) IV Continuous <Continuous>  diphenhydrAMINE Injectable 50 milliGRAM(s) IV Push once  finasteride 5 milliGRAM(s) Oral daily  heparin   Injectable 7000 Unit(s) IV Push once  heparin  Infusion.  Unit(s)/Hr (16 mL/Hr) IV Continuous <Continuous>  metoprolol tartrate 50 milliGRAM(s) Oral two times a day  pantoprazole    Tablet 40 milliGRAM(s) Oral before breakfast  polyethylene glycol 3350 17 Gram(s) Oral daily  sodium chloride 0.9% Bolus 250 milliLiter(s) IV Bolus once  sodium chloride 0.9%. 1000 milliLiter(s) (50 mL/Hr) IV Continuous <Continuous>  tamsulosin 0.4 milliGRAM(s) Oral at bedtime    MEDICATIONS  (PRN):  atropine Injectable 1 milliGRAM(s) IntraMuscular once PRN bradycardia  heparin   Injectable 7000 Unit(s) IV Push every 6 hours PRN For aPTT less than 40  heparin   Injectable 3500 Unit(s) IV Push every 6 hours PRN For aPTT between 40 - 57  nitroglycerin     SubLingual 0.4 milliGRAM(s) SubLingual every 10 minutes PRN Chest Pain  zolpidem 5 milliGRAM(s) Oral at bedtime PRN Insomnia      REVIEW OF SYSTEMS:          All negative except as mentioned in HPI    PHYSICAL EXAM:           CONSTITUTIONAL: Well-developed; well-nourished; in no acute distress  	SKIN: warm, dry  	HEAD: Normocephalic; atraumatic  	EYES: PERRL.  	ENT: No nasal discharge, airway clear, mucous membranes moist  	NECK: Supple; non tender.  	CARD: +S1, +S2, no murmurs, gallops, or rubs. Regular rate and rhythm    	RESP: No wheezes, rales or rhonchi. CTA B/L  	ABD: soft ntnd, + BS x 4 quadrants  	EXT: moves all extremities,  no clubbing, cyanosis or edema  	NEURO: Alert and oriented x3, no focal deficits          PSYCH: Cooperative, appropriate          VASCULAR:  + Rad / + PTs / +  DPs          EXTREMITY:             Right Groin:  access site soft, no hematoma, no pain, + pulses, no sign of infection, no numbness  	   Left Radial  [place] [place]: access site soft, no hematoma, no pain, + pulses, no sign of infection, no numbness            EC Lead ECG (23 @ 05:10)     Ventricular Rate 99 BPM      Diagnosis Line Ventricular-paced rhythm  Abnormal ECG    Confirmed by ITZEL CASTORENA MD ([id number]) on 2023 6:59:19 AM                                                                                                     2D ECHO:    TTE Echo Complete w/o Contrast w/ Doppler (23 @ 16:12)   Summary:   1. Left ventricular ejection fraction, by visual estimation, is 30 to   35%.   2. Severely decreased global left ventricular systolic function.   3. Moderate concentric left ventricular hypertrophy.   4. The left ventricular diastolic function could not be assessed in this   study.   5. Normal right ventricular size and function.   6. Mildly enlarged right atrium.   7. Moderately enlarged left atrium.   8. Moderate mitral annular calcification.   9. Moderate mitral valve regurgitation.  10. Mild tricuspid regurgitation.  11. S/p Mehrdad S3 26mm TAVR with trace central regurgitation and no   evidence of stenosis (Vmax 2.04m/s, mean PG 9mmHg, MARCI by VTI 1.8cm2). No   paravalular leak.  12. Estimated pulmonary artery systolic pressure is 47.9 mmHg assuming a  right atrial pressure of 3 mmHg, which is consistent with mild pulmonary   hypertension.  13. Endocardial visualization was enhanced with intravenous echo contrast.    PHYSICIAN INTERPRETATION:  Left Ventricle: Endocardial visualization was enhanced with intravenous   echo contrast. The left ventricular internal cavity size is normal. There   is moderate concentric left ventricular hypertrophy. Global LV systolic   function was severely decreased. Left ventricular ejection fraction, by   visualestimation, is 30 to 35%. The left ventricular diastolic function   could not be assessed in this study.  Right Ventricle: Normal right ventricular size and function.  Left Atrium: Moderately enlarged left atrium.  Right Atrium: Mildly enlarged rightatrium.  Mitral Valve: The mitral valve is normal in structure. There is moderate   mitral annular calcification. No evidence of mitral valve stenosis.   Moderate mitral valve regurgitation is seen.  Tricuspid Valve: Structurally normal tricuspid valve, with normal leaflet   excursion. Mild tricuspid regurgitation is visualized. Estimated   pulmonary artery systolic pressure is 47.9 mmHg assuming a right atrial   pressure of 3 mmHg, which is consistent with mild pulmonary hypertension.  Aortic Valve: Trivial aortic valve regurgitation is seen. S/p Mehrdad S3   26mm TAVR with trace central regurgitation and no evidence of stenosis   (Vmax 2.04m/s, mean PG 9mmHg, MARCI by VTI 1.8cm2). No paravalular leak.  Pulmonic Valve: The pulmonic valve was not well visualized. The pulmonic   valve is normal. Trace pulmonic valve regurgitation.  Aorta: The aortic root and ascending aorta are structurally normal, with   no evidence of dilitation.  Venous: The inferior vena cava was normal sized, with respiratory size   variation greater than 50%.  Additional Comments: A pacer wire is visualized in the right ventricle   and right atrium.            LABS:                        9.9    10.60 )-----------( 203      ( 19 May 2023 00:15 )             29.2         138  |  109  |  28<H>  ----------------------------<  131<H>  3.9   |  22  |  1.0    Ca    8.6      19 May 2023 04:30  Mg     2.1         TPro  5.1<L>  /  Alb  3.0<L>  /  TBili  1.2  /  DBili  x   /  AST  17  /  ALT  12  /  AlkPhos  125<H>          Magnesium, Serum: 2.1 mg/dL [1.8 - 2.4] (23 @ 04:30)  LIVER FUNCTIONS - ( 19 May 2023 04:30 )  Alb: 3.0 g/dL / Pro: 5.1 g/dL / ALK PHOS: 125 U/L / ALT: 12 U/L / AST: 17 U/L / GGT: x             A/P:  I discussed the case with Cardiologist Dr. Armstrong and recommend the following:        S/P PCI:  Intervention:   - PCI to SVG to OM  (AUC 8 for revascularization)  - Impella device insertion    Implants:   - Synergy TRISTIAN 3.5x12 to distal SVG  - Synergy TRISTIAN 3.5x16 to mid SVG  - Synergy TRISTIAN 4.0x16 to prox SVG     FINDINGS:     SVG to OM1: 99% stenosis in prox segment, 80% stenosis in mid segment, 80% stenosis in distal segment     CI by thermodilution: 1.95                         Care as per CCU team                     D/C Viki                   D/C A-line                   OOB to chair                    Monitor Hgb PLEASE SEND REPEAT CBC,  Cr, aPTT, INR, UO                    Consider Starting PPI                   Keep K = 4, Mg = 2                   HF recs. noted  	     Continue DAPT ( Aspirin 81 mg daily and Plavix 75 mg daily ), Eliquis,  Hydralazine, PPI,  B-Blocker, Statin Therapy                   Add ACE/ARB if BP tolerates                   Patient given 30 day supply of ( Aspirin 81 mg daily and Plavix 75 mg daily ) to take at home                   Pt to be on triple therapy for 1 week then d/c ASA due to increased risk of bleeding.                    Patient agreeing to take DAPT for at least one year or as directed by cardiologist                    Pt given instructions on importance of taking antiplatelet medication or risk acute stent thrombosis/death                   Post cath instructions, access site care and activity restrictions reviewed with patient                     Discussed with patient to return to hospital if experience chest pain, shortness breath, dizziness and site bleeding                   Aggressive risk factor modification, diet counseling, smoking cessation discussed with patient                       Monitor in CCU               Cardiology Follow up    DENITA PINO   89y Male  PAST MEDICAL & SURGICAL HISTORY:  Asthma with COPD    HLD (hyperlipidemia)    Essential hypertension    Aortic stenosis, severe    A-fib    DM (diabetes mellitus)    Pacemaker    BPH (benign prostatic hyperplasia)    S/P CABG x 3    History of CEA (carotid endarterectomy)    History of permanent cardiac pacemaker placement    H/O aortic valve replacement       HPI:  88yo M hx of aortic stenosis s/p TAVR (), CAD s/p PCI and CABG 3v  (LIMA => LAD, SVG to OM1, SVG to PDA), chronic atrial fibrillation (on Coumadin s/p MAZE procedure s/p PPM). 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 His symptoms started around 1 month ago and has been getting worse since then which prompted him to come to the Ed. He denies associated symptoms such as dizziness, lightheadedness, palpitations, or syncope. He sleeps with 2 pillows, denies PND. Reports that he is due for new PPM next month with Dr. Tai. Last dose of coumadin 4/12 am. At this time of eval, he reports mild, dull chest pain.     In the ED: Afebrile, HR 88, /83 > 177/83, 98%RA, Labs with Hgb 12.0 (bl 10-11), CMP wnl, trop 0.05 x1 (prev 0.05 in 2022), BNP 3098 (prev 2226).   EKG with Ventricular-paced rhythm   Chest Xray with cardiomegaly     Admit to cardio- stepdown, planned for cath Monday with Dr. Dan.  (13 May 2023 20:21)    Allergies    IV Contrast (Hives)    Intolerances    contrast media (gadolinium-based) (Hives)    Patient seen and examined at bedside. No acute events overnight.  Patient without complaints. Drew in place draining clear urine.  [place] in place dressing clean, dry, and intact.   Denies CP, SOB, palpitations, or dizziness  No events on telemetry overnight    Vital Signs Last 24 Hrs  T(C): 36.8 (19 May 2023 08:00), Max: 36.8 (19 May 2023 08:00)  T(F): 98.2 (19 May 2023 08:00), Max: 98.2 (19 May 2023 08:00)  HR: 69 (19 May 2023 08:00) (55 - 93)  BP: 113/48 (18 May 2023 14:35) (70/50 - 113/48)  BP(mean): 69 (18 May 2023 14:35) (55 - 69)  RR: 14 (19 May 2023 08:00) (14 - 31)  SpO2: 99% (19 May 2023 08:00) (94% - 100%)    Parameters below as of 19 May 2023 08:00  Patient On (Oxygen Delivery Method): room air        MEDICATIONS  (STANDING):  aspirin enteric coated 81 milliGRAM(s) Oral daily  atorvastatin 40 milliGRAM(s) Oral at bedtime  chlorhexidine 2% Cloths 1 Application(s) Topical <User Schedule>  clopidogrel Tablet 75 milliGRAM(s) Oral daily  dextrose 5% 500 milliLiter(s) (15 mL/Hr) IV Continuous <Continuous>  diphenhydrAMINE Injectable 50 milliGRAM(s) IV Push once  finasteride 5 milliGRAM(s) Oral daily  heparin   Injectable 7000 Unit(s) IV Push once  heparin  Infusion.  Unit(s)/Hr (16 mL/Hr) IV Continuous <Continuous>  metoprolol tartrate 50 milliGRAM(s) Oral two times a day  pantoprazole    Tablet 40 milliGRAM(s) Oral before breakfast  polyethylene glycol 3350 17 Gram(s) Oral daily  sodium chloride 0.9% Bolus 250 milliLiter(s) IV Bolus once  sodium chloride 0.9%. 1000 milliLiter(s) (50 mL/Hr) IV Continuous <Continuous>  tamsulosin 0.4 milliGRAM(s) Oral at bedtime    MEDICATIONS  (PRN):  atropine Injectable 1 milliGRAM(s) IntraMuscular once PRN bradycardia  heparin   Injectable 7000 Unit(s) IV Push every 6 hours PRN For aPTT less than 40  heparin   Injectable 3500 Unit(s) IV Push every 6 hours PRN For aPTT between 40 - 57  nitroglycerin     SubLingual 0.4 milliGRAM(s) SubLingual every 10 minutes PRN Chest Pain  zolpidem 5 milliGRAM(s) Oral at bedtime PRN Insomnia      REVIEW OF SYSTEMS:          All negative except as mentioned in HPI    PHYSICAL EXAM:           CONSTITUTIONAL: Well-developed; well-nourished; in no acute distress  	SKIN: warm, dry  	HEAD: Normocephalic; atraumatic  	EYES: PERRL.  	ENT: No nasal discharge, airway clear, mucous membranes moist  	NECK: Supple; non tender.  	CARD: +S1, +S2, no murmurs, gallops, or rubs. Regular rate and rhythm    	RESP: No wheezes, rales or rhonchi. CTA B/L  	ABD: soft ntnd, + BS x 4 quadrants  	EXT: moves all extremities,  no clubbing, cyanosis or edema  	NEURO: Alert and oriented x3, no focal deficits          PSYCH: Cooperative, appropriate          VASCULAR:  + Rad / + PTs / +  DPs          EXTREMITY:             Right Groin:  access site soft, no hematoma, no pain, + pulses, no sign of infection, no numbness  	   Left Radial  [place]: access site soft, no hematoma, no pain, + pulses, no sign of infection, no numbness            EC Lead ECG (23 @ 05:10)     Ventricular Rate 99 BPM      Diagnosis Line Ventricular-paced rhythm  Abnormal ECG    Confirmed by ITZEL CASTORENA MD ([id number]) on 2023 6:59:19 AM                                                                                                     2D ECHO:    TTE Echo Complete w/o Contrast w/ Doppler (23 @ 16:12)   Summary:   1. Left ventricular ejection fraction, by visual estimation, is 30 to   35%.   2. Severely decreased global left ventricular systolic function.   3. Moderate concentric left ventricular hypertrophy.   4. The left ventricular diastolic function could not be assessed in this   study.   5. Normal right ventricular size and function.   6. Mildly enlarged right atrium.   7. Moderately enlarged left atrium.   8. Moderate mitral annular calcification.   9. Moderate mitral valve regurgitation.  10. Mild tricuspid regurgitation.  11. S/p Mehrdad S3 26mm TAVR with trace central regurgitation and no   evidence of stenosis (Vmax 2.04m/s, mean PG 9mmHg, MARCI by VTI 1.8cm2). No   paravalular leak.  12. Estimated pulmonary artery systolic pressure is 47.9 mmHg assuming a  right atrial pressure of 3 mmHg, which is consistent with mild pulmonary   hypertension.  13. Endocardial visualization was enhanced with intravenous echo contrast.    PHYSICIAN INTERPRETATION:  Left Ventricle: Endocardial visualization was enhanced with intravenous   echo contrast. The left ventricular internal cavity size is normal. There   is moderate concentric left ventricular hypertrophy. Global LV systolic   function was severely decreased. Left ventricular ejection fraction, by   visualestimation, is 30 to 35%. The left ventricular diastolic function   could not be assessed in this study.  Right Ventricle: Normal right ventricular size and function.  Left Atrium: Moderately enlarged left atrium.  Right Atrium: Mildly enlarged rightatrium.  Mitral Valve: The mitral valve is normal in structure. There is moderate   mitral annular calcification. No evidence of mitral valve stenosis.   Moderate mitral valve regurgitation is seen.  Tricuspid Valve: Structurally normal tricuspid valve, with normal leaflet   excursion. Mild tricuspid regurgitation is visualized. Estimated   pulmonary artery systolic pressure is 47.9 mmHg assuming a right atrial   pressure of 3 mmHg, which is consistent with mild pulmonary hypertension.  Aortic Valve: Trivial aortic valve regurgitation is seen. S/p Mehrdad S3   26mm TAVR with trace central regurgitation and no evidence of stenosis   (Vmax 2.04m/s, mean PG 9mmHg, MARCI by VTI 1.8cm2). No paravalular leak.  Pulmonic Valve: The pulmonic valve was not well visualized. The pulmonic   valve is normal. Trace pulmonic valve regurgitation.  Aorta: The aortic root and ascending aorta are structurally normal, with   no evidence of dilitation.  Venous: The inferior vena cava was normal sized, with respiratory size   variation greater than 50%.  Additional Comments: A pacer wire is visualized in the right ventricle   and right atrium.            LABS:                        9.9    10.60 )-----------( 203      ( 19 May 2023 00:15 )             29.2         138  |  109  |  28<H>  ----------------------------<  131<H>  3.9   |  22  |  1.0    Ca    8.6      19 May 2023 04:30  Mg     2.1         TPro  5.1<L>  /  Alb  3.0<L>  /  TBili  1.2  /  DBili  x   /  AST  17  /  ALT  12  /  AlkPhos  125<H>          Magnesium, Serum: 2.1 mg/dL [1.8 - 2.4] (23 @ 04:30)  LIVER FUNCTIONS - ( 19 May 2023 04:30 )  Alb: 3.0 g/dL / Pro: 5.1 g/dL / ALK PHOS: 125 U/L / ALT: 12 U/L / AST: 17 U/L / GGT: x             A/P:  I discussed the case with Cardiologist Dr. Armstrong and recommend the following:        S/P PCI:  Intervention:   - PCI to SVG to OM  (AUC 8 for revascularization)  - Impella device insertion    Implants:   - Synergy TRISTIAN 3.5x12 to distal SVG  - Synergy TRISTIAN 3.5x16 to mid SVG  - Synergy TRISTIAN 4.0x16 to prox SVG     FINDINGS:     SVG to OM1: 99% stenosis in prox segment, 80% stenosis in mid segment, 80% stenosis in distal segment     CI by thermodilution: 1.95                         Care as per CCU team                     D/C Viki                   D/C A-line                   OOB to chair                    EP to eval for Life Vest d/t low EF                    Monitor Hgb PLEASE SEND REPEAT CBC,  Cr, aPTT, INR, UO                    Consider Starting PPI                   Keep K = 4, Mg = 2                   HF recs. noted  	     Continue DAPT ( Aspirin 81 mg daily and Plavix 75 mg daily ), Eliquis,  Hydralazine, PPI,  B-Blocker, Statin Therapy                   Add ACE/ARB if BP tolerates                   Patient given 30 day supply of ( Aspirin 81 mg daily and Plavix 75 mg daily ) to take at home                   Pt to be on triple therapy for 1 week then d/c ASA due to increased risk of bleeding.                    Patient agreeing to take DAPT for at least one year or as directed by cardiologist                    Pt given instructions on importance of taking antiplatelet medication or risk acute stent thrombosis/death                   Post cath instructions, access site care and activity restrictions reviewed with patient                     Discussed with patient to return to hospital if experience chest pain, shortness breath, dizziness and site bleeding                   Aggressive risk factor modification, diet counseling, smoking cessation discussed with patient                       Monitor in CCU

## 2023-05-19 NOTE — CHART NOTE - NSCHARTNOTEFT_GEN_A_CORE
Pt seen at bedside by medical team, pt refused Life Vest. Expressed understanding and has clarity. Will continue to monitor and reassess.

## 2023-05-19 NOTE — PROGRESS NOTE ADULT - SUBJECTIVE AND OBJECTIVE BOX
CHIEF COMPLAINT:  Patient is a 89y old  Male who presents with a chief complaint of Chest pain (19 May 2023 08:58)      INTERVAL HISTORY/OVERNIGHT EVENTS:  No major events.    ======================  MEDICATIONS:  apixaban 5 milliGRAM(s) Oral two times a day  aspirin enteric coated 81 milliGRAM(s) Oral daily  atorvastatin 40 milliGRAM(s) Oral at bedtime  chlorhexidine 2% Cloths 1 Application(s) Topical <User Schedule>  clopidogrel Tablet 75 milliGRAM(s) Oral daily  diphenhydrAMINE Injectable 50 milliGRAM(s) IV Push once  finasteride 5 milliGRAM(s) Oral daily  metoprolol tartrate 50 milliGRAM(s) Oral two times a day  pantoprazole    Tablet 40 milliGRAM(s) Oral before breakfast  polyethylene glycol 3350 17 Gram(s) Oral daily  sacubitril 24 mG/valsartan 26 mG 1 Tablet(s) Oral two times a day  sodium chloride 0.9% Bolus 250 milliLiter(s) IV Bolus once  tamsulosin 0.4 milliGRAM(s) Oral at bedtime    DRIPS:  dextrose 5% 500 milliLiter(s) (15 mL/Hr) IV Continuous <Continuous>  sodium chloride 0.9%. 1000 milliLiter(s) (50 mL/Hr) IV Continuous <Continuous>      ======================  PHYSICAL EXAMINATION:  GEN:  nad.   HEENT:  eomi. ncat  PULM:  b/l lung sounds   CARD: s1, s2  ABD: +bs. ntnd  EXT:  no new rashes.    NEURO:  no new focal deficits.   ======================  OBJECTIVE:        VS:  T(F): 99 ( @ 12:00), Max: 99 ( @ 12:00)  HR: 73 ( @ 11:00) (69 - 93)  BP: 113/48 (18 @ 14:35) (113/48 - 113/48)  RR: 19 ( @ 11:00) (14 - 31)  SpO2: 99% ( @ 11:00) (94% - 100%)    I/O:      05-16 @ 07:01  -   @ 07:00  --------------------------------------------------------  IN: 345 mL / OUT: 1100 mL / NET: -755 mL     @ 07:01  -   @ 07:00  --------------------------------------------------------  IN: 1240.6 mL / OUT: 2385 mL / NET: -1144.4 mL     @ 07:01  -   @ 07:00  --------------------------------------------------------  IN: 1502 mL / OUT: 1085 mL / NET: 417 mL     @ 07:01  -   @ 12:09  --------------------------------------------------------  IN: 0 mL / OUT: 310 mL / NET: -310 mL        Weight trend:      ======================    LABS:  ABG - ( 18 May 2023 10:50 )  pH, Arterial: 7.44  pH, Blood: x     /  pCO2: 32    /  pO2: 103   / HCO3: 22    / Base Excess: -2.0  /  SaO2: 99.7                                    9.9    10.60 )-----------(       ( 19 May 2023 00:15 )             29.2     05-19    138  |  109  |  28<H>  ----------------------------<  131<H>  3.9   |  22  |  1.0    Ca    8.6      19 May 2023 04:30  Mg     2.1     -    TPro  5.1<L>  /  Alb  3.0<L>  /  TBili  1.2  /  DBili  x   /  AST  17  /  ALT  12  /  AlkPhos  125<H>  -    LIVER FUNCTIONS - ( 19 May 2023 04:30 )  Alb: 3.0 g/dL / Pro: 5.1 g/dL / ALK PHOS: 125 U/L / ALT: 12 U/L / AST: 17 U/L / GGT: x           PTT - ( 18 May 2023 15:42 )  PTT:29.0 sec          Urinalysis Basic - ( 17 May 2023 21:53 )    Color: Light Yellow / Appearance: Clear / S.021 / pH: x  Gluc: x / Ketone: Negative  / Bili: Negative / Urobili: <2 mg/dL   Blood: x / Protein: Negative / Nitrite: Negative   Leuk Esterase: Negative / RBC: 5 /HPF / WBC 5 /HPF   Sq Epi: x / Non Sq Epi: x / Bacteria: Negative

## 2023-05-30 ENCOUNTER — APPOINTMENT (OUTPATIENT)
Dept: MEDICATION MANAGEMENT | Facility: CLINIC | Age: 88
End: 2023-05-30

## 2023-06-02 DIAGNOSIS — Z95.3 PRESENCE OF XENOGENIC HEART VALVE: ICD-10-CM

## 2023-06-02 DIAGNOSIS — Z91.041 RADIOGRAPHIC DYE ALLERGY STATUS: ICD-10-CM

## 2023-06-02 DIAGNOSIS — Z79.84 LONG TERM (CURRENT) USE OF ORAL HYPOGLYCEMIC DRUGS: ICD-10-CM

## 2023-06-02 DIAGNOSIS — I48.20 CHRONIC ATRIAL FIBRILLATION, UNSPECIFIED: ICD-10-CM

## 2023-06-02 DIAGNOSIS — I25.5 ISCHEMIC CARDIOMYOPATHY: ICD-10-CM

## 2023-06-02 DIAGNOSIS — I50.23 ACUTE ON CHRONIC SYSTOLIC (CONGESTIVE) HEART FAILURE: ICD-10-CM
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
## 2023-06-02 DIAGNOSIS — I11.0 HYPERTENSIVE HEART DISEASE WITH HEART FAILURE: ICD-10-CM

## 2023-06-02 DIAGNOSIS — N40.0 BENIGN PROSTATIC HYPERPLASIA WITHOUT LOWER URINARY TRACT SYMPTOMS: ICD-10-CM

## 2023-06-02 DIAGNOSIS — I65.21 OCCLUSION AND STENOSIS OF RIGHT CAROTID ARTERY: ICD-10-CM

## 2023-06-02 DIAGNOSIS — I21.4 NON-ST ELEVATION (NSTEMI) MYOCARDIAL INFARCTION: ICD-10-CM

## 2023-06-02 DIAGNOSIS — Z95.5 PRESENCE OF CORONARY ANGIOPLASTY IMPLANT AND GRAFT: ICD-10-CM

## 2023-06-02 DIAGNOSIS — Z95.0 PRESENCE OF CARDIAC PACEMAKER: ICD-10-CM

## 2023-06-02 DIAGNOSIS — I25.110 ATHEROSCLEROTIC HEART DISEASE OF NATIVE CORONARY ARTERY WITH UNSTABLE ANGINA PECTORIS: ICD-10-CM

## 2023-06-02 DIAGNOSIS — I95.9 HYPOTENSION, UNSPECIFIED: ICD-10-CM

## 2023-06-02 DIAGNOSIS — D64.9 ANEMIA, UNSPECIFIED: ICD-10-CM

## 2023-06-02 DIAGNOSIS — G47.00 INSOMNIA, UNSPECIFIED: ICD-10-CM

## 2023-06-02 DIAGNOSIS — I35.0 NONRHEUMATIC AORTIC (VALVE) STENOSIS: ICD-10-CM

## 2023-06-02 DIAGNOSIS — R57.0 CARDIOGENIC SHOCK: ICD-10-CM

## 2023-06-02 DIAGNOSIS — J44.9 CHRONIC OBSTRUCTIVE PULMONARY DISEASE, UNSPECIFIED: ICD-10-CM

## 2023-06-02 DIAGNOSIS — Z87.891 PERSONAL HISTORY OF NICOTINE DEPENDENCE: ICD-10-CM

## 2023-06-02 DIAGNOSIS — T82.855A STENOSIS OF CORONARY ARTERY STENT, INITIAL ENCOUNTER: ICD-10-CM

## 2023-06-02 DIAGNOSIS — E11.9 TYPE 2 DIABETES MELLITUS WITHOUT COMPLICATIONS: ICD-10-CM

## 2023-06-02 DIAGNOSIS — I27.20 PULMONARY HYPERTENSION, UNSPECIFIED: ICD-10-CM

## 2023-06-02 DIAGNOSIS — E78.5 HYPERLIPIDEMIA, UNSPECIFIED: ICD-10-CM

## 2023-06-02 DIAGNOSIS — Z79.01 LONG TERM (CURRENT) USE OF ANTICOAGULANTS: ICD-10-CM

## 2023-06-05 ENCOUNTER — TRANSCRIPTION ENCOUNTER (OUTPATIENT)
Age: 88
End: 2023-06-05

## 2023-06-07 ENCOUNTER — TRANSCRIPTION ENCOUNTER (OUTPATIENT)
Age: 88
End: 2023-06-07

## 2023-06-08 ENCOUNTER — APPOINTMENT (OUTPATIENT)
Dept: ELECTROPHYSIOLOGY | Facility: CLINIC | Age: 88
End: 2023-06-08

## 2023-06-15 ENCOUNTER — OUTPATIENT (OUTPATIENT)
Dept: OUTPATIENT SERVICES | Facility: HOSPITAL | Age: 88
LOS: 1 days | End: 2023-06-15
Payer: MEDICARE

## 2023-06-15 VITALS
RESPIRATION RATE: 18 BRPM | WEIGHT: 186.07 LBS | TEMPERATURE: 96 F | OXYGEN SATURATION: 99 % | HEART RATE: 51 BPM | HEIGHT: 70 IN | DIASTOLIC BLOOD PRESSURE: 60 MMHG | SYSTOLIC BLOOD PRESSURE: 140 MMHG

## 2023-06-15 DIAGNOSIS — T82.111A BREAKDOWN (MECHANICAL) OF CARDIAC PULSE GENERATOR (BATTERY), INITIAL ENCOUNTER: ICD-10-CM

## 2023-06-15 DIAGNOSIS — T82.111D BREAKDOWN (MECHANICAL) OF CARDIAC PULSE GENERATOR (BATTERY), SUBSEQUENT ENCOUNTER: ICD-10-CM

## 2023-06-15 DIAGNOSIS — Z98.89 OTHER SPECIFIED POSTPROCEDURAL STATES: Chronic | ICD-10-CM

## 2023-06-15 DIAGNOSIS — Z01.818 ENCOUNTER FOR OTHER PREPROCEDURAL EXAMINATION: ICD-10-CM

## 2023-06-15 DIAGNOSIS — Z95.0 PRESENCE OF CARDIAC PACEMAKER: Chronic | ICD-10-CM

## 2023-06-15 DIAGNOSIS — Z95.1 PRESENCE OF AORTOCORONARY BYPASS GRAFT: Chronic | ICD-10-CM

## 2023-06-15 DIAGNOSIS — Z95.2 PRESENCE OF PROSTHETIC HEART VALVE: Chronic | ICD-10-CM

## 2023-06-15 LAB
ALBUMIN SERPL ELPH-MCNC: 4.1 G/DL — SIGNIFICANT CHANGE UP (ref 3.5–5.2)
ALP SERPL-CCNC: 215 U/L — HIGH (ref 30–115)
ALT FLD-CCNC: 18 U/L — SIGNIFICANT CHANGE UP (ref 0–41)
ANION GAP SERPL CALC-SCNC: 15 MMOL/L — HIGH (ref 7–14)
APPEARANCE UR: CLEAR — SIGNIFICANT CHANGE UP
APTT BLD: 36.2 SEC — SIGNIFICANT CHANGE UP (ref 27–39.2)
AST SERPL-CCNC: 22 U/L — SIGNIFICANT CHANGE UP (ref 0–41)
BACTERIA # UR AUTO: NEGATIVE — SIGNIFICANT CHANGE UP
BASOPHILS # BLD AUTO: 0.01 K/UL — SIGNIFICANT CHANGE UP (ref 0–0.2)
BASOPHILS NFR BLD AUTO: 0.2 % — SIGNIFICANT CHANGE UP (ref 0–1)
BILIRUB SERPL-MCNC: 0.6 MG/DL — SIGNIFICANT CHANGE UP (ref 0.2–1.2)
BILIRUB UR-MCNC: NEGATIVE — SIGNIFICANT CHANGE UP
BLD GP AB SCN SERPL QL: SIGNIFICANT CHANGE UP
BUN SERPL-MCNC: 28 MG/DL — HIGH (ref 10–20)
CALCIUM SERPL-MCNC: 9.2 MG/DL — SIGNIFICANT CHANGE UP (ref 8.4–10.5)
CHLORIDE SERPL-SCNC: 105 MMOL/L — SIGNIFICANT CHANGE UP (ref 98–110)
CO2 SERPL-SCNC: 19 MMOL/L — SIGNIFICANT CHANGE UP (ref 17–32)
COLOR SPEC: YELLOW — SIGNIFICANT CHANGE UP
CREAT SERPL-MCNC: 1 MG/DL — SIGNIFICANT CHANGE UP (ref 0.7–1.5)
DIFF PNL FLD: ABNORMAL
EGFR: 72 ML/MIN/1.73M2 — SIGNIFICANT CHANGE UP
EOSINOPHIL # BLD AUTO: 0.01 K/UL — SIGNIFICANT CHANGE UP (ref 0–0.7)
EOSINOPHIL NFR BLD AUTO: 0.2 % — SIGNIFICANT CHANGE UP (ref 0–8)
EPI CELLS # UR: 5 /HPF — SIGNIFICANT CHANGE UP (ref 0–5)
GLUCOSE SERPL-MCNC: 223 MG/DL — HIGH (ref 70–99)
GLUCOSE UR QL: ABNORMAL
HCT VFR BLD CALC: 36.3 % — LOW (ref 42–52)
HGB BLD-MCNC: 11.6 G/DL — LOW (ref 14–18)
HYALINE CASTS # UR AUTO: 2 /LPF — SIGNIFICANT CHANGE UP (ref 0–7)
IMM GRANULOCYTES NFR BLD AUTO: 0.7 % — HIGH (ref 0.1–0.3)
INR BLD: 1.88 RATIO — HIGH (ref 0.65–1.3)
KETONES UR-MCNC: NEGATIVE — SIGNIFICANT CHANGE UP
LEUKOCYTE ESTERASE UR-ACNC: NEGATIVE — SIGNIFICANT CHANGE UP
LYMPHOCYTES # BLD AUTO: 0.79 K/UL — LOW (ref 1.2–3.4)
LYMPHOCYTES # BLD AUTO: 14.7 % — LOW (ref 20.5–51.1)
MCHC RBC-ENTMCNC: 29.7 PG — SIGNIFICANT CHANGE UP (ref 27–31)
MCHC RBC-ENTMCNC: 32 G/DL — SIGNIFICANT CHANGE UP (ref 32–37)
MCV RBC AUTO: 93.1 FL — SIGNIFICANT CHANGE UP (ref 80–94)
MONOCYTES # BLD AUTO: 0.27 K/UL — SIGNIFICANT CHANGE UP (ref 0.1–0.6)
MONOCYTES NFR BLD AUTO: 5 % — SIGNIFICANT CHANGE UP (ref 1.7–9.3)
MRSA PCR RESULT.: NEGATIVE — SIGNIFICANT CHANGE UP
NEUTROPHILS # BLD AUTO: 4.26 K/UL — SIGNIFICANT CHANGE UP (ref 1.4–6.5)
NEUTROPHILS NFR BLD AUTO: 79.2 % — HIGH (ref 42.2–75.2)
NITRITE UR-MCNC: NEGATIVE — SIGNIFICANT CHANGE UP
NRBC # BLD: 0 /100 WBCS — SIGNIFICANT CHANGE UP (ref 0–0)
PH UR: 6 — SIGNIFICANT CHANGE UP (ref 5–8)
PLATELET # BLD AUTO: 241 K/UL — SIGNIFICANT CHANGE UP (ref 130–400)
PMV BLD: 11.3 FL — HIGH (ref 7.4–10.4)
POTASSIUM SERPL-MCNC: 4.4 MMOL/L — SIGNIFICANT CHANGE UP (ref 3.5–5)
POTASSIUM SERPL-SCNC: 4.4 MMOL/L — SIGNIFICANT CHANGE UP (ref 3.5–5)
PROT SERPL-MCNC: 6.4 G/DL — SIGNIFICANT CHANGE UP (ref 6–8)
PROT UR-MCNC: ABNORMAL
PROTHROM AB SERPL-ACNC: 21.9 SEC — HIGH (ref 9.95–12.87)
RBC # BLD: 3.9 M/UL — LOW (ref 4.7–6.1)
RBC # FLD: 13.5 % — SIGNIFICANT CHANGE UP (ref 11.5–14.5)
RBC CASTS # UR COMP ASSIST: 29 /HPF — HIGH (ref 0–4)
SODIUM SERPL-SCNC: 139 MMOL/L — SIGNIFICANT CHANGE UP (ref 135–146)
SP GR SPEC: 1.02 — SIGNIFICANT CHANGE UP (ref 1.01–1.03)
UROBILINOGEN FLD QL: SIGNIFICANT CHANGE UP
WBC # BLD: 5.38 K/UL — SIGNIFICANT CHANGE UP (ref 4.8–10.8)
WBC # FLD AUTO: 5.38 K/UL — SIGNIFICANT CHANGE UP (ref 4.8–10.8)
WBC UR QL: 6 /HPF — HIGH (ref 0–5)

## 2023-06-15 PROCEDURE — 85730 THROMBOPLASTIN TIME PARTIAL: CPT

## 2023-06-15 PROCEDURE — 86850 RBC ANTIBODY SCREEN: CPT

## 2023-06-15 PROCEDURE — 93005 ELECTROCARDIOGRAM TRACING: CPT

## 2023-06-15 PROCEDURE — 93010 ELECTROCARDIOGRAM REPORT: CPT

## 2023-06-15 PROCEDURE — 81001 URINALYSIS AUTO W/SCOPE: CPT

## 2023-06-15 PROCEDURE — 87086 URINE CULTURE/COLONY COUNT: CPT

## 2023-06-15 PROCEDURE — 80053 COMPREHEN METABOLIC PANEL: CPT

## 2023-06-15 PROCEDURE — 87640 STAPH A DNA AMP PROBE: CPT

## 2023-06-15 PROCEDURE — 87641 MR-STAPH DNA AMP PROBE: CPT

## 2023-06-15 PROCEDURE — 85610 PROTHROMBIN TIME: CPT

## 2023-06-15 PROCEDURE — 36415 COLL VENOUS BLD VENIPUNCTURE: CPT

## 2023-06-15 PROCEDURE — 86901 BLOOD TYPING SEROLOGIC RH(D): CPT

## 2023-06-15 PROCEDURE — 86900 BLOOD TYPING SEROLOGIC ABO: CPT

## 2023-06-15 PROCEDURE — 99214 OFFICE O/P EST MOD 30 MIN: CPT | Mod: 25

## 2023-06-15 PROCEDURE — 85025 COMPLETE CBC W/AUTO DIFF WBC: CPT

## 2023-06-15 NOTE — H&P PST ADULT - REASON FOR ADMISSION
Patient is a   89 year old  male presenting to PAST in preparation for   on   under anesthesia by Dr. Davila

## 2023-06-15 NOTE — H&P PST ADULT - HISTORY OF PRESENT ILLNESS
PATIENT CURRENTLY DENIES CHEST PAIN  SHORTNESS OF BREATH  PALPITATIONS,  DYSURIA, OR UPPER RESPIRATORY INFECTION IN PAST 2 WEEKS  EXERCISE  TOLERANCE  1- FLIGHT OF STAIRS  WITHOUT SHORTNESS OF BREATH  denies travel outside the [place] in the past 30 days  Patient denies any signs or symptoms of COVID 19 and denies contact with known positive individuals.  They have an appointment for repeat COVID testing pre-procedure and acknowledge its time and place.  They were instructed to quarantine pre-procedure, practice exposure control measures, continue to self-monitor and report any concerns to their proceduralist.  pt advised self quarantine till day of procedure  Anesthesia Alert  NO--Difficult Airway  NO--History of neck surgery or radiation  NO--Limited ROM of neck  NO--History of Malignant hyperthermia  NO--No personal or family history of Pseudocholinesterase deficiency.  NO--Prior Anesthesia Complication  NO--Latex Allergy  NO--Loose teeth  NO--History of Rheumatoid Arthritis  Bleeding risk eliquis/plavix  NO--MICA  NO--Other_____    PT DENIES ANY RASHES, ABRASION, OR OPEN WOUNDS OR CUTS    AS PER THE PT, THIS IS HIS/HER COMPLETE MEDICAL AND SURGICAL HX, INCLUDING MEDICATIONS PRESCRIBED AND OVER THE COUNTER    Patient verbalized understanding of instructions and was given the opportunity to ask questions and have them answered.    pt denies any suicidal ideation or thoughts, pt states not a threat to self or others    Breakdown (mechanical) of cardiac pulse generator (battery), initial encounter    Encounter for other preprocedural examination    Breakdown (mechanical) of cardiac pulse generator (battery), subsequent encounter    Asthma with COPD    HLD (hyperlipidemia)    Essential hypertension    Aortic stenosis, severe    A-fib    CHF, chronic    DM (diabetes mellitus)    Pacemaker    BPH (benign prostatic hyperplasia)    S/P CABG x 3    History of CEA (carotid endarterectomy)    History of permanent cardiac pacemaker placement    H/O aortic valve replacement    [email or web address]

## 2023-06-16 DIAGNOSIS — Z01.818 ENCOUNTER FOR OTHER PREPROCEDURAL EXAMINATION: ICD-10-CM

## 2023-06-16 DIAGNOSIS — T82.111D BREAKDOWN (MECHANICAL) OF CARDIAC PULSE GENERATOR (BATTERY), SUBSEQUENT ENCOUNTER: ICD-10-CM

## 2023-06-16 LAB
CULTURE RESULTS: NO GROWTH — SIGNIFICANT CHANGE UP
SPECIMEN SOURCE: SIGNIFICANT CHANGE UP

## 2023-07-13 ENCOUNTER — APPOINTMENT (OUTPATIENT)
Dept: ELECTROPHYSIOLOGY | Facility: HOSPITAL | Age: 88
End: 2023-07-13

## 2023-07-13 ENCOUNTER — OUTPATIENT (OUTPATIENT)
Dept: OUTPATIENT SERVICES | Facility: HOSPITAL | Age: 88
LOS: 1 days | Discharge: HOME | End: 2023-07-13
Payer: MEDICARE

## 2023-07-13 VITALS
DIASTOLIC BLOOD PRESSURE: 89 MMHG | RESPIRATION RATE: 16 BRPM | SYSTOLIC BLOOD PRESSURE: 195 MMHG | OXYGEN SATURATION: 98 % | HEART RATE: 69 BPM | HEIGHT: 69 IN | WEIGHT: 190.04 LBS

## 2023-07-13 DIAGNOSIS — T82.111A BREAKDOWN (MECHANICAL) OF CARDIAC PULSE GENERATOR (BATTERY), INITIAL ENCOUNTER: ICD-10-CM

## 2023-07-13 DIAGNOSIS — Z98.89 OTHER SPECIFIED POSTPROCEDURAL STATES: Chronic | ICD-10-CM

## 2023-07-13 DIAGNOSIS — Z95.1 PRESENCE OF AORTOCORONARY BYPASS GRAFT: Chronic | ICD-10-CM

## 2023-07-13 DIAGNOSIS — I44.2 ATRIOVENTRICULAR BLOCK, COMPLETE: ICD-10-CM

## 2023-07-13 DIAGNOSIS — Z95.0 PRESENCE OF CARDIAC PACEMAKER: Chronic | ICD-10-CM

## 2023-07-13 DIAGNOSIS — Z95.2 PRESENCE OF PROSTHETIC HEART VALVE: Chronic | ICD-10-CM

## 2023-07-13 DIAGNOSIS — Z45.010 ENCOUNTER FOR CHECKING AND TESTING OF CARDIAC PACEMAKER PULSE GENERATOR [BATTERY]: ICD-10-CM

## 2023-07-13 LAB — GLUCOSE BLDC GLUCOMTR-MCNC: 133 MG/DL — HIGH (ref 70–99)

## 2023-07-13 PROCEDURE — 82962 GLUCOSE BLOOD TEST: CPT

## 2023-07-13 PROCEDURE — C1889: CPT

## 2023-07-13 PROCEDURE — C1785: CPT

## 2023-07-13 PROCEDURE — 33228 REMV&REPLC PM GEN DUAL LEAD: CPT | Mod: KX

## 2023-07-13 RX ORDER — CEPHALEXIN 500 MG
1 CAPSULE ORAL
Qty: 10 | Refills: 0
Start: 2023-07-13 | End: 2023-07-17

## 2023-07-13 RX ORDER — CEFAZOLIN SODIUM 1 G
2000 VIAL (EA) INJECTION ONCE
Refills: 0 | Status: DISCONTINUED | OUTPATIENT
Start: 2023-07-13 | End: 2023-07-13

## 2023-07-13 RX ORDER — METFORMIN HYDROCHLORIDE 850 MG/1
1 TABLET ORAL
Qty: 0 | Refills: 0 | DISCHARGE

## 2023-07-13 RX ORDER — ZOLPIDEM TARTRATE 10 MG/1
1 TABLET ORAL
Qty: 0 | Refills: 0 | DISCHARGE

## 2023-07-13 RX ORDER — CEFAZOLIN SODIUM 1 G
1000 VIAL (EA) INJECTION ONCE
Refills: 0 | Status: DISCONTINUED | OUTPATIENT
Start: 2023-07-13 | End: 2023-07-13

## 2023-07-13 NOTE — PROGRESS NOTE ADULT - SUBJECTIVE AND OBJECTIVE BOX
Electrophysiology Brief Post-Op Note    I have personally seen and examined the patient.  I agree with the history and physical which I have reviewed and noted any changes below.  07-13-23 @ 9:00 AM    PRE-OP DIAGNOSIS: CHB, Pacemaker Near KALYAN    POST-OP DIAGNOSIS: CHB, Pacemaker Near KALYAN    PROCEDURE: Pacemaker Generator Change    Physician: Martha Lira MD  Assistant: None    ESTIMATED BLOOD LOSS:     5  mL    ANESTHESIA TYPE:  [  ] General Anesthesia  [ x ] Sedation  [ x ] Local/Regional    CONDITION  [  ] Critical  [  ] Serious  [  ] Fair  [ x ] Good      SPECIMENS REMOVED (IF APPLICABLE): Old Pacemaker Generator    IMPLANTS (IF APPLICABLE): DC PPM ([employer])    FINDINGS: CHB, Pacemaker Near KALYAN    COMPLICATIONS: None    PLAN OF CARE  - Keflex 500 mg PO BID x 5 days.  - No shower x 3 days.  - No swimming or hot tubs x 1 month.  - Follow up with me in the office in 3-4 weeks at [address] [place] [address], [address] (Phone [phone])  - Resume Eliquis Sat morning.     Martha Lira MD   Electrophysiology Attending               Electrophysiology Brief Post-Op Note    I have personally seen and examined the patient.  I agree with the history and physical which I have reviewed and noted any changes below.  07-13-23 @ 9:00 AM    PRE-OP DIAGNOSIS: CHB, Pacemaker Near KALYAN    POST-OP DIAGNOSIS: CHB, Pacemaker Near KALYAN    PROCEDURE: Pacemaker Generator Change    Physician: Martha Lira MD  Assistant: None    ESTIMATED BLOOD LOSS:     5  mL    ANESTHESIA TYPE:  [  ] General Anesthesia  [ x ] Sedation  [ x ] Local/Regional    CONDITION  [  ] Critical  [  ] Serious  [  ] Fair  [ x ] Good      SPECIMENS REMOVED (IF APPLICABLE): Old Pacemaker Generator    IMPLANTS (IF APPLICABLE): DC PPM ([employer])    FINDINGS: CHB, Pacemaker Near KALYAN    COMPLICATIONS: None    PLAN OF CARE  - Keflex 500 mg PO BID x 5 days.  - No shower x 3 days.  - No swimming or hot tubs x 1 month.  - Follow up with Dr. Tai in the office in 3-4 weeks   - Resume Eliquis Sat morning.     Martha Lira MD   Electrophysiology Attending

## 2023-07-13 NOTE — PRE PROCEDURE NOTE - PRE PROCEDURE EVALUATION
I have personally seen and examined the patient. I agree with the history and physical//consult//progress note, which I have reviewed and noted any changes below.     Pre-op Diagnosis: CHB, Pacemaker Near KALYAN    Procedure: Pacemaker Generator Change

## 2023-07-17 DIAGNOSIS — T82.111A BREAKDOWN (MECHANICAL) OF CARDIAC PULSE GENERATOR (BATTERY), INITIAL ENCOUNTER: ICD-10-CM
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
## 2023-08-18 ENCOUNTER — APPOINTMENT (OUTPATIENT)
Dept: ELECTROPHYSIOLOGY | Facility: CLINIC | Age: 88
End: 2023-08-18

## 2023-09-13 NOTE — CDI QUERY NOTE - NSCDIOTHERTXTBX_GEN_ALL_CORE_HH
Pre-Operative Diagnosis: Shoulder rotator cuff tear, subacromial impingement     Post-Operative Diagnosis: Shoulder rotator cuff tear, subacromial impingement      Procedure Performed:   Right shoulder arthroscopic rotator cuff debridement, subacromial decompression and inspace balloon procedure, biceps tenotomy    Surgeon(s) and Role:     * Chavo Reyes MD - Primary     * Paulino Tovar MD - Assisting Surgeon    Assistant(s):  PA: Arturo Scott PA-C     Surgical Findings: see dictation     Specimen: none     Estimated Blood Loss: No data recorded    Dictation Number:  -     Colton Nick MD  9/13/2023  3:37 PM CLINICAL INDICATORS  7/5 Chart Note-Event Note Rapid Res Resident: … hypoxia to 60% and increased work of breathing. 10L NC initiated, only improved saturation to ~80%. Rapid Response was called. Respiratory was called to bedside immediately, patient placed on a nonrebreather mask with improvement to 90%. During this time patient was hypertensive to 190's SBP and HR in the 60's …  On CXR patient appeared fluid overloaded, 40 of IV lasix given. Patient started on BiPAP with saturations >98%. Work of breathing improved. SICU consult placed for close hemodynamic and respiratory monitoring …    7/5 Chart Note-Event Note SICU Resident: … RT came to bedside and pateint was placed on NRM with further improvement to 90% and evetaully placed on BiPAP with sats to 98%. CXR was done and decision was made to give patient Lasix … Serum Pro-Brain Natriuretic Peptide: 2287 pg/mL (07-05-21 @ 07:16) … Observe patient on floor. c/w NC. monitor oxygen saturations … AM CXR. f/u cards.    7/5 Consult Note Adult-Cardiology Attending: … a rapid response was called and pt give oxygen and iv lasix and improved.  pt has spb of 190.  pt was thought to be in chf with possible flash pulmonary edema … Problem: Dyspnea …  pt had episode of desaturation with htn this am and thought to be flash pulmonary edema.  pt has diastolic dysfxn and was given iv hydration over the last 2 days … repeat troponin, bmp, cbc, probnp, lactate, inr this afternoon. consider tele monitor if deemed by surgical team that he does not require the sicu.    7/6 : … patient seen and examined this morning. patient feels fine with no more SOB or chest pain. sating fine on Rair. s/p IV lasix once … avoid excess IV hydration and can given as need lasix for fluids overload … AS s/p TAVR. AFib on coumadin , s/p PPM. HFmrEF              Based on your professional judgment and the clinical indicators, please clarify if the flash pulmonary edema diagnosis can be further specified as:    •  Flash pulmonary edema associated with fluid overload and decompensated systolic heart failure    •  Flash pulmonary edema associated with fluid overload, noncardiogenic    •  Other (please specify):    •  Clinically unable to determine      Thank you,  Ree Schultz, RN, CCS, CCDS  [phone] CLINICAL INDICATORS  7/5 Chart Note-Event Note Rapid Res Resident: … hypoxia to 60% and increased work of breathing. 10L NC initiated, only improved saturation to ~80%. Rapid Response was called. Respiratory was called to bedside immediately, patient placed on a nonrebreather mask with improvement to 90%. During this time patient was hypertensive to 190's SBP and HR in the 60's …  On CXR patient appeared fluid overloaded, 40 of IV lasix given. Patient started on BiPAP with saturations >98%. Work of breathing improved. SICU consult placed for close hemodynamic and respiratory monitoring …    7/5 Chart Note-Event Note SICU Resident: … RT came to bedside and pateint was placed on NRM with further improvement to 90% and evetaully placed on BiPAP with sats to 98%. CXR was done and decision was made to give patient Lasix … Serum Pro-Brain Natriuretic Peptide: 2287 pg/mL (07-05-21 @ 07:16) … Observe patient on floor. c/w NC. monitor oxygen saturations … AM CXR. f/u cards.    7/5 Consult Note Adult-Cardiology Attending: … a rapid response was called and pt give oxygen and iv lasix and improved.  pt has spb of 190.  pt was thought to be in chf with possible flash pulmonary edema … Problem: Dyspnea …  pt had episode of desaturation with htn this am and thought to be flash pulmonary edema.  pt has diastolic dysfxn and was given iv hydration over the last 2 days … repeat troponin, bmp, cbc, probnp, lactate, inr this afternoon. consider tele monitor if deemed by surgical team that he does not require the sicu.    7/6 Progress Note Adult-Cardiology Attending: … patient seen and examined this morning. patient feels fine with no more SOB or chest pain. sating fine on Rair. s/p IV lasix once … avoid excess IV hydration and can given as need lasix for fluids overload … AS s/p TAVR. AFib on coumadin , s/p PPM. HFmrEF              Based on your professional judgment and the clinical indicators, please clarify if the flash pulmonary edema diagnosis can be further specified as:    •  Flash pulmonary edema associated with fluid overload and decompensated systolic heart failure    •  Flash pulmonary edema associated with fluid overload, noncardiogenic    •  Other (please specify):    •  Clinically unable to determine      Thank you,  Ree Schultz RN, CCS, CCDS  [phone] CLINICAL INDICATORS  7/5 Chart Note-Event Note Rapid Res Resident: … hypoxia to 60% and increased work of breathing. 10L NC initiated, only improved saturation to ~80%. Rapid Response was called. Respiratory was called to bedside immediately, patient placed on a nonrebreather mask with improvement to 90%. During this time patient was hypertensive to 190's SBP and HR in the 60's …  On CXR patient appeared fluid overloaded, 40 of IV lasix given. Patient started on BiPAP with saturations >98%. Work of breathing improved. SICU consult placed for close hemodynamic and respiratory monitoring …    7/5 Chart Note-Event Note SICU Resident: … RT came to bedside and pateint was placed on NRM with further improvement to 90% and evetaully placed on BiPAP with sats to 98%. CXR was done and decision was made to give patient Lasix … Serum Pro-Brain Natriuretic Peptide: 2287 pg/mL (07-05-21 @ 07:16) … Observe patient on floor. c/w NC. monitor oxygen saturations … AM CXR. f/u cards.    7/5 Consult Note Adult-Cardiology Attending: … a rapid response was called and pt give oxygen and iv lasix and improved.  pt has spb of 190.  pt was thought to be in chf with possible flash pulmonary edema … Problem: Dyspnea …  pt had episode of desaturation with htn this am and thought to be flash pulmonary edema.  pt has diastolic dysfxn and was given iv hydration over the last 2 days … repeat troponin, bmp, cbc, probnp, lactate, inr this afternoon. consider tele monitor if deemed by surgical team that he does not require the sicu.    7/6 Progress Note Adult-Cardiology Attending: … patient seen and examined this morning. patient feels fine with no more SOB or chest pain. sating fine on Rair. s/p IV lasix once … avoid excess IV hydration and can given as need lasix for fluids overload … AS s/p TAVR. AFib on coumadin , s/p PPM. HFmrEF              Based on your professional judgment and the clinical indicators, please clarify if the flash pulmonary edema diagnosis can be further specified as:    •  Acute flash pulmonary edema associated with fluid overload and decompensated systolic heart failure    •  Acute flash pulmonary edema associated with fluid overload, noncardiogenic    •  Other (please specify):    •  Clinically unable to determine      Thank you,  Ree Schultz RN, CCS, CCDS  [phone]

## 2023-09-18 ENCOUNTER — APPOINTMENT (OUTPATIENT)
Dept: ELECTROPHYSIOLOGY | Facility: CLINIC | Age: 88
End: 2023-09-18
Payer: MEDICARE

## 2023-09-18 VITALS
WEIGHT: 186 LBS | SYSTOLIC BLOOD PRESSURE: 130 MMHG | HEIGHT: 70 IN | BODY MASS INDEX: 26.63 KG/M2 | DIASTOLIC BLOOD PRESSURE: 82 MMHG | HEART RATE: 63 BPM

## 2023-09-18 PROCEDURE — 93000 ELECTROCARDIOGRAM COMPLETE: CPT | Mod: 59

## 2023-09-18 PROCEDURE — 93280 PM DEVICE PROGR EVAL DUAL: CPT

## 2023-09-18 PROCEDURE — 99214 OFFICE O/P EST MOD 30 MIN: CPT | Mod: 24

## 2023-09-18 RX ORDER — SACUBITRIL AND VALSARTAN 24; 26 MG/1; MG/1
24-26 TABLET, FILM COATED ORAL
Qty: 180 | Refills: 0 | Status: ACTIVE | COMMUNITY
Start: 2023-09-05

## 2023-09-18 RX ORDER — FUROSEMIDE 20 MG/1
20 TABLET ORAL DAILY
Refills: 0 | Status: ACTIVE | COMMUNITY

## 2023-09-18 RX ORDER — ATORVASTATIN CALCIUM 20 MG/1
20 TABLET, FILM COATED ORAL DAILY
Refills: 0 | Status: ACTIVE | COMMUNITY

## 2023-09-18 RX ORDER — CLOPIDOGREL BISULFATE 75 MG/1
75 TABLET, FILM COATED ORAL
Qty: 90 | Refills: 0 | Status: ACTIVE | COMMUNITY
Start: 2023-07-13

## 2023-09-18 RX ORDER — TORSEMIDE 20 MG/1
20 TABLET ORAL DAILY
Refills: 0 | Status: DISCONTINUED | COMMUNITY
Start: 2022-04-28 | End: 2023-09-18

## 2023-09-18 RX ORDER — FLUTICASONE PROPIONATE 50 UG/1
50 SPRAY, METERED NASAL
Qty: 48 | Refills: 0 | Status: DISCONTINUED | COMMUNITY
Start: 2022-05-12 | End: 2023-09-18

## 2023-09-18 RX ORDER — FINASTERIDE 1 MG/1
1 TABLET ORAL
Qty: 90 | Refills: 0 | Status: DISCONTINUED | COMMUNITY
Start: 2022-04-18 | End: 2023-09-18

## 2023-09-18 RX ORDER — CHROMIUM 200 MCG
TABLET ORAL
Refills: 0 | Status: DISCONTINUED | COMMUNITY
End: 2023-09-18

## 2023-11-13 NOTE — DISCHARGE NOTE NURSING/CASE MANAGEMENT/SOCIAL WORK - NSDCPEFALRISK_GEN_ALL_CORE
For information on Fall & Injury Prevention, visit: [email or web address] OR  [email or web address] OR  https://www.cdc.gov/steadi/patient.html
Telemetry

## 2023-12-20 ENCOUNTER — NON-APPOINTMENT (OUTPATIENT)
Age: 88
End: 2023-12-20

## 2023-12-20 ENCOUNTER — APPOINTMENT (OUTPATIENT)
Dept: CARDIOLOGY | Facility: CLINIC | Age: 88
End: 2023-12-20
Payer: MEDICARE

## 2023-12-20 PROCEDURE — 93296 REM INTERROG EVL PM/IDS: CPT

## 2023-12-20 PROCEDURE — 93294 REM INTERROG EVL PM/LDLS PM: CPT

## 2024-03-14 ENCOUNTER — APPOINTMENT (OUTPATIENT)
Dept: ELECTROPHYSIOLOGY | Facility: CLINIC | Age: 89
End: 2024-03-14
Payer: MEDICARE

## 2024-03-14 VITALS
WEIGHT: 189 LBS | DIASTOLIC BLOOD PRESSURE: 86 MMHG | HEIGHT: 70 IN | BODY MASS INDEX: 27.06 KG/M2 | HEART RATE: 82 BPM | SYSTOLIC BLOOD PRESSURE: 136 MMHG | OXYGEN SATURATION: 98 %

## 2024-03-14 DIAGNOSIS — I47.29 OTHER VENTRICULAR TACHYCARDIA: ICD-10-CM

## 2024-03-14 DIAGNOSIS — I48.91 UNSPECIFIED ATRIAL FIBRILLATION: ICD-10-CM

## 2024-03-14 DIAGNOSIS — Z45.018 ENCOUNTER FOR ADJUSTMENT AND MANAGEMENT OF OTHER PART OF CARDIAC PACEMAKER: ICD-10-CM

## 2024-03-14 DIAGNOSIS — I10 ESSENTIAL (PRIMARY) HYPERTENSION: ICD-10-CM

## 2024-03-14 DIAGNOSIS — I44.2 ATRIOVENTRICULAR BLOCK, COMPLETE: ICD-10-CM

## 2024-03-14 DIAGNOSIS — Z79.01 LONG TERM (CURRENT) USE OF ANTICOAGULANTS: ICD-10-CM

## 2024-03-14 PROCEDURE — 99214 OFFICE O/P EST MOD 30 MIN: CPT

## 2024-03-14 PROCEDURE — 93280 PM DEVICE PROGR EVAL DUAL: CPT

## 2024-03-14 NOTE — REASON FOR VISIT
[Other ___] : [unfilled] [___ Device Check] : is here today for a [unfilled] device check for [Family Member] : family member

## 2024-03-20 PROBLEM — Z45.018 ENCOUNTER FOR INTERROGATION OF CARDIAC PACEMAKER: Status: ACTIVE | Noted: 2019-09-05

## 2024-03-20 PROBLEM — I47.29 NSVT (NONSUSTAINED VENTRICULAR TACHYCARDIA): Status: ACTIVE | Noted: 2019-09-05

## 2024-03-20 PROBLEM — Z79.01 LONG TERM (CURRENT) USE OF ANTICOAGULANTS: Status: ACTIVE | Noted: 2020-02-10

## 2024-03-20 NOTE — HISTORY OF PRESENT ILLNESS
[Palpitations] : no palpitations [SOB] : no dyspnea [Dizziness] : no dizziness [Syncope] : no syncope [de-identified] : EP: Dr. Tai Cardio: Dr. Armstrong  PCP: Dr. Gonsalez  88 yo M with h/o CAD s/p PCI with stent to OM 1, s/p 3v CABG (7/18/2011), AS S/P TAVR CHB, s/p PPM, paroxysmal AF prev on coumadin now Eliquis, HTN, here for routine follow up. Stable dyspnea.   The patient is feeling well and has no cardiac complaints. Denies CP, palpitations, SOB, dizziness, PELAYO, PND, syncope.

## 2024-03-20 NOTE — PROCEDURE
[Atrial Fibrillation] : atrial fibrillation [Pacemaker] : pacemaker [Medtronic] : Medtronic [DDD] : DDD [Voltage: ___ volts] : Voltage was [unfilled] volts [Longevity: ___ months] : The estimated remaining battery life is [unfilled] months [Magnet Rate: ___ Ppm] : magnet rate was [unfilled] Ppm [Threshold Testing Performed] : Threshold testing was performed [Ventricular] : Ventricular [Sensing Amplitude ___mv] : sensing amplitude was [unfilled] mv [Lead Imp:  ___ohms] : lead impedance was [unfilled] ohms [___V @] : [unfilled] V [___ ms] : [unfilled] ms [Asense-Vsense ___ %] : Asense-Vsense [unfilled]% [Programmed for Longevity] : output reprogrammed for improved battery longevity [Asense-Vpace ___ %] : Asense-Vpace [unfilled]% [Apace-Vpace ___ %] : Apace-Vpace [unfilled]% [Apace-Vsense ___ %] : Apace-Vsense [unfilled]% [de-identified] : <30 BPM [de-identified] : LEIGH ANN [de-identified] : Deonna MORA DR MRI [id number] [de-identified] : 7/13/2023 [de-identified] : [id number] [de-identified] : lowered sensitivity to 0.15mV, atrial capture management turned OFF [de-identified] :  [de-identified] : PM Dependent AT/AF 78.7%//18.9 hours/day average 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

## 2024-03-20 NOTE — CARDIOLOGY SUMMARY
[de-identified] : 9/18/2023 A-sensed, V-paced (HR 63 bpm) [de-identified] : ECHO 6/26/2021 LVEF 50-55%, mild LVH, MV grade 3 diastolic dysfunction, trace MVR, anterior and posterior MV leaflets are thickened and mildly restricted in movement, mild mitral stenosis, bioprosthetic aortic valve, trace central AR, mild-mod TR

## 2024-03-20 NOTE — PHYSICAL EXAM
[General Appearance - Well Developed] : well developed [Normal Appearance] : normal appearance [General Appearance - Well Nourished] : well nourished [Well Groomed] : well groomed [No Deformities] : no deformities [General Appearance - In No Acute Distress] : no acute distress [Heart Rate And Rhythm] : heart rate and rhythm were normal [Heart Sounds] : normal S1 and S2 [Murmurs] : no murmurs present [Edema] : no peripheral edema present [Respiration, Rhythm And Depth] : normal respiratory rhythm and effort [Exaggerated Use Of Accessory Muscles For Inspiration] : no accessory muscle use [Auscultation Breath Sounds / Voice Sounds] : lungs were clear to auscultation bilaterally [Left Infraclavicular] : left infraclavicular area [Clean] : clean [Dry] : dry [Healing Well] : healing well [Abdomen Soft] : soft [Abdomen Tenderness] : non-tender [Abdomen Mass (___ Cm)] : no abdominal mass palpated [Cyanosis, Localized] : no localized cyanosis [Nail Clubbing] : no clubbing of the fingernails [Petechial Hemorrhages (___cm)] : no petechial hemorrhages [] : no ischemic changes [FreeTextEntry1] : walks with walker

## 2024-03-20 NOTE — ASSESSMENT
[FreeTextEntry1] : # CHB s/p PPM s/p gen change 7/13/2023  - Device interrogation normal. I interrogated and reprogrammed the device as described above.  - 1 atrially driven event as noted above. Continue to monitor.  - Atrial threshold high due to likely 100% atrial flutter burden. Atrial sensitivity reprogrammed for true atrial burden as atrial beats were undersensed when set at 0.30mV but working well at 0.15mV. Consider VVIR programming at NOV if burden 100%.  - Remote monitor is set up and patient is transmitting.   # Paroxysmal AF, likely persistent now. - Cont Eliquis 5mg PO BID. Denies s/sx of bleeding. Formerly was on Coumadin.  - I recommend CBC, BMP at least yearly with PCP. - Cont Metoprolol Tartrate 50 mg BID  # HTN - BP elevated but generally well controlled. Due to advancing age, BP acceptable.  - Cont Entresto 24/26 mg BID, Metoprolol Tartrate 50 mg BID - 2g Na diet enforced  I have also advised the patient to go to the nearest emergency room if he experiences any chest pain, dyspnea, syncope, or has any other compelling symptoms.  Follow up in 6 mo with Dr. Tai/NP

## 2024-03-20 NOTE — REVIEW OF SYSTEMS
[Negative] : Neurological [Dizziness] : no dizziness [FreeTextEntry5] : per HPI [Weakness] : no weakness

## 2024-03-27 RX ORDER — APIXABAN 5 MG/1
5 TABLET, FILM COATED ORAL TWICE DAILY
Qty: 180 | Refills: 1 | Status: ACTIVE | COMMUNITY
Start: 1900-01-01 | End: 1900-01-01

## 2024-04-04 NOTE — PRE-ANESTHESIA EVALUATION ADULT - NSANTHASARD_GEN_ALL_CORE
Amount Injected: 40 mg
Ndc (80 Mg/0.8): [address]-46
Hide Non-Enhanced Ndc Variable: No
Detail Level: None
Expiration Date (Optional): 09/2025
J-Code: 
Administered By (Optional): GUSTAVO
Ndc (40 Mg/0.8): [address]-55
Ndc (40 Mg/0.4): [address]-07
Syringe Size Used (Required For Enhanced Ndc): 40 mg/0.8ml Single Use Pen
Use Enhanced Ndc?: Yes
Treatment Number (Optional): 4
3
Lot # (Optional): [id number]
Date Of Next Injection: 2 Weeks
3
Consent: The risks of pain and injection site reactions were reviewed with the patient prior to the injection.
J-Code Units: 1

## 2024-06-18 ENCOUNTER — APPOINTMENT (OUTPATIENT)
Dept: CARDIOLOGY | Facility: CLINIC | Age: 89
End: 2024-06-18
Payer: MEDICARE

## 2024-06-18 ENCOUNTER — NON-APPOINTMENT (OUTPATIENT)
Age: 89
End: 2024-06-18

## 2024-06-18 PROCEDURE — 93294 REM INTERROG EVL PM/LDLS PM: CPT

## 2024-06-18 PROCEDURE — 93296 REM INTERROG EVL PM/IDS: CPT

## 2024-08-13 NOTE — PRE-ANESTHESIA EVALUATION ADULT - MALLAMPATI CLASS
Cardiac Cath Lab Class IV (difficult) - the soft palate is not visible at all

## 2024-09-12 ENCOUNTER — APPOINTMENT (OUTPATIENT)
Dept: ELECTROPHYSIOLOGY | Facility: CLINIC | Age: 89
End: 2024-09-12
Payer: MEDICARE

## 2024-09-12 ENCOUNTER — NON-APPOINTMENT (OUTPATIENT)
Age: 89
End: 2024-09-12

## 2024-09-12 VITALS — DIASTOLIC BLOOD PRESSURE: 68 MMHG | SYSTOLIC BLOOD PRESSURE: 136 MMHG

## 2024-09-12 VITALS
SYSTOLIC BLOOD PRESSURE: 172 MMHG | HEART RATE: 65 BPM | HEIGHT: 70 IN | DIASTOLIC BLOOD PRESSURE: 60 MMHG | WEIGHT: 178 LBS | BODY MASS INDEX: 25.48 KG/M2

## 2024-09-12 DIAGNOSIS — I47.29 OTHER VENTRICULAR TACHYCARDIA: ICD-10-CM

## 2024-09-12 DIAGNOSIS — I48.91 UNSPECIFIED ATRIAL FIBRILLATION: ICD-10-CM

## 2024-09-12 DIAGNOSIS — I10 ESSENTIAL (PRIMARY) HYPERTENSION: ICD-10-CM

## 2024-09-12 DIAGNOSIS — I44.2 ATRIOVENTRICULAR BLOCK, COMPLETE: ICD-10-CM

## 2024-09-12 DIAGNOSIS — I50.32 CHRONIC DIASTOLIC (CONGESTIVE) HEART FAILURE: ICD-10-CM

## 2024-09-12 PROCEDURE — 93000 ELECTROCARDIOGRAM COMPLETE: CPT | Mod: 59

## 2024-09-12 PROCEDURE — 93280 PM DEVICE PROGR EVAL DUAL: CPT

## 2024-09-12 PROCEDURE — 99214 OFFICE O/P EST MOD 30 MIN: CPT

## 2024-09-12 NOTE — ASSESSMENT
[FreeTextEntry1] : # CHB s/p PPM s/p gen change 7/13/2023  - Device interrogation normal. I interrogated and reprogrammed the device as described above.  - Reprogrammed to VVIR due to AF burden almost 100%.  - The patient is on remote and transmitting.   # Persistent AF - Cont Eliquis 5mg PO BID. Denies s/sx of bleeding. Formerly was on Coumadin.  - I recommend CBC, BMP at least yearly with PCP. Blood work from 6/4/2024 reviewed. Hgb, creat WNL.  - Cont Metoprolol Tartrate 50 mg BID  # HTN - BP elevated but manual recheck improved. Due to advancing age, BP acceptable.  - Cont Entresto 24/26 mg BID, Metoprolol Tartrate 50 mg BID - 2g Na diet enforced. Hgb A1C 7.0. Low carb, diabetic diet enforced.   I have also advised the patient to go to the nearest emergency room if he experiences any chest pain, dyspnea, syncope, or has any other compelling symptoms.  Follow up in 9 mo with Dr. Tai/ [facility]

## 2024-09-12 NOTE — HISTORY OF PRESENT ILLNESS
[Palpitations] : no palpitations [SOB] : no dyspnea [Syncope] : no syncope [Dizziness] : no dizziness [de-identified] : EP: Dr. Tai Cardio: Dr. Armstrong  PCP: Dr. Gonsalez  89 yo M with h/o CAD s/p PCI with stent to OM 1, s/p 3v CABG (7/18/2011), AS S/P TAVR CHB, s/p PPM, paroxysmal AF prev on coumadin now Eliquis, HTN, here for routine follow up. Stable dyspnea.   The patient is feeling well and has no cardiac complaints. Denies CP, palpitations, SOB, dizziness, PELAYO, PND, syncope.

## 2024-09-12 NOTE — PROCEDURE
[Atrial Fibrillation] : atrial fibrillation [Pacemaker] : pacemaker [Medtronic] : Medtronic [DDD] : DDD [Voltage: ___ volts] : Voltage was [unfilled] volts [Magnet Rate: ___ Ppm] : magnet rate was [unfilled] Ppm [Longevity: ___ months] : The estimated remaining battery life is [unfilled] months [Threshold Testing Performed] : Threshold testing was performed [Ventricular] : Ventricular [Lead Imp:  ___ohms] : lead impedance was [unfilled] ohms [Sensing Amplitude ___mv] : sensing amplitude was [unfilled] mv [___V @] : [unfilled] V [___ ms] : [unfilled] ms [Programmed for Longevity] : output reprogrammed for improved battery longevity [Asense-Vsense ___ %] : Asense-Vsense [unfilled]% [Asense-Vpace ___ %] : Asense-Vpace [unfilled]% [Apace-Vsense ___ %] : Apace-Vsense [unfilled]% [Apace-Vpace ___ %] : Apace-Vpace [unfilled]% [de-identified] : <30 BPM [de-identified] : LEIGH ANN [de-identified] : Deonna MORA DR MRI [id number] [de-identified] : [id number] [de-identified] : 7/13/2023 [de-identified] :  [de-identified] : Changed to VVIR mode [de-identified] : AF burden 98.1% Transmitting on [facility]

## 2024-09-12 NOTE — CARDIOLOGY SUMMARY
[de-identified] : 9/12/2024: Afib, V-paced 65 bpm 9/18/2023 A-sensed, V-paced (HR 63 bpm) [de-identified] : ECHO 6/26/2021 LVEF 50-55%, mild LVH, MV grade 3 diastolic dysfunction, trace MVR, anterior and posterior MV leaflets are thickened and mildly restricted in movement, mild mitral stenosis, bioprosthetic aortic valve, trace central AR, mild-mod TR

## 2024-09-12 NOTE — PHYSICAL EXAM
[General Appearance - Well Developed] : well developed [Normal Appearance] : normal appearance [Well Groomed] : well groomed [General Appearance - Well Nourished] : well nourished [No Deformities] : no deformities [General Appearance - In No Acute Distress] : no acute distress [Heart Rate And Rhythm] : heart rate and rhythm were normal [Heart Sounds] : normal S1 and S2 [Murmurs] : no murmurs present [Edema] : no peripheral edema present [Respiration, Rhythm And Depth] : normal respiratory rhythm and effort [Exaggerated Use Of Accessory Muscles For Inspiration] : no accessory muscle use [Auscultation Breath Sounds / Voice Sounds] : lungs were clear to auscultation bilaterally [Left Infraclavicular] : left infraclavicular area [Clean] : clean [Dry] : dry [Healing Well] : healing well [Abdomen Soft] : soft [Abdomen Tenderness] : non-tender [Abdomen Mass (___ Cm)] : no abdominal mass palpated [Nail Clubbing] : no clubbing of the fingernails [Cyanosis, Localized] : no localized cyanosis [Petechial Hemorrhages (___cm)] : no petechial hemorrhages [] : no ischemic changes [FreeTextEntry1] : walks with walker

## 2024-09-12 NOTE — REVIEW OF SYSTEMS
[Negative] : Neurological [Dizziness] : no dizziness [Weakness] : no weakness [FreeTextEntry5] : per HPI

## 2024-09-27 NOTE — CONSULT NOTE ADULT - PROBLEM SELECTOR RECOMMENDATION 4
Hospitalist Consult Note    Patient:  Megha Guillen     YOB: 1951    MRN: [id number]   Admit date: 9/27/2024     Acct: [id number]     PCP: Ron Padgett MD    CC--Interval History: POD 0 left BUCK---9.27.2024--[facility]    Severe OA left hip----prior right BUCK--2023    Mild postoperative nausea----no vomitng    HTN    ASCVD    Asthma--controlled    IFG---BG = 169---goal 140-180    Obesity    CKD--Stage 3a---previously Stage 2----dehydration    Patient zumb-lwkzrajimv-gffqvcyo-available records reviewed, including, but not limited to, OR reports--labs--imaging---office records--EKGs--personal notes.    All other ROS negative except noted in HPI    Diet:  ADULT DIET; Regular    Medications:  Scheduled Meds:   hydroCHLOROthiazide  25 mg Oral Daily    losartan  100 mg Oral Daily    sodium chloride flush  5-40 mL IntraVENous 2 times per day    acetaminophen  650 mg Oral Q6H    aspirin  81 mg Oral BID    ceFAZolin  2,000 mg IntraVENous Q8H     Continuous Infusions:   sodium chloride      sodium chloride 100 mL/hr at 09/27/24 1639     PRN Meds:albuterol sulfate HFA, sodium chloride flush, sodium chloride, HYDROcodone 5 mg - acetaminophen **OR** HYDROcodone 5 mg - acetaminophen, magnesium hydroxide, promethazine **OR** ondansetron, HYDROmorphone    Objective:  Labs:  CBC with Differential:    Lab Results   Component Value Date/Time    WBC 18.7 09/27/2024 03:49 PM    RBC 4.65 09/27/2024 03:49 PM    HGB 12.4 09/27/2024 03:49 PM    HCT 38.4 09/27/2024 03:49 PM     09/27/2024 03:49 PM    MCV 82.6 09/27/2024 03:49 PM    MCH 26.7 09/27/2024 03:49 PM    MCHC 32.3 09/27/2024 03:49 PM    RDW 13.5 09/27/2024 03:49 PM    LYMPHOPCT 6 09/27/2024 03:49 PM    MONOPCT 2 09/27/2024 03:49 PM    EOSPCT 0 09/27/2024 03:49 PM    BASOPCT 0 09/27/2024 03:49 PM    MONOSABS 0.42 09/27/2024 03:49 PM    LYMPHSABS 1.19 09/27/2024 03:49 PM    EOSABS 0.03 09/27/2024 03:49 PM    BASOSABS 0.04 09/27/2024 03:49 PM    DIFFTYPE NOT REPORTED 
pt had episode of desaturation with htn this am and thought to be flash pulmonary edema.  pt has diastolic dysfxn and was given iv hydration over the last 2 days.  this may explain sxs partially.  unclear cause for lactate, low bicarb, and resultant respiratory alkalosis.  pt also has new wbc 23,000 /reactionary vs brewing infection.  pt will need further w/u   repeat troponin, bmp, cbc, probnp, lactate, inr this afternoon.  consider tele monitor if deemed by surgical team that he does not require the sicu.     discussed with house staff and family in detail

## 2024-11-08 ENCOUNTER — EMERGENCY (EMERGENCY)
Facility: HOSPITAL | Age: 89
LOS: 0 days | Discharge: HOME | End: 2024-11-08
Attending: EMERGENCY MEDICINE
Payer: MEDICARE

## 2024-11-08 VITALS
HEIGHT: 69 IN | DIASTOLIC BLOOD PRESSURE: 72 MMHG | RESPIRATION RATE: 18 BRPM | SYSTOLIC BLOOD PRESSURE: 155 MMHG | WEIGHT: 184.09 LBS | HEART RATE: 64 BPM | TEMPERATURE: 99 F

## 2024-11-08 DIAGNOSIS — Z95.0 PRESENCE OF CARDIAC PACEMAKER: Chronic | ICD-10-CM

## 2024-11-08 DIAGNOSIS — Z95.1 PRESENCE OF AORTOCORONARY BYPASS GRAFT: Chronic | ICD-10-CM

## 2024-11-08 DIAGNOSIS — Z98.89 OTHER SPECIFIED POSTPROCEDURAL STATES: Chronic | ICD-10-CM

## 2024-11-08 DIAGNOSIS — Z95.2 PRESENCE OF PROSTHETIC HEART VALVE: Chronic | ICD-10-CM

## 2024-11-08 PROCEDURE — 76770 US EXAM ABDO BACK WALL COMP: CPT | Mod: 26

## 2024-11-08 PROCEDURE — 99283 EMERGENCY DEPT VISIT LOW MDM: CPT | Mod: GC

## 2024-11-08 PROCEDURE — 99282 EMERGENCY DEPT VISIT SF MDM: CPT | Mod: 25

## 2024-11-08 PROCEDURE — 76770 US EXAM ABDO BACK WALL COMP: CPT

## 2024-11-08 NOTE — ED PROVIDER NOTE - PHYSICAL EXAMINATION
VITAL SIGNS: I have reviewed nursing notes and confirm.  CONSTITUTIONAL: Well-appearing, non-toxic, in NAD  SKIN: Warm dry, normal skin turgor  HEAD: NCAT  EYES: No conjunctival injection, scleral anicteric  ENT: Moist mucous membranes, normal pharynx with no erythema or exudates  NECK: Supple; full ROM. Nontender. No cervical LAD  CARD: RRR, no murmurs, rubs or gallops  RESP: Clear to ausculation bilaterally.  No rales, rhonchi, or wheezing.  ABD: Soft, non-distended, non-tender, no rebound or guarding. No CVA tenderness  FU: pizano catheter in place and balloon properly inflated as confirmed on bedside ultrasound

## 2024-11-08 NOTE — ED PROVIDER NOTE - CARE PROVIDER_API CALL
Victor Manuel Liang  Urology  [address] [facility] [place] [address]  Phone: [phone]  Fax: [phone]  Follow Up Time: 1-3 Days

## 2024-11-08 NOTE — ED ADULT TRIAGE NOTE - CHIEF COMPLAINT QUOTE
Pt c/o catheter displacement. Pt had removal of cancerous growth 11/7/24. As per son, pt's catheter this coming Monday Pt c/o catheter displacement/ urinary catheter complications. Pt had removal of cancerous growth 11/7/24. As per son, pt's catheter is supposed to be removed this coming Monday however pt has been having decreased urinary output

## 2024-11-08 NOTE — ED PROVIDER NOTE - NSFOLLOWUPINSTRUCTIONS_ED_ALL_ED_FT
Drew Catheter Care, Adult    A Drew catheter is a soft, flexible tube that is placed into the bladder to drain urine. A Drew catheter may be inserted if:    You leak urine or are not able to control when you urinate (urinary incontinence).  You are not able to urinate when you need to (urinary retention).   You had prostate surgery or surgery on the genitals.  You have certain medical conditions, such as multiple sclerosis, dementia, or a spinal cord injury.    If you are going home with a Drew catheter in place, follow the instructions below.    TAKING CARE OF THE CATHETER   Wash your hands with soap and water.   Using mild soap and warm water on a clean washcloth:    Clean the area on your body closest to the catheter insertion site using a circular motion, moving away from the catheter. Never wipe toward the catheter because this could sweep bacteria up into the urethra and cause infection.   Remove all traces of soap. Pat the area dry with a clean towel. For males, reposition the foreskin.    Attach the catheter to your leg so there is no tension on the catheter. Use adhesive tape or a leg strap. If you are using adhesive tape, remove any sticky residue left behind by the previous tape you used.   Keep the drainage bag below the level of the bladder, but keep it off the floor.   Check throughout the day to be sure the catheter is working and urine is draining freely. Make sure the tubing does not become kinked.  Do not pull on the catheter or try to remove it. Pulling could damage internal tissues.    TAKING CARE OF THE DRAINAGE BAGS  You will be given two drainage bags to take home. One is a large overnight drainage bag, and the other is a smaller leg bag that fits underneath clothing. You may wear the overnight bag at any time, but you should never wear the smaller leg bag at night. Follow the instructions below for how to empty, change, and clean your drainage bags.    Emptying the Drainage Bag    You must empty your drainage bag when it is ?–½ full or at least 2–3 times a day.     Wash your hands with soap and water.   Keep the drainage bag below your hips, below the level of your bladder. This stops urine from going back into the tubing and into your bladder.    Hold the dirty bag over the toilet or a clean container.   Open the pour spout at the bottom of the bag and empty the urine into the toilet or container. Do not let the pour spout touch the toilet, container, or any other surface. Doing so can place bacteria on the bag, which can cause an infection.   Clean the pour spout with a gauze pad or cotton ball that has rubbing alcohol on it.   Close the pour spout.   Attach the bag to your leg with adhesive tape or a leg strap.   Wash your hands well.    Changing the Drainage Bag    Change your drainage bag once a month or sooner if it starts to smell bad or look dirty. Below are steps to follow when changing the drainage bag.     Wash your hands with soap and water.   Pinch off the rubber catheter so that urine does not spill out.   Disconnect the catheter tube from the drainage tube at the connection valve. Do not let the tubes touch any surface.    Clean the end of the catheter tube with an alcohol wipe. Use a different alcohol wipe to clean the end of the drainage tube.   Connect the catheter tube to the drainage tube of the clean drainage bag.   Attach the new bag to the leg with adhesive tape or a leg strap. Avoid attaching the new bag too tightly.    Wash your hands well.    Cleaning the Drainage Bag     Wash your hands with soap and water.   Wash the bag in warm, soapy water.   Rinse the bag thoroughly with warm water.   Fill the bag with a solution of white vinegar and water (1 cup vinegar to 1 qt warm water [.2 L vinegar to 1 L warm water]). Close the bag and soak it for 30 minutes in the solution.    Rinse the bag with warm water.    Hang the bag to dry with the pour spout open and hanging downward.   Store the clean bag (once it is dry) in a clean plastic bag.   Wash your hands well.     PREVENTING INFECTION  Wash your hands before and after handling your catheter.  Take showers daily and wash the area where the catheter enters your body. Do not take baths. Replace wet leg straps with dry ones, if this applies.  Do not use powders, sprays, or lotions on the genital area. Only use creams, lotions, or ointments as directed by your caregiver.  For females, wipe from front to back after each bowel movement.   Drink enough fluids to keep your urine clear or pale yellow unless you have a fluid restriction.   Do not let the drainage bag or tubing touch or lie on the floor.   Wear cotton underwear to absorb moisture and to keep your .    SEEK MEDICAL CARE IF:  Your urine is cloudy or smells unusually bad.  Your catheter becomes clogged.  You are not draining urine into the bag or your bladder feels full.  Your catheter starts to leak.    SEEK IMMEDIATE MEDICAL CARE IF:  You have pain, swelling, redness, or pus where the catheter enters the body.  You have pain in the abdomen, legs, lower back, or bladder.  You have a fever.  You see blood fill the catheter, or your urine is pink or red.  You have nausea, vomiting, or chills.  Your catheter gets pulled out.    MAKE SURE YOU:  Understand these instructions.  Will watch your condition.  Will get help right away if you are not doing well or get worse.    ADDITIONAL NOTES AND INSTRUCTIONS    Please follow up with your Primary MD in 24-48 hr.  Seek immediate medical care for any new/worsening signs or symptoms.

## 2024-11-08 NOTE — ED ADULT NURSE NOTE - OBJECTIVE STATEMENT
Pt c/o of leaking of urine around penial area with pizano in place, pt endorses bladder surgery yesterday. Denies fever, chills

## 2024-11-08 NOTE — ED PROVIDER NOTE - PATIENT PORTAL LINK FT
You can access the FollowMyHealth Patient Portal offered by [facility] by registering at the following website: [email or web address]. By joining [facility]’s FollowMyHealth portal, you will also be able to view your health information using other applications (apps) compatible with our system.

## 2024-11-08 NOTE — ED PROVIDER NOTE - OBJECTIVE STATEMENT
Patient is a 89 year old male with PMH of BPH, Asthma, Atrial Fibrillation, HLD, and DM presenting for pizano catheter complications. Patient recently had a pizano catheter placed on 11/7 s/p a bladder procedure. Approximately one hour prior to arrival to the ED patient reports his pizano catheter stopped draining and he is now having leakage around the urethral opening. Patient denies abdominal pain, flank tenderness, fevers, nausea, vomiting, or additional concerns.

## 2024-11-08 NOTE — ED ADULT NURSE NOTE - CHIEF COMPLAINT QUOTE
Pt c/o catheter displacement/ urinary catheter complications. Pt had removal of cancerous growth 11/7/24. As per son, pt's catheter is supposed to be removed this coming Monday however pt has been having decreased urinary output

## 2024-11-10 ENCOUNTER — EMERGENCY (EMERGENCY)
Facility: HOSPITAL | Age: 88
LOS: 0 days | Discharge: HOME | End: 2024-11-10
Attending: EMERGENCY MEDICINE
Payer: MEDICARE

## 2024-11-10 VITALS
SYSTOLIC BLOOD PRESSURE: 198 MMHG | HEART RATE: 65 BPM | DIASTOLIC BLOOD PRESSURE: 77 MMHG | HEIGHT: 69 IN | WEIGHT: 184.09 LBS | RESPIRATION RATE: 17 BRPM | OXYGEN SATURATION: 99 % | TEMPERATURE: 98 F

## 2024-11-10 DIAGNOSIS — T83.091A OTHER MECHANICAL COMPLICATION OF INDWELLING URETHRAL CATHETER, INITIAL ENCOUNTER: ICD-10-CM

## 2024-11-10 DIAGNOSIS — Z85.51 PERSONAL HISTORY OF MALIGNANT NEOPLASM OF BLADDER: ICD-10-CM

## 2024-11-10 DIAGNOSIS — I10 ESSENTIAL (PRIMARY) HYPERTENSION: ICD-10-CM

## 2024-11-10 DIAGNOSIS — Z91.041 RADIOGRAPHIC DYE ALLERGY STATUS: ICD-10-CM

## 2024-11-10 DIAGNOSIS — Z95.1 PRESENCE OF AORTOCORONARY BYPASS GRAFT: Chronic | ICD-10-CM

## 2024-11-10 DIAGNOSIS — Z98.89 OTHER SPECIFIED POSTPROCEDURAL STATES: Chronic | ICD-10-CM

## 2024-11-10 DIAGNOSIS — Z95.2 PRESENCE OF PROSTHETIC HEART VALVE: Chronic | ICD-10-CM

## 2024-11-10 DIAGNOSIS — Z95.0 PRESENCE OF CARDIAC PACEMAKER: Chronic | ICD-10-CM

## 2024-11-10 PROCEDURE — 99282 EMERGENCY DEPT VISIT SF MDM: CPT

## 2024-11-10 PROCEDURE — 99283 EMERGENCY DEPT VISIT LOW MDM: CPT | Mod: GC

## 2024-12-13 ENCOUNTER — APPOINTMENT (OUTPATIENT)
Dept: CARDIOLOGY | Facility: CLINIC | Age: 88
End: 2024-12-13
Payer: MEDICARE

## 2024-12-13 ENCOUNTER — NON-APPOINTMENT (OUTPATIENT)
Age: 88
End: 2024-12-13

## 2024-12-13 PROCEDURE — 93296 REM INTERROG EVL PM/IDS: CPT

## 2024-12-13 PROCEDURE — 93294 REM INTERROG EVL PM/LDLS PM: CPT

## 2025-03-14 ENCOUNTER — NON-APPOINTMENT (OUTPATIENT)
Age: 89
End: 2025-03-14

## 2025-03-14 ENCOUNTER — APPOINTMENT (OUTPATIENT)
Dept: CARDIOLOGY | Facility: CLINIC | Age: 89
End: 2025-03-14
Payer: MEDICARE

## 2025-03-14 PROCEDURE — 93296 REM INTERROG EVL PM/IDS: CPT

## 2025-03-14 PROCEDURE — 93294 REM INTERROG EVL PM/LDLS PM: CPT

## 2025-06-12 ENCOUNTER — APPOINTMENT (OUTPATIENT)
Dept: ELECTROPHYSIOLOGY | Facility: CLINIC | Age: 89
End: 2025-06-12
Payer: MEDICARE

## 2025-06-12 ENCOUNTER — NON-APPOINTMENT (OUTPATIENT)
Age: 89
End: 2025-06-12

## 2025-06-12 VITALS
WEIGHT: 185 LBS | HEIGHT: 70 IN | SYSTOLIC BLOOD PRESSURE: 170 MMHG | DIASTOLIC BLOOD PRESSURE: 80 MMHG | BODY MASS INDEX: 26.48 KG/M2 | HEART RATE: 64 BPM

## 2025-06-12 PROCEDURE — 93280 PM DEVICE PROGR EVAL DUAL: CPT

## 2025-06-12 PROCEDURE — 93000 ELECTROCARDIOGRAM COMPLETE: CPT | Mod: 59

## 2025-06-12 PROCEDURE — 99214 OFFICE O/P EST MOD 30 MIN: CPT

## 2025-07-07 ENCOUNTER — INPATIENT (INPATIENT)
Facility: HOSPITAL | Age: 89
LOS: 1 days | Discharge: HOME HEALTH | DRG: 291 | End: 2025-07-09
Attending: STUDENT IN AN ORGANIZED HEALTH CARE EDUCATION/TRAINING PROGRAM | Admitting: STUDENT IN AN ORGANIZED HEALTH CARE EDUCATION/TRAINING PROGRAM
Payer: MEDICARE

## 2025-07-07 VITALS
HEART RATE: 65 BPM | TEMPERATURE: 98 F | WEIGHT: 184.09 LBS | RESPIRATION RATE: 18 BRPM | SYSTOLIC BLOOD PRESSURE: 155 MMHG | OXYGEN SATURATION: 99 % | HEIGHT: 70 IN | DIASTOLIC BLOOD PRESSURE: 72 MMHG

## 2025-07-07 DIAGNOSIS — Z98.89 OTHER SPECIFIED POSTPROCEDURAL STATES: Chronic | ICD-10-CM

## 2025-07-07 DIAGNOSIS — Z95.1 PRESENCE OF AORTOCORONARY BYPASS GRAFT: Chronic | ICD-10-CM

## 2025-07-07 DIAGNOSIS — I50.9 HEART FAILURE, UNSPECIFIED: ICD-10-CM

## 2025-07-07 DIAGNOSIS — Z95.0 PRESENCE OF CARDIAC PACEMAKER: Chronic | ICD-10-CM

## 2025-07-07 DIAGNOSIS — Z95.2 PRESENCE OF PROSTHETIC HEART VALVE: Chronic | ICD-10-CM

## 2025-07-07 LAB
ALBUMIN SERPL ELPH-MCNC: 4.2 G/DL — SIGNIFICANT CHANGE UP (ref 3.5–5.2)
ALP SERPL-CCNC: 214 U/L — HIGH (ref 30–115)
ALT FLD-CCNC: 24 U/L — SIGNIFICANT CHANGE UP (ref 0–41)
ANION GAP SERPL CALC-SCNC: 15 MMOL/L — HIGH (ref 7–14)
AST SERPL-CCNC: 33 U/L — SIGNIFICANT CHANGE UP (ref 0–41)
BASOPHILS # BLD AUTO: 0.05 K/UL — SIGNIFICANT CHANGE UP (ref 0–0.2)
BASOPHILS NFR BLD AUTO: 0.6 % — SIGNIFICANT CHANGE UP (ref 0–1)
BILIRUB SERPL-MCNC: 0.9 MG/DL — SIGNIFICANT CHANGE UP (ref 0.2–1.2)
BUN SERPL-MCNC: 19 MG/DL — SIGNIFICANT CHANGE UP (ref 10–20)
CALCIUM SERPL-MCNC: 9.8 MG/DL — SIGNIFICANT CHANGE UP (ref 8.4–10.5)
CHLORIDE SERPL-SCNC: 104 MMOL/L — SIGNIFICANT CHANGE UP (ref 98–110)
CO2 SERPL-SCNC: 20 MMOL/L — SIGNIFICANT CHANGE UP (ref 17–32)
CREAT SERPL-MCNC: 1.2 MG/DL — SIGNIFICANT CHANGE UP (ref 0.7–1.5)
EGFR: 57 ML/MIN/1.73M2 — LOW
EGFR: 57 ML/MIN/1.73M2 — LOW
EOSINOPHIL # BLD AUTO: 0.2 K/UL — SIGNIFICANT CHANGE UP (ref 0–0.7)
EOSINOPHIL NFR BLD AUTO: 2.3 % — SIGNIFICANT CHANGE UP (ref 0–8)
FLUAV AG NPH QL: SIGNIFICANT CHANGE UP
FLUBV AG NPH QL: SIGNIFICANT CHANGE UP
GAS PNL BLDV: SIGNIFICANT CHANGE UP
GLUCOSE SERPL-MCNC: 118 MG/DL — HIGH (ref 70–99)
HCT VFR BLD CALC: 36.3 % — LOW (ref 42–52)
HGB BLD-MCNC: 11.1 G/DL — LOW (ref 14–18)
IMM GRANULOCYTES NFR BLD AUTO: 0.6 % — HIGH (ref 0.1–0.3)
LYMPHOCYTES # BLD AUTO: 1.18 K/UL — LOW (ref 1.2–3.4)
LYMPHOCYTES # BLD AUTO: 13.4 % — LOW (ref 20.5–51.1)
MCHC RBC-ENTMCNC: 26.8 PG — LOW (ref 27–31)
MCHC RBC-ENTMCNC: 30.6 G/DL — LOW (ref 32–37)
MCV RBC AUTO: 87.7 FL — SIGNIFICANT CHANGE UP (ref 80–94)
MONOCYTES # BLD AUTO: 0.9 K/UL — HIGH (ref 0.1–0.6)
MONOCYTES NFR BLD AUTO: 10.2 % — HIGH (ref 1.7–9.3)
NEUTROPHILS # BLD AUTO: 6.43 K/UL — SIGNIFICANT CHANGE UP (ref 1.4–6.5)
NEUTROPHILS NFR BLD AUTO: 72.9 % — SIGNIFICANT CHANGE UP (ref 42.2–75.2)
NRBC BLD AUTO-RTO: 0 /100 WBCS — SIGNIFICANT CHANGE UP (ref 0–0)
NT-PROBNP SERPL-SCNC: 9384 PG/ML — HIGH (ref 0–300)
PLATELET # BLD AUTO: 225 K/UL — SIGNIFICANT CHANGE UP (ref 130–400)
PMV BLD: 11.3 FL — HIGH (ref 7.4–10.4)
POTASSIUM SERPL-MCNC: 4.9 MMOL/L — SIGNIFICANT CHANGE UP (ref 3.5–5)
POTASSIUM SERPL-SCNC: 4.9 MMOL/L — SIGNIFICANT CHANGE UP (ref 3.5–5)
PROT SERPL-MCNC: 7.2 G/DL — SIGNIFICANT CHANGE UP (ref 6–8)
RBC # BLD: 4.14 M/UL — LOW (ref 4.7–6.1)
RBC # FLD: 14.7 % — HIGH (ref 11.5–14.5)
RSV RNA NPH QL NAA+NON-PROBE: SIGNIFICANT CHANGE UP
SARS-COV-2 RNA SPEC QL NAA+PROBE: SIGNIFICANT CHANGE UP
SODIUM SERPL-SCNC: 139 MMOL/L — SIGNIFICANT CHANGE UP (ref 135–146)
SOURCE RESPIRATORY: SIGNIFICANT CHANGE UP
TROPONIN T, HIGH SENSITIVITY RESULT: 55 NG/L — CRITICAL HIGH (ref 6–21)
TROPONIN T, HIGH SENSITIVITY RESULT: 58 NG/L — CRITICAL HIGH (ref 6–21)
WBC # BLD: 8.81 K/UL — SIGNIFICANT CHANGE UP (ref 4.8–10.8)
WBC # FLD AUTO: 8.81 K/UL — SIGNIFICANT CHANGE UP (ref 4.8–10.8)

## 2025-07-07 PROCEDURE — 84132 ASSAY OF SERUM POTASSIUM: CPT

## 2025-07-07 PROCEDURE — 85014 HEMATOCRIT: CPT

## 2025-07-07 PROCEDURE — 87637 SARSCOV2&INF A&B&RSV AMP PRB: CPT

## 2025-07-07 PROCEDURE — 83036 HEMOGLOBIN GLYCOSYLATED A1C: CPT

## 2025-07-07 PROCEDURE — 93970 EXTREMITY STUDY: CPT | Mod: 26

## 2025-07-07 PROCEDURE — 80061 LIPID PANEL: CPT

## 2025-07-07 PROCEDURE — 83540 ASSAY OF IRON: CPT

## 2025-07-07 PROCEDURE — 93010 ELECTROCARDIOGRAM REPORT: CPT

## 2025-07-07 PROCEDURE — 84100 ASSAY OF PHOSPHORUS: CPT

## 2025-07-07 PROCEDURE — 99223 1ST HOSP IP/OBS HIGH 75: CPT

## 2025-07-07 PROCEDURE — 82728 ASSAY OF FERRITIN: CPT

## 2025-07-07 PROCEDURE — 83605 ASSAY OF LACTIC ACID: CPT

## 2025-07-07 PROCEDURE — 99285 EMERGENCY DEPT VISIT HI MDM: CPT

## 2025-07-07 PROCEDURE — 82803 BLOOD GASES ANY COMBINATION: CPT

## 2025-07-07 PROCEDURE — 71045 X-RAY EXAM CHEST 1 VIEW: CPT

## 2025-07-07 PROCEDURE — 83550 IRON BINDING TEST: CPT

## 2025-07-07 PROCEDURE — 80053 COMPREHEN METABOLIC PANEL: CPT

## 2025-07-07 PROCEDURE — 93306 TTE W/DOPPLER COMPLETE: CPT

## 2025-07-07 PROCEDURE — 76604 US EXAM CHEST: CPT

## 2025-07-07 PROCEDURE — 71045 X-RAY EXAM CHEST 1 VIEW: CPT | Mod: 26

## 2025-07-07 PROCEDURE — 82553 CREATINE MB FRACTION: CPT

## 2025-07-07 PROCEDURE — 85025 COMPLETE CBC W/AUTO DIFF WBC: CPT

## 2025-07-07 PROCEDURE — 84484 ASSAY OF TROPONIN QUANT: CPT

## 2025-07-07 PROCEDURE — 93280 PM DEVICE PROGR EVAL DUAL: CPT

## 2025-07-07 PROCEDURE — 76604 US EXAM CHEST: CPT | Mod: 26

## 2025-07-07 PROCEDURE — 84443 ASSAY THYROID STIM HORMONE: CPT

## 2025-07-07 PROCEDURE — 36415 COLL VENOUS BLD VENIPUNCTURE: CPT

## 2025-07-07 PROCEDURE — 85018 HEMOGLOBIN: CPT

## 2025-07-07 PROCEDURE — 82550 ASSAY OF CK (CPK): CPT

## 2025-07-07 PROCEDURE — 83735 ASSAY OF MAGNESIUM: CPT

## 2025-07-07 PROCEDURE — 84295 ASSAY OF SERUM SODIUM: CPT

## 2025-07-07 PROCEDURE — 93970 EXTREMITY STUDY: CPT

## 2025-07-07 PROCEDURE — 82330 ASSAY OF CALCIUM: CPT

## 2025-07-07 RX ORDER — AMLODIPINE BESYLATE 10 MG/1
5 TABLET ORAL DAILY
Refills: 0 | Status: DISCONTINUED | OUTPATIENT
Start: 2025-07-07 | End: 2025-07-07

## 2025-07-07 RX ORDER — FUROSEMIDE 10 MG/ML
40 INJECTION INTRAMUSCULAR; INTRAVENOUS EVERY 12 HOURS
Refills: 0 | Status: DISCONTINUED | OUTPATIENT
Start: 2025-07-08 | End: 2025-07-08

## 2025-07-07 RX ORDER — ATORVASTATIN CALCIUM 80 MG/1
40 TABLET, FILM COATED ORAL AT BEDTIME
Refills: 0 | Status: DISCONTINUED | OUTPATIENT
Start: 2025-07-07 | End: 2025-07-09

## 2025-07-07 RX ORDER — FINASTERIDE 1 MG/1
5 TABLET, FILM COATED ORAL DAILY
Refills: 0 | Status: DISCONTINUED | OUTPATIENT
Start: 2025-07-07 | End: 2025-07-07

## 2025-07-07 RX ORDER — MAGNESIUM, ALUMINUM HYDROXIDE 200-200 MG
30 TABLET,CHEWABLE ORAL EVERY 4 HOURS
Refills: 0 | Status: DISCONTINUED | OUTPATIENT
Start: 2025-07-07 | End: 2025-07-09

## 2025-07-07 RX ORDER — METOPROLOL SUCCINATE 50 MG/1
50 TABLET, EXTENDED RELEASE ORAL
Refills: 0 | Status: DISCONTINUED | OUTPATIENT
Start: 2025-07-07 | End: 2025-07-09

## 2025-07-07 RX ORDER — FUROSEMIDE 10 MG/ML
40 INJECTION INTRAMUSCULAR; INTRAVENOUS ONCE
Refills: 0 | Status: COMPLETED | OUTPATIENT
Start: 2025-07-07 | End: 2025-07-07

## 2025-07-07 RX ORDER — HEPARIN SODIUM 1000 [USP'U]/ML
5000 INJECTION INTRAVENOUS; SUBCUTANEOUS EVERY 12 HOURS
Refills: 0 | Status: DISCONTINUED | OUTPATIENT
Start: 2025-07-07 | End: 2025-07-08

## 2025-07-07 RX ORDER — TAMSULOSIN HYDROCHLORIDE 0.4 MG/1
0.4 CAPSULE ORAL AT BEDTIME
Refills: 0 | Status: DISCONTINUED | OUTPATIENT
Start: 2025-07-07 | End: 2025-07-07

## 2025-07-07 RX ORDER — ACETAMINOPHEN 500 MG/5ML
650 LIQUID (ML) ORAL EVERY 6 HOURS
Refills: 0 | Status: DISCONTINUED | OUTPATIENT
Start: 2025-07-07 | End: 2025-07-09

## 2025-07-07 RX ORDER — ONDANSETRON HCL/PF 4 MG/2 ML
4 VIAL (ML) INJECTION EVERY 8 HOURS
Refills: 0 | Status: DISCONTINUED | OUTPATIENT
Start: 2025-07-07 | End: 2025-07-09

## 2025-07-07 RX ORDER — MELATONIN 5 MG
3 TABLET ORAL AT BEDTIME
Refills: 0 | Status: DISCONTINUED | OUTPATIENT
Start: 2025-07-07 | End: 2025-07-09

## 2025-07-07 RX ADMIN — METOPROLOL SUCCINATE 50 MILLIGRAM(S): 50 TABLET, EXTENDED RELEASE ORAL at 20:40

## 2025-07-07 RX ADMIN — FUROSEMIDE 40 MILLIGRAM(S): 10 INJECTION INTRAMUSCULAR; INTRAVENOUS at 14:40

## 2025-07-07 NOTE — H&P ADULT - HISTORY OF PRESENT ILLNESS
A 89-year-old male with a history of aortic stenosis status post TAVR in 2016, coronary artery disease status post PCI with DESx3 in prox, mid, distal SVG to OM1 2/2 UA in 2023 and CABG (LIMA to LAD, saphenous vein grafts to OM1 and PDA), chronic atrial fibrillation previously on Coumadin, currently on Eliquis with prior MAZE procedure and permanent pacemaker, heart failure with reduced EF (30–35% in 2023), carotid artery disease status post right carotid endarterectomy in 2013, hypertension, dyslipidemia, and COPD, presents with a 5-day history of worsening shortness of breath, inability to finish sentences, new lower extremity edema, orthopnea, and PND, now unable to climb a full flight of stairs or walk his usual distance. He denies fever, chills, chest pain, GI or  symptoms, and has been off Eliquis and Plavix since July 4 due to ongoing bladder cancer treatment, planning to resume them on July 9. He is not on home diuretics. Patient follows with Dr. Vasques on OP.     A 89-year-old male with a history of aortic stenosis status post TAVR in 2016, coronary artery disease status post PCI with DESx3 in prox, mid, distal SVG to OM1 2/2 UA in 2023 and CABG (LIMA to LAD, saphenous vein grafts to OM1 and PDA), chronic atrial fibrillation previously on Coumadin, currently on Eliquis with prior MAZE procedure and permanent pacemaker, heart failure with reduced EF (30–35% in 2023), carotid artery disease status post right carotid endarterectomy in 2013, hypertension, dyslipidemia, and COPD, presents with a 5-day history of worsening shortness of breath, inability to finish sentences, new lower extremity edema, orthopnea, and PND, now unable to climb a full flight of stairs or walk his usual distance. He denies any chest pain, just reports pressure that started with onset of symptoms. He denies fever, chills, chest pain, GI or  symptoms, and has been off Eliquis and Plavix since July 4 due to ongoing bladder cancer treatment, planning to resume them on July 9. He is not on home diuretics. Patient follows with Dr. Vasques on OP.    ICU Vital Signs Last 24 Hrs  T(C): 36.4 (07 Jul 2025 15:35), Max: 36.4 (07 Jul 2025 11:41)  T(F): 97.5 (07 Jul 2025 15:35), Max: 97.6 (07 Jul 2025 11:41)  HR: 60 (07 Jul 2025 15:35) (60 - 65)  BP: 179/84 (07 Jul 2025 15:35) (155/72 - 179/84)  RR: 18 (07 Jul 2025 15:35) (18 - 18)  SpO2: 99% (07 Jul 2025 15:35) (99% - 99%)    Labs: WBC 8.81; Hb 11.1; MCV 87.7; Plts 225; Na 139; K 4.9; AG 15; Creatinine 1.2; Alk 214; Trop 58; NT-proBNP 9384    CXR: Shows bilateral pulmonary vascular redistribution with bilateral pleural effusions    EKG:   A 89-year-old male with a history of aortic stenosis status post TAVR in 2016, coronary artery disease status post PCI with DESx3 in prox, mid, distal SVG to OM1 2/2 UA in 2023 and CABG (LIMA to LAD, saphenous vein grafts to OM1 and PDA), chronic atrial fibrillation previously on Coumadin, currently on Eliquis with prior MAZE procedure and permanent pacemaker, heart failure with reduced EF (30–35% in 2023), carotid artery disease status post right carotid endarterectomy in 2013, hypertension, dyslipidemia, and COPD, presents with a 5-day history of worsening shortness of breath, inability to finish sentences, new lower extremity edema, orthopnea, and PND, now unable to climb a full flight of stairs or walk his usual distance. He denies any chest pain, just reports pressure that started with onset of symptoms. He denies fever, chills, chest pain, GI or  symptoms, and has been off Eliquis and Plavix since July 4 due to ongoing bladder cancer treatment, planning to resume them on July 9. Patient was diagnosed with bladder CA 1 year ago, he underwent polypectomy as per patient, cancer recurred, and patient underwent 6 sessions of intravesicular Ctx. Patient had recurrence recently and is scheduled for repeat cystoscopy on Wednesday. He is not on home diuretics. Patient follows with Dr. Vasques on OP.    ICU Vital Signs Last 24 Hrs  T(C): 36.4 (07 Jul 2025 15:35), Max: 36.4 (07 Jul 2025 11:41)  T(F): 97.5 (07 Jul 2025 15:35), Max: 97.6 (07 Jul 2025 11:41)  HR: 60 (07 Jul 2025 15:35) (60 - 65)  BP: 179/84 (07 Jul 2025 15:35) (155/72 - 179/84)  RR: 18 (07 Jul 2025 15:35) (18 - 18)  SpO2: 99% (07 Jul 2025 15:35) (99% - 99%)    Labs: WBC 8.81; Hb 11.1; MCV 87.7; Plts 225; Na 139; K 4.9; AG 15; Creatinine 1.2; Alk 214; Trop 58; NT-proBNP 9384    CXR: Shows bilateral pulmonary vascular redistribution with bilateral pleural effusions    EKG: V paced rhythm with aflutter and CHB, no ischemic changes, LBBB morphology

## 2025-07-07 NOTE — ED PROVIDER NOTE - CARE PLAN
1 Principal Discharge DX:	Congestive heart failure (CHF)  Secondary Diagnosis:	Shortness of breath  Secondary Diagnosis:	Pleural effusion

## 2025-07-07 NOTE — ED PROVIDER NOTE - PHYSICAL EXAMINATION
GENERAL: Well-developed; well-nourished; in no acute distress. AO  SKIN: warm, well perfused  HEAD: Normocephalic; atraumatic.  EYES: no conjunctival erythema, ocular motions intact and appropriate  ENT: airway clear.  CARD: Regular rate and rhythm.   RESP: mild crackles at lung base. no increased work of breathing  ABD: soft and nondistended. nontender  Upper EXT: moving b/l UEs. Rad pulses 2+ symm, sensation intact and symm  Lower EXT: moving b/l LEs, sensation intact and symm

## 2025-07-07 NOTE — ED PROVIDER NOTE - OBJECTIVE STATEMENT
89 year old male PMH of BPH, Asthma, Atrial Fibrillation with PPM, HLD, and DM, come sin for shortness of breath over the past few days. Shortness of breath over the last few days, worsens with walking. Denies chest pain. Went to [facility] today, was told he had PNA, sent in for further evaluation. Patient states he follows with Dr. Dan as his cardiologist. Is currently on lasix only when his legs are swollen. Denies fevers, chills, abd pain.

## 2025-07-07 NOTE — ED PROVIDER NOTE - CLINICAL SUMMARY MEDICAL DECISION MAKING FREE TEXT BOX
89-year-old male PMH BPH, asthma, A-fib with PPM, HLD, DM here with SOB for the past 4 days.  Went to urgent care were they noted abnormal x-ray, sent to the ED for further evaluation.  Denies any fever, cough, leg swelling.  Takes furosemide as needed when his leg swelled up.  No abdominal distention or weight gain.  Labs were notable for elevated BNP and see x-ray concerning for pleural congestion with POCUS finding bilateral pleural effusions.  Patient is a patient of Dr. Armstrong.  Vital signs stable, clinical exam benign aside from mildly decreased breath sounds bilaterally.  No leg swelling noted.  Patient to be admitted to med telemetry for further management of fluid overload and workup for congestive heart failure.

## 2025-07-07 NOTE — ED ADULT TRIAGE NOTE - WEIGHT IN LBS
82y/oM PMH dementia, HTN, HLD brought to ER s/p fall at home     1. COVID PNA  -Probably superimposed aspiration PNA (2/2 gram negative organism); completed course of antibiotics       2. Acute infectious encephalopathy with Alzheimer's dementia   -Multifactorial, worsening dementia w/ behavioral disturbance, viral illness  -Pt at baseline needing assistance in all ADLS and has HHA 7hrs/day  -Supportive care  -Dysphagic and failed multiple swallow evals  -Ongoing palliative discussions; diet changed to pureed w/ nectar. Hospice appropriate, further family discussions per palliative   -palliative following, started on ativan 1mg sublingual PRN   -if sublingual ineffective, change to IV, pt would then be appropriate for Inpatient Hospice as per Palliative     3. Leukopenia, thrombocytopenia   -Likely 2/2 COVID infection     4. ROCKY on CKD 3 - resolved   -  5. Hypernatremia - resolved       6. HTN : BP stable off medications       DVT ppx: cont lovenox sq     Prognosis guarded   Dispo: SNF with hospice pending placement  repeat COVID 12/19 still positive , will repeat tomorrow      184

## 2025-07-07 NOTE — H&P ADULT - NSHPPHYSICALEXAM_GEN_ALL_CORE
CONSTITUTIONAL: Well groomed, no apparent distress  ENMT: Oral mucosa with moist membranes.   RESP: No respiratory distress, no use of accessory muscles; No wheezing, bl crackles noted, decreased breath sounds in bibasilar lung fields  CV: RRR, +S1S2  GI: Soft, NT, ND, no rebound, no guarding; no palpable masses;  SKIN: Bilateral LE erythema, non tender, chronic, G2LE edema up to the level of the proximal tibial  NEURO: AAO3

## 2025-07-07 NOTE — ED PROVIDER NOTE - ATTENDING CONTRIBUTION TO CARE
Medication:   Requested Prescriptions     Pending Prescriptions Disp Refills    allopurinol (ZYLOPRIM) 300 MG tablet [Pharmacy Med Name: ALLOPURINOL TABS 300MG] 30 tablet 11     Sig: TAKE 1 TABLET DAILY (DUE FOR APPOINTMENT)          Patient Phone Number: [phone] (home)     Last appt: 6/22/2020   Next appt: Visit date not found    Last OARRS: No flowsheet data found.   PDMP Monitoring:    Last PDMP Gely Mtz as Reviewed [facility]):  Review User Review Instant Review Result          Preferred Pharmacy:   [facility] [address] [place] [address] Vidal Mackay  [phone] - F [phone]  [address] [place] [id number]  Phone: [phone] Fax: [phone]    [facility] [facility] F [phone]  [address] [facility] [address] [place] [address] [address] [place] [address]  Phone: [phone] Fax: [phone] Siva Wolff [address] [place] [address] [phone] - f [phone]  [address] [place] [address]  Phone: [phone] Fax: [phone] Spoke with pt. Advised to contact cardiology for refill. Understanding voiced.----- Message from Yamile Aguilar sent at 8/28/2024  2:22 PM CDT -----  .Type:  RX Refill Request    Who Called: pt   Refill or New Rx:refill   RX Name and Strength:REPATHA PUSHTRONEX 420 mg/3.5 mL Injt  How is the patient currently taking it? (ex. 1XDay):inject 3.5 mls sub-q once per month  Is this a 30 day or 90 day RX:  Preferred Pharmacy with phone number:[facility] in [place]   Local or Mail Order:local   Ordering Provider:maggi   Would the patient rather a call back or a response via [facility]? Call back   Best Call Back Number:[phone]  Additional Information: states that the pharmacy will need a PA   See MDM section above for attending physician contribution to care.

## 2025-07-07 NOTE — H&P ADULT - ASSESSMENT
A 89-year-old male with a history of aortic stenosis status post TAVR in 2016, coronary artery disease status post PCI with DESx3 in prox, mid, distal SVG to OM1 2/2 UA in 2023 and CABG (LIMA to LAD, saphenous vein grafts to OM1 and PDA), chronic atrial fibrillation previously on Coumadin, currently on Eliquis with prior MAZE procedure and permanent pacemaker, heart failure with reduced EF (30–35% in 2023), carotid artery disease status post right carotid endarterectomy in 2013, hypertension, dyslipidemia, and COPD, presents with a 5-day history of worsening shortness of breath, inability to finish sentences, new lower extremity edema, orthopnea, and PND, now unable to climb a full flight of stairs or walk his usual distance.     #Heart Failure With Reduced Ejection Fraction (EF 30–35%)    #Bilateral Pleural Effusions    #Shortness Of Breath (worsening 5 days)    #Orthopnea    #Paroxysmal Nocturnal Dyspnea    #New Lower Extremity Edema    #Volume overload/Acute heart failure exacerbation  -C/w Lasix 40 mg IVP BID for now, Target 2-3 L QD, please increase diuresis if inadequate response  -NT-proBNP 9384  -Not on home diuretics; will need PO diuresis at d/c  -Accurate Ins and Outs  -Bladder scan q6 for now, patient at risk of urinary retention due to BPH, if retaining more than 350 ml => straight cath  -Fu on AM CXR and monitor volume status  -Last TTE in 2023, EF 30%-35%; Global hypokinesis and Mod MR; Will repeat TTE given Hx of valvular dx and TAVR  -Will hold Entresto 49/51 BID for now  -Will Hold Metformin 500 mg BID for now    #Type 2 MI  #Elevated Troponin  -Troponin 58, fu on repeat Troponin at 8 pm  - No chest pain, dull chest pressure  - No ischemic changes on EKG:    #CKD stage 3a  #Metabolic acidosis  -Creat 1.2 (Bl 1.0)  -Monitor creatinine for now  -Unlikely FABRICIO  -AG 15; CO2 20; Likely uremic vs LA in setting of possible cardiorenal and Metformin  -Monitor for now  -If worsens consider ABG    #Coronary Artery Disease    #Status Post PCI with TRISTIAN ×3 (2023)    #Status Post CABG    #Carotid Artery Disease (s/p Right CEA 2013)    #Hypertension    #Dyslipidemia    -C/w Atorvastatin 40 mg QD  -/84  -Will Hold Entresto while on IV diuretics  -Consider Starting patient on amlodipine 5 mg QD  -Will c/w Metoprolol 50 mg BID      #Aortic Stenosis (s/p TAVR 2016)  -Fu on Repeat TTE; Last TTE 2023 with trace central regurgitation and no   evidence of stenosis  -No evidence of Paravalvular leak    #Chronic Atrial Fibrillation    #Status Post MAZE Procedure    #Permanent Pacemaker    -Patient was previously on Coumadin, now on Apixaban 2.5 BID  -EKG:....    #Chronic Obstructive Pulmonary Disease  -Not in COPD exacerbation no wheezing on exam  -No cough, increase in sputum production      #Bladder Cancer (active treatment)     A 89-year-old male with a history of aortic stenosis status post TAVR in 2016, coronary artery disease status post PCI with DESx3 in prox, mid, distal SVG to OM1 2/2 UA in 2023 and CABG (LIMA to LAD, saphenous vein grafts to OM1 and PDA), chronic atrial fibrillation previously on Coumadin, currently on Eliquis with prior MAZE procedure and permanent pacemaker, heart failure with reduced EF (30–35% in 2023), carotid artery disease status post right carotid endarterectomy in 2013, hypertension, dyslipidemia, and COPD, presents with a 5-day history of worsening shortness of breath, inability to finish sentences, new lower extremity edema, orthopnea, and PND, now unable to climb a full flight of stairs or walk his usual distance.     #Heart Failure With Reduced Ejection Fraction (EF 30–35%)    #Bilateral Pleural Effusions    #Shortness Of Breath (worsening 5 days)    #Orthopnea    #Paroxysmal Nocturnal Dyspnea    #New Lower Extremity Edema    #Volume overload/Acute heart failure exacerbation  -C/w Lasix 40 mg IVP BID for now, Target 2-3 L QD, please increase diuresis if inadequate response  -NT-proBNP 9384  -Not on home diuretics; will need PO diuresis at d/c  -Accurate Ins and Outs  -Bladder scan q6 for now, patient at risk of urinary retention due to BPH, if retaining more than 350 ml => straight cath  -Fu on AM CXR and monitor volume status  -Last TTE in 2023, EF 30%-35%; Global hypokinesis and Mod MR; Will repeat TTE given Hx of valvular dx and TAVR  -Will hold Entresto 49/51 BID for now  -Will Hold Metformin 500 mg BID for now    #Type 2 MI  #Elevated Troponin  -Troponin 58, fu on repeat Troponin at 8 pm  - No chest pain, dull chest pressure  - No ischemic changes on EKG: V paced rhythm with aflutter and CHB, no ischemic changes, LBBB morphology      #CKD stage 3a  #Metabolic acidosis  -Creat 1.2 (Bl 1.0)  -Monitor creatinine for now  -Unlikely FABRICIO  -AG 15; CO2 20; Likely uremic vs LA in setting of possible cardiorenal and Metformin  -Monitor for now  -If worsens consider ABG    #Coronary Artery Disease    #Status Post PCI with TRISTIAN ×3 (2023)    #Status Post CABG    #Carotid Artery Disease (s/p Right CEA 2013)    #Hypertension    #Dyslipidemia    -C/w Atorvastatin 40 mg QD  -/84  -Will Hold Entresto while on IV diuretics  -Starting patient on amlodipine 5 mg QD  -Will c/w Metoprolol 50 mg BID      #Aortic Stenosis (s/p TAVR 2016)  -Fu on Repeat TTE; Last TTE 2023 with trace central regurgitation and no   evidence of stenosis  -No evidence of Paravalvular leak    #Chronic Atrial Fibrillation    #Status Post MAZE Procedure    #Permanent Pacemaker    -Patient was previously on Coumadin, now on Apixaban 2.5 BID  -EKG: V paced rhythm with aflutter and CHB, no ischemic changes, LBBB morphology      #Chronic Obstructive Pulmonary Disease  -Not in COPD exacerbation no wheezing on exam  -No cough, increase in sputum production      #Bladder Cancer (active treatment)    -Recently diagnosed with bladder cancer  -Was scheduled for a cystoscopy with Dr. Chema Liang on Wednesday, hence holding the Plavix and Eliquis    Diet: Dash Diet  DVT Prophylaxis: SCDs  Activity: AAT  PPI Prophylaxis: None   A 89-year-old male with a history of aortic stenosis status post TAVR in 2016, coronary artery disease status post PCI with DESx3 in prox, mid, distal SVG to OM1 2/2 UA in 2023 and CABG (LIMA to LAD, saphenous vein grafts to OM1 and PDA), chronic atrial fibrillation previously on Coumadin, currently on Eliquis with prior MAZE procedure and permanent pacemaker, heart failure with reduced EF (30–35% in 2023), carotid artery disease status post right carotid endarterectomy in 2013, hypertension, dyslipidemia, and COPD, presents with a 5-day history of worsening shortness of breath, inability to finish sentences, new lower extremity edema, orthopnea, and PND, now unable to climb a full flight of stairs or walk his usual distance.     #Heart Failure With Reduced Ejection Fraction (EF 30–35%)    #Bilateral Pleural Effusions    #Shortness Of Breath (worsening 5 days)    #Orthopnea    #Paroxysmal Nocturnal Dyspnea    #New Lower Extremity Edema    #Volume overload/Acute heart failure exacerbation  -C/w Lasix 40 mg IVP BID for now, Target 2-3 L QD, please increase diuresis if inadequate response  -NT-proBNP 9384  -Not on home diuretics; will need PO diuresis at d/c  -Accurate Ins and Outs  -Bladder scan q6 for now, patient at risk of urinary retention due to BPH, if retaining more than 350 ml => straight cath  -Fu on AM CXR and monitor volume status  -Last TTE in 2023, EF 30%-35%; Global hypokinesis and Mod MR; Will repeat TTE given Hx of valvular dx and TAVR  -Will hold Entresto 49/51 BID for now  -Will Hold Metformin 500 mg BID for now  -fu on LE duplex    #Type 2 MI  #Elevated Troponin  -Troponin 58, fu on repeat Troponin at 8 pm  - No chest pain, dull chest pressure  - No ischemic changes on EKG: V paced rhythm with aflutter and CHB, no ischemic changes, LBBB morphology    #CKD stage 3a  #Metabolic acidosis  -Creat 1.2 (Bl 1.0)  -Monitor creatinine for now  -Unlikely FABRICIO  -AG 15; CO2 20; Likely uremic vs LA in setting of possible cardiorenal and Metformin  -Monitor for now  -fu on VBG    #Coronary Artery Disease    #Status Post PCI with TRISTIAN ×3 (2023)    #Status Post CABG    #Carotid Artery Disease (s/p Right CEA 2013)    #Hypertension    #Dyslipidemia    -C/w Atorvastatin 40 mg QD  -/84  -Will Hold Entresto while on IV diuretics  -Starting patient on amlodipine 5 mg QD  -Will c/w Metoprolol 50 mg BID  -Fu on AM Lipid profile and A1c    #Aortic Stenosis (s/p TAVR 2016)  -Fu on Repeat TTE; Last TTE 2023 with trace central regurgitation and no   evidence of stenosis  -No evidence of Paravalvular leak    #Chronic Atrial Fibrillation    #Status Post MAZE Procedure    #Permanent Pacemaker    -Patient was previously on Coumadin, now on Apixaban 2.5 BID, on hold for now  -EKG: V paced rhythm with aflutter and CHB, no ischemic changes, LBBB morphology      #Chronic Obstructive Pulmonary Disease  -Not in COPD exacerbation no wheezing on exam  -No cough, increase in sputum production      #Bladder Cancer (active treatment)    -Recently diagnosed with bladder cancer  -Was scheduled for a cystoscopy with Dr. Chema Liang on Wednesday, hence holding the Plavix and Eliquis    Diet: Dash Diet  DVT Prophylaxis: SCDs  Activity: AAT  PPI Prophylaxis: None   A 89-year-old male with a history of aortic stenosis status post TAVR in 2016, coronary artery disease status post PCI with DESx3 in prox, mid, distal SVG to OM1 2/2 UA in 2023 and CABG (LIMA to LAD, saphenous vein grafts to OM1 and PDA), chronic atrial fibrillation previously on Coumadin, currently on Eliquis with prior MAZE procedure and permanent pacemaker, heart failure with reduced EF (30–35% in 2023), carotid artery disease status post right carotid endarterectomy in 2013, hypertension, dyslipidemia, and COPD, presents with a 5-day history of worsening shortness of breath, inability to finish sentences, new lower extremity edema, orthopnea, and PND, now unable to climb a full flight of stairs or walk his usual distance.     #Heart Failure With Reduced Ejection Fraction (EF 30–35%)    #Bilateral Pleural Effusions    #Shortness Of Breath (worsening 5 days)    #Orthopnea    #Paroxysmal Nocturnal Dyspnea    #New Lower Extremity Edema    #Volume overload/Acute heart failure exacerbation  -C/w Lasix 40 mg IVP BID for now, Target 2-3 L QD, please increase diuresis if inadequate response  -NT-proBNP 9384  -Not on home diuretics; will need PO diuresis at d/c  -Accurate Ins and Outs  -Bladder scan q6 for now, patient at risk of urinary retention due to BPH, if retaining more than 350 ml => straight cath  -Fu on AM CXR and monitor volume status  -Last TTE in 2023, EF 30%-35%; Global hypokinesis and Mod MR; Will repeat TTE given Hx of valvular dx and TAVR  -Will hold Entresto 49/51 BID for now  -Will Hold Metformin 500 mg BID for now  -fu on LE duplex    #Type 2 MI  #Elevated Troponin  -Troponin 58, fu on repeat Troponin at 8 pm  - No chest pain, dull chest pressure  - No ischemic changes on EKG: V paced rhythm with aflutter and CHB, no ischemic changes, LBBB morphology    #CKD stage 3a  #Metabolic acidosis  -Creat 1.2 (Bl 1.0)  -Monitor creatinine for now  -Unlikely FABRICIO  -AG 15; CO2 20; Likely uremic vs LA in setting of possible cardiorenal and Metformin  -Monitor for now  -fu on VBG    #Coronary Artery Disease    #Status Post PCI with TRISTIAN ×3 (2023)    #Status Post CABG    #Carotid Artery Disease (s/p Right CEA 2013)    #Hypertension    #Dyslipidemia    -C/w Atorvastatin 40 mg QD  -/84  -Will Hold Entresto while on IV diuretics  -cw home hydralazine 50 mg PO daily  -Will c/w Metoprolol 50 mg BID  -Fu on AM Lipid profile and A1c    #Aortic Stenosis (s/p TAVR 2016)  -Fu on Repeat TTE; Last TTE 2023 with trace central regurgitation and no   evidence of stenosis  -No evidence of Paravalvular leak    #Chronic Atrial Fibrillation    #Status Post MAZE Procedure    #Permanent Pacemaker    -Patient was previously on Coumadin, now on Apixaban 2.5 BID, on hold for now, and double check why patient on half dose, no history of bleed  -EKG: V paced rhythm with aflutter and CHB, no ischemic changes, LBBB morphology      #Chronic Obstructive Pulmonary Disease  -Not in COPD exacerbation no wheezing on exam  -No cough, increase in sputum production      #Bladder Cancer (active treatment)    -Recently diagnosed with bladder cancer  -Was scheduled for a cystoscopy with Dr. Chema Liang on Wednesday, hence holding the Plavix and Eliquis    Diet: Dash Diet  DVT Prophylaxis: SCDs  Activity: AAT  PPI Prophylaxis: None

## 2025-07-08 ENCOUNTER — RESULT REVIEW (OUTPATIENT)
Age: 89
End: 2025-07-08

## 2025-07-08 LAB
A1C WITH ESTIMATED AVERAGE GLUCOSE RESULT: 6.4 % — HIGH (ref 4–5.6)
ALBUMIN SERPL ELPH-MCNC: 4.1 G/DL — SIGNIFICANT CHANGE UP (ref 3.5–5.2)
ALP SERPL-CCNC: 203 U/L — HIGH (ref 30–115)
ALT FLD-CCNC: 23 U/L — SIGNIFICANT CHANGE UP (ref 0–41)
ANION GAP SERPL CALC-SCNC: 14 MMOL/L — SIGNIFICANT CHANGE UP (ref 7–14)
AST SERPL-CCNC: 35 U/L — SIGNIFICANT CHANGE UP (ref 0–41)
BASOPHILS # BLD AUTO: 0.07 K/UL — SIGNIFICANT CHANGE UP (ref 0–0.2)
BASOPHILS NFR BLD AUTO: 0.9 % — SIGNIFICANT CHANGE UP (ref 0–1)
BILIRUB SERPL-MCNC: 0.9 MG/DL — SIGNIFICANT CHANGE UP (ref 0.2–1.2)
BUN SERPL-MCNC: 21 MG/DL — HIGH (ref 10–20)
CALCIUM SERPL-MCNC: 9.8 MG/DL — SIGNIFICANT CHANGE UP (ref 8.4–10.5)
CHLORIDE SERPL-SCNC: 104 MMOL/L — SIGNIFICANT CHANGE UP (ref 98–110)
CHOLEST SERPL-MCNC: 96 MG/DL — SIGNIFICANT CHANGE UP
CK MB CFR SERPL CALC: 5.4 NG/ML — SIGNIFICANT CHANGE UP (ref 0.6–6.3)
CK SERPL-CCNC: 104 U/L — SIGNIFICANT CHANGE UP (ref 0–225)
CO2 SERPL-SCNC: 23 MMOL/L — SIGNIFICANT CHANGE UP (ref 17–32)
CREAT SERPL-MCNC: 1.2 MG/DL — SIGNIFICANT CHANGE UP (ref 0.7–1.5)
EGFR: 57 ML/MIN/1.73M2 — LOW
EGFR: 57 ML/MIN/1.73M2 — LOW
EOSINOPHIL # BLD AUTO: 0.41 K/UL — SIGNIFICANT CHANGE UP (ref 0–0.7)
EOSINOPHIL NFR BLD AUTO: 5.4 % — SIGNIFICANT CHANGE UP (ref 0–8)
ESTIMATED AVERAGE GLUCOSE: 137 MG/DL — HIGH (ref 68–114)
FERRITIN SERPL-MCNC: 22 NG/ML — LOW (ref 30–400)
GLUCOSE SERPL-MCNC: 114 MG/DL — HIGH (ref 70–99)
HCT VFR BLD CALC: 35.1 % — LOW (ref 42–52)
HDLC SERPL-MCNC: 54 MG/DL — SIGNIFICANT CHANGE UP
HGB BLD-MCNC: 11.1 G/DL — LOW (ref 14–18)
IMM GRANULOCYTES NFR BLD AUTO: 0.3 % — SIGNIFICANT CHANGE UP (ref 0.1–0.3)
IRON SATN MFR SERPL: 31 UG/DL — LOW (ref 35–150)
IRON SATN MFR SERPL: 8 % — LOW (ref 15–50)
LDLC SERPL-MCNC: 26 MG/DL — SIGNIFICANT CHANGE UP
LIPID PNL WITH DIRECT LDL SERPL: 26 MG/DL — SIGNIFICANT CHANGE UP
LYMPHOCYTES # BLD AUTO: 1.42 K/UL — SIGNIFICANT CHANGE UP (ref 1.2–3.4)
LYMPHOCYTES # BLD AUTO: 18.8 % — LOW (ref 20.5–51.1)
MAGNESIUM SERPL-MCNC: 1.9 MG/DL — SIGNIFICANT CHANGE UP (ref 1.8–2.4)
MCHC RBC-ENTMCNC: 27 PG — SIGNIFICANT CHANGE UP (ref 27–31)
MCHC RBC-ENTMCNC: 31.6 G/DL — LOW (ref 32–37)
MCV RBC AUTO: 85.4 FL — SIGNIFICANT CHANGE UP (ref 80–94)
MONOCYTES # BLD AUTO: 1.14 K/UL — HIGH (ref 0.1–0.6)
MONOCYTES NFR BLD AUTO: 15.1 % — HIGH (ref 1.7–9.3)
NEUTROPHILS # BLD AUTO: 4.49 K/UL — SIGNIFICANT CHANGE UP (ref 1.4–6.5)
NEUTROPHILS NFR BLD AUTO: 59.5 % — SIGNIFICANT CHANGE UP (ref 42.2–75.2)
NONHDLC SERPL-MCNC: 42 MG/DL — SIGNIFICANT CHANGE UP
NRBC BLD AUTO-RTO: 0 /100 WBCS — SIGNIFICANT CHANGE UP (ref 0–0)
PLATELET # BLD AUTO: 242 K/UL — SIGNIFICANT CHANGE UP (ref 130–400)
PMV BLD: 11 FL — HIGH (ref 7.4–10.4)
POTASSIUM SERPL-MCNC: 3.9 MMOL/L — SIGNIFICANT CHANGE UP (ref 3.5–5)
POTASSIUM SERPL-SCNC: 3.9 MMOL/L — SIGNIFICANT CHANGE UP (ref 3.5–5)
PROT SERPL-MCNC: 6.6 G/DL — SIGNIFICANT CHANGE UP (ref 6–8)
RBC # BLD: 4.11 M/UL — LOW (ref 4.7–6.1)
RBC # FLD: 14.7 % — HIGH (ref 11.5–14.5)
SODIUM SERPL-SCNC: 141 MMOL/L — SIGNIFICANT CHANGE UP (ref 135–146)
TIBC SERPL-MCNC: 398 UG/DL — SIGNIFICANT CHANGE UP (ref 220–430)
TRIGL SERPL-MCNC: 76 MG/DL — SIGNIFICANT CHANGE UP
TROPONIN T, HIGH SENSITIVITY RESULT: 61 NG/L — CRITICAL HIGH (ref 6–21)
TROPONIN T, HIGH SENSITIVITY RESULT: 65 NG/L — CRITICAL HIGH (ref 6–21)
TSH SERPL-MCNC: 2.16 UIU/ML — SIGNIFICANT CHANGE UP (ref 0.27–4.2)
UIBC SERPL-MCNC: 367 UG/DL — SIGNIFICANT CHANGE UP (ref 110–370)
WBC # BLD: 7.55 K/UL — SIGNIFICANT CHANGE UP (ref 4.8–10.8)
WBC # FLD AUTO: 7.55 K/UL — SIGNIFICANT CHANGE UP (ref 4.8–10.8)

## 2025-07-08 PROCEDURE — 99233 SBSQ HOSP IP/OBS HIGH 50: CPT

## 2025-07-08 PROCEDURE — 71045 X-RAY EXAM CHEST 1 VIEW: CPT | Mod: 26

## 2025-07-08 PROCEDURE — 93306 TTE W/DOPPLER COMPLETE: CPT | Mod: 26

## 2025-07-08 RX ORDER — TAMSULOSIN HYDROCHLORIDE 0.4 MG/1
0.4 CAPSULE ORAL AT BEDTIME
Refills: 0 | Status: DISCONTINUED | OUTPATIENT
Start: 2025-07-08 | End: 2025-07-09

## 2025-07-08 RX ORDER — FUROSEMIDE 10 MG/ML
40 INJECTION INTRAMUSCULAR; INTRAVENOUS DAILY
Refills: 0 | Status: DISCONTINUED | OUTPATIENT
Start: 2025-07-09 | End: 2025-07-09

## 2025-07-08 RX ORDER — ENOXAPARIN SODIUM 100 MG/ML
80 INJECTION SUBCUTANEOUS EVERY 12 HOURS
Refills: 0 | Status: DISCONTINUED | OUTPATIENT
Start: 2025-07-08 | End: 2025-07-09

## 2025-07-08 RX ORDER — SACUBITRIL AND VALSARTAN 6; 6 MG/1; MG/1
1 PELLET ORAL
Refills: 0 | Status: DISCONTINUED | OUTPATIENT
Start: 2025-07-08 | End: 2025-07-09

## 2025-07-08 RX ADMIN — Medication 5 MILLIGRAM(S): at 21:16

## 2025-07-08 RX ADMIN — ENOXAPARIN SODIUM 80 MILLIGRAM(S): 100 INJECTION SUBCUTANEOUS at 10:22

## 2025-07-08 RX ADMIN — TAMSULOSIN HYDROCHLORIDE 0.4 MILLIGRAM(S): 0.4 CAPSULE ORAL at 21:16

## 2025-07-08 RX ADMIN — METOPROLOL SUCCINATE 50 MILLIGRAM(S): 50 TABLET, EXTENDED RELEASE ORAL at 17:09

## 2025-07-08 RX ADMIN — ATORVASTATIN CALCIUM 40 MILLIGRAM(S): 80 TABLET, FILM COATED ORAL at 21:16

## 2025-07-08 RX ADMIN — Medication 50 MILLIGRAM(S): at 05:24

## 2025-07-08 RX ADMIN — FUROSEMIDE 40 MILLIGRAM(S): 10 INJECTION INTRAMUSCULAR; INTRAVENOUS at 01:15

## 2025-07-08 RX ADMIN — SACUBITRIL AND VALSARTAN 1 TABLET(S): 6; 6 PELLET ORAL at 17:09

## 2025-07-08 RX ADMIN — HEPARIN SODIUM 5000 UNIT(S): 1000 INJECTION INTRAVENOUS; SUBCUTANEOUS at 05:24

## 2025-07-08 RX ADMIN — ENOXAPARIN SODIUM 80 MILLIGRAM(S): 100 INJECTION SUBCUTANEOUS at 17:10

## 2025-07-08 RX ADMIN — SACUBITRIL AND VALSARTAN 1 TABLET(S): 6; 6 PELLET ORAL at 10:22

## 2025-07-08 RX ADMIN — FUROSEMIDE 40 MILLIGRAM(S): 10 INJECTION INTRAMUSCULAR; INTRAVENOUS at 12:10

## 2025-07-08 RX ADMIN — METOPROLOL SUCCINATE 50 MILLIGRAM(S): 50 TABLET, EXTENDED RELEASE ORAL at 05:24

## 2025-07-08 NOTE — PROGRESS NOTE ADULT - TIME BILLING
direct patient care, education and counseling   chart review (review of tests and scheduled medications, independent review of previous medical records and available test results including ED admissions elsewhere, and resident supervision.     -coordinated current plan of care with housestaff on MDR     ***above time spent on encounter excludes residents' teaching time and/or separately reported services

## 2025-07-08 NOTE — CONSULT NOTE ADULT - ASSESSMENT
89-year-old male with a history of aortic stenosis status post TAVR in 2016, coronary artery disease status post PCI with DESx3 in prox, mid, distal SVG to OM1 2/2 UA in 2023 and CABG (LIMA to LAD, saphenous vein grafts to OM1 and PDA), chronic atrial fibrillation previously on Coumadin, currently on Eliquis with prior MAZE procedure and permanent pacemaker, HFrEF (30–35% in 2023), carotid artery disease s/p right CEA in 2013, hypertension, dyslipidemia, and COPD, presents with a 5-day history of worsening shortness of breath, inability to finish sentences, new lower extremity edema, orthopnea, and PND, now unable to climb a full flight of stairs or walk his usual distance.     Currently admitted for acute decompensated HFrEF. Cardiology consulted for further management.     IMPRESSION:  #acute HFrEF exacerbation  #AS s/p TAVR 2016  #CAD s/p PCI to prox, mid, and distal SVG to OM1   #h/o CABG  #chronic AFib previously on Coumadin and is now on Eliquis s/p prior MAZE  #CHB s/p PPM  #s/p right carotid endarterectomy 2013  #HTN/HLD  #COPD    Recommendations:  - EF now improved  - strict I/Os  - daily weights  - low salt diet  - GDMT: on entresto 24/26, metoprolol tartrate 50mg BID, hydralazine 50mg qD  - continue with lipitor  - pt having good U/O of -1.6 L in the past 24 hours  - if no plans for urological procedure anytime soon, would resume plavix and Eliquis  - target net -1.5L-2.0L  - continue with lasix 40mg BID      *NOTE IS INCOMPLETE. PENDING ATTENDING*   89-year-old male with a history of aortic stenosis status post TAVR in 2016, coronary artery disease status post PCI with DESx3 in prox, mid, distal SVG to OM1 2/2 UA in 2023 and CABG (LIMA to LAD, saphenous vein grafts to OM1 and PDA), chronic atrial fibrillation previously on Coumadin, currently on Eliquis with prior MAZE procedure and permanent pacemaker, HFrEF (30–35% in 2023), carotid artery disease s/p right CEA in 2013, hypertension, dyslipidemia, and COPD, presents with a 5-day history of worsening shortness of breath, inability to finish sentences, new lower extremity edema, orthopnea, and PND, now unable to climb a full flight of stairs or walk his usual distance.     Currently admitted for acute decompensated HFrEF. Cardiology consulted for further management.     IMPRESSION:  #acute HFrEF exacerbation- improving  #AS s/p TAVR 2016  #CAD s/p PCI to prox, mid, and distal SVG to OM1   #h/o CABG  #chronic AFib previously on Coumadin and is now on Eliquis s/p prior MAZE  #CHB s/p PPM  #s/p right carotid endarterectomy 2013  #HTN/HLD  #COPD    Recommendations:  - EF now improved  - strict I/Os  - daily weights  - low salt diet  - GDMT: on entresto 24/26, metoprolol tartrate 50mg BID, hydralazine 50mg qD  - continue with lipitor  - pt having good U/O of -1.6 L in the past 24 hours  - target net -1.5L-2.0L  - switch to PO lasix 40mg qdaily starting tomorrow 7/8 and can be discharged on this dose with Follow up with Dr. Armstrong.   - resume Eliquis upon discharge, can hold off on plavix    Plan discussed with attending. Attending attestation to follow.

## 2025-07-08 NOTE — PROGRESS NOTE ADULT - SUBJECTIVE AND OBJECTIVE BOX
DENITA PINO 89y Male  MRN#: [id number]   CODE STATUS:________      SUBJECTIVE  Patient is a 89y old Male who presents with a chief complaint of Shortness of breath (07 Jul 2025 15:51)  Currently admitted to medicine with the primary diagnosis of Congestive heart failure (CHF)      Today is hospital day 1d, and this morning he is           OBJECTIVE  PAST MEDICAL & SURGICAL HISTORY  Asthma with COPD    HLD (hyperlipidemia)    Essential hypertension    Aortic stenosis, severe    A-fib    DM (diabetes mellitus)    Pacemaker    BPH (benign prostatic hyperplasia)    S/P CABG x 3    History of CEA (carotid endarterectomy)    History of permanent cardiac pacemaker placement    H/O aortic valve replacement      ALLERGIES:  IV Contrast (Hives)    MEDICATIONS:  STANDING MEDICATIONS  atorvastatin 40 milliGRAM(s) Oral at bedtime  chlorhexidine 2% Cloths 1 Application(s) Topical <User Schedule>  enoxaparin Injectable 80 milliGRAM(s) SubCutaneous every 12 hours  furosemide   Injectable 40 milliGRAM(s) IV Push every 12 hours  hydrALAZINE 50 milliGRAM(s) Oral daily  metoprolol tartrate 50 milliGRAM(s) Oral two times a day  sacubitril 24 mG/valsartan 26 mG 1 Tablet(s) Oral two times a day    PRN MEDICATIONS  acetaminophen     Tablet .. 650 milliGRAM(s) Oral every 6 hours PRN  aluminum hydroxide/magnesium hydroxide/simethicone Suspension 30 milliLiter(s) Oral every 4 hours PRN  melatonin 3 milliGRAM(s) Oral at bedtime PRN  ondansetron Injectable 4 milliGRAM(s) IV Push every 8 hours PRN  zolpidem 5 milliGRAM(s) Oral at bedtime PRN  zolpidem 5 milliGRAM(s) Oral at bedtime PRN      VITAL SIGNS: Last 24 Hours  T(C): 36.6 (08 Jul 2025 04:18), Max: 36.7 (07 Jul 2025 23:42)  T(F): 97.9 (08 Jul 2025 04:18), Max: 98.1 (07 Jul 2025 23:42)  HR: 64 (08 Jul 2025 10:20) (60 - 65)  BP: 131/67 (08 Jul 2025 10:20) (131/67 - 179/84)  BP(mean): 102 (08 Jul 2025 04:18) (102 - 106)  RR: 17 (08 Jul 2025 04:18) (17 - 18)  SpO2: 96% (08 Jul 2025 07:51) (96% - 99%)    LABS:                        11.1   7.55  )-----------( 242      ( 08 Jul 2025 07:07 )             35.1     07-08    141  |  104  |  21[H]  ----------------------------<  114[H]  3.9   |  23  |  1.2    Ca    9.8      08 Jul 2025 07:07  Mg     1.9     07-08    TPro  6.6  /  Alb  4.1  /  TBili  0.9  /  DBili  x   /  AST  35  /  ALT  23  /  AlkPhos  203[H]  07-08      Urinalysis Basic - ( 08 Jul 2025 07:07 )    Color: x / Appearance: x / SG: x / pH: x  Gluc: 114 mg/dL / Ketone: x  / Bili: x / Urobili: x   Blood: x / Protein: x / Nitrite: x   Leuk Esterase: x / RBC: x / WBC x   Sq Epi: x / Non Sq Epi: x / Bacteria: x                RADIOLOGY:      PHYSICAL EXAM:    GENERAL: NAD, well-developed, AAOx3  HEENT:  Atraumatic, Normocephalic. EOMI, PERRLA, conjunctiva and sclera clear, No JVD  PULMONARY: Clear to auscultation bilaterally; No wheeze  CARDIOVASCULAR: Regular rate and rhythm; No murmurs, rubs, or gallops  GASTROINTESTINAL: Soft, Nontender, Nondistended; Bowel sounds present  MUSCULOSKELETAL:  2+ Peripheral Pulses, No clubbing, cyanosis, or edema  NEUROLOGY: non-focal  SKIN: No rashes or lesions       DENITA PINO 89y Male  MRN#: [id number]   CODE STATUS:________      SUBJECTIVE  Patient is a 89y old Male who presents with a chief complaint of Shortness of breath (07 Jul 2025 15:51)  Currently admitted to medicine with the primary diagnosis of Congestive heart failure (CHF)      Today is hospital day 1d, and this morning he is was laying in the bed. No events during night.           OBJECTIVE  PAST MEDICAL & SURGICAL HISTORY  Asthma with COPD    HLD (hyperlipidemia)    Essential hypertension    Aortic stenosis, severe    A-fib    DM (diabetes mellitus)    Pacemaker    BPH (benign prostatic hyperplasia)    S/P CABG x 3    History of CEA (carotid endarterectomy)    History of permanent cardiac pacemaker placement    H/O aortic valve replacement      ALLERGIES:  IV Contrast (Hives)    MEDICATIONS:  STANDING MEDICATIONS  atorvastatin 40 milliGRAM(s) Oral at bedtime  chlorhexidine 2% Cloths 1 Application(s) Topical <User Schedule>  enoxaparin Injectable 80 milliGRAM(s) SubCutaneous every 12 hours  furosemide   Injectable 40 milliGRAM(s) IV Push every 12 hours  hydrALAZINE 50 milliGRAM(s) Oral daily  metoprolol tartrate 50 milliGRAM(s) Oral two times a day  sacubitril 24 mG/valsartan 26 mG 1 Tablet(s) Oral two times a day    PRN MEDICATIONS  acetaminophen     Tablet .. 650 milliGRAM(s) Oral every 6 hours PRN  aluminum hydroxide/magnesium hydroxide/simethicone Suspension 30 milliLiter(s) Oral every 4 hours PRN  melatonin 3 milliGRAM(s) Oral at bedtime PRN  ondansetron Injectable 4 milliGRAM(s) IV Push every 8 hours PRN  zolpidem 5 milliGRAM(s) Oral at bedtime PRN  zolpidem 5 milliGRAM(s) Oral at bedtime PRN      VITAL SIGNS: Last 24 Hours  T(C): 36.6 (08 Jul 2025 04:18), Max: 36.7 (07 Jul 2025 23:42)  T(F): 97.9 (08 Jul 2025 04:18), Max: 98.1 (07 Jul 2025 23:42)  HR: 64 (08 Jul 2025 10:20) (60 - 65)  BP: 131/67 (08 Jul 2025 10:20) (131/67 - 179/84)  BP(mean): 102 (08 Jul 2025 04:18) (102 - 106)  RR: 17 (08 Jul 2025 04:18) (17 - 18)  SpO2: 96% (08 Jul 2025 07:51) (96% - 99%)    LABS:                        11.1   7.55  )-----------( 242      ( 08 Jul 2025 07:07 )             35.1     07-08    141  |  104  |  21[H]  ----------------------------<  114[H]  3.9   |  23  |  1.2    Ca    9.8      08 Jul 2025 07:07  Mg     1.9     07-08    TPro  6.6  /  Alb  4.1  /  TBili  0.9  /  DBili  x   /  AST  35  /  ALT  23  /  AlkPhos  203[H]  07-08      Urinalysis Basic - ( 08 Jul 2025 07:07 )    Color: x / Appearance: x / SG: x / pH: x  Gluc: 114 mg/dL / Ketone: x  / Bili: x / Urobili: x   Blood: x / Protein: x / Nitrite: x   Leuk Esterase: x / RBC: x / WBC x   Sq Epi: x / Non Sq Epi: x / Bacteria: x                  PHYSICAL EXAM:    GENERAL: NAD, well-developed, AAOx3  HEENT:  Atraumatic, Normocephalic. EOMI, PERRLA, conjunctiva and sclera clear, No JVD  PULMONARY: Clear to auscultation bilaterally; No wheeze  CARDIOVASCULAR: Regular rate and rhythm; No murmurs, rubs, or gallops  GASTROINTESTINAL: Soft, Nontender, Nondistended; Bowel sounds present  MUSCULOSKELETAL:  2+ Peripheral Pulses, No clubbing, cyanosis, or edema  NEUROLOGY: non-focal  SKIN: No rashes or lesions

## 2025-07-08 NOTE — PROGRESS NOTE ADULT - SUBJECTIVE AND OBJECTIVE BOX
DENITA PINO  89y  Male      Patient is a 89y old  Male who presents with a chief complaint of Shortness of breath (2025 13:03)      INTERVAL HPI/OVERNIGHT EVENTS:  pt seen this afternoon in the presence of son Eladio   -appears comfortable   -c/w current IV diuresis   -discussed with Dr. Armstrong over the phone - will see the patient later but cardiac fellow at bedside checking IVC     -Vital Signs Last 24 Hrs  T(C): 36.6 (2025 04:18), Max: 36.7 (2025 23:42)  T(F): 97.9 (2025 04:18), Max: 98.1 (2025 23:42)  HR: 66 (2025 12:17) (60 - 66)  BP: 161/72 (2025 12:17) (131/67 - 179/84)  BP(mean): 102 (2025 04:18) (102 - 106)  RR: 17 (2025 04:18) (17 - 18)  SpO2: 96% (2025 07:51) (96% - 99%)    Parameters below as of 2025 07:51  Patient On (Oxygen Delivery Method): room air        PHYSICAL EXAM:  GENERAL: Not in distress - sitting up in chair - son Eladio at bedside   HEAD:  Atraumatic, Normocephalic  EYES: EOMI, PERRLA, conjunctiva and sclera clear  NERVOUS SYSTEM:  Alert & Oriented X 4  CHEST/LUNG: crackles at bases   CV/HEART: Regular rate and rhythm   GI/ABDOMEN: Soft abdomen   EXTREMITIES:  2+ Peripheral Pulses, No clubbing, cyanosis, or edema  SKIN: No rashes or lesions    LAB:                        11.1   7.55  )-----------( 242      ( 2025 07:07 )             35.1     07-08    141  |  104  |  21[H]  ----------------------------<  114[H]  3.9   |  23  |  1.2    Ca    9.8      2025 07:07  Mg     1.9     07-08    TPro  6.6  /  Alb  4.1  /  TBili  0.9  /  DBili  x   /  AST  35  /  ALT  23  /  AlkPhos  203[H]  07-08    CARDIAC MARKERS ( 2025 12:40 )  x     / x     / x     / x     / 5.4 ng/mL      Daily Height in cm: 177.8 (2025 01:00)    Daily Weight in k (2025 05:15)  CAPILLARY BLOOD GLUCOSE        Urinalysis Basic - ( 2025 07:07 )    Color: x / Appearance: x / SG: x / pH: x  Gluc: 114 mg/dL / Ketone: x  / Bili: x / Urobili: x   Blood: x / Protein: x / Nitrite: x   Leuk Esterase: x / RBC: x / WBC x   Sq Epi: x / Non Sq Epi: x / Bacteria: x      LIVER FUNCTIONS - ( 2025 07:07 )  Alb: 4.1 g/dL / Pro: 6.6 g/dL / ALK PHOS: 203 U/L / ALT: 23 U/L / AST: 35 U/L / GGT: x               RADIOLOGY:    Imaging Personally visualized and Reviewed:  [y ] YES  [ ] NO      MEDS:  acetaminophen     Tablet .. 650 milliGRAM(s) Oral every 6 hours PRN  aluminum hydroxide/magnesium hydroxide/simethicone Suspension 30 milliLiter(s) Oral every 4 hours PRN  atorvastatin 40 milliGRAM(s) Oral at bedtime  chlorhexidine 2% Cloths 1 Application(s) Topical <User Schedule>  enoxaparin Injectable 80 milliGRAM(s) SubCutaneous every 12 hours  furosemide   Injectable 40 milliGRAM(s) IV Push every 12 hours  hydrALAZINE 50 milliGRAM(s) Oral daily  melatonin 3 milliGRAM(s) Oral at bedtime PRN  metoprolol tartrate 50 milliGRAM(s) Oral two times a day  ondansetron Injectable 4 milliGRAM(s) IV Push every 8 hours PRN  sacubitril 24 mG/valsartan 26 mG 1 Tablet(s) Oral two times a day  tamsulosin 0.4 milliGRAM(s) Oral at bedtime  zolpidem 5 milliGRAM(s) Oral at bedtime PRN

## 2025-07-08 NOTE — PROGRESS NOTE ADULT - ASSESSMENT
Initial presentation:   89-year-old male with a history of aortic stenosis status post TAVR in 2016, coronary artery disease status post PCI with DESx3 in prox, mid, distal SVG to OM1 2/2 UA in 2023 and CABG (LIMA to LAD, saphenous vein grafts to OM1 and PDA), chronic atrial fibrillation previously on Coumadin, currently on Eliquis with prior MAZE procedure and permanent pacemaker, heart failure with reduced EF (30–35% in 2023), carotid artery disease status post right carotid endarterectomy in 2013, hypertension, dyslipidemia, and COPD, presents with a 5-day history of worsening shortness of breath, inability to finish sentences, new lower extremity edema, orthopnea, and PND, now unable to climb a full flight of stairs or walk his usual distance. He denies any chest pain, just reports pressure that started with onset of symptoms. He denies fever, chills, chest pain, GI or  symptoms, and has been off Eliquis and Plavix since July 4 due to ongoing bladder cancer treatment, planning to resume them on July 9. Patient was diagnosed with bladder CA 1 year ago, he underwent polypectomy as per patient, cancer recurred, and patient underwent 6 sessions of intravesicular Ctx. Patient had recurrence recently and is scheduled for repeat cystoscopy on Wednesday. He is not on home diuretics. Patient follows with Dr. Dan on OP.    ·	Acute CHFrEF exacerbation (EF 30–35%)  / PELAYO and at rest   ·	Paroxysmal Nocturnal Dyspnea   ·	Chronic AFIB   ·	HTN/HL  ·	COPD - not in exacerbation   ·	Bladder cancer   ·	age related debility   ·	immunosenescence     -admitted to tele floor  -BNP > 9K, cxr with effusions, IV lasix 40mg twice daily for now - check IVC - cardio consulted (case discussed with Dr. Armstrong over Teams phone call)  -medical optimization with GDMT - resume Entresto, on BB, farxiga upon d/c and or when more euvolemic (add aldactone outpatient - d/c hydralazine)  -Plavix and Eliquis on hold as patient was scheduled for cystoscopy tomorrow (unable to go for the procedure and requires inpatient monitoring with IV diuresis)   -switch to Lovenox therapeutic dose while inpatient and Eliquis on d/c if no cysto planned  -home meds   -oob as tolerated (already in chair and assist with ambulation as tolerated)  -skin care as per nursing protocol    *high risk of clinical deterioration due to decline in innate and adaptive immunity considering advanced age     DISPO: not discharge ready - IV diuresis, symptom management, clinical improvement   -I updated son Eladio at bedside and discussed the case with patient' cardio Dr. Armstrong    Attending Physician Dr. Valorie Johnson # [id number]    Initial presentation:   89-year-old male with a history of aortic stenosis status post TAVR in 2016, coronary artery disease status post PCI with DESx3 in prox, mid, distal SVG to OM1 2/2 UA in 2023 and CABG (LIMA to LAD, saphenous vein grafts to OM1 and PDA), chronic atrial fibrillation previously on Coumadin, currently on Eliquis with prior MAZE procedure and permanent pacemaker, heart failure with reduced EF (30–35% in 2023), carotid artery disease status post right carotid endarterectomy in 2013, hypertension, dyslipidemia, and COPD, presents with a 5-day history of worsening shortness of breath, inability to finish sentences, new lower extremity edema, orthopnea, and PND, now unable to climb a full flight of stairs or walk his usual distance. He denies any chest pain, just reports pressure that started with onset of symptoms. He denies fever, chills, chest pain, GI or  symptoms, and has been off Eliquis and Plavix since July 4 due to ongoing bladder cancer treatment, planning to resume them on July 9. Patient was diagnosed with bladder CA 1 year ago, he underwent polypectomy as per patient, cancer recurred, and patient underwent 6 sessions of intravesicular Ctx. Patient had recurrence recently and is scheduled for repeat cystoscopy on Wednesday. He is not on home diuretics. Patient follows with Dr. Dan on OP.    ·	Acute CHFrEF exacerbation (EF 30–35%)  / PELAYO and at rest   ·	Troponinemia   ·	Paroxysmal Nocturnal Dyspnea   ·	Chronic AFIB   ·	HTN/HL  ·	COPD - not in exacerbation   ·	Bladder cancer   ·	age related debility   ·	immunosenescence     -admitted to tele floor  -labs reviewed - trops downtrended at 11am 61 from 65 from 55 from 58 - stop trending - no active chest pain - NSTEMI ruled out   -BNP > 9K, cxr with effusions, IV lasix 40mg twice daily for now - check IVC - cardio consulted (case discussed with Dr. Armstrong over Teams phone call)  -medical optimization with GDMT - resume Entresto, on BB, farxiga upon d/c and or when more euvolemic (add aldactone outpatient - d/c hydralazine)  -Plavix and Eliquis on hold as patient was scheduled for cystoscopy tomorrow (unable to go for the procedure and requires inpatient monitoring with IV diuresis)   -switch to Lovenox therapeutic dose while inpatient and Eliquis on d/c if no cysto planned  -home meds   -oob as tolerated (already in chair and assist with ambulation as tolerated)  -skin care as per nursing protocol    *high risk of clinical deterioration due to decline in innate and adaptive immunity considering advanced age     DISPO: not discharge ready - IV diuresis, symptom management, clinical improvement   -I updated son Eladio at bedside and discussed the case with patient' cardio Dr. Armstrong    Attending Physician Dr. Valorie Johnson # [id number]    Initial presentation:   89-year-old male with a history of aortic stenosis status post TAVR in 2016, coronary artery disease status post PCI with DESx3 in prox, mid, distal SVG to OM1 2/2 UA in 2023 and CABG (LIMA to LAD, saphenous vein grafts to OM1 and PDA), chronic atrial fibrillation previously on Coumadin, currently on Eliquis with prior MAZE procedure and permanent pacemaker, heart failure with reduced EF (30–35% in 2023), carotid artery disease status post right carotid endarterectomy in 2013, hypertension, dyslipidemia, and COPD, presents with a 5-day history of worsening shortness of breath, inability to finish sentences, new lower extremity edema, orthopnea, and PND, now unable to climb a full flight of stairs or walk his usual distance. He denies any chest pain, just reports pressure that started with onset of symptoms. He denies fever, chills, chest pain, GI or  symptoms, and has been off Eliquis and Plavix since July 4 due to ongoing bladder cancer treatment, planning to resume them on July 9. Patient was diagnosed with bladder CA 1 year ago, he underwent polypectomy as per patient, cancer recurred, and patient underwent 6 sessions of intravesicular Ctx. Patient had recurrence recently and is scheduled for repeat cystoscopy on Wednesday. He is not on home diuretics. Patient follows with Dr. Dan on OP.    ·	Acute CHFrEF exacerbation (EF 30–35%)  / PELAYO and at rest   ·	Troponinemia   ·	Paroxysmal Nocturnal Dyspnea   ·	Chronic AFIB   ·	CKD stage III  ·	HTN/HL  ·	COPD - not in exacerbation   ·	Bladder cancer   ·	age related debility   ·	immunosenescence     -admitted to tele floor  -labs reviewed - trops downtrended at 11am 61 from 65 from 55 from 58 - stop trending - no active chest pain - NSTEMI ruled out   -BNP > 9K, cxr with effusions, IV lasix 40mg twice daily for now - check IVC - cardio consulted (case discussed with Dr. Armstrong over Teams phone call)  -medical optimization with GDMT - resume Entresto, on BB, farxiga upon d/c and or when more euvolemic (add aldactone outpatient - d/c hydralazine)  -Plavix and Eliquis on hold as patient was scheduled for cystoscopy tomorrow (unable to go for the procedure and requires inpatient monitoring with IV diuresis)   -switch to Lovenox therapeutic dose while inpatient and Eliquis on d/c if no cysto planned  -home meds   -oob as tolerated (already in chair and assist with ambulation as tolerated)  -skin care as per nursing protocol    *high risk of clinical deterioration due to decline in innate and adaptive immunity considering advanced age     DISPO: not discharge ready - IV diuresis, symptom management, clinical improvement   -I updated son Eladio at bedside and discussed the case with patient' cardio Dr. Armstrong    Attending Physician Dr. Valorie Johnson # [id number]

## 2025-07-08 NOTE — PATIENT PROFILE ADULT - FALL HARM RISK - HARM RISK INTERVENTIONS

## 2025-07-08 NOTE — PATIENT PROFILE ADULT - THINK ABOUT THE PLACE YOU LIVE. DO YOU HAVE PROBLEMS WITH ANY OF THE FOLLOWING?
Date of Service: 10/25/2021    SUBJECTIVE:  This patient is here for followup of her respiratory status.  She is being treated with Cresemba invasive aspergillosis by infectious disease consultant, Dr. Joya.  She has taken off and on Cresemba for the last year.  She is supposed to see Dr. Joya in the next few weeks to determine the duration of therapy.  She claims no significant cough, no sputum production, no fever, no chills.  She is unfortunately still smoking, but only about 4 cigarettes a day.  She also has a nicotine patch.  I will strongly encourage her to quit smoking.  She denies any nausea or emesis, but has lost appetite and has lost weight.    Her last workup included a comprehensive metabolic panel on 09/15/2021 did not show any abnormality.  She is doing fair, otherwise.  No respiratory issues.      PHYSICAL EXAMINATION:  VITAL SIGNS:  Blood pressure 120/76, heart rate was 97 per minute, respiratory rate is 20 per minute, oxygen saturation is 98%, body mass index is 18.64.  CHEST:  Clear to auscultation.  HEART:  Without gallop.  ABDOMEN:  Soft, nontender, no mass palpable.  EXTREMITIES:  No pedal edema or calf tenderness.    ASSESSMENT AND PLAN:  She will follow up with Dr. Joya.  I will also order a chest x-ray and see if there is any abnormality which needs to be followed up with a scan.  Her last scan was done in 05/2021 and was reported as multiple pulmonary nodules, which did not appear larger than the prior scan.      Dictated By: Katy Bunch MD  Signing Provider: Katy Bunch MD MA/ROSIE ([id number])  DD: 10/25/2021 13:02:32 TD: 10/26/2021 08:05:36    Copy Sent To:   
no

## 2025-07-08 NOTE — PROGRESS NOTE ADULT - ASSESSMENT
1. Acute Decompensated Heart Failure (EF 30–35%) / Volume Overload  Symptoms: SOB, orthopnea, PND, LE edema.    CXR: Bilateral effusions. BNP: 9384.    Plan:  IV Lasix 40 mg BID; target 2–3 L/day output.  Monitor I/Os; bladder scan q6h due to BPH ? straight cath PRN.  Repeat TTE (last EF 30–35% in 2023).  Hold Entresto and Metformin during acute phase.  Plan for oral diuresis on discharge.    - echo:  1. Mild hypokinesis of the anterior and septal wall segments. Left   ventricular systolic function is overall mildly decreased with an   ejection fraction visually estimated at 45 to 50%. The left ventricular   diastolic function is indeterminate.  2. Normal right ventricular cavity size and borderline reduced right   ventricular systolic function. Device lead is visualized in the right   ventricle.   3. Mild biatrial enlargement.   4. S/p Mehrdad 3 26mm valve in the aortic position. Trace central   regurgitation with no paravalvular regurgitation. Normal prosthetic valve   gradient with mildly reduced MARCI (Vmax 2.5m/s, mean PG 14mmHg, MARCI   1.15cm2 by continuity). Values are stable compared to prior studies.   5. Moderate mitral valve leaflet calcification.   6. Mild to moderate mitral regurgitation.   7. Moderate to severe tricuspid regurgitation.   8. Estimated pulmonary artery systolic pressure is 62 mmHg, consistent   with moderate-to-severe pulmonary hypertension. The IVC is normalin size   with <50% respiratory variability.   9. No pericardial effusion seen.  10. Compared to prior study, note improvement in systolic function   - Pending Cardiac consult.     2. Type 2 MI  Troponin 58; no chest pain; V-paced rhythm on EKG.  Likely demand ischemia.    Plan:  Continue beta-blocker (Metoprolol 50 mg BID).  - Pending ckMP, Troponin.    3. CKD Stage 3a / Mild Metabolic Acidosis  Cr 1.2 (baseline 1.0), CO2 20, AG 15.  Possibly uremic/metformin-related.    Plan:  Monitor creatinine, electrolytes, and VBG.  Hold Metformin for now.  Avoid nephrotoxic agents.    4. Chronic Atrial Fibrillation / MAZE / PPM  EKG: V-paced rhythm with flutter and CHB.  On Apixaban 2.5 mg BID — on hold pending procedure.    Plan:  Verify indication for half-dose Apixaban.  Resume anticoagulation after cystoscopy if appropriate.    5. Coronary Artery Disease / s/p PCI + CABG  No new ischemic findings.    Plan:  Continue Atorvastatin 40 mg QD.  Hold Entresto while on IV diuresis.  Continue Hydralazine 50 mg PO daily.  Monitor BP, lipid profile, A1c.    6. Aortic Stenosis (s/p TAVR 2016)  Last TTE: Trace central AI, no stenosis or paravalvular leak.    Plan:  Repeat TTE due to cardiac symptoms.    7. COPD  No wheezing, cough, or sputum production.  Not in acute exacerbation.    Plan:  No steroids or antibiotics at this time.  continue home regimen if applicable.    8. Bladder Cancer (Active Treatment)  Scheduled cystoscopy this week.  Plavix and Eliquis held accordingly.    Plan:  Coordinate with urology for anticoagulation timing.    9. Hypertension / Dyslipidemia  BP: 179/84 >> 131/67.     Plan:  Continue Hydralazine and Metoprolol.  Resume Entresto when stable off IV diuresis.     Supportive Care  Diet: DASH diet.  DVT prophylaxis: SCDs.  Activity: As tolerated.  GI prophylaxis: None needed currently. 1. Acute Decompensated Heart Failure (EF 30–35%) / Volume Overload  Symptoms: SOB, orthopnea, PND, LE edema.    CXR: Bilateral effusions. BNP: 9384.    Plan:  IV Lasix 40 mg BID; target 2–3 L/day output.  Monitor I/Os; bladder scan q6h due to BPH ? straight cath PRN.  Repeat TTE (last EF 30–35% in 2023).  Hold Entresto and Metformin during acute phase.  Plan for oral diuresis on discharge.    - echo:  1. Mild hypokinesis of the anterior and septal wall segments. Left   ventricular systolic function is overall mildly decreased with an   ejection fraction visually estimated at 45 to 50%. The left ventricular   diastolic function is indeterminate.  2. Normal right ventricular cavity size and borderline reduced right   ventricular systolic function. Device lead is visualized in the right   ventricle.   3. Mild biatrial enlargement.   4. S/p Mehrdad 3 26mm valve in the aortic position. Trace central   regurgitation with no paravalvular regurgitation. Normal prosthetic valve   gradient with mildly reduced MARCI (Vmax 2.5m/s, mean PG 14mmHg, MARCI   1.15cm2 by continuity). Values are stable compared to prior studies.   5. Moderate mitral valve leaflet calcification.   6. Mild to moderate mitral regurgitation.   7. Moderate to severe tricuspid regurgitation.   8. Estimated pulmonary artery systolic pressure is 62 mmHg, consistent   with moderate-to-severe pulmonary hypertension. The IVC is normalin size   with <50% respiratory variability.   9. No pericardial effusion seen.  10. Compared to prior study, note improvement in systolic function   Cardiac consult - EF now improved  - strict I/Os  - daily weights  - low salt diet  - GDMT: on entresto 24/26, metoprolol tartrate 50mg BID, hydralazine 50mg qD  - continue with lipitor  - pt having good U/O of -1.6 L in the past 24 hours  - if no plans for urological procedure anytime soon, would resume plavix and Eliquis  - target net -1.5L-2.0L  - continue with lasix 40mg BID      2. Type 2 MI  Troponin 58; no chest pain; V-paced rhythm on EKG.  Likely demand ischemia.    Plan:  Continue beta-blocker (Metoprolol 50 mg BID).  - Pending ckMP, Troponin.    3. CKD Stage 3a / Mild Metabolic Acidosis  Cr 1.2 (baseline 1.0), CO2 20, AG 15.  Possibly uremic/metformin-related.    Plan:  Monitor creatinine, electrolytes, and VBG.  Hold Metformin for now.  Avoid nephrotoxic agents.    4. Chronic Atrial Fibrillation / MAZE / PPM  EKG: V-paced rhythm with flutter and CHB.  On Apixaban 2.5 mg BID — on hold pending procedure.    Plan:  Verify indication for half-dose Apixaban.  Resume anticoagulation after cystoscopy if appropriate.    5. Coronary Artery Disease / s/p PCI + CABG  No new ischemic findings.    Plan:  Continue Atorvastatin 40 mg QD.  Hold Entresto while on IV diuresis.  Continue Hydralazine 50 mg PO daily.  Monitor BP, lipid profile, A1c.    6. Aortic Stenosis (s/p TAVR 2016)  Last TTE: Trace central AI, no stenosis or paravalvular leak.    Plan:  Repeat TTE due to cardiac symptoms.    7. COPD  No wheezing, cough, or sputum production.  Not in acute exacerbation.    Plan:  No steroids or antibiotics at this time.  continue home regimen if applicable.    8. Bladder Cancer (Active Treatment)  Scheduled cystoscopy this week.  Plavix and Eliquis held accordingly.    Plan:  Coordinate with urology for anticoagulation timing.    9. Hypertension / Dyslipidemia  BP: 179/84 >> 131/67.     Plan:  Continue Hydralazine and Metoprolol.  Resume Entresto when stable off IV diuresis.     Supportive Care  Diet: DASH diet.  DVT prophylaxis: SCDs.  Activity: As tolerated.  GI prophylaxis: None needed currently. 1. Acute Decompensated Heart Failure (EF 30–35%) / Volume Overload  Symptoms: SOB, orthopnea, PND, LE edema.    CXR: Bilateral effusions. BNP: 9384.    Plan:  IV Lasix 40 mg BID; target 2–3 L/day output.  Monitor I/Os; bladder scan q6h due to BPH ? straight cath PRN.  Repeat TTE (last EF 30–35% in 2023).  Hold Metformin during acute phase.    - echo:  1. Mild hypokinesis of the anterior and septal wall segments. Left   ventricular systolic function is overall mildly decreased with an   ejection fraction visually estimated at 45 to 50%. The left ventricular   diastolic function is indeterminate.  2. Normal right ventricular cavity size and borderline reduced right   ventricular systolic function. Device lead is visualized in the right   ventricle.   3. Mild biatrial enlargement.   4. S/p Mehrdad 3 26mm valve in the aortic position. Trace central   regurgitation with no paravalvular regurgitation. Normal prosthetic valve   gradient with mildly reduced MARCI (Vmax 2.5m/s, mean PG 14mmHg, MARCI   1.15cm2 by continuity). Values are stable compared to prior studies.   5. Moderate mitral valve leaflet calcification.   6. Mild to moderate mitral regurgitation.   7. Moderate to severe tricuspid regurgitation.   8. Estimated pulmonary artery systolic pressure is 62 mmHg, consistent   with moderate-to-severe pulmonary hypertension. The IVC is normalin size   with <50% respiratory variability.   9. No pericardial effusion seen.  10. Compared to prior study, note improvement in systolic function     Cardiac consult - EF now improved  - strict I/Os  - daily weights  - low salt diet  - GDMT: on entresto 24/26, metoprolol tartrate 50mg BID, hydralazine 50mg qD  - continue with lipitor  - pt having good U/O of -1.6 L in the past 24 hours  - if no plans for urological procedure anytime soon, would resume plavix and Eliquis  - target net -1.5L-2.0L  - continue with lasix 40mg BID      2. Type 2 MI  Troponin 58>>55>> 61 ; no chest pain; V-paced rhythm on EKG.  Likely demand ischemia.  - ckMP:  5.4 ng/mL    Plan:  Continue beta-blocker (Metoprolol 50 mg BID).      3. CKD Stage 3a / Mild Metabolic Acidosis  Cr 1.2 (baseline 1.0), CO2 20, AG 15.  Possibly uremic/metformin-related.    Plan:  Monitor creatinine, electrolytes, and VBG.  Hold Metformin for now.  Avoid nephrotoxic agents.    4. Chronic Atrial Fibrillation / MAZE / PPM  EKG: V-paced rhythm with flutter and CHB.  On Apixaban 2.5 mg BID — on hold pending procedure.    Plan:  Verify indication for half-dose Apixaban.  Resume anticoagulation after cystoscopy if appropriate.    5. Coronary Artery Disease / s/p PCI + CABG  No new ischemic findings.    Plan:  Continue Atorvastatin 40 mg QD.  Hold Entresto while on IV diuresis.  Continue Hydralazine 50 mg PO daily.  Monitor BP, lipid profile, A1c.    6. Aortic Stenosis (s/p TAVR 2016)  Last TTE: Trace central AI, no stenosis or paravalvular leak.    7. COPD  No wheezing, cough, or sputum production.  Not in acute exacerbation.    Plan:  No steroids or antibiotics at this time.  continue home regimen if applicable.    8. Bladder Cancer (Active Treatment)  Scheduled cystoscopy this week.  Plavix and Eliquis held accordingly.    Plan:  Coordinate with urology for anticoagulation timing.    9. Hypertension / Dyslipidemia  BP: 179/84 >> 131/67.     Plan:  Continue Hydralazine and Metoprolol.  Resume Entresto.      Supportive Care  Diet: DASH diet.  DVT prophylaxis: SCDs.  Activity: As tolerated.  GI prophylaxis: None needed currently. 1. Acute Decompensated Heart Failure (EF 30–35%) / Volume Overload  Symptoms: SOB, orthopnea, PND, LE edema.    CXR: Bilateral effusions. BNP: 9384.    Plan:  IV Lasix 40 mg BID; target 2–3 L/day output.  Monitor I/Os; bladder scan q6h due to BPH ? straight cath PRN.  Repeat TTE (last EF 30–35% in 2023).  Hold Metformin during acute phase.    - echo:  1. Mild hypokinesis of the anterior and septal wall segments. Left   ventricular systolic function is overall mildly decreased with an   ejection fraction visually estimated at 45 to 50%. The left ventricular   diastolic function is indeterminate.  2. Normal right ventricular cavity size and borderline reduced right   ventricular systolic function. Device lead is visualized in the right   ventricle.   3. Mild biatrial enlargement.   4. S/p Mehrdad 3 26mm valve in the aortic position. Trace central   regurgitation with no paravalvular regurgitation. Normal prosthetic valve   gradient with mildly reduced MARCI (Vmax 2.5m/s, mean PG 14mmHg, MARCI   1.15cm2 by continuity). Values are stable compared to prior studies.   5. Moderate mitral valve leaflet calcification.   6. Mild to moderate mitral regurgitation.   7. Moderate to severe tricuspid regurgitation.   8. Estimated pulmonary artery systolic pressure is 62 mmHg, consistent   with moderate-to-severe pulmonary hypertension. The IVC is normalin size   with <50% respiratory variability.   9. No pericardial effusion seen.  10. Compared to prior study, note improvement in systolic function     Cardiac consult   - EF now improved  - strict I/Os  - daily weights  - low salt diet  - GDMT: on entresto 24/26, metoprolol tartrate 50mg BID, hydralazine 50mg qD  - continue with lipitor  - pt having good U/O of -1.6 L in the past 24 hours  - target net -1.5L-2.0L  - switch to PO lasix 40mg qdaily starting tomorrow 7/8 and can be discharged on this dose with Follow up with Dr. Armstrong.   - resume Eliquis upon discharge, can hold off on plavix        2. Type 2 MI  Troponin 58>>55>> 61 ; no chest pain; V-paced rhythm on EKG.  Likely demand ischemia.  - ckMP:  5.4 ng/mL    Plan:  Continue beta-blocker (Metoprolol 50 mg BID).      3. CKD Stage 3a / Mild Metabolic Acidosis  Cr 1.2 (baseline 1.0), CO2 20, AG 15.  Possibly uremic/metformin-related.    Plan:  Monitor creatinine, electrolytes, and VBG.  Hold Metformin for now.  Avoid nephrotoxic agents.    4. Chronic Atrial Fibrillation / MAZE / PPM  EKG: V-paced rhythm with flutter and CHB.  On Apixaban 2.5 mg BID — on hold pending procedure.    Plan:  Verify indication for half-dose Apixaban.  Resume anticoagulation after cystoscopy if appropriate.    5. Coronary Artery Disease / s/p PCI + CABG  No new ischemic findings.    Plan:  Continue Atorvastatin 40 mg QD.  Hold Entresto while on IV diuresis.  Continue Hydralazine 50 mg PO daily.  Monitor BP, lipid profile, A1c.    6. Aortic Stenosis (s/p TAVR 2016)  Last TTE: Trace central AI, no stenosis or paravalvular leak.    7. COPD  No wheezing, cough, or sputum production.  Not in acute exacerbation.    Plan:  No steroids or antibiotics at this time.  continue home regimen if applicable.    8. Bladder Cancer (Active Treatment)  Scheduled cystoscopy this week.  Plavix and Eliquis held accordingly.    Plan:  Coordinate with urology for anticoagulation timing.    9. Hypertension / Dyslipidemia  BP: 179/84 >> 131/67.     Plan:  Continue Hydralazine and Metoprolol.  Resume Entresto.      Supportive Care  Diet: DASH diet.  DVT prophylaxis: SCDs.  Activity: As tolerated.  GI prophylaxis: None needed currently.

## 2025-07-08 NOTE — PROGRESS NOTE ADULT - TIME-BASED BILLING (NON-CRITICAL CARE)
Time-based billing (NON-critical care) no enlarged lymph nodes/no tender lymph nodes/no swelling of extremity

## 2025-07-08 NOTE — CONSULT NOTE ADULT - SUBJECTIVE AND OBJECTIVE BOX
Outpt cardiologist:    HPI:  A 89-year-old male with a history of aortic stenosis status post TAVR in 2016, coronary artery disease status post PCI with DESx3 in prox, mid, distal SVG to OM1 2/2 UA in 2023 and CABG (LIMA to LAD, saphenous vein grafts to OM1 and PDA), chronic atrial fibrillation previously on Coumadin, currently on Eliquis with prior MAZE procedure and permanent pacemaker, heart failure with reduced EF (30–35% in 2023), carotid artery disease status post right carotid endarterectomy in 2013, hypertension, dyslipidemia, and COPD, presents with a 5-day history of worsening shortness of breath, inability to finish sentences, new lower extremity edema, orthopnea, and PND, now unable to climb a full flight of stairs or walk his usual distance. He denies any chest pain, just reports pressure that started with onset of symptoms. He denies fever, chills, chest pain, GI or  symptoms, and has been off Eliquis and Plavix since July 4 due to ongoing bladder cancer treatment, planning to resume them on July 9. Patient was diagnosed with bladder CA 1 year ago, he underwent polypectomy as per patient, cancer recurred, and patient underwent 6 sessions of intravesicular Ctx. Patient had recurrence recently and is scheduled for repeat cystoscopy on Wednesday. He is not on home diuretics. Patient follows with Dr. Vasques on OP.    ICU Vital Signs Last 24 Hrs  T(C): 36.4 (07 Jul 2025 15:35), Max: 36.4 (07 Jul 2025 11:41)  T(F): 97.5 (07 Jul 2025 15:35), Max: 97.6 (07 Jul 2025 11:41)  HR: 60 (07 Jul 2025 15:35) (60 - 65)  BP: 179/84 (07 Jul 2025 15:35) (155/72 - 179/84)  RR: 18 (07 Jul 2025 15:35) (18 - 18)  SpO2: 99% (07 Jul 2025 15:35) (99% - 99%)    Labs: WBC 8.81; Hb 11.1; MCV 87.7; Plts 225; Na 139; K 4.9; AG 15; Creatinine 1.2; Alk 214; Trop 58; NT-proBNP 9384    CXR: Shows bilateral pulmonary vascular redistribution with bilateral pleural effusions    EKG: V paced rhythm with aflutter and CHB, no ischemic changes, LBBB morphology   (07 Jul 2025 15:51)      Cardiology fellow HPI:  Saw pt at bedside with son present as well. Pt states that since last Thursday, he started feeling worsening PELAYO, orthopnea, PND, LE swelling. He was only able to walk about 100 ft/half a block and intermediate up the stairs before he needed to stop. He reports that he hasn't been taking the lasix/furosemide because he thought the hydralazine was the diuretic. He hasn't been taking the lasix for a few months now. In addition, he also was eating more salty foods recently- notably chips. He denied any chest pain, palpitations, syncope, dizziness. Today, he reports feeling much better than when he first came into the hospital.       PAST MEDICAL & SURGICAL HISTORY  Asthma with COPD    HLD (hyperlipidemia)    Essential hypertension    Aortic stenosis, severe    A-fib    DM (diabetes mellitus)    Pacemaker    BPH (benign prostatic hyperplasia)    S/P CABG x 3    History of CEA (carotid endarterectomy)    History of permanent cardiac pacemaker placement    H/O aortic valve replacement        FAMILY HISTORY:  FAMILY HISTORY:  No pertinent family history in first degree relatives        SOCIAL HISTORY:  Social History:      ALLERGIES:  IV Contrast (Hives)      MEDICATIONS:  atorvastatin 40 milliGRAM(s) Oral at bedtime  chlorhexidine 2% Cloths 1 Application(s) Topical <User Schedule>  enoxaparin Injectable 80 milliGRAM(s) SubCutaneous every 12 hours  furosemide   Injectable 40 milliGRAM(s) IV Push every 12 hours  hydrALAZINE 50 milliGRAM(s) Oral daily  metoprolol tartrate 50 milliGRAM(s) Oral two times a day  sacubitril 24 mG/valsartan 26 mG 1 Tablet(s) Oral two times a day  tamsulosin 0.4 milliGRAM(s) Oral at bedtime    PRN:  acetaminophen     Tablet .. 650 milliGRAM(s) Oral every 6 hours PRN  aluminum hydroxide/magnesium hydroxide/simethicone Suspension 30 milliLiter(s) Oral every 4 hours PRN  melatonin 3 milliGRAM(s) Oral at bedtime PRN  ondansetron Injectable 4 milliGRAM(s) IV Push every 8 hours PRN  zolpidem 5 milliGRAM(s) Oral at bedtime PRN  zolpidem 5 milliGRAM(s) Oral at bedtime PRN      HOME MEDICATIONS:  Home Medications:  Ambien 10 mg oral tablet: 1 tab(s) orally once a day (at bedtime), As Needed (13 Jul 2023 08:44)  hydrALAZINE 50 mg oral tablet: 1 tab(s) orally once a day (07 Jul 2025 17:52)  metFORMIN 500 mg oral tablet: 1 tab(s) orally 2 times a day (13 Jul 2023 08:44)      VITALS:   T(F): 97.9 (07-08 @ 04:18), Max: 98.1 (07-07 @ 23:42)  HR: 66 (07-08 @ 12:17) (60 - 66)  BP: 161/72 (07-08 @ 12:17) (131/67 - 179/84)  BP(mean): 102 (07-08 @ 04:18) (102 - 106)  RR: 17 (07-08 @ 04:18) (17 - 18)  SpO2: 96% (07-08 @ 07:51) (96% - 99%)    I&O's Summary    07 Jul 2025 07:01  -  08 Jul 2025 07:00  --------------------------------------------------------  IN: 0 mL / OUT: 1670 mL / NET: -1670 mL    08 Jul 2025 07:01  -  08 Jul 2025 13:03  --------------------------------------------------------  IN: 330 mL / OUT: 200 mL / NET: 130 mL        PHYSICAL EXAM:  General: Not in distress  Cardio: regular, S1, S2, + systolic murmur  Pulm: bilateral bibasilar crackles, no wheezing  Abdomen: Soft, non-tender, non-distended, BS+  Extremities: bilateral 1+ pitting edema  Neuro: A&O x3      LABS:                        11.1   7.55  )-----------( 242      ( 08 Jul 2025 07:07 )             35.1     07-08    141  |  104  |  21[H]  ----------------------------<  114[H]  3.9   |  23  |  1.2    Ca    9.8      08 Jul 2025 07:07  Mg     1.9     07-08    TPro  6.6  /  Alb  4.1  /  TBili  0.9  /  DBili  x   /  AST  35  /  ALT  23  /  AlkPhos  203[H]  07-08              Troponin trend: 58>55>65      07-08 Chol 96 LDL -- HDL 54 Trig 76      RADIOLOGY:  -CXR: bilateral pleural effusions    -TTE: < from: TTE Echo Complete w/o Contrast w/ Doppler (07.08.25 @ 08:52) >  1. Mild hypokinesis of the anterior and septal wall segments. Left   ventricular systolic function is overall mildly decreased with an   ejection fraction visually estimated at 45 to 50%. The left ventricular   diastolic function is indeterminate.  2. Normal right ventricular cavity size and borderline reduced right   ventricular systolic function. Device lead is visualized in the right   ventricle.   3. Mild biatrial enlargement.   4. S/p Mehrdad 3 26mm valve in the aortic position. Trace central   regurgitation with no paravalvular regurgitation. Normal prosthetic valve   gradient with mildly reduced MARCI (Vmax 2.5m/s, mean PG 14mmHg, MARCI   1.15cm2 by continuity). Values are stable compared to prior studies.   5. Moderate mitral valve leaflet calcification.   6. Mild to moderate mitral regurgitation.   7. Moderate to severe tricuspid regurgitation.   8. Estimated pulmonary artery systolic pressure is 62 mmHg, consistent   with moderate-to-severe pulmonary hypertension. The IVC is normalin size   with <50% respiratory variability.   9. No pericardial effusion seen.  10. Compared to prior study, note improvement in systolic function.      < end of copied text >      -CATHETERIZATION:  < from: Cardiac Catheterization (05.17.23 @ 13:26) >    Conclusions:   There is significant triple vessel coronary artery disease  Successful PCI of 99% stenosis of prox SVG to OM1 with a 4.0x16mm  Synergy TRISTIAN. 0% residual stenosis and excellent angiographic result  post intervention.  Successful PCI of 80% stenosis of mid SVG to OM1 with a 3.5x16mm  Synergy TRISTIAN. 0% residual stenosis and excellent  angiographic result post intervention.   Successful PCI of80% stenosis of distal SVG to OM1 with a 3.5x12mm  Synergy TRISTIAN. 0% residual stenosis and excellent  angiographic result post intervention.     < end of copied text >      ECG:  < from: 12 Lead ECG (06.15.23 @ 08:41) >  Atrial flutter with complete heart block  Ventricular-pacedrhythm    < end of copied text >        TELEMETRY EVENTS:  regular rate, no acute events   Outpt cardiologist:    HPI:  A 89-year-old male with a history of aortic stenosis status post TAVR in 2016, coronary artery disease status post PCI with DESx3 in prox, mid, distal SVG to OM1 2/2 UA in 2023 and CABG (LIMA to LAD, saphenous vein grafts to OM1 and PDA), chronic atrial fibrillation previously on Coumadin, currently on Eliquis with prior MAZE procedure and permanent pacemaker, heart failure with reduced EF (30–35% in 2023), carotid artery disease status post right carotid endarterectomy in 2013, hypertension, dyslipidemia, and COPD, presents with a 5-day history of worsening shortness of breath, inability to finish sentences, new lower extremity edema, orthopnea, and PND, now unable to climb a full flight of stairs or walk his usual distance. He denies any chest pain, just reports pressure that started with onset of symptoms. He denies fever, chills, chest pain, GI or  symptoms, and has been off Eliquis and Plavix since July 4 due to ongoing bladder cancer treatment, planning to resume them on July 9. Patient was diagnosed with bladder CA 1 year ago, he underwent polypectomy as per patient, cancer recurred, and patient underwent 6 sessions of intravesicular Ctx. Patient had recurrence recently and is scheduled for repeat cystoscopy on Wednesday. He is not on home diuretics. Patient follows with Dr. Vasques on OP.    ICU Vital Signs Last 24 Hrs  T(C): 36.4 (07 Jul 2025 15:35), Max: 36.4 (07 Jul 2025 11:41)  T(F): 97.5 (07 Jul 2025 15:35), Max: 97.6 (07 Jul 2025 11:41)  HR: 60 (07 Jul 2025 15:35) (60 - 65)  BP: 179/84 (07 Jul 2025 15:35) (155/72 - 179/84)  RR: 18 (07 Jul 2025 15:35) (18 - 18)  SpO2: 99% (07 Jul 2025 15:35) (99% - 99%)    Labs: WBC 8.81; Hb 11.1; MCV 87.7; Plts 225; Na 139; K 4.9; AG 15; Creatinine 1.2; Alk 214; Trop 58; NT-proBNP 9384    CXR: Shows bilateral pulmonary vascular redistribution with bilateral pleural effusions    EKG: V paced rhythm with aflutter and CHB, no ischemic changes, LBBB morphology   (07 Jul 2025 15:51)      Cardiology fellow HPI:  Saw pt at bedside with son present as well. Pt states that since last Thursday, he started feeling worsening PELAYO, orthopnea, PND, LE swelling. He was only able to walk about 100 ft/half a block and [facility] up the stairs before he needed to stop. He reports that he hasn't been taking the lasix/furosemide because he thought the hydralazine was the diuretic. He hasn't been taking the lasix for a few months now. In addition, he also was eating more salty foods recently- notably chips. He denied any chest pain, palpitations, syncope, dizziness. Today, he reports feeling much better than when he first came into the hospital.       PAST MEDICAL & SURGICAL HISTORY  Asthma with COPD    HLD (hyperlipidemia)    Essential hypertension    Aortic stenosis, severe    A-fib    DM (diabetes mellitus)    Pacemaker    BPH (benign prostatic hyperplasia)    S/P CABG x 3    History of CEA (carotid endarterectomy)    History of permanent cardiac pacemaker placement    H/O aortic valve replacement        FAMILY HISTORY:  FAMILY HISTORY:  No pertinent family history in first degree relatives        SOCIAL HISTORY:  Social History:      ALLERGIES:  IV Contrast (Hives)      MEDICATIONS:  atorvastatin 40 milliGRAM(s) Oral at bedtime  chlorhexidine 2% Cloths 1 Application(s) Topical <User Schedule>  enoxaparin Injectable 80 milliGRAM(s) SubCutaneous every 12 hours  furosemide   Injectable 40 milliGRAM(s) IV Push every 12 hours  hydrALAZINE 50 milliGRAM(s) Oral daily  metoprolol tartrate 50 milliGRAM(s) Oral two times a day  sacubitril 24 mG/valsartan 26 mG 1 Tablet(s) Oral two times a day  tamsulosin 0.4 milliGRAM(s) Oral at bedtime    PRN:  acetaminophen     Tablet .. 650 milliGRAM(s) Oral every 6 hours PRN  aluminum hydroxide/magnesium hydroxide/simethicone Suspension 30 milliLiter(s) Oral every 4 hours PRN  melatonin 3 milliGRAM(s) Oral at bedtime PRN  ondansetron Injectable 4 milliGRAM(s) IV Push every 8 hours PRN  zolpidem 5 milliGRAM(s) Oral at bedtime PRN  zolpidem 5 milliGRAM(s) Oral at bedtime PRN      HOME MEDICATIONS:  Home Medications:  Ambien 10 mg oral tablet: 1 tab(s) orally once a day (at bedtime), As Needed (13 Jul 2023 08:44)  hydrALAZINE 50 mg oral tablet: 1 tab(s) orally once a day (07 Jul 2025 17:52)  metFORMIN 500 mg oral tablet: 1 tab(s) orally 2 times a day (13 Jul 2023 08:44)      VITALS:   T(F): 97.9 (07-08 @ 04:18), Max: 98.1 (07-07 @ 23:42)  HR: 66 (07-08 @ 12:17) (60 - 66)  BP: 161/72 (07-08 @ 12:17) (131/67 - 179/84)  BP(mean): 102 (07-08 @ 04:18) (102 - 106)  RR: 17 (07-08 @ 04:18) (17 - 18)  SpO2: 96% (07-08 @ 07:51) (96% - 99%)    I&O's Summary    07 Jul 2025 07:01  -  08 Jul 2025 07:00  --------------------------------------------------------  IN: 0 mL / OUT: 1670 mL / NET: -1670 mL    08 Jul 2025 07:01  -  08 Jul 2025 13:03  --------------------------------------------------------  IN: 330 mL / OUT: 200 mL / NET: 130 mL        PHYSICAL EXAM:  General: Not in distress  Cardio: regular, S1, S2, + systolic murmur  Pulm: minimal bibasilar crackles, no wheezing  Abdomen: Soft, non-tender, non-distended, BS+  Extremities: bilateral trace pitting edema  Neuro: A&O x3  IVC: 2.1cm, >50% collapsibility      LABS:                        11.1   7.55  )-----------( 242      ( 08 Jul 2025 07:07 )             35.1     07-08    141  |  104  |  21[H]  ----------------------------<  114[H]  3.9   |  23  |  1.2    Ca    9.8      08 Jul 2025 07:07  Mg     1.9     07-08    TPro  6.6  /  Alb  4.1  /  TBili  0.9  /  DBili  x   /  AST  35  /  ALT  23  /  AlkPhos  203[H]  07-08              Troponin trend: 58>55>65      07-08 Chol 96 LDL -- HDL 54 Trig 76      RADIOLOGY:  -CXR: bilateral pleural effusions    -TTE: < from: TTE Echo Complete w/o Contrast w/ Doppler (07.08.25 @ 08:52) >  1. Mild hypokinesis of the anterior and septal wall segments. Left   ventricular systolic function is overall mildly decreased with an   ejection fraction visually estimated at 45 to 50%. The left ventricular   diastolic function is indeterminate.  2. Normal right ventricular cavity size and borderline reduced right   ventricular systolic function. Device lead is visualized in the right   ventricle.   3. Mild biatrial enlargement.   4. S/p Mehrdad 3 26mm valve in the aortic position. Trace central   regurgitation with no paravalvular regurgitation. Normal prosthetic valve   gradient with mildly reduced MARCI (Vmax 2.5m/s, mean PG 14mmHg, MARCI   1.15cm2 by continuity). Values are stable compared to prior studies.   5. Moderate mitral valve leaflet calcification.   6. Mild to moderate mitral regurgitation.   7. Moderate to severe tricuspid regurgitation.   8. Estimated pulmonary artery systolic pressure is 62 mmHg, consistent   with moderate-to-severe pulmonary hypertension. The IVC is normalin size   with <50% respiratory variability.   9. No pericardial effusion seen.  10. Compared to prior study, note improvement in systolic function.      < end of copied text >      -CATHETERIZATION:  < from: Cardiac Catheterization (05.17.23 @ 13:26) >    Conclusions:   There is significant triple vessel coronary artery disease  Successful PCI of 99% stenosis of prox SVG to OM1 with a 4.0x16mm  Synergy TRISTIAN. 0% residual stenosis and excellent angiographic result  post intervention.  Successful PCI of 80% stenosis of mid SVG to OM1 with a 3.5x16mm  Synergy TRISTIAN. 0% residual stenosis and excellent  angiographic result post intervention.   Successful PCI of80% stenosis of distal SVG to OM1 with a 3.5x12mm  Synergy TRISTIAN. 0% residual stenosis and excellent  angiographic result post intervention.     < end of copied text >      ECG:  < from: 12 Lead ECG (06.15.23 @ 08:41) >  Atrial flutter with complete heart block  Ventricular-pacedrhythm    < end of copied text >        TELEMETRY EVENTS:  regular rate, no acute events

## 2025-07-09 ENCOUNTER — TRANSCRIPTION ENCOUNTER (OUTPATIENT)
Age: 89
End: 2025-07-09

## 2025-07-09 VITALS
TEMPERATURE: 97 F | SYSTOLIC BLOOD PRESSURE: 125 MMHG | DIASTOLIC BLOOD PRESSURE: 72 MMHG | RESPIRATION RATE: 18 BRPM | HEART RATE: 60 BPM

## 2025-07-09 LAB
ALBUMIN SERPL ELPH-MCNC: 4 G/DL — SIGNIFICANT CHANGE UP (ref 3.5–5.2)
ALP SERPL-CCNC: 198 U/L — HIGH (ref 30–115)
ALT FLD-CCNC: 21 U/L — SIGNIFICANT CHANGE UP (ref 0–41)
ANION GAP SERPL CALC-SCNC: 14 MMOL/L — SIGNIFICANT CHANGE UP (ref 7–14)
AST SERPL-CCNC: 31 U/L — SIGNIFICANT CHANGE UP (ref 0–41)
BASOPHILS # BLD AUTO: 0.06 K/UL — SIGNIFICANT CHANGE UP (ref 0–0.2)
BASOPHILS NFR BLD AUTO: 0.7 % — SIGNIFICANT CHANGE UP (ref 0–1)
BILIRUB SERPL-MCNC: 0.9 MG/DL — SIGNIFICANT CHANGE UP (ref 0.2–1.2)
BUN SERPL-MCNC: 24 MG/DL — HIGH (ref 10–20)
CALCIUM SERPL-MCNC: 9.4 MG/DL — SIGNIFICANT CHANGE UP (ref 8.4–10.5)
CHLORIDE SERPL-SCNC: 104 MMOL/L — SIGNIFICANT CHANGE UP (ref 98–110)
CO2 SERPL-SCNC: 23 MMOL/L — SIGNIFICANT CHANGE UP (ref 17–32)
CREAT SERPL-MCNC: 1.2 MG/DL — SIGNIFICANT CHANGE UP (ref 0.7–1.5)
EGFR: 57 ML/MIN/1.73M2 — LOW
EGFR: 57 ML/MIN/1.73M2 — LOW
EOSINOPHIL # BLD AUTO: 0.59 K/UL — SIGNIFICANT CHANGE UP (ref 0–0.7)
EOSINOPHIL NFR BLD AUTO: 7.3 % — SIGNIFICANT CHANGE UP (ref 0–8)
GLUCOSE SERPL-MCNC: 127 MG/DL — HIGH (ref 70–99)
HCT VFR BLD CALC: 36.7 % — LOW (ref 42–52)
HGB BLD-MCNC: 11.4 G/DL — LOW (ref 14–18)
IMM GRANULOCYTES NFR BLD AUTO: 0.5 % — HIGH (ref 0.1–0.3)
LYMPHOCYTES # BLD AUTO: 1.43 K/UL — SIGNIFICANT CHANGE UP (ref 1.2–3.4)
LYMPHOCYTES # BLD AUTO: 17.7 % — LOW (ref 20.5–51.1)
MAGNESIUM SERPL-MCNC: 2 MG/DL — SIGNIFICANT CHANGE UP (ref 1.8–2.4)
MCHC RBC-ENTMCNC: 26.5 PG — LOW (ref 27–31)
MCHC RBC-ENTMCNC: 31.1 G/DL — LOW (ref 32–37)
MCV RBC AUTO: 85.3 FL — SIGNIFICANT CHANGE UP (ref 80–94)
MONOCYTES # BLD AUTO: 1.24 K/UL — HIGH (ref 0.1–0.6)
MONOCYTES NFR BLD AUTO: 15.3 % — HIGH (ref 1.7–9.3)
NEUTROPHILS # BLD AUTO: 4.73 K/UL — SIGNIFICANT CHANGE UP (ref 1.4–6.5)
NEUTROPHILS NFR BLD AUTO: 58.5 % — SIGNIFICANT CHANGE UP (ref 42.2–75.2)
NRBC BLD AUTO-RTO: 0 /100 WBCS — SIGNIFICANT CHANGE UP (ref 0–0)
PHOSPHATE SERPL-MCNC: 4 MG/DL — SIGNIFICANT CHANGE UP (ref 2.1–4.9)
PLATELET # BLD AUTO: 238 K/UL — SIGNIFICANT CHANGE UP (ref 130–400)
PMV BLD: 11.5 FL — HIGH (ref 7.4–10.4)
POTASSIUM SERPL-MCNC: 3.8 MMOL/L — SIGNIFICANT CHANGE UP (ref 3.5–5)
POTASSIUM SERPL-SCNC: 3.8 MMOL/L — SIGNIFICANT CHANGE UP (ref 3.5–5)
PROT SERPL-MCNC: 6.6 G/DL — SIGNIFICANT CHANGE UP (ref 6–8)
RBC # BLD: 4.3 M/UL — LOW (ref 4.7–6.1)
RBC # FLD: 14.8 % — HIGH (ref 11.5–14.5)
SODIUM SERPL-SCNC: 141 MMOL/L — SIGNIFICANT CHANGE UP (ref 135–146)
WBC # BLD: 8.09 K/UL — SIGNIFICANT CHANGE UP (ref 4.8–10.8)
WBC # FLD AUTO: 8.09 K/UL — SIGNIFICANT CHANGE UP (ref 4.8–10.8)

## 2025-07-09 PROCEDURE — 93280 PM DEVICE PROGR EVAL DUAL: CPT | Mod: 26

## 2025-07-09 PROCEDURE — 99239 HOSP IP/OBS DSCHRG MGMT >30: CPT

## 2025-07-09 RX ORDER — APIXABAN 5 MG/1
5 TABLET, FILM COATED ORAL
Refills: 0 | Status: DISCONTINUED | OUTPATIENT
Start: 2025-07-09 | End: 2025-07-09

## 2025-07-09 RX ORDER — FUROSEMIDE 10 MG/ML
1 INJECTION INTRAMUSCULAR; INTRAVENOUS
Qty: 30 | Refills: 0
Start: 2025-07-09 | End: 2025-08-07

## 2025-07-09 RX ORDER — TAMSULOSIN HYDROCHLORIDE 0.4 MG/1
1 CAPSULE ORAL
Qty: 30 | Refills: 0
Start: 2025-07-09 | End: 2025-08-07

## 2025-07-09 RX ADMIN — METOPROLOL SUCCINATE 50 MILLIGRAM(S): 50 TABLET, EXTENDED RELEASE ORAL at 05:57

## 2025-07-09 RX ADMIN — ENOXAPARIN SODIUM 80 MILLIGRAM(S): 100 INJECTION SUBCUTANEOUS at 05:57

## 2025-07-09 RX ADMIN — SACUBITRIL AND VALSARTAN 1 TABLET(S): 6; 6 PELLET ORAL at 05:57

## 2025-07-09 RX ADMIN — FUROSEMIDE 40 MILLIGRAM(S): 10 INJECTION INTRAMUSCULAR; INTRAVENOUS at 05:57

## 2025-07-09 RX ADMIN — Medication 1 APPLICATION(S): at 06:02

## 2025-07-09 NOTE — DISCHARGE NOTE NURSING/CASE MANAGEMENT/SOCIAL WORK - NSDCVIVACCINE_GEN_ALL_CORE_FT
Tdap; 03-Jul-2021 15:00; Montserrat Kimble (CELIA); [employer]; [id number] (Exp. Date: 18-Nov-2022); IntraMuscular; Deltoid Left.; 0.5 milliLiter(s); VIS (VIS Published: 09-May-2013, VIS Presented: 03-Jul-2021);

## 2025-07-09 NOTE — DISCHARGE NOTE PROVIDER - CARE PROVIDER_API CALL
Shaw Armstrong  Interventional Cardiology  [address] [place] [id number]  Phone: [phone]  Fax: [phone]  Follow Up Time: 1 week

## 2025-07-09 NOTE — DISCHARGE NOTE PROVIDER - NSDCFUSCHEDAPPT_GEN_ALL_CORE_FT
[facility] Physician [facility]  CARDIOLOGY [address] [facility]  Scheduled Appointment: 09/11/2025

## 2025-07-09 NOTE — DISCHARGE NOTE PROVIDER - NSDCMRMEDTOKEN_GEN_ALL_CORE_FT
Ambien 10 mg oral tablet: 1 tab(s) orally once a day (at bedtime), As Needed  apixaban 5 mg oral tablet: 1 tab(s) orally 2 times a day  atorvastatin 40 mg oral tablet: 1 tab(s) orally once a day (at bedtime)  furosemide 40 mg oral tablet: 1 tab(s) orally once a day  metFORMIN 500 mg oral tablet: 1 tab(s) orally 2 times a day  Metoprolol Tartrate 50 mg oral tablet: 1 tab(s) orally 2 times a day  sacubitril-valsartan 24 mg-26 mg oral tablet: 1 tab(s) orally 2 times a day  tamsulosin 0.4 mg oral capsule: 1 cap(s) orally once a day (at bedtime)

## 2025-07-09 NOTE — DISCHARGE NOTE PROVIDER - HOSPITAL COURSE
A 89-year-old male with a history of aortic stenosis status post TAVR in 2016, coronary artery disease status post PCI with DESx3 in prox, mid, distal SVG to OM1 2/2 UA in 2023 and CABG (LIMA to LAD, saphenous vein grafts to OM1 and PDA), chronic atrial fibrillation previously on Coumadin, currently on Eliquis with prior MAZE procedure and permanent pacemaker, heart failure with reduced EF (30–35% in 2023), carotid artery disease status post right carotid endarterectomy in 2013, hypertension, dyslipidemia, and COPD, presents with a 5-day history of worsening shortness of breath, inability to finish sentences, new lower extremity edema, orthopnea, and PND, now unable to climb a full flight of stairs or walk his usual distance. He denies any chest pain, just reports pressure that started with onset of symptoms. He denies fever, chills, chest pain, GI or  symptoms, and has been off Eliquis and Plavix since July 4 due to ongoing bladder cancer treatment, planning to resume them on July 9. Patient was diagnosed with bladder CA 1 year ago, he underwent polypectomy as per patient, cancer recurred, and patient underwent 6 sessions of intravesicular Ctx. Patient had recurrence recently and is scheduled for repeat cystoscopy on Wednesday. Patient follows with Dr. Vasques on OP.    Acute CHFrEF exacerbation (EF 30–35%)  / PELAYO and at rest   Troponinemia   Paroxysmal Nocturnal Dyspnea   Chronic AFIB   CKD stage III  HTN/HL  COPD - not in exacerbation   Bladder cancer   age related debility   immunosenescence     -admitted to tele floor  -labs reviewed - trops downtrended at 11am 61 from 65 from 55 from 58 - stop trending - no active chest pain - NSTEMI ruled out   -BNP > 9K, cxr with effusions, IV lasix 40mg twice daily for now - check IVC - cardio consulted (case discussed with Dr. Armstrong over Teams phone call)  -medical optimization with GDMT - resume Entresto, on BB, farxiga upon d/c and or when more euvolemic (add aldactone outpatient - d/c hydralazine)  -Plavix and Eliquis on hold as patient was scheduled for cystoscopy tomorrow (unable to go for the procedure and requires inpatient monitoring with IV diuresis)   -switch to Lovenox therapeutic dose while inpatient and Eliquis on d/c if no cysto planned  -home meds   -oob as tolerated (already in chair and assist with ambulation as tolerated)  -skin care as per nursing protocol   A 89-year-old male with a history of aortic stenosis status post TAVR in 2016, coronary artery disease status post PCI with DESx3 in prox, mid, distal SVG to OM1 2/2 UA in 2023 and CABG (LIMA to LAD, saphenous vein grafts to OM1 and PDA), chronic atrial fibrillation previously on Coumadin, currently on Eliquis with prior MAZE procedure and permanent pacemaker, heart failure with reduced EF (30–35% in 2023), carotid artery disease status post right carotid endarterectomy in 2013, hypertension, dyslipidemia, and COPD, presents with a 5-day history of worsening shortness of breath, inability to finish sentences, new lower extremity edema, orthopnea, and PND, now unable to climb a full flight of stairs or walk his usual distance. He denies any chest pain, just reports pressure that started with onset of symptoms. He denies fever, chills, chest pain, GI or  symptoms, and has been off Eliquis and Plavix since July 4 due to ongoing bladder cancer treatment, planning to resume them on July 9. Patient was diagnosed with bladder CA 1 year ago, he underwent polypectomy as per patient, cancer recurred, and patient underwent 6 sessions of intravesicular Ctx. Patient had recurrence recently and is scheduled for repeat cystoscopy on Wednesday. Patient follows with Dr. Vasques on OP.    Acute CHFrEF exacerbation (EF 30–35%)  / PELAYO and at rest   Troponinemia   Paroxysmal Nocturnal Dyspnea   Chronic AFIB   CKD stage III  HTN/HL  COPD - not in exacerbation   Bladder cancer   age related debility   immunosenescence     -admitted to tele floor  -labs reviewed - trops downtrended at 11am 61 from 65 from 55 from 58 - stop trending - no active chest pain - NSTEMI ruled out   -BNP > 9K, cxr with effusions, IV lasix 40mg twice daily  and changed to 40mg PO - cardio consulted appreciated (case discussed with Dr. Armstrong over Teams phone call)  -medical optimization with GDMT - resume Entresto, on BB, farxiga upon d/c and or when more euvolemic (add aldactone outpatient - d/c hydralazine)  -dc on eliquis   -home meds   -oob as tolerated (already in chair and assist with ambulation as tolerated)  -TTE - EF 45-50% 7/8    Pt discharged with home care

## 2025-07-09 NOTE — CHART NOTE - NSCHARTNOTEFT_GEN_A_CORE
Electrophysiology    Pts device MDT  dual chamber PPM was interrogated on 07-09-25  Device working properly  Battery longevity: 9.1 yrs  Mode: VVIR 60 bpm    : 93.9% (dependent)    Events:  Patient has permanent AFIB on Eliquis 5 mg BID for thromboembolic prophylaxis.   No other episodes of arrhythmia recorded.     Reviewed by attending Dr. Tai    Contact EP ACP with any questions [phone]

## 2025-07-09 NOTE — DISCHARGE NOTE NURSING/CASE MANAGEMENT/SOCIAL WORK - FINANCIAL ASSISTANCE
[facility] provides services at a reduced cost to those who are determined to be eligible through [facility]’s financial assistance program. Information regarding [facility]’s financial assistance program can be found by going to [email or web address] or by calling [phone].

## 2025-07-09 NOTE — DISCHARGE NOTE PROVIDER - NSDCCPCAREPLAN_GEN_ALL_CORE_FT
PRINCIPAL DISCHARGE DIAGNOSIS  Diagnosis: Congestive heart failure (CHF)  Assessment and Plan of Treatment: You came to the hospital for increased fluid in the lungs and lower legs.  You stated that you were not taking your diuretic as prescribed and your diet included salty foods.  Please take your diuretic medication as prescribed and eat a low salt diet.  Please weigh yourself daily, in the morning after urinating.  Please keep a log of your weigh and call your cardiologist office if you gain 2-3 pounds in 24 hours or 5-7 pounds in 7 days.      SECONDARY DISCHARGE DIAGNOSES  Diagnosis: Shortness of breath  Assessment and Plan of Treatment:     Diagnosis: Pleural effusion  Assessment and Plan of Treatment:

## 2025-07-09 NOTE — DISCHARGE NOTE PROVIDER - NSDCHC_MEDRECSTATUS_GEN_ALL_CORE
Admission Reconciliation is Completed  Discharge Reconciliation is Completed
Risks/benefits discussed with patient/surrogate

## 2025-07-09 NOTE — DISCHARGE NOTE NURSING/CASE MANAGEMENT/SOCIAL WORK - PATIENT PORTAL LINK FT
You can access the FollowMyHealth Patient Portal offered by [facility] by registering at the following website: [email or web address]. By joining [employer]’s FollowMyHealth portal, you will also be able to view your health information using other applications (apps) compatible with our system.

## 2025-07-09 NOTE — DISCHARGE NOTE PROVIDER - ATTENDING DISCHARGE PHYSICAL EXAMINATION:
Vital Signs Last 24 Hrs  T(F): 97.3 (09 Jul 2025 12:12), Max: 97.8 (08 Jul 2025 20:29)  HR: 60 (09 Jul 2025 12:12) (60 - 66)  BP: 125/72 (09 Jul 2025 12:12) (114/65 - 159/70)  RR: 18 (09 Jul 2025 12:12) (18 - 18)  SpO2: 98% (09 Jul 2025 05:37) (98% - 98%)    PHYSICAL EXAM:  GENERAL: NAD, well-groomed, well-developed  HEAD:  Atraumatic, Normocephalic  EYES: EOMI, conjunctiva and sclera clear  ENMT: Moist mucous membranes, Good dentition, no thrush  NECK: Supple, No JVD  CHEST/LUNG: Clear to auscultation bilaterally, good air entry, non-labored breathing  HEART: RRR; S1/S2, 2/6 murmur   ABDOMEN: Soft, Nontender, Nondistended; Bowel sounds present  VASCULAR: Normal pulses, Normal capillary refill  EXTREMITIES: No calf tenderness, No cyanosis, No edema  LYMPH: Normal; No lymphadenopathy noted  SKIN: Warm, Intact  PSYCH: Normal mood, Normal affect  NERVOUS SYSTEM:  A/O x3, Good concentration; CN 2-12 intact, No focal deficits

## 2025-07-15 DIAGNOSIS — R06.02 SHORTNESS OF BREATH: ICD-10-CM

## 2025-07-15 DIAGNOSIS — I50.23 ACUTE ON CHRONIC SYSTOLIC (CONGESTIVE) HEART FAILURE: ICD-10-CM

## 2025-07-15 DIAGNOSIS — R06.01 ORTHOPNEA: ICD-10-CM

## 2025-07-15 DIAGNOSIS — Z91.041 RADIOGRAPHIC DYE ALLERGY STATUS: ICD-10-CM

## 2025-07-15 DIAGNOSIS — Z95.1 PRESENCE OF AORTOCORONARY BYPASS GRAFT: ICD-10-CM

## 2025-07-15 DIAGNOSIS — E78.5 HYPERLIPIDEMIA, UNSPECIFIED: ICD-10-CM

## 2025-07-15 DIAGNOSIS — R06.09 OTHER FORMS OF DYSPNEA: ICD-10-CM

## 2025-07-15 DIAGNOSIS — Z79.02 LONG TERM (CURRENT) USE OF ANTITHROMBOTICS/ANTIPLATELETS: ICD-10-CM

## 2025-07-15 DIAGNOSIS — J44.9 CHRONIC OBSTRUCTIVE PULMONARY DISEASE, UNSPECIFIED: ICD-10-CM

## 2025-07-15 DIAGNOSIS — E87.70 FLUID OVERLOAD, UNSPECIFIED: ICD-10-CM

## 2025-07-15 DIAGNOSIS — Z95.0 PRESENCE OF CARDIAC PACEMAKER: ICD-10-CM

## 2025-07-15 DIAGNOSIS — Z95.5 PRESENCE OF CORONARY ANGIOPLASTY IMPLANT AND GRAFT: ICD-10-CM

## 2025-07-15 DIAGNOSIS — I25.10 ATHEROSCLEROTIC HEART DISEASE OF NATIVE CORONARY ARTERY WITHOUT ANGINA PECTORIS: ICD-10-CM

## 2025-07-15 DIAGNOSIS — I48.20 CHRONIC ATRIAL FIBRILLATION, UNSPECIFIED: ICD-10-CM

## 2025-07-15 DIAGNOSIS — I13.0 HYPERTENSIVE HEART AND CHRONIC KIDNEY DISEASE WITH HEART FAILURE AND STAGE 1 THROUGH STAGE 4 CHRONIC KIDNEY DISEASE, OR UNSPECIFIED CHRONIC KIDNEY DISEASE: ICD-10-CM

## 2025-07-15 DIAGNOSIS — Z95.2 PRESENCE OF PROSTHETIC HEART VALVE: ICD-10-CM

## 2025-07-15 DIAGNOSIS — E87.20 ACIDOSIS, UNSPECIFIED: ICD-10-CM

## 2025-07-15 DIAGNOSIS — Z79.01 LONG TERM (CURRENT) USE OF ANTICOAGULANTS: ICD-10-CM

## 2025-07-15 DIAGNOSIS — C67.9 MALIGNANT NEOPLASM OF BLADDER, UNSPECIFIED: ICD-10-CM

## 2025-07-16 ENCOUNTER — TRANSCRIPTION ENCOUNTER (OUTPATIENT)
Age: 89
End: 2025-07-16

## 2025-07-23 ENCOUNTER — TRANSCRIPTION ENCOUNTER (OUTPATIENT)
Age: 89
End: 2025-07-23

## 2025-08-01 ENCOUNTER — TRANSCRIPTION ENCOUNTER (OUTPATIENT)
Age: 89
End: 2025-08-01

## 2025-09-11 ENCOUNTER — APPOINTMENT (OUTPATIENT)
Dept: CARDIOLOGY | Facility: CLINIC | Age: 89
End: 2025-09-11

## 2025-09-11 PROCEDURE — 93296 REM INTERROG EVL PM/IDS: CPT

## 2025-09-11 PROCEDURE — 93294 REM INTERROG EVL PM/LDLS PM: CPT
